# Patient Record
Sex: FEMALE | Race: WHITE | Employment: UNEMPLOYED | URBAN - METROPOLITAN AREA
[De-identification: names, ages, dates, MRNs, and addresses within clinical notes are randomized per-mention and may not be internally consistent; named-entity substitution may affect disease eponyms.]

---

## 2017-01-10 ENCOUNTER — OFFICE VISIT (OUTPATIENT)
Dept: FAMILY MEDICINE CLINIC | Age: 37
End: 2017-01-10

## 2017-01-10 VITALS
HEART RATE: 88 BPM | OXYGEN SATURATION: 98 % | BODY MASS INDEX: 34.75 KG/M2 | HEIGHT: 60 IN | DIASTOLIC BLOOD PRESSURE: 78 MMHG | TEMPERATURE: 98.4 F | WEIGHT: 177 LBS | SYSTOLIC BLOOD PRESSURE: 120 MMHG

## 2017-01-10 DIAGNOSIS — Z01.818 PRE-OP EXAM: Primary | ICD-10-CM

## 2017-01-10 DIAGNOSIS — N30.10 INTERSTITIAL CYSTITIS: ICD-10-CM

## 2017-01-10 PROCEDURE — 99242 OFF/OP CONSLTJ NEW/EST SF 20: CPT | Performed by: FAMILY MEDICINE

## 2017-01-10 PROCEDURE — 93000 ELECTROCARDIOGRAM COMPLETE: CPT | Performed by: FAMILY MEDICINE

## 2017-01-10 ASSESSMENT — ENCOUNTER SYMPTOMS
RESPIRATORY NEGATIVE: 1
GASTROINTESTINAL NEGATIVE: 1

## 2017-01-31 ENCOUNTER — OFFICE VISIT (OUTPATIENT)
Dept: FAMILY MEDICINE CLINIC | Age: 37
End: 2017-01-31

## 2017-01-31 VITALS
HEIGHT: 60 IN | BODY MASS INDEX: 35.14 KG/M2 | SYSTOLIC BLOOD PRESSURE: 102 MMHG | WEIGHT: 179 LBS | DIASTOLIC BLOOD PRESSURE: 72 MMHG

## 2017-01-31 DIAGNOSIS — J01.00 ACUTE NON-RECURRENT MAXILLARY SINUSITIS: ICD-10-CM

## 2017-01-31 DIAGNOSIS — E66.9 OBESITY (BMI 30.0-34.9): ICD-10-CM

## 2017-01-31 DIAGNOSIS — N30.10 INTERSTITIAL CYSTITIS: Primary | ICD-10-CM

## 2017-01-31 PROCEDURE — 99214 OFFICE O/P EST MOD 30 MIN: CPT | Performed by: FAMILY MEDICINE

## 2017-01-31 RX ORDER — AZITHROMYCIN 250 MG/1
TABLET, FILM COATED ORAL
Qty: 1 PACKET | Refills: 0 | Status: SHIPPED | OUTPATIENT
Start: 2017-01-31 | End: 2017-02-10

## 2017-01-31 ASSESSMENT — ENCOUNTER SYMPTOMS
BACK PAIN: 1
SORE THROAT: 1
NAUSEA: 1
RHINORRHEA: 1
COUGH: 1
SINUS PRESSURE: 1

## 2017-02-21 DIAGNOSIS — E66.9 OBESITY (BMI 30.0-34.9): ICD-10-CM

## 2017-02-22 ENCOUNTER — TELEPHONE (OUTPATIENT)
Dept: FAMILY MEDICINE CLINIC | Age: 37
End: 2017-02-22

## 2017-02-28 ENCOUNTER — OFFICE VISIT (OUTPATIENT)
Dept: FAMILY MEDICINE CLINIC | Age: 37
End: 2017-02-28

## 2017-02-28 VITALS
WEIGHT: 173 LBS | BODY MASS INDEX: 32.66 KG/M2 | SYSTOLIC BLOOD PRESSURE: 122 MMHG | HEIGHT: 61 IN | DIASTOLIC BLOOD PRESSURE: 82 MMHG

## 2017-02-28 DIAGNOSIS — K42.9 UMBILICAL HERNIA WITHOUT OBSTRUCTION AND WITHOUT GANGRENE: Primary | ICD-10-CM

## 2017-02-28 DIAGNOSIS — E66.9 OBESITY (BMI 30.0-34.9): ICD-10-CM

## 2017-02-28 PROCEDURE — 99214 OFFICE O/P EST MOD 30 MIN: CPT | Performed by: FAMILY MEDICINE

## 2017-02-28 ASSESSMENT — ENCOUNTER SYMPTOMS
ABDOMINAL PAIN: 1
RESPIRATORY NEGATIVE: 1

## 2017-03-09 ENCOUNTER — INITIAL CONSULT (OUTPATIENT)
Dept: SURGERY | Age: 37
End: 2017-03-09

## 2017-03-09 VITALS
DIASTOLIC BLOOD PRESSURE: 60 MMHG | SYSTOLIC BLOOD PRESSURE: 100 MMHG | WEIGHT: 173 LBS | HEIGHT: 61 IN | BODY MASS INDEX: 32.66 KG/M2

## 2017-03-09 DIAGNOSIS — K42.9 UMBILICAL HERNIA WITHOUT OBSTRUCTION AND WITHOUT GANGRENE: Primary | ICD-10-CM

## 2017-03-09 PROCEDURE — 99243 OFF/OP CNSLTJ NEW/EST LOW 30: CPT | Performed by: SURGERY

## 2017-03-09 ASSESSMENT — ENCOUNTER SYMPTOMS
ABDOMINAL PAIN: 1
ABDOMINAL DISTENTION: 1

## 2017-03-17 ENCOUNTER — OFFICE VISIT (OUTPATIENT)
Dept: FAMILY MEDICINE CLINIC | Age: 37
End: 2017-03-17

## 2017-03-17 VITALS
HEIGHT: 60 IN | OXYGEN SATURATION: 98 % | HEART RATE: 107 BPM | SYSTOLIC BLOOD PRESSURE: 116 MMHG | WEIGHT: 173 LBS | DIASTOLIC BLOOD PRESSURE: 80 MMHG | TEMPERATURE: 97.5 F | BODY MASS INDEX: 33.96 KG/M2

## 2017-03-17 DIAGNOSIS — Z01.818 PRE-OP EXAM: Primary | ICD-10-CM

## 2017-03-17 DIAGNOSIS — K42.9 UMBILICAL HERNIA WITHOUT OBSTRUCTION AND WITHOUT GANGRENE: ICD-10-CM

## 2017-03-17 DIAGNOSIS — E66.9 OBESITY (BMI 30.0-34.9): ICD-10-CM

## 2017-03-17 PROCEDURE — 99242 OFF/OP CONSLTJ NEW/EST SF 20: CPT | Performed by: FAMILY MEDICINE

## 2017-03-17 PROCEDURE — 93000 ELECTROCARDIOGRAM COMPLETE: CPT | Performed by: FAMILY MEDICINE

## 2017-03-17 ASSESSMENT — ENCOUNTER SYMPTOMS
RESPIRATORY NEGATIVE: 1
ABDOMINAL PAIN: 1

## 2017-03-22 ENCOUNTER — PAT TELEPHONE (OUTPATIENT)
Dept: PREADMISSION TESTING | Age: 37
End: 2017-03-22

## 2017-03-22 VITALS — WEIGHT: 173 LBS | BODY MASS INDEX: 32.66 KG/M2 | HEIGHT: 61 IN

## 2017-03-23 ENCOUNTER — SURG/PROC ORDERS (OUTPATIENT)
Dept: ANESTHESIOLOGY | Age: 37
End: 2017-03-23

## 2017-03-23 RX ORDER — SODIUM CHLORIDE 0.9 % (FLUSH) 0.9 %
10 SYRINGE (ML) INJECTION PRN
Status: CANCELLED | OUTPATIENT
Start: 2017-03-23

## 2017-03-23 RX ORDER — SODIUM CHLORIDE 9 MG/ML
INJECTION, SOLUTION INTRAVENOUS CONTINUOUS
Status: CANCELLED | OUTPATIENT
Start: 2017-03-23

## 2017-03-23 RX ORDER — SODIUM CHLORIDE 0.9 % (FLUSH) 0.9 %
10 SYRINGE (ML) INJECTION EVERY 12 HOURS SCHEDULED
Status: CANCELLED | OUTPATIENT
Start: 2017-03-23

## 2017-03-24 ENCOUNTER — HOSPITAL ENCOUNTER (OUTPATIENT)
Dept: SURGERY | Age: 37
Discharge: HOME | End: 2017-03-24
Attending: SURGERY | Admitting: SURGERY

## 2017-03-24 VITALS
SYSTOLIC BLOOD PRESSURE: 109 MMHG | TEMPERATURE: 97.8 F | DIASTOLIC BLOOD PRESSURE: 72 MMHG | OXYGEN SATURATION: 95 % | HEART RATE: 68 BPM | RESPIRATION RATE: 14 BRPM

## 2017-03-24 LAB
A/G RATIO: 2 (ref 1.1–2.2)
ALBUMIN SERPL-MCNC: 4.2 G/DL (ref 3.4–5)
ALP BLD-CCNC: 70 U/L (ref 40–129)
ALT SERPL-CCNC: 20 U/L (ref 10–40)
ANION GAP SERPL CALCULATED.3IONS-SCNC: 13 MMOL/L (ref 3–16)
AST SERPL-CCNC: 15 U/L (ref 15–37)
BILIRUB SERPL-MCNC: <0.2 MG/DL (ref 0–1)
BUN BLDV-MCNC: 16 MG/DL (ref 7–20)
CALCIUM SERPL-MCNC: 9.3 MG/DL (ref 8.3–10.6)
CHLORIDE BLD-SCNC: 104 MMOL/L (ref 99–110)
CO2: 21 MMOL/L (ref 21–32)
CREAT SERPL-MCNC: 0.7 MG/DL (ref 0.6–1.1)
GFR AFRICAN AMERICAN: >60
GFR NON-AFRICAN AMERICAN: >60
GLOBULIN: 2.1 G/DL
GLUCOSE BLD-MCNC: 98 MG/DL (ref 70–99)
HCT VFR BLD CALC: 39.9 % (ref 36–48)
HEMOGLOBIN: 13.2 G/DL (ref 12–16)
MCH RBC QN AUTO: 29.6 PG (ref 26–34)
MCHC RBC AUTO-ENTMCNC: 33.1 G/DL (ref 31–36)
MCV RBC AUTO: 89.6 FL (ref 80–100)
PDW BLD-RTO: 14 % (ref 12.4–15.4)
PLATELET # BLD: 233 K/UL (ref 135–450)
PMV BLD AUTO: 7.8 FL (ref 5–10.5)
POTASSIUM SERPL-SCNC: 3.6 MMOL/L (ref 3.5–5.1)
RBC # BLD: 4.45 M/UL (ref 4–5.2)
SODIUM BLD-SCNC: 138 MMOL/L (ref 136–145)
TOTAL PROTEIN: 6.3 G/DL (ref 6.4–8.2)
WBC # BLD: 12.2 K/UL (ref 4–11)

## 2017-03-24 PROCEDURE — 49585 REPAIR UMBILICAL HERN,5+Y/O,REDUC: CPT | Performed by: SURGERY

## 2017-03-24 RX ORDER — PROMETHAZINE HYDROCHLORIDE 25 MG/ML
6.25 INJECTION, SOLUTION INTRAMUSCULAR; INTRAVENOUS
Status: COMPLETED | OUTPATIENT
Start: 2017-03-24 | End: 2017-03-24

## 2017-03-24 RX ORDER — FENTANYL CITRATE 50 UG/ML
50 INJECTION, SOLUTION INTRAMUSCULAR; INTRAVENOUS EVERY 5 MIN PRN
Status: DISCONTINUED | OUTPATIENT
Start: 2017-03-24 | End: 2017-03-25 | Stop reason: HOSPADM

## 2017-03-24 RX ORDER — MORPHINE SULFATE 2 MG/ML
1 INJECTION, SOLUTION INTRAMUSCULAR; INTRAVENOUS EVERY 5 MIN PRN
Status: DISCONTINUED | OUTPATIENT
Start: 2017-03-24 | End: 2017-03-25 | Stop reason: HOSPADM

## 2017-03-24 RX ORDER — OXYCODONE HYDROCHLORIDE AND ACETAMINOPHEN 5; 325 MG/1; MG/1
2 TABLET ORAL PRN
Status: ACTIVE | OUTPATIENT
Start: 2017-03-24 | End: 2017-03-24

## 2017-03-24 RX ORDER — ALPRAZOLAM 0.5 MG/1
0.5 TABLET ORAL NIGHTLY PRN
COMMUNITY
End: 2017-10-12 | Stop reason: SDUPTHER

## 2017-03-24 RX ORDER — SODIUM CHLORIDE 0.9 % (FLUSH) 0.9 %
10 SYRINGE (ML) INJECTION EVERY 12 HOURS SCHEDULED
Status: DISCONTINUED | OUTPATIENT
Start: 2017-03-24 | End: 2017-03-25 | Stop reason: HOSPADM

## 2017-03-24 RX ORDER — MEPERIDINE HYDROCHLORIDE 25 MG/ML
12.5 INJECTION INTRAMUSCULAR; INTRAVENOUS; SUBCUTANEOUS EVERY 5 MIN PRN
Status: DISCONTINUED | OUTPATIENT
Start: 2017-03-24 | End: 2017-03-25 | Stop reason: HOSPADM

## 2017-03-24 RX ORDER — MORPHINE SULFATE 2 MG/ML
2 INJECTION, SOLUTION INTRAMUSCULAR; INTRAVENOUS EVERY 5 MIN PRN
Status: DISCONTINUED | OUTPATIENT
Start: 2017-03-24 | End: 2017-03-25 | Stop reason: HOSPADM

## 2017-03-24 RX ORDER — SODIUM CHLORIDE 9 MG/ML
INJECTION, SOLUTION INTRAVENOUS CONTINUOUS
Status: DISCONTINUED | OUTPATIENT
Start: 2017-03-24 | End: 2017-03-25 | Stop reason: HOSPADM

## 2017-03-24 RX ORDER — SODIUM CHLORIDE 0.9 % (FLUSH) 0.9 %
10 SYRINGE (ML) INJECTION PRN
Status: DISCONTINUED | OUTPATIENT
Start: 2017-03-24 | End: 2017-03-25 | Stop reason: HOSPADM

## 2017-03-24 RX ORDER — ONDANSETRON 2 MG/ML
4 INJECTION INTRAMUSCULAR; INTRAVENOUS
Status: COMPLETED | OUTPATIENT
Start: 2017-03-24 | End: 2017-03-24

## 2017-03-24 RX ORDER — FENTANYL CITRATE 50 UG/ML
25 INJECTION, SOLUTION INTRAMUSCULAR; INTRAVENOUS EVERY 5 MIN PRN
Status: DISCONTINUED | OUTPATIENT
Start: 2017-03-24 | End: 2017-03-25 | Stop reason: HOSPADM

## 2017-03-24 RX ORDER — OXYCODONE HYDROCHLORIDE AND ACETAMINOPHEN 5; 325 MG/1; MG/1
1 TABLET ORAL PRN
Status: ACTIVE | OUTPATIENT
Start: 2017-03-24 | End: 2017-03-24

## 2017-03-24 RX ORDER — OXYCODONE HYDROCHLORIDE 5 MG/1
10 TABLET ORAL
Status: ACTIVE | OUTPATIENT
Start: 2017-03-24 | End: 2017-03-24

## 2017-03-24 RX ORDER — APREPITANT 40 MG/1
40 CAPSULE ORAL ONCE
Status: COMPLETED | OUTPATIENT
Start: 2017-03-24 | End: 2017-03-24

## 2017-03-24 RX ADMIN — APREPITANT 40 MG: 40 CAPSULE ORAL at 07:09

## 2017-03-24 RX ADMIN — FENTANYL CITRATE 50 MCG: 50 INJECTION, SOLUTION INTRAMUSCULAR; INTRAVENOUS at 09:27

## 2017-03-24 RX ADMIN — SODIUM CHLORIDE: 9 INJECTION, SOLUTION INTRAVENOUS at 07:12

## 2017-03-24 RX ADMIN — FENTANYL CITRATE 25 MCG: 50 INJECTION, SOLUTION INTRAMUSCULAR; INTRAVENOUS at 10:24

## 2017-03-24 RX ADMIN — FENTANYL CITRATE 50 MCG: 50 INJECTION, SOLUTION INTRAMUSCULAR; INTRAVENOUS at 09:04

## 2017-03-24 RX ADMIN — ONDANSETRON 4 MG: 2 INJECTION INTRAMUSCULAR; INTRAVENOUS at 09:22

## 2017-03-24 RX ADMIN — PROMETHAZINE HYDROCHLORIDE 6.25 MG: 25 INJECTION, SOLUTION INTRAMUSCULAR; INTRAVENOUS at 09:35

## 2017-03-24 ASSESSMENT — PAIN SCALES - GENERAL: PAINLEVEL_OUTOF10: 7

## 2017-03-24 ASSESSMENT — ENCOUNTER SYMPTOMS: SHORTNESS OF BREATH: 0

## 2017-04-06 ENCOUNTER — OFFICE VISIT (OUTPATIENT)
Dept: SURGERY | Age: 37
End: 2017-04-06

## 2017-04-06 DIAGNOSIS — K42.9 UMBILICAL HERNIA WITHOUT OBSTRUCTION AND WITHOUT GANGRENE: Primary | ICD-10-CM

## 2017-04-06 PROCEDURE — 99024 POSTOP FOLLOW-UP VISIT: CPT | Performed by: SURGERY

## 2017-05-18 ENCOUNTER — OFFICE VISIT (OUTPATIENT)
Dept: SURGERY | Age: 37
End: 2017-05-18

## 2017-05-18 DIAGNOSIS — K42.9 UMBILICAL HERNIA WITHOUT OBSTRUCTION AND WITHOUT GANGRENE: Primary | ICD-10-CM

## 2017-05-18 PROCEDURE — 99024 POSTOP FOLLOW-UP VISIT: CPT | Performed by: SURGERY

## 2017-05-23 ENCOUNTER — OFFICE VISIT (OUTPATIENT)
Dept: FAMILY MEDICINE CLINIC | Age: 37
End: 2017-05-23

## 2017-05-23 VITALS
BODY MASS INDEX: 34.29 KG/M2 | SYSTOLIC BLOOD PRESSURE: 116 MMHG | WEIGHT: 181.6 LBS | HEIGHT: 61 IN | DIASTOLIC BLOOD PRESSURE: 76 MMHG
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
## 2017-05-23 DIAGNOSIS — B00.1 FEVER BLISTER: Primary | ICD-10-CM

## 2017-05-23 DIAGNOSIS — R53.83 OTHER FATIGUE: ICD-10-CM

## 2017-05-23 DIAGNOSIS — K21.9 GASTROESOPHAGEAL REFLUX DISEASE WITHOUT ESOPHAGITIS: ICD-10-CM

## 2017-05-23 DIAGNOSIS — R60.9 EDEMA, UNSPECIFIED TYPE: ICD-10-CM

## 2017-05-23 DIAGNOSIS — R00.2 HEART PALPITATIONS: ICD-10-CM

## 2017-05-23 DIAGNOSIS — L65.9 HAIR LOSS: ICD-10-CM

## 2017-05-23 LAB
A/G RATIO: 2.6 (ref 1.1–2.2)
ALBUMIN SERPL-MCNC: 4.4 G/DL (ref 3.4–5)
ALP BLD-CCNC: 91 U/L (ref 40–129)
ALT SERPL-CCNC: 70 U/L (ref 10–40)
ANION GAP SERPL CALCULATED.3IONS-SCNC: 16 MMOL/L (ref 3–16)
AST SERPL-CCNC: 37 U/L (ref 15–37)
BILIRUB SERPL-MCNC: 0.3 MG/DL (ref 0–1)
BUN BLDV-MCNC: 11 MG/DL (ref 7–20)
CALCIUM SERPL-MCNC: 9.4 MG/DL (ref 8.3–10.6)
CHLORIDE BLD-SCNC: 106 MMOL/L (ref 99–110)
CO2: 21 MMOL/L (ref 21–32)
CREAT SERPL-MCNC: 0.9 MG/DL (ref 0.6–1.1)
GFR AFRICAN AMERICAN: >60
GFR NON-AFRICAN AMERICAN: >60
GLOBULIN: 1.7 G/DL
GLUCOSE BLD-MCNC: 113 MG/DL (ref 70–99)
HCT VFR BLD CALC: 42.1 % (ref 36–48)
HEMOGLOBIN: 13.6 G/DL (ref 12–16)
MCH RBC QN AUTO: 29.5 PG (ref 26–34)
MCHC RBC AUTO-ENTMCNC: 32.3 G/DL (ref 31–36)
MCV RBC AUTO: 91.3 FL (ref 80–100)
PDW BLD-RTO: 14 % (ref 12.4–15.4)
PLATELET # BLD: 250 K/UL (ref 135–450)
PMV BLD AUTO: 8.5 FL (ref 5–10.5)
POTASSIUM SERPL-SCNC: 4.1 MMOL/L (ref 3.5–5.1)
RBC # BLD: 4.61 M/UL (ref 4–5.2)
SODIUM BLD-SCNC: 143 MMOL/L (ref 136–145)
TOTAL PROTEIN: 6.1 G/DL (ref 6.4–8.2)
TSH SERPL DL<=0.05 MIU/L-ACNC: 2.91 UIU/ML (ref 0.27–4.2)
WBC # BLD: 8.6 K/UL (ref 4–11)

## 2017-05-23 PROCEDURE — 99214 OFFICE O/P EST MOD 30 MIN: CPT | Performed by: FAMILY MEDICINE

## 2017-05-23 PROCEDURE — 36415 COLL VENOUS BLD VENIPUNCTURE: CPT | Performed by: FAMILY MEDICINE

## 2017-05-23 RX ORDER — FAMOTIDINE 40 MG/1
40 TABLET, FILM COATED ORAL 2 TIMES DAILY
Qty: 60 TABLET | Refills: 3 | Status: SHIPPED | OUTPATIENT
Start: 2017-05-23 | End: 2019-09-17 | Stop reason: SDUPTHER

## 2017-05-23 RX ORDER — FAMCICLOVIR 500 MG/1
500 TABLET, FILM COATED ORAL 3 TIMES DAILY
Qty: 30 TABLET | Refills: 2 | Status: SHIPPED | OUTPATIENT
Start: 2017-05-23 | End: 2018-05-14 | Stop reason: SDUPTHER

## 2017-05-23 RX ORDER — HYDROCHLOROTHIAZIDE 25 MG/1
25 TABLET ORAL DAILY
Qty: 30 TABLET | Refills: 3 | Status: SHIPPED | OUTPATIENT
Start: 2017-05-23 | End: 2021-12-20

## 2017-05-23 RX ORDER — PROPRANOLOL HYDROCHLORIDE 20 MG/1
20 TABLET ORAL 3 TIMES DAILY
Qty: 90 TABLET | Refills: 2 | Status: SHIPPED | OUTPATIENT
Start: 2017-05-23

## 2017-05-23 RX ORDER — PHENAZOPYRIDINE HYDROCHLORIDE 200 MG/1
200 TABLET, FILM COATED ORAL 3 TIMES DAILY PRN
Qty: 30 TABLET | Refills: 0 | Status: SHIPPED | OUTPATIENT
Start: 2017-05-23 | End: 2017-05-26

## 2017-05-23 ASSESSMENT — ENCOUNTER SYMPTOMS
GASTROINTESTINAL NEGATIVE: 1
RESPIRATORY NEGATIVE: 1
BACK PAIN: 1

## 2017-05-24 LAB
ANA INTERPRETATION: NORMAL
ANTI-NUCLEAR ANTIBODY (ANA): NEGATIVE

## 2017-05-25 LAB — LYME, EIA: 0.49 LIV (ref 0–1.2)

## 2017-05-30 ENCOUNTER — TELEPHONE (OUTPATIENT)
Dept: INTERNAL MEDICINE CLINIC | Age: 37
End: 2017-05-30

## 2017-05-30 ENCOUNTER — OFFICE VISIT (OUTPATIENT)
Dept: FAMILY MEDICINE CLINIC | Age: 37
End: 2017-05-30

## 2017-05-30 VITALS
SYSTOLIC BLOOD PRESSURE: 112 MMHG | WEIGHT: 179.4 LBS | DIASTOLIC BLOOD PRESSURE: 76 MMHG | BODY MASS INDEX: 33.87 KG/M2 | HEIGHT: 61 IN

## 2017-05-30 DIAGNOSIS — R53.83 FATIGUE, UNSPECIFIED TYPE: ICD-10-CM

## 2017-05-30 DIAGNOSIS — M25.50 POLYARTHRALGIA: Primary | ICD-10-CM

## 2017-05-30 DIAGNOSIS — R60.9 EDEMA, UNSPECIFIED TYPE: ICD-10-CM

## 2017-05-30 PROCEDURE — 99214 OFFICE O/P EST MOD 30 MIN: CPT | Performed by: FAMILY MEDICINE

## 2017-05-30 ASSESSMENT — ENCOUNTER SYMPTOMS: RESPIRATORY NEGATIVE: 1

## 2017-06-06 ENCOUNTER — OFFICE VISIT (OUTPATIENT)
Dept: RHEUMATOLOGY | Age: 37
End: 2017-06-06

## 2017-06-06 VITALS
SYSTOLIC BLOOD PRESSURE: 116 MMHG | HEART RATE: 100 BPM | DIASTOLIC BLOOD PRESSURE: 60 MMHG | BODY MASS INDEX: 34.01 KG/M2 | WEIGHT: 180 LBS | OXYGEN SATURATION: 98 %

## 2017-06-06 DIAGNOSIS — M79.7 FIBROMYALGIA: ICD-10-CM

## 2017-06-06 DIAGNOSIS — Z13.89 SCREENING FOR RHEUMATIC DISORDER: Primary | ICD-10-CM

## 2017-06-06 DIAGNOSIS — B18.2 CHRONIC HEPATITIS C WITHOUT HEPATIC COMA (HCC): ICD-10-CM

## 2017-06-06 PROCEDURE — 99244 OFF/OP CNSLTJ NEW/EST MOD 40: CPT | Performed by: INTERNAL MEDICINE

## 2017-06-06 RX ORDER — TIZANIDINE 4 MG/1
4 TABLET ORAL
COMMUNITY
End: 2017-10-05 | Stop reason: ALTCHOICE

## 2017-06-15 DIAGNOSIS — Z13.89 SCREENING FOR RHEUMATIC DISORDER: ICD-10-CM

## 2017-06-15 DIAGNOSIS — B18.2 CHRONIC HEPATITIS C WITHOUT HEPATIC COMA (HCC): ICD-10-CM

## 2017-06-15 DIAGNOSIS — M79.7 FIBROMYALGIA: ICD-10-CM

## 2017-06-15 LAB
C-REACTIVE PROTEIN: 1.8 MG/L (ref 0–5.1)
C3 COMPLEMENT: 149.3 MG/DL (ref 90–180)
C4 COMPLEMENT: 18.8 MG/DL (ref 10–40)
HEPATITIS B CORE IGM ANTIBODY: NORMAL
HEPATITIS B SURFACE ANTIGEN INTERPRETATION: NORMAL
RHEUMATOID FACTOR: <10 IU/ML
SEDIMENTATION RATE, ERYTHROCYTE: 9 MM/HR (ref 0–20)
TOTAL CK: 47 U/L (ref 26–192)
TSH REFLEX FT4: 3.37 UIU/ML (ref 0.27–4.2)
VITAMIN B-12: 259 PG/ML (ref 211–911)
VITAMIN D 25-HYDROXY: 27.2 NG/ML

## 2017-06-16 LAB
ENA TO RNP ANTIBODY: NEGATIVE EU
ENA TO SMITH (SM) ANTIBODY: NEGATIVE EU
ENA TO SSA (RO) ANTIBODY: NEGATIVE EU
ENA TO SSB (LA) ANTIBODY: NEGATIVE EU

## 2017-06-17 LAB
ANA BY IFA: NORMAL
ANTICARDIOLIPIN IGA ANTIBODY: 0 APL (ref 0–11)
ANTICARDIOLIPIN IGG ANTIBODY: 7 GPL (ref 0–14)
BETA-2 GLYCOPROTEIN 1 IGG ANTIBODY: 0 SGU (ref 0–20)
BETA-2 GLYCOPROTEIN 1 IGM ANTIBODY: 2 SMU (ref 0–20)
CARDIOLIPIN AB IGM: 5 MPL (ref 0–12)
CCP IGG ANTIBODIES: 2 UNITS (ref 0–19)
DOUBLE STRANDED DNA AB, IGG: NORMAL
DRVVT CONFIRMATION TEST: ABNORMAL RATIO
DRVVT SCREEN: 37 SEC (ref 33–44)
DRVVT,DIL: ABNORMAL SEC (ref 33–44)
HEXAGONAL PHOSPHOLIPID NEUTRALIZAT TEST: ABNORMAL
LUPUS ANTICOAG INTERP: ABNORMAL
PLT NEUTA: ABNORMAL
PT D: 11.8 SEC (ref 12–15.5)
PTT D: 39 SEC (ref 32–48)
PTT-D CORR REFLEX: ABNORMAL SEC (ref 32–48)
PTT-HEPARIN NEUTRALIZED: ABNORMAL SEC (ref 32–48)
REPTILASE TIME: ABNORMAL SEC
THROMBIN TIME: ABNORMAL SEC (ref 14.7–19.5)

## 2017-06-20 ENCOUNTER — OFFICE VISIT (OUTPATIENT)
Dept: FAMILY MEDICINE CLINIC | Age: 37
End: 2017-06-20

## 2017-06-20 VITALS
WEIGHT: 178 LBS | DIASTOLIC BLOOD PRESSURE: 84 MMHG | SYSTOLIC BLOOD PRESSURE: 122 MMHG | BODY MASS INDEX: 33.61 KG/M2 | HEIGHT: 61 IN | TEMPERATURE: 98.3 F | HEART RATE: 96 BPM | OXYGEN SATURATION: 97 %

## 2017-06-20 DIAGNOSIS — N30.10 IC (INTERSTITIAL CYSTITIS): Primary | ICD-10-CM

## 2017-06-20 DIAGNOSIS — Z01.818 PREOP EXAMINATION: ICD-10-CM

## 2017-06-20 LAB — CRYOGLOBULIN, QUALITATIVE: NORMAL

## 2017-06-20 PROCEDURE — 99242 OFF/OP CONSLTJ NEW/EST SF 20: CPT | Performed by: FAMILY MEDICINE

## 2017-06-20 RX ORDER — EPINEPHRINE 0.3 MG/.3ML
0.3 INJECTION SUBCUTANEOUS ONCE
Qty: 0.3 ML | Refills: 1 | Status: ON HOLD | OUTPATIENT
Start: 2017-06-20 | End: 2019-04-12 | Stop reason: HOSPADM

## 2017-06-20 RX ORDER — PHENTERMINE HYDROCHLORIDE 37.5 MG/1
37.5 TABLET ORAL
Qty: 30 TABLET | Refills: 0 | Status: SHIPPED | OUTPATIENT
Start: 2017-06-20 | End: 2017-07-20

## 2017-06-20 ASSESSMENT — ENCOUNTER SYMPTOMS
GASTROINTESTINAL NEGATIVE: 1
RESPIRATORY NEGATIVE: 1

## 2017-07-06 ENCOUNTER — OFFICE VISIT (OUTPATIENT)
Dept: RHEUMATOLOGY | Age: 37
End: 2017-07-06

## 2017-07-06 VITALS
HEART RATE: 100 BPM | DIASTOLIC BLOOD PRESSURE: 82 MMHG | HEIGHT: 61 IN | BODY MASS INDEX: 32.59 KG/M2 | SYSTOLIC BLOOD PRESSURE: 126 MMHG | WEIGHT: 172.6 LBS

## 2017-07-06 DIAGNOSIS — M25.50 POLYARTHRALGIA: ICD-10-CM

## 2017-07-06 DIAGNOSIS — M79.7 FIBROMYALGIA: Primary | ICD-10-CM

## 2017-07-06 DIAGNOSIS — R51.9 CHRONIC NONINTRACTABLE HEADACHE, UNSPECIFIED HEADACHE TYPE: ICD-10-CM

## 2017-07-06 DIAGNOSIS — G89.29 CHRONIC NONINTRACTABLE HEADACHE, UNSPECIFIED HEADACHE TYPE: ICD-10-CM

## 2017-07-06 DIAGNOSIS — B18.2 CHRONIC HEPATITIS C WITHOUT HEPATIC COMA (HCC): ICD-10-CM

## 2017-07-06 PROCEDURE — 99214 OFFICE O/P EST MOD 30 MIN: CPT | Performed by: INTERNAL MEDICINE

## 2017-07-19 ENCOUNTER — OFFICE VISIT (OUTPATIENT)
Dept: FAMILY MEDICINE CLINIC | Age: 37
End: 2017-07-19

## 2017-07-19 VITALS
HEIGHT: 61 IN | WEIGHT: 172.6 LBS | BODY MASS INDEX: 32.59 KG/M2 | DIASTOLIC BLOOD PRESSURE: 86 MMHG | SYSTOLIC BLOOD PRESSURE: 124 MMHG

## 2017-07-19 DIAGNOSIS — E66.09 NON MORBID OBESITY DUE TO EXCESS CALORIES: ICD-10-CM

## 2017-07-19 DIAGNOSIS — G45.9 TRANSIENT CEREBRAL ISCHEMIA, UNSPECIFIED TYPE: ICD-10-CM

## 2017-07-19 DIAGNOSIS — A09 TRAVELER'S DIARRHEA: ICD-10-CM

## 2017-07-19 DIAGNOSIS — N30.10 INTERSTITIAL CYSTITIS: Primary | ICD-10-CM

## 2017-07-19 PROCEDURE — 99214 OFFICE O/P EST MOD 30 MIN: CPT | Performed by: FAMILY MEDICINE

## 2017-07-19 RX ORDER — PHENTERMINE HYDROCHLORIDE 37.5 MG/1
37.5 TABLET ORAL
Qty: 30 TABLET | Refills: 0 | Status: SHIPPED | OUTPATIENT
Start: 2017-07-19 | End: 2017-08-18 | Stop reason: SDUPTHER

## 2017-07-19 RX ORDER — CIPROFLOXACIN 500 MG/1
500 TABLET, FILM COATED ORAL 2 TIMES DAILY
Qty: 20 TABLET | Refills: 0 | Status: SHIPPED | OUTPATIENT
Start: 2017-07-19 | End: 2017-07-29

## 2017-07-19 ASSESSMENT — ENCOUNTER SYMPTOMS
GASTROINTESTINAL NEGATIVE: 1
RESPIRATORY NEGATIVE: 1

## 2017-07-24 ENCOUNTER — HOSPITAL ENCOUNTER (OUTPATIENT)
Dept: CT IMAGING | Age: 37
Discharge: HOME | End: 2017-07-24
Attending: FAMILY MEDICINE | Admitting: FAMILY MEDICINE

## 2017-07-24 DIAGNOSIS — G45.9 TRANSIENT CEREBRAL ISCHEMIC ATTACK: ICD-10-CM

## 2017-07-24 DIAGNOSIS — G45.9 TRANSIENT CEREBRAL ISCHEMIA, UNSPECIFIED TYPE: ICD-10-CM

## 2017-08-18 ENCOUNTER — OFFICE VISIT (OUTPATIENT)
Dept: FAMILY MEDICINE CLINIC | Age: 37
End: 2017-08-18

## 2017-08-18 VITALS
BODY MASS INDEX: 31.53 KG/M2 | WEIGHT: 167 LBS | HEIGHT: 61 IN | SYSTOLIC BLOOD PRESSURE: 118 MMHG | DIASTOLIC BLOOD PRESSURE: 70 MMHG

## 2017-08-18 DIAGNOSIS — J01.00 ACUTE NON-RECURRENT MAXILLARY SINUSITIS: Primary | ICD-10-CM

## 2017-08-18 DIAGNOSIS — E66.09 NON MORBID OBESITY DUE TO EXCESS CALORIES: ICD-10-CM

## 2017-08-18 DIAGNOSIS — F32.9 REACTIVE DEPRESSION: ICD-10-CM

## 2017-08-18 PROCEDURE — 99214 OFFICE O/P EST MOD 30 MIN: CPT | Performed by: FAMILY MEDICINE

## 2017-08-18 RX ORDER — BUPROPION HYDROCHLORIDE 150 MG/1
150 TABLET, EXTENDED RELEASE ORAL 2 TIMES DAILY
Qty: 60 TABLET | Refills: 3 | Status: SHIPPED | OUTPATIENT
Start: 2017-08-18 | End: 2017-10-30 | Stop reason: SDUPTHER

## 2017-08-18 RX ORDER — PHENTERMINE HYDROCHLORIDE 37.5 MG/1
37.5 TABLET ORAL
Qty: 30 TABLET | Refills: 0 | Status: SHIPPED | OUTPATIENT
Start: 2017-08-18 | End: 2017-09-17

## 2017-08-18 ASSESSMENT — ENCOUNTER SYMPTOMS
HOARSE VOICE: 1
SINUS PRESSURE: 1
COUGH: 1
SORE THROAT: 0
SWOLLEN GLANDS: 0
SHORTNESS OF BREATH: 0

## 2017-09-14 ENCOUNTER — OFFICE VISIT (OUTPATIENT)
Dept: FAMILY MEDICINE CLINIC | Age: 37
End: 2017-09-14

## 2017-09-14 VITALS
HEIGHT: 61 IN | WEIGHT: 161 LBS | SYSTOLIC BLOOD PRESSURE: 108 MMHG | TEMPERATURE: 97.8 F | BODY MASS INDEX: 30.4 KG/M2 | DIASTOLIC BLOOD PRESSURE: 72 MMHG

## 2017-09-14 DIAGNOSIS — J01.90 ACUTE BACTERIAL SINUSITIS: Primary | ICD-10-CM

## 2017-09-14 DIAGNOSIS — E66.09 NON MORBID OBESITY DUE TO EXCESS CALORIES: ICD-10-CM

## 2017-09-14 DIAGNOSIS — B96.89 ACUTE BACTERIAL SINUSITIS: Primary | ICD-10-CM

## 2017-09-14 PROCEDURE — 99213 OFFICE O/P EST LOW 20 MIN: CPT | Performed by: FAMILY MEDICINE

## 2017-09-14 RX ORDER — AZITHROMYCIN 250 MG/1
TABLET, FILM COATED ORAL
Qty: 1 PACKET | Refills: 0 | Status: SHIPPED | OUTPATIENT
Start: 2017-09-14 | End: 2017-09-24

## 2017-09-14 RX ORDER — PHENTERMINE HYDROCHLORIDE 37.5 MG/1
37.5 TABLET ORAL
Qty: 30 TABLET | Refills: 0 | Status: CANCELLED | OUTPATIENT
Start: 2017-09-14 | End: 2017-10-14

## 2017-09-14 ASSESSMENT — ENCOUNTER SYMPTOMS
SWOLLEN GLANDS: 0
COUGH: 1
SINUS PRESSURE: 1
HOARSE VOICE: 1
SHORTNESS OF BREATH: 0
SORE THROAT: 0

## 2017-10-04 ENCOUNTER — TELEPHONE (OUTPATIENT)
Dept: FAMILY MEDICINE CLINIC | Age: 37
End: 2017-10-04

## 2017-10-04 DIAGNOSIS — E66.9 OBESITY (BMI 30.0-34.9): ICD-10-CM

## 2017-10-04 NOTE — TELEPHONE ENCOUNTER
Pt called to let Dr Robert Shaw know that the liraglutide weight management injection(saxenda 18mg/3ml cost a thousand dollars and she can not afford them. Pt would like to go back on the Qsymia 37.5mg-23m.  Please send rx to [facility] and [facility] [phone](R) [phone](C) Please give pt a call when complete [phone]

## 2017-10-12 ENCOUNTER — OFFICE VISIT (OUTPATIENT)
Dept: FAMILY MEDICINE CLINIC | Age: 37
End: 2017-10-12

## 2017-10-12 VITALS
HEIGHT: 61 IN | BODY MASS INDEX: 29.64 KG/M2 | SYSTOLIC BLOOD PRESSURE: 112 MMHG | DIASTOLIC BLOOD PRESSURE: 84 MMHG | WEIGHT: 157 LBS

## 2017-10-12 DIAGNOSIS — N30.10 INTERSTITIAL CYSTITIS: ICD-10-CM

## 2017-10-12 DIAGNOSIS — F41.1 GAD (GENERALIZED ANXIETY DISORDER): ICD-10-CM

## 2017-10-12 DIAGNOSIS — S90.01XD CONTUSION OF RIGHT ANKLE, SUBSEQUENT ENCOUNTER: Primary | ICD-10-CM

## 2017-10-12 PROCEDURE — 99214 OFFICE O/P EST MOD 30 MIN: CPT | Performed by: FAMILY MEDICINE

## 2017-10-12 RX ORDER — ALPRAZOLAM 0.5 MG/1
0.5 TABLET ORAL 2 TIMES DAILY PRN
Qty: 60 TABLET | Refills: 2 | Status: SHIPPED | OUTPATIENT
Start: 2017-10-12 | End: 2018-10-10 | Stop reason: SDUPTHER

## 2017-10-12 RX ORDER — PHENAZOPYRIDINE HYDROCHLORIDE 200 MG/1
200 TABLET, FILM COATED ORAL 3 TIMES DAILY PRN
Qty: 30 TABLET | Refills: 2 | Status: SHIPPED | OUTPATIENT
Start: 2017-10-12 | End: 2017-12-28 | Stop reason: SDUPTHER

## 2017-10-12 ASSESSMENT — PATIENT HEALTH QUESTIONNAIRE - PHQ9
2. FEELING DOWN, DEPRESSED OR HOPELESS: 3
SUM OF ALL RESPONSES TO PHQ9 QUESTIONS 1 & 2: 3
SUM OF ALL RESPONSES TO PHQ QUESTIONS 1-9: 3
1. LITTLE INTEREST OR PLEASURE IN DOING THINGS: 0

## 2017-10-12 NOTE — PROGRESS NOTES
Subjective:      Patient ID: Didier Yu is a 39 y.o. female. Ankle Pain    The incident occurred more than 1 week ago. The incident occurred at home. The injury mechanism was a fall. The pain is present in the right ankle. The pain is at a severity of 6/10. The pain is moderate. The pain has been worsening since onset. Associated symptoms include an inability to bear weight and a loss of motion. Pertinent negatives include no loss of sensation, muscle weakness, numbness or tingling. She reports no foreign bodies present. The symptoms are aggravated by movement and weight bearing. She has tried non-weight bearing and NSAIDs for the symptoms. The treatment provided no relief. She is not longer seeing pain specialist  She is not going to go back on her pain meds  She does want to continue her anxiety meds   She needs refill     She needs refill on her pyridium  She takes this for flare ups on her Interstitial cystitis   Review of Systems   Genitourinary: Positive for difficulty urinating, frequency and urgency. Musculoskeletal: Positive for arthralgias, gait problem, joint swelling and myalgias. Neurological: Negative for tingling and numbness. Psychiatric/Behavioral: Positive for agitation and sleep disturbance. The patient is nervous/anxious.       YOB: 1980    Date of Visit:  10/12/2017    Allergies   Allergen Reactions    Shellfish-Derived Products Shortness Of Breath    Tape Sandralee Hensen Tape] Rash    Tylenol [Acetaminophen]      History of hep c    Codeine Nausea And Vomiting and Palpitations    Morphine Itching and Nausea And Vomiting     \"makes me crazy\"     Prednisone Hives, Palpitations and Rash       Outpatient Prescriptions Marked as Taking for the 10/12/17 encounter (Office Visit) with Francisco Javier Martinez, DO   Medication Sig Dispense Refill    buPROPion (WELLBUTRIN SR) 150 MG extended release tablet Take 1 tablet by mouth 2 times daily 60 tablet 3    promethazine Future    JACKSON (generalized anxiety disorder)  -     ALPRAZolam (XANAX) 0.5 MG tablet; Take 1 tablet by mouth 2 times daily as needed for Sleep    Interstitial cystitis  -     phenazopyridine (PYRIDIUM) 200 MG tablet;  Take 1 tablet by mouth 3 times daily as needed for Pain

## 2017-10-30 ENCOUNTER — OFFICE VISIT (OUTPATIENT)
Dept: FAMILY MEDICINE CLINIC | Age: 37
End: 2017-10-30

## 2017-10-30 VITALS
BODY MASS INDEX: 29 KG/M2 | SYSTOLIC BLOOD PRESSURE: 112 MMHG | HEIGHT: 61 IN | DIASTOLIC BLOOD PRESSURE: 74 MMHG | WEIGHT: 153.6 LBS

## 2017-10-30 DIAGNOSIS — F32.9 REACTIVE DEPRESSION: Primary | ICD-10-CM

## 2017-10-30 DIAGNOSIS — E66.9 OBESITY (BMI 30.0-34.9): ICD-10-CM

## 2017-10-30 PROCEDURE — 99214 OFFICE O/P EST MOD 30 MIN: CPT | Performed by: FAMILY MEDICINE

## 2017-10-30 RX ORDER — BUPROPION HYDROCHLORIDE 150 MG/1
150 TABLET, EXTENDED RELEASE ORAL 2 TIMES DAILY
Qty: 60 TABLET | Refills: 3 | Status: SHIPPED | OUTPATIENT
Start: 2017-10-30 | End: 2019-03-01 | Stop reason: SDUPTHER

## 2017-10-30 ASSESSMENT — ENCOUNTER SYMPTOMS
RESPIRATORY NEGATIVE: 1
GASTROINTESTINAL NEGATIVE: 1

## 2017-10-30 NOTE — LETTER
[facility]  [address] [place] [address]  Phone: [phone]  Fax: [phone]    Amaury Guadalupe DO        October 30, 2017     Patient: Evy Davila   YOB: 1980   Date of Visit: 10/30/2017       To Whom it May Concern:    Sabine Stanton was seen in my clinic on 10/30/2017. She has a medical excuse from work today. Also her schedule should be limited to working every other day due to her chronic medical conditions. Her lifting at work should be restricted to no more than 10 pounds at any time. These restrictions are in place until further notice. If you have any questions or concerns, please don't hesitate to call.     Sincerely,         Amaury Guadalupe DO

## 2017-10-30 NOTE — LETTER
[facility]  [address] [place] [address]  Phone: [phone]  Fax: [phone]    Sumi Raman DO        October 30, 2017     Patient: Arminda Underwood   YOB: 1980   Date of Visit: 10/30/2017       To Whom it May Concern:    Warden Lawson was seen in my clinic on 10/30/2017. Please excuse her from work on 10/30 due to medical illness. She may return to work on 10/31/2017. If you have any questions or concerns, please don't hesitate to call.     Sincerely,         Sumi Raman DO

## 2017-10-30 NOTE — PROGRESS NOTES
tablet by mouth 3 times daily (Patient taking differently: Take 20 mg by mouth daily ) 90 tablet 2    hydrochlorothiazide (HYDRODIURIL) 25 MG tablet Take 1 tablet by mouth daily 30 tablet 3       Vitals:    10/30/17 1107   BP: 112/74   Weight: 153 lb 9.6 oz (69.7 kg)   Height: 5' 1\" (1.549 m)     Body mass index is 29.02 kg/m². Wt Readings from Last 3 Encounters:   10/30/17 153 lb 9.6 oz (69.7 kg)   10/12/17 157 lb (71.2 kg)   10/05/17 145 lb (65.8 kg)     BP Readings from Last 3 Encounters:   10/30/17 112/74   10/12/17 112/84   10/05/17 133/86       Objective:   Physical Exam   Constitutional: She is oriented to person, place, and time. She appears well-developed and well-nourished. HENT:   Head: Normocephalic. Neck: No thyromegaly present. Cardiovascular: Normal rate, regular rhythm and normal heart sounds. Pulmonary/Chest: Effort normal and breath sounds normal.   Lymphadenopathy:     She has no cervical adenopathy. Neurological: She is alert and oriented to person, place, and time. Psychiatric: She has a normal mood and affect. Her behavior is normal. Judgment and thought content normal.   Nursing note and vitals reviewed. Assessment:      Assessment/plan;  Sulma was seen today for weight management, medication refill, fall and numbness. Diagnoses and all orders for this visit:    Reactive depression  -     buPROPion (WELLBUTRIN SR) 150 MG extended release tablet; Take 1 tablet by mouth 2 times daily    Obesity (BMI 30.0-34.9)  -     Phentermine-Topiramate (QSYMIA) 3.75-23 MG CP24;  Take 1 capsule by mouth daily    note for work written with restrictions  Explained with bmi closing in on 28 she will not be able to take qysmia  Wants to check into contrave

## 2017-11-30 ENCOUNTER — TELEPHONE (OUTPATIENT)
Dept: FAMILY MEDICINE CLINIC | Age: 37
End: 2017-11-30

## 2017-11-30 ENCOUNTER — OFFICE VISIT (OUTPATIENT)
Dept: FAMILY MEDICINE CLINIC | Age: 37
End: 2017-11-30

## 2017-11-30 VITALS
HEIGHT: 60 IN | BODY MASS INDEX: 29.61 KG/M2 | TEMPERATURE: 97.7 F | SYSTOLIC BLOOD PRESSURE: 108 MMHG | DIASTOLIC BLOOD PRESSURE: 80 MMHG | WEIGHT: 150.8 LBS

## 2017-11-30 DIAGNOSIS — E66.9 OBESITY (BMI 30.0-34.9): ICD-10-CM

## 2017-11-30 DIAGNOSIS — J40 BRONCHITIS: Primary | ICD-10-CM

## 2017-11-30 PROCEDURE — 99214 OFFICE O/P EST MOD 30 MIN: CPT | Performed by: FAMILY MEDICINE

## 2017-11-30 RX ORDER — DEXTROMETHORPHAN HYDROBROMIDE AND PROMETHAZINE HYDROCHLORIDE 15; 6.25 MG/5ML; MG/5ML
SYRUP ORAL
Qty: 180 ML | Refills: 2 | Status: SHIPPED | OUTPATIENT
Start: 2017-11-30 | End: 2018-01-09 | Stop reason: ALTCHOICE

## 2017-11-30 RX ORDER — DEXTROMETHORPHAN HYDROBROMIDE AND PROMETHAZINE HYDROCHLORIDE 15; 6.25 MG/5ML; MG/5ML
SYRUP ORAL
Qty: 1 BOTTLE | Refills: 0 | Status: CANCELLED | OUTPATIENT
Start: 2017-11-30

## 2017-11-30 RX ORDER — AZITHROMYCIN 250 MG/1
TABLET, FILM COATED ORAL
Qty: 1 PACKET | Refills: 0 | Status: SHIPPED | OUTPATIENT
Start: 2017-11-30 | End: 2018-11-09 | Stop reason: SDUPTHER

## 2017-11-30 RX ORDER — AMOXICILLIN AND CLAVULANATE POTASSIUM 250; 62.5 MG/5ML; MG/5ML
500 POWDER, FOR SUSPENSION ORAL 2 TIMES DAILY
Qty: 200 ML | Refills: 0 | Status: CANCELLED | OUTPATIENT
Start: 2017-11-30 | End: 2017-12-10

## 2017-11-30 ASSESSMENT — ENCOUNTER SYMPTOMS
SINUS PAIN: 1
ABDOMINAL PAIN: 0
RHINORRHEA: 1
NAUSEA: 1
COUGH: 1
DIARRHEA: 0
SORE THROAT: 1
WHEEZING: 1
SWOLLEN GLANDS: 0

## 2017-11-30 NOTE — LETTER
[facility]  [address] [place] [address]  Phone: [phone]  Fax: [phone]    Adriano Montana DO        November 30, 2017     Patient: Chikis Townsend   YOB: 1980   Date of Visit: 11/30/2017       To Whom it May Concern:    Jesse House was seen in my clinic on 11/30/2017. 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 If you have any questions or concerns, please don't hesitate to call.     Sincerely,         Adriano Montana DO

## 2017-11-30 NOTE — PROGRESS NOTES
suppository Place 0.5 suppositories rectally every 6 hours as needed for Nausea 12 suppository 0    famciclovir (FAMVIR) 500 MG tablet Take 1 tablet by mouth 3 times daily 30 tablet 2    famotidine (PEPCID) 40 MG tablet Take 1 tablet by mouth 2 times daily 60 tablet 3    propranolol (INDERAL) 20 MG tablet Take 1 tablet by mouth 3 times daily (Patient taking differently: Take 20 mg by mouth daily ) 90 tablet 2    hydrochlorothiazide (HYDRODIURIL) 25 MG tablet Take 1 tablet by mouth daily 30 tablet 3       Vitals:    11/30/17 1121   BP: 108/80   Temp: 97.7 °F (36.5 °C)   Weight: 150 lb 12.8 oz (68.4 kg)   Height: 5' (1.524 m)     Body mass index is 29.45 kg/m². Wt Readings from Last 3 Encounters:   11/30/17 150 lb 12.8 oz (68.4 kg)   10/30/17 153 lb 9.6 oz (69.7 kg)   10/12/17 157 lb (71.2 kg)     BP Readings from Last 3 Encounters:   11/30/17 108/80   10/30/17 112/74   10/12/17 112/84       Objective:   Physical Exam   Constitutional: She is oriented to person, place, and time. She appears well-developed and well-nourished. No distress. HENT:   Head: Normocephalic. Right Ear: Tympanic membrane, external ear and ear canal normal.   Left Ear: Tympanic membrane, external ear and ear canal normal.   Nose: Mucosal edema present. Mouth/Throat: Posterior oropharyngeal erythema present. Eyes: Conjunctivae are normal. Left eye discharge: pnd    Neck: No thyromegaly present. Cardiovascular: Normal rate. Pulmonary/Chest: Effort normal. No respiratory distress. She has wheezes. She has no rales. Lymphadenopathy:     She has cervical adenopathy. Neurological: She is alert and oriented to person, place, and time. Skin: Skin is warm and dry. No rash noted. Psychiatric: She has a normal mood and affect. Her behavior is normal. Judgment and thought content normal.       Assessment:     Assessment/plan;  Sulma was seen today for weight management and cough.     Diagnoses and all orders for this visit:    Bronchitis  Antibiotic and cough meds called   Note for work  Call if increasing symptoms      Obesity (BMI 30.0-34. 9)  Doing well on the qsymia    rx sent in  Watch calories and increase activity    Other orders  -     Cancel: Phentermine-Topiramate (QSYMIA) 3.75-23 MG CP24; Take 1 capsule by mouth daily . -     Cancel: promethazine-dextromethorphan (PROMETHAZINE-DM) 6.25-15 MG/5ML syrup; One or two tsp q 6 hours prn cough  -     Cancel: amoxicillin-clavulanate (AUGMENTIN) 250-62.5 MG/5ML suspension; Take 10 mLs by mouth 2 times daily for 10 days      Return if symptoms worsen or fail to improve.

## 2017-12-06 ENCOUNTER — TELEPHONE (OUTPATIENT)
Dept: FAMILY MEDICINE CLINIC | Age: 37
End: 2017-12-06

## 2017-12-06 DIAGNOSIS — E66.9 OBESITY (BMI 30.0-34.9): ICD-10-CM

## 2017-12-28 ENCOUNTER — OFFICE VISIT (OUTPATIENT)
Dept: FAMILY MEDICINE CLINIC | Age: 37
End: 2017-12-28

## 2017-12-28 VITALS
TEMPERATURE: 98 F | WEIGHT: 148 LBS | DIASTOLIC BLOOD PRESSURE: 74 MMHG | SYSTOLIC BLOOD PRESSURE: 104 MMHG | HEIGHT: 60 IN | BODY MASS INDEX: 29.06 KG/M2

## 2017-12-28 DIAGNOSIS — K64.4 EXTERNAL HEMORRHOID: Primary | ICD-10-CM

## 2017-12-28 DIAGNOSIS — N30.10 INTERSTITIAL CYSTITIS: ICD-10-CM

## 2017-12-28 DIAGNOSIS — J40 BRONCHITIS: ICD-10-CM

## 2017-12-28 DIAGNOSIS — E66.9 OBESITY (BMI 30.0-34.9): ICD-10-CM

## 2017-12-28 PROCEDURE — 99214 OFFICE O/P EST MOD 30 MIN: CPT | Performed by: FAMILY MEDICINE

## 2017-12-28 RX ORDER — PHENAZOPYRIDINE HYDROCHLORIDE 200 MG/1
200 TABLET, FILM COATED ORAL 3 TIMES DAILY PRN
Qty: 30 TABLET | Refills: 2 | Status: SHIPPED | OUTPATIENT
Start: 2017-12-28 | End: 2018-11-16 | Stop reason: ALTCHOICE

## 2017-12-28 RX ORDER — METHYLPREDNISOLONE 4 MG/1
TABLET ORAL
Qty: 1 KIT | Refills: 0 | Status: SHIPPED | OUTPATIENT
Start: 2017-12-28 | End: 2018-01-03

## 2017-12-28 NOTE — PATIENT INSTRUCTIONS
Patient Education        Hemorrhoids: Care Instructions  Your Care Instructions    Hemorrhoids are enlarged veins that develop in the anal canal. Bleeding during bowel movements, itching, swelling, and rectal pain are the most common symptoms. They can be uncomfortable at times, but hemorrhoids rarely are a serious problem. You can treat most hemorrhoids with simple changes to your diet and bowel habits. These changes include eating more fiber and not straining to pass stools. Most hemorrhoids do not need surgery or other treatment unless they are very large and painful or bleed a lot. Follow-up care is a key part of your treatment and safety. Be sure to make and go to all appointments, and call your doctor if you are having problems. It's also a good idea to know your test results and keep a list of the medicines you take. How can you care for yourself at home? · Sit in a few inches of warm water (sitz bath) 3 times a day and after bowel movements. The warm water helps with pain and itching. · Put ice on your anal area several times a day for 10 minutes at a time. Put a thin cloth between the ice and your skin. Follow this by placing a warm, wet towel on the area for another 10 to 20 minutes. · Take pain medicines exactly as directed. ¨ If the doctor gave you a prescription medicine for pain, take it as prescribed. ¨ If you are not taking a prescription pain medicine, ask your doctor if you can take an over-the-counter medicine. · Keep the anal area clean, but be gentle. Use water and a fragrance-free soap, such as [place], or use baby wipes or medicated pads, such as Tucks. · Wear cotton underwear and loose clothing to decrease moisture in the anal area. · Eat more fiber. Include foods such as whole-grain breads and cereals, raw vegetables, raw and dried fruits, and beans. · Drink plenty of fluids, enough so that your urine is light yellow or clear like water.  If you have kidney, heart, or liver

## 2017-12-28 NOTE — PROGRESS NOTES
Subjective:      Patient ID: Ollie Goltz is a 40 y.o. female. Cough   This is a recurrent problem. The current episode started more than 1 month ago. The problem has been gradually improving. The problem occurs every few minutes. The cough is non-productive. Associated symptoms include chest pain, chills, myalgias, nasal congestion, postnasal drip, shortness of breath and wheezing. Pertinent negatives include no ear congestion, ear pain, fever, headaches, heartburn, hemoptysis, rhinorrhea, sore throat, sweats or weight loss. The symptoms are aggravated by exercise and lying down. She has tried rest and OTC cough suppressant (took antibiotic) for the symptoms. The treatment provided mild relief. Her past medical history is significant for asthma, bronchitis and environmental allergies. There is no history of bronchiectasis, COPD, emphysema or pneumonia. obesity  Doing well on the medication   She feels like it helps control her appetite and she is able to not eat as much    Bump need rectum  Tender  Hurts when she sits  Not bleeding  Never had this before  Tried to open it but not successful        Review of Systems   Constitutional: Positive for chills. Negative for fever and weight loss. HENT: Positive for postnasal drip. Negative for ear pain, rhinorrhea and sore throat. Respiratory: Positive for cough, shortness of breath and wheezing. Negative for hemoptysis. Cardiovascular: Positive for chest pain. Gastrointestinal: Negative for heartburn. Musculoskeletal: Positive for myalgias. Allergic/Immunologic: Positive for environmental allergies. Neurological: Negative for headaches.      YOB: 1980    Date of Visit:  12/28/2017    Allergies   Allergen Reactions    Shellfish-Derived Products Shortness Of Breath    Tape Jose L Clary Tape] Rash    Tylenol [Acetaminophen]      History of hep c    Codeine Nausea And Vomiting and Palpitations    Morphine Itching and Nausea And Vomiting

## 2017-12-28 NOTE — LETTER
[facility]  [address]  [address] [facility] [place] [address]  Phone: [phone]  Fax: [phone]    Chata Salas DO        December 28, 2017     Patient: Emiliano Barton   YOB: 1980   Date of Visit: 12/28/2017       To Whom it May Concern:    Jared Frazier was seen in my clinic on 12/28/2017. Please excuse her from work Tuesday 12/26/2017 and Thursday 12/27/2017 due to illness. If you have any questions or concerns, please don't hesitate to call.     Sincerely,         Chata Salas DO

## 2018-01-09 ENCOUNTER — OFFICE VISIT (OUTPATIENT)
Dept: FAMILY MEDICINE CLINIC | Age: 38
End: 2018-01-09

## 2018-01-09 VITALS
SYSTOLIC BLOOD PRESSURE: 110 MMHG | BODY MASS INDEX: 29.72 KG/M2 | TEMPERATURE: 98 F | OXYGEN SATURATION: 99 % | DIASTOLIC BLOOD PRESSURE: 68 MMHG | WEIGHT: 151.4 LBS | HEIGHT: 60 IN | HEART RATE: 87 BPM

## 2018-01-09 DIAGNOSIS — Z01.818 PREOP EXAMINATION: ICD-10-CM

## 2018-01-09 DIAGNOSIS — N30.10 INTERSTITIAL CYSTITIS: Primary | ICD-10-CM

## 2018-01-09 DIAGNOSIS — M99.08 RIB CAGE DYSFUNCTION: ICD-10-CM

## 2018-01-09 PROCEDURE — 99242 OFF/OP CONSLTJ NEW/EST SF 20: CPT | Performed by: FAMILY MEDICINE

## 2018-01-09 RX ORDER — TIZANIDINE 4 MG/1
4 TABLET ORAL EVERY 8 HOURS PRN
Qty: 30 TABLET | Refills: 1 | Status: SHIPPED | OUTPATIENT
Start: 2018-01-09 | End: 2018-10-10 | Stop reason: SDUPTHER

## 2018-01-09 ASSESSMENT — ENCOUNTER SYMPTOMS
RESPIRATORY NEGATIVE: 1
GASTROINTESTINAL NEGATIVE: 1

## 2018-01-09 NOTE — PROGRESS NOTES
 hydrochlorothiazide (HYDRODIURIL) 25 MG tablet Take 1 tablet by mouth daily 30 tablet 3       Vitals:    01/09/18 1132   BP: 110/68   Pulse: 87   Temp: 98 °F (36.7 °C)   SpO2: 99%   Weight: 151 lb 6.4 oz (68.7 kg)   Height: 5' (1.524 m)     Body mass index is 29.57 kg/m². Wt Readings from Last 3 Encounters:   01/09/18 151 lb 6.4 oz (68.7 kg)   12/28/17 148 lb (67.1 kg)   11/30/17 150 lb 12.8 oz (68.4 kg)     BP Readings from Last 3 Encounters:   01/09/18 110/68   12/28/17 104/74   11/30/17 108/80     Allergies   Allergen Reactions    Shellfish-Derived Products Shortness Of Breath    Tape Ty Sober Tape] Rash    Tylenol [Acetaminophen]      History of hep c    Codeine Nausea And Vomiting and Palpitations    Morphine Itching and Nausea And Vomiting     \"makes me crazy\"     Prednisone Hives, Palpitations and Rash     Current Outpatient Prescriptions   Medication Sig Dispense Refill    tiZANidine (ZANAFLEX) 4 MG tablet Take 1 tablet by mouth every 8 hours as needed (muscle spasm) 30 tablet 1    phenazopyridine (PYRIDIUM) 200 MG tablet Take 1 tablet by mouth 3 times daily as needed for Pain 30 tablet 2    hydrocortisone (ANUSOL-HC) 2.5 % rectal cream Place rectally 2 times daily. 1 Tube 0    Phentermine-Topiramate (QSYMIA) 3.75-23 MG CP24 Take 1 capsule by mouth daily .  30 capsule 0    buPROPion (WELLBUTRIN SR) 150 MG extended release tablet Take 1 tablet by mouth 2 times daily 60 tablet 3    ALPRAZolam (XANAX) 0.5 MG tablet Take 1 tablet by mouth 2 times daily as needed for Sleep 60 tablet 2    promethazine (PHENERGAN) 25 MG suppository Place 0.5 suppositories rectally every 6 hours as needed for Nausea 12 suppository 0    famciclovir (FAMVIR) 500 MG tablet Take 1 tablet by mouth 3 times daily 30 tablet 2    famotidine (PEPCID) 40 MG tablet Take 1 tablet by mouth 2 times daily 60 tablet 3    propranolol (INDERAL) 20 MG tablet Take 1 tablet by mouth 3 times daily (Patient taking differently: Take

## 2018-02-22 ENCOUNTER — OFFICE VISIT (OUTPATIENT)
Dept: FAMILY MEDICINE CLINIC | Age: 38
End: 2018-02-22

## 2018-02-22 VITALS
SYSTOLIC BLOOD PRESSURE: 100 MMHG | HEIGHT: 60 IN | OXYGEN SATURATION: 98 % | HEART RATE: 100 BPM | DIASTOLIC BLOOD PRESSURE: 78 MMHG | TEMPERATURE: 98 F | WEIGHT: 149 LBS | RESPIRATION RATE: 16 BRPM | BODY MASS INDEX: 29.25 KG/M2

## 2018-02-22 DIAGNOSIS — Z01.818 PREOP EXAMINATION: Primary | ICD-10-CM

## 2018-02-22 DIAGNOSIS — T85.49XS: ICD-10-CM

## 2018-02-22 DIAGNOSIS — E66.09 OBESITY DUE TO EXCESS CALORIES WITH SERIOUS COMORBIDITY, UNSPECIFIED CLASSIFICATION: ICD-10-CM

## 2018-02-22 LAB
A/G RATIO: 3.6 (ref 1.1–2.2)
ALBUMIN SERPL-MCNC: 5 G/DL (ref 3.4–5)
ALP BLD-CCNC: 71 U/L (ref 40–129)
ALT SERPL-CCNC: 12 U/L (ref 10–40)
ANION GAP SERPL CALCULATED.3IONS-SCNC: 16 MMOL/L (ref 3–16)
AST SERPL-CCNC: 13 U/L (ref 15–37)
BILIRUB SERPL-MCNC: 0.4 MG/DL (ref 0–1)
BUN BLDV-MCNC: 13 MG/DL (ref 7–20)
CALCIUM SERPL-MCNC: 9.7 MG/DL (ref 8.3–10.6)
CHLORIDE BLD-SCNC: 106 MMOL/L (ref 99–110)
CO2: 23 MMOL/L (ref 21–32)
CREAT SERPL-MCNC: 0.7 MG/DL (ref 0.6–1.1)
GFR AFRICAN AMERICAN: >60
GFR NON-AFRICAN AMERICAN: >60
GLOBULIN: 1.4 G/DL
GLUCOSE BLD-MCNC: 66 MG/DL (ref 70–99)
HCT VFR BLD CALC: 38.6 % (ref 36–48)
HEMOGLOBIN: 13.4 G/DL (ref 12–16)
INR BLD: 0.96 (ref 0.85–1.15)
MCH RBC QN AUTO: 31.2 PG (ref 26–34)
MCHC RBC AUTO-ENTMCNC: 34.7 G/DL (ref 31–36)
MCV RBC AUTO: 90.1 FL (ref 80–100)
PDW BLD-RTO: 13.6 % (ref 12.4–15.4)
PLATELET # BLD: 223 K/UL (ref 135–450)
PMV BLD AUTO: 8.5 FL (ref 5–10.5)
POTASSIUM SERPL-SCNC: 4.2 MMOL/L (ref 3.5–5.1)
PROTHROMBIN TIME: 10.9 SEC (ref 9.6–13)
RBC # BLD: 4.28 M/UL (ref 4–5.2)
SODIUM BLD-SCNC: 145 MMOL/L (ref 136–145)
TOTAL PROTEIN: 6.4 G/DL (ref 6.4–8.2)
WBC # BLD: 7.2 K/UL (ref 4–11)

## 2018-02-22 PROCEDURE — 36415 COLL VENOUS BLD VENIPUNCTURE: CPT | Performed by: FAMILY MEDICINE

## 2018-02-22 PROCEDURE — 99242 OFF/OP CONSLTJ NEW/EST SF 20: CPT | Performed by: FAMILY MEDICINE

## 2018-02-22 PROCEDURE — 93000 ELECTROCARDIOGRAM COMPLETE: CPT | Performed by: FAMILY MEDICINE

## 2018-02-22 RX ORDER — PHENTERMINE HYDROCHLORIDE 37.5 MG/1
37.5 TABLET ORAL
Qty: 30 TABLET | Refills: 0 | Status: SHIPPED | OUTPATIENT
Start: 2018-02-22 | End: 2018-05-02 | Stop reason: SDUPTHER

## 2018-02-22 ASSESSMENT — ENCOUNTER SYMPTOMS
RESPIRATORY NEGATIVE: 1
GASTROINTESTINAL NEGATIVE: 1

## 2018-03-01 ENCOUNTER — HOSPITAL ENCOUNTER (OUTPATIENT)
Dept: NON INVASIVE DIAGNOSTICS | Age: 38
Discharge: HOME | End: 2018-03-01
Attending: PHYSICAL MEDICINE & REHABILITATION | Admitting: PHYSICAL MEDICINE & REHABILITATION

## 2018-03-01 DIAGNOSIS — M51.36 OTHER INTERVERTEBRAL DISC DEGENERATION, LUMBAR REGION: ICD-10-CM

## 2018-03-08 RX ORDER — PROMETHAZINE HYDROCHLORIDE 25 MG/1
TABLET ORAL
Qty: 60 TABLET | Refills: 1 | Status: SHIPPED | OUTPATIENT
Start: 2018-03-08 | End: 2018-10-10 | Stop reason: SDUPTHER

## 2018-03-28 ENCOUNTER — TELEPHONE (OUTPATIENT)
Dept: FAMILY MEDICINE CLINIC | Age: 38
End: 2018-03-28

## 2018-03-28 RX ORDER — CLOTRIMAZOLE 10 MG/1
10 LOZENGE ORAL; TOPICAL
Qty: 50 TABLET | Refills: 0 | Status: SHIPPED | OUTPATIENT
Start: 2018-03-28 | End: 2018-04-07

## 2018-05-02 ENCOUNTER — OFFICE VISIT (OUTPATIENT)
Dept: FAMILY MEDICINE CLINIC | Age: 38
End: 2018-05-02

## 2018-05-02 VITALS
DIASTOLIC BLOOD PRESSURE: 76 MMHG | HEIGHT: 60 IN | WEIGHT: 150 LBS | BODY MASS INDEX: 29.45 KG/M2 | SYSTOLIC BLOOD PRESSURE: 104 MMHG

## 2018-05-02 DIAGNOSIS — E66.09 OBESITY DUE TO EXCESS CALORIES WITH SERIOUS COMORBIDITY, UNSPECIFIED CLASSIFICATION: Primary | ICD-10-CM

## 2018-05-02 DIAGNOSIS — B37.31 VAGINAL YEAST INFECTION: ICD-10-CM

## 2018-05-02 DIAGNOSIS — F41.9 ANXIETY: ICD-10-CM

## 2018-05-02 PROCEDURE — 99214 OFFICE O/P EST MOD 30 MIN: CPT | Performed by: FAMILY MEDICINE

## 2018-05-02 RX ORDER — FLUCONAZOLE 150 MG/1
150 TABLET ORAL ONCE
Qty: 2 TABLET | Refills: 0 | Status: SHIPPED | OUTPATIENT
Start: 2018-05-02 | End: 2018-05-02

## 2018-05-02 RX ORDER — SULFAMETHOXAZOLE AND TRIMETHOPRIM 800; 160 MG/1; MG/1
1 TABLET ORAL 2 TIMES DAILY
Qty: 20 TABLET | Refills: 0 | Status: SHIPPED | OUTPATIENT
Start: 2018-05-02 | End: 2018-05-12

## 2018-05-02 RX ORDER — PHENTERMINE HYDROCHLORIDE 37.5 MG/1
37.5 TABLET ORAL
Qty: 30 TABLET | Refills: 0 | Status: SHIPPED | OUTPATIENT
Start: 2018-05-02 | End: 2018-06-01

## 2018-05-02 ASSESSMENT — ENCOUNTER SYMPTOMS
RESPIRATORY NEGATIVE: 1
GASTROINTESTINAL NEGATIVE: 1

## 2018-05-14 DIAGNOSIS — B00.1 FEVER BLISTER: ICD-10-CM

## 2018-05-14 RX ORDER — FAMCICLOVIR 500 MG/1
TABLET, FILM COATED ORAL
Qty: 30 TABLET | Refills: 0 | Status: SHIPPED | OUTPATIENT
Start: 2018-05-14 | End: 2018-07-27 | Stop reason: SDUPTHER

## 2018-07-25 ENCOUNTER — OFFICE VISIT (OUTPATIENT)
Dept: ORTHOPEDIC SURGERY | Age: 38
End: 2018-07-25

## 2018-07-25 VITALS
BODY MASS INDEX: 28.66 KG/M2 | DIASTOLIC BLOOD PRESSURE: 76 MMHG | HEIGHT: 60 IN | WEIGHT: 146 LBS | SYSTOLIC BLOOD PRESSURE: 112 MMHG | HEART RATE: 88 BPM

## 2018-07-25 DIAGNOSIS — S63.502A WRIST SPRAIN, LEFT, INITIAL ENCOUNTER: Primary | ICD-10-CM

## 2018-07-25 DIAGNOSIS — G56.02 CARPAL TUNNEL SYNDROME OF LEFT WRIST: ICD-10-CM

## 2018-07-25 DIAGNOSIS — M25.532 LEFT WRIST PAIN: ICD-10-CM

## 2018-07-25 PROCEDURE — 99203 OFFICE O/P NEW LOW 30 MIN: CPT | Performed by: PHYSICIAN ASSISTANT

## 2018-07-27 ENCOUNTER — OFFICE VISIT (OUTPATIENT)
Dept: FAMILY MEDICINE CLINIC | Age: 38
End: 2018-07-27

## 2018-07-27 VITALS
BODY MASS INDEX: 28.66 KG/M2 | TEMPERATURE: 98.3 F | OXYGEN SATURATION: 99 % | SYSTOLIC BLOOD PRESSURE: 110 MMHG | HEIGHT: 60 IN | DIASTOLIC BLOOD PRESSURE: 78 MMHG | HEART RATE: 74 BPM | WEIGHT: 146 LBS

## 2018-07-27 DIAGNOSIS — Z01.818 PREOP EXAMINATION: ICD-10-CM

## 2018-07-27 DIAGNOSIS — N30.10 INTERSTITIAL CYSTITIS: Primary | ICD-10-CM

## 2018-07-27 DIAGNOSIS — B00.1 FEVER BLISTER: ICD-10-CM

## 2018-07-27 DIAGNOSIS — L25.9 CONTACT DERMATITIS, UNSPECIFIED CONTACT DERMATITIS TYPE, UNSPECIFIED TRIGGER: ICD-10-CM

## 2018-07-27 PROCEDURE — 99242 OFF/OP CONSLTJ NEW/EST SF 20: CPT | Performed by: FAMILY MEDICINE

## 2018-07-27 RX ORDER — FAMCICLOVIR 500 MG/1
TABLET, FILM COATED ORAL
Qty: 30 TABLET | Refills: 1 | Status: SHIPPED | OUTPATIENT
Start: 2018-07-27 | End: 2018-11-09 | Stop reason: SDUPTHER

## 2018-07-27 RX ORDER — TRIAMCINOLONE ACETONIDE 0.25 MG/G
CREAM TOPICAL
Qty: 80 G | Refills: 1 | Status: SHIPPED | OUTPATIENT
Start: 2018-07-27 | End: 2018-11-16 | Stop reason: ALTCHOICE

## 2018-07-27 ASSESSMENT — ENCOUNTER SYMPTOMS
GASTROINTESTINAL NEGATIVE: 1
RESPIRATORY NEGATIVE: 1

## 2018-07-27 NOTE — PROGRESS NOTES
trigger  -     triamcinolone (KENALOG) 0.025 % cream; Apply topically 2 times daily.     pt medically clear for surgery  Refer to derm if rash not clearing with cream

## 2018-08-21 ENCOUNTER — OFFICE VISIT (OUTPATIENT)
Dept: ORTHOPEDIC SURGERY | Age: 38
End: 2018-08-21

## 2018-08-21 DIAGNOSIS — S63.502A SPRAIN OF LEFT WRIST, INITIAL ENCOUNTER: Primary | ICD-10-CM

## 2018-08-21 DIAGNOSIS — M25.532 LEFT WRIST PAIN: ICD-10-CM

## 2018-08-21 DIAGNOSIS — M79.602 PAIN OF LEFT UPPER EXTREMITY: Primary | ICD-10-CM

## 2018-08-21 PROCEDURE — L3908 WHO COCK-UP NONMOLDE PRE OTS: HCPCS | Performed by: PHYSICIAN ASSISTANT

## 2018-08-21 PROCEDURE — 95886 MUSC TEST DONE W/N TEST COMP: CPT | Performed by: PHYSICAL MEDICINE & REHABILITATION

## 2018-08-21 PROCEDURE — 95908 NRV CNDJ TST 3-4 STUDIES: CPT | Performed by: PHYSICAL MEDICINE & REHABILITATION

## 2018-08-21 PROCEDURE — 99213 OFFICE O/P EST LOW 20 MIN: CPT | Performed by: PHYSICIAN ASSISTANT

## 2018-08-21 RX ORDER — MELOXICAM 15 MG/1
15 TABLET ORAL DAILY
Qty: 90 TABLET | Refills: 0 | Status: SHIPPED | OUTPATIENT
Start: 2018-08-21 | End: 2018-11-16 | Stop reason: ALTCHOICE

## 2018-08-21 NOTE — PROGRESS NOTES
Constitutional: Patient is adequately groomed with no evidence of malnutrition  DTRs: Deep tendon reflexes are intact  Mental Status: The patient is oriented to time, place and person. The patient's mood and affect are appropriate. Lymphatic: The lymphatic examination bilaterally reveals all areas to be without enlargement or induration. Vascular: Examination reveals no swelling or calf tenderness. Peripheral pulses are palpable and 2+. Neurological: The patient has good coordination. There is no weakness or sensory deficit. Left Wrist Examination:    Inspection:  No rashes, scars, or lesions. No swelling or discoloration    Palpation:  She does have some tenderness to palpation over the anatomical snuffbox and the scapholunate region. Range of Motion:  Range of motion of the left wrist is limited secondary to pain and discomfort. Both ulnar and radial deviation increases her level pain as does wrist extension. Strength:   strength is markedly diminished at greater than 50% compared to her contralateral side. Special Tests:  Positive Phalen's and reverse Phalen's exam and positive carpal tunnel compression exam.    Skin: There are no rashes, ulcerations or lesions. Gait: Normal gait pattern    Reflex normal deep tendon reflexes    Additional Comments:       Additional Examinations:         Right Upper Extremity:  Examination of the right upper extremity does not show any tenderness, deformity or injury. Range of motion is unremarkable. There is no gross instability. There are no rashes, ulcerations or lesions. Strength and tone are normal.        ELECTRODIAGNOSTIC EXAMINATION  [facility]      Patient: Brady Kenny Age: 40 Years 8 Months  Sex: Female Date: 8/21/2018  YOB: 1980 Ref. Phys.: Juju López  Notes:  r/o left CTS      Sensory NCS      Nerve / Sites Peak PeakAmp Dist Julius    ms µV cm m/s   L MEDIAN - D2 ULNAR D5   1.  Median Wrist

## 2018-08-21 NOTE — PROGRESS NOTES
ELECTRODIAGNOSTIC EXAMINATION  [address] [place] [address]      Patient: Landy Pizano Age: 40 Years 8 Months  Sex: Female Date: 8/21/2018  YOB: 1980 Ref. Phys.: Aida Hernandez  Notes:  r/o left CTS      Sensory NCS      Nerve / Sites Peak PeakAmp Dist Julius    ms µV cm m/s   L MEDIAN - D2 ULNAR D5   1. Median Wrist 2.90 28.3 14 58.3   2. Ulnar Wrist 2.95 61.7 14 58.3   L MEDIAN - RADIAL THUMB   1. Median Wrist       2. Radial Wrist 2.25 24.8 10 58.8       Motor NCS      Nerve / Sites Lat Amp Amp Dist Juluis    ms mV % cm m/s   L MEDIAN - APB   1. Wrist 2.95 7.9 100 8    2. Elbow 6.50 7.8 99.2 25 70.4   L ULNAR - ADM   1. Wrist 2.30 9.4 100 8    2. B. Elbow 4.95 10.1 107 18 67.9   3. A. Elbow 6.55 10.3 110 10 62.5       EMG Summary Table     Spontaneous MUAP Recruit. Ins. Act Fibs. PSW Fasics. H.F. Amp. Dur. Poly's. Pattern   L. FIRST D INTEROSS N None None None None N N N N   L. BICEPS N None None None None N N N N   L. TRICEPS N None None None None N N N N   L. EXT CARPI R BREV N None None None None N N N N   L. EXT DIG COMM N None None None None N N N N   L. PRON TERES N None None None None N N N N       Summary: Nerve conduction studies and monopolar exam of the left upper extremity are normal.      Impression: Normal examination. 1. No left median mononeuropathy around the wrist (carpal tunnel syndrome)  2.  No evidence of any other left upper extremity mononeuropathy, plexopathy, or radiculopathy            Dina Ramos MD

## 2018-10-10 ENCOUNTER — OFFICE VISIT (OUTPATIENT)
Dept: FAMILY MEDICINE CLINIC | Age: 38
End: 2018-10-10
Payer: COMMERCIAL

## 2018-10-10 VITALS
SYSTOLIC BLOOD PRESSURE: 110 MMHG | OXYGEN SATURATION: 97 % | HEART RATE: 76 BPM | WEIGHT: 147 LBS | DIASTOLIC BLOOD PRESSURE: 72 MMHG | BODY MASS INDEX: 28.86 KG/M2 | TEMPERATURE: 98.3 F | HEIGHT: 60 IN

## 2018-10-10 DIAGNOSIS — M79.7 FIBROMYALGIA: ICD-10-CM

## 2018-10-10 DIAGNOSIS — N65.0 BREAST RECONSTRUCTION DEFORMITY: ICD-10-CM

## 2018-10-10 DIAGNOSIS — S05.11XS: ICD-10-CM

## 2018-10-10 DIAGNOSIS — Z01.818 PREOP EXAMINATION: Primary | ICD-10-CM

## 2018-10-10 DIAGNOSIS — F41.1 GAD (GENERALIZED ANXIETY DISORDER): ICD-10-CM

## 2018-10-10 LAB
A/G RATIO: 2.6 (ref 1.1–2.2)
ALBUMIN SERPL-MCNC: 4.6 G/DL (ref 3.4–5)
ALP BLD-CCNC: 89 U/L (ref 40–129)
ALT SERPL-CCNC: 14 U/L (ref 10–40)
ANION GAP SERPL CALCULATED.3IONS-SCNC: 13 MMOL/L (ref 3–16)
AST SERPL-CCNC: 14 U/L (ref 15–37)
BILIRUB SERPL-MCNC: 0.4 MG/DL (ref 0–1)
BUN BLDV-MCNC: 11 MG/DL (ref 7–20)
CALCIUM SERPL-MCNC: 9.8 MG/DL (ref 8.3–10.6)
CHLORIDE BLD-SCNC: 106 MMOL/L (ref 99–110)
CO2: 23 MMOL/L (ref 21–32)
CREAT SERPL-MCNC: 0.7 MG/DL (ref 0.6–1.1)
GFR AFRICAN AMERICAN: >60
GFR NON-AFRICAN AMERICAN: >60
GLOBULIN: 1.8 G/DL
GLUCOSE BLD-MCNC: 111 MG/DL (ref 70–99)
HCT VFR BLD CALC: 41 % (ref 36–48)
HEMOGLOBIN: 13.6 G/DL (ref 12–16)
MCH RBC QN AUTO: 30.5 PG (ref 26–34)
MCHC RBC AUTO-ENTMCNC: 33.1 G/DL (ref 31–36)
MCV RBC AUTO: 92.2 FL (ref 80–100)
PDW BLD-RTO: 13.5 % (ref 12.4–15.4)
PLATELET # BLD: 256 K/UL (ref 135–450)
PMV BLD AUTO: 8.3 FL (ref 5–10.5)
POTASSIUM SERPL-SCNC: 4.1 MMOL/L (ref 3.5–5.1)
RBC # BLD: 4.45 M/UL (ref 4–5.2)
SODIUM BLD-SCNC: 142 MMOL/L (ref 136–145)
TOTAL PROTEIN: 6.4 G/DL (ref 6.4–8.2)
WBC # BLD: 7.9 K/UL (ref 4–11)

## 2018-10-10 PROCEDURE — 99242 OFF/OP CONSLTJ NEW/EST SF 20: CPT | Performed by: FAMILY MEDICINE

## 2018-10-10 PROCEDURE — 36415 COLL VENOUS BLD VENIPUNCTURE: CPT | Performed by: FAMILY MEDICINE

## 2018-10-10 RX ORDER — PROMETHAZINE HYDROCHLORIDE 25 MG/1
TABLET ORAL
Qty: 60 TABLET | Refills: 2 | Status: ON HOLD | OUTPATIENT
Start: 2018-10-10 | End: 2019-04-12 | Stop reason: HOSPADM

## 2018-10-10 RX ORDER — ALPRAZOLAM 0.5 MG/1
0.5 TABLET ORAL 2 TIMES DAILY PRN
Qty: 60 TABLET | Refills: 2 | Status: SHIPPED | OUTPATIENT
Start: 2018-10-10 | End: 2019-01-24 | Stop reason: SDUPTHER

## 2018-10-10 RX ORDER — TIZANIDINE 4 MG/1
4 TABLET ORAL EVERY 8 HOURS PRN
Qty: 30 TABLET | Refills: 2 | Status: SHIPPED | OUTPATIENT
Start: 2018-10-10 | End: 2019-04-08 | Stop reason: SDUPTHER

## 2018-10-10 ASSESSMENT — PATIENT HEALTH QUESTIONNAIRE - PHQ9
1. LITTLE INTEREST OR PLEASURE IN DOING THINGS: 1
SUM OF ALL RESPONSES TO PHQ QUESTIONS 1-9: 2
SUM OF ALL RESPONSES TO PHQ9 QUESTIONS 1 & 2: 2
SUM OF ALL RESPONSES TO PHQ QUESTIONS 1-9: 2
2. FEELING DOWN, DEPRESSED OR HOPELESS: 1

## 2018-10-10 ASSESSMENT — ENCOUNTER SYMPTOMS
GASTROINTESTINAL NEGATIVE: 1
RESPIRATORY NEGATIVE: 1

## 2018-10-10 NOTE — PROGRESS NOTES
needed for Pain. .      ondansetron (ZOFRAN ODT) 4 MG disintegrating tablet Take 1 tablet by mouth every 6 hours as needed for Nausea 12 tablet 0    meloxicam (MOBIC) 15 MG tablet Take 1 tablet by mouth daily 90 tablet 0    famciclovir (FAMVIR) 500 MG tablet TAKE ONE TABLET BY MOUTH THREE TIMES DAILY 30 tablet 1    triamcinolone (KENALOG) 0.025 % cream Apply topically 2 times daily. 80 g 1    phenazopyridine (PYRIDIUM) 200 MG tablet Take 1 tablet by mouth 3 times daily as needed for Pain 30 tablet 2    Phentermine-Topiramate (QSYMIA) 3.75-23 MG CP24 Take 1 capsule by mouth daily . 30 capsule 0    buPROPion (WELLBUTRIN SR) 150 MG extended release tablet Take 1 tablet by mouth 2 times daily 60 tablet 3    promethazine (PHENERGAN) 25 MG suppository Place 0.5 suppositories rectally every 6 hours as needed for Nausea 12 suppository 0    famotidine (PEPCID) 40 MG tablet Take 1 tablet by mouth 2 times daily 60 tablet 3    propranolol (INDERAL) 20 MG tablet Take 1 tablet by mouth 3 times daily (Patient taking differently: Take 20 mg by mouth daily ) 90 tablet 2    hydrochlorothiazide (HYDRODIURIL) 25 MG tablet Take 1 tablet by mouth daily 30 tablet 3    EPINEPHrine (EPIPEN 2-RUFINA) 0.3 MG/0.3ML SOAJ injection Inject 0.3 mLs into the skin once for 1 dose Use as directed for allergic reaction 0.3 mL 1     No current facility-administered medications for this visit.       Past Medical History:   Diagnosis Date    Arrythmia     Asthma     no problems recently    Back pain     Breast cancer ([facility] [address]     Breast lump     right    Cervical cancer (HCC)     Chronic hepatitis C (HCC)     Chronic pain     DDD (degenerative disc disease)     Diverticulitis     Fibromyalgia     GERD (gastroesophageal reflux disease)     Headache     migraines    Heart disease     Immune deficiency disorder (HCC)     Interstitial cystitis     Nausea & vomiting     Osteoarthritis     Other closed fractures of distal end of radius

## 2018-10-26 ENCOUNTER — TELEPHONE (OUTPATIENT)
Dept: FAMILY MEDICINE CLINIC | Age: 38
End: 2018-10-26

## 2018-11-01 ENCOUNTER — APPOINTMENT (OUTPATIENT)
Dept: CT IMAGING | Age: 38
End: 2018-11-01
Payer: COMMERCIAL

## 2018-11-01 ENCOUNTER — TELEPHONE (OUTPATIENT)
Dept: FAMILY MEDICINE CLINIC | Age: 38
End: 2018-11-01

## 2018-11-01 ENCOUNTER — HOSPITAL ENCOUNTER (EMERGENCY)
Age: 38
Discharge: HOME | End: 2018-11-02
Attending: EMERGENCY MEDICINE
Payer: COMMERCIAL

## 2018-11-01 DIAGNOSIS — R50.9 FEVER, UNSPECIFIED FEVER CAUSE: Primary | ICD-10-CM

## 2018-11-01 LAB
A/G RATIO: 2.1 (ref 1.1–2.2)
ALBUMIN SERPL-MCNC: 3.9 G/DL (ref 3.4–5)
ALP BLD-CCNC: 81 U/L (ref 40–129)
ALT SERPL-CCNC: 30 U/L (ref 10–40)
ANION GAP SERPL CALCULATED.3IONS-SCNC: 11 MMOL/L (ref 3–16)
AST SERPL-CCNC: 31 U/L (ref 15–37)
BASOPHILS ABSOLUTE: 0 K/UL (ref 0–0.2)
BASOPHILS RELATIVE PERCENT: 0.5 %
BILIRUB SERPL-MCNC: 0.6 MG/DL (ref 0–1)
BUN BLDV-MCNC: 5 MG/DL (ref 7–20)
CALCIUM SERPL-MCNC: 8.8 MG/DL (ref 8.3–10.6)
CHLORIDE BLD-SCNC: 107 MMOL/L (ref 99–110)
CO2: 26 MMOL/L (ref 21–32)
CREAT SERPL-MCNC: 0.7 MG/DL (ref 0.6–1.1)
EOSINOPHILS ABSOLUTE: 0.2 K/UL (ref 0–0.6)
EOSINOPHILS RELATIVE PERCENT: 2.9 %
GFR AFRICAN AMERICAN: >60
GFR NON-AFRICAN AMERICAN: >60
GLOBULIN: 1.9 G/DL
GLUCOSE BLD-MCNC: 88 MG/DL (ref 70–99)
HCT VFR BLD CALC: 34.8 % (ref 36–48)
HEMOGLOBIN: 11.6 G/DL (ref 12–16)
LYMPHOCYTES ABSOLUTE: 2.6 K/UL (ref 1–5.1)
LYMPHOCYTES RELATIVE PERCENT: 31 %
MCH RBC QN AUTO: 30.3 PG (ref 26–34)
MCHC RBC AUTO-ENTMCNC: 33.4 G/DL (ref 31–36)
MCV RBC AUTO: 90.7 FL (ref 80–100)
MONOCYTES ABSOLUTE: 0.6 K/UL (ref 0–1.3)
MONOCYTES RELATIVE PERCENT: 7.1 %
NEUTROPHILS ABSOLUTE: 4.8 K/UL (ref 1.7–7.7)
NEUTROPHILS RELATIVE PERCENT: 58.5 %
PDW BLD-RTO: 13.3 % (ref 12.4–15.4)
PLATELET # BLD: 214 K/UL (ref 135–450)
PMV BLD AUTO: 8.3 FL (ref 5–10.5)
POTASSIUM SERPL-SCNC: 4 MMOL/L (ref 3.5–5.1)
RAPID INFLUENZA  B AGN: NEGATIVE
RAPID INFLUENZA A AGN: NEGATIVE
RBC # BLD: 3.84 M/UL (ref 4–5.2)
SODIUM BLD-SCNC: 144 MMOL/L (ref 136–145)
TOTAL PROTEIN: 5.8 G/DL (ref 6.4–8.2)
WBC # BLD: 8.3 K/UL (ref 4–11)

## 2018-11-01 PROCEDURE — 87804 INFLUENZA ASSAY W/OPTIC: CPT

## 2018-11-01 PROCEDURE — 99284 EMERGENCY DEPT VISIT MOD MDM: CPT

## 2018-11-01 PROCEDURE — 6370000000 HC RX 637 (ALT 250 FOR IP): Performed by: EMERGENCY MEDICINE

## 2018-11-01 PROCEDURE — 36415 COLL VENOUS BLD VENIPUNCTURE: CPT

## 2018-11-01 PROCEDURE — 87040 BLOOD CULTURE FOR BACTERIA: CPT

## 2018-11-01 PROCEDURE — 87086 URINE CULTURE/COLONY COUNT: CPT

## 2018-11-01 PROCEDURE — 70450 CT HEAD/BRAIN W/O DYE: CPT

## 2018-11-01 PROCEDURE — 80053 COMPREHEN METABOLIC PANEL: CPT

## 2018-11-01 PROCEDURE — 85025 COMPLETE CBC W/AUTO DIFF WBC: CPT

## 2018-11-01 PROCEDURE — 4500000024 HC ED LEVEL 4 PROCEDURE

## 2018-11-01 RX ORDER — ALPRAZOLAM 0.5 MG/1
0.5 TABLET ORAL ONCE
Status: COMPLETED | OUTPATIENT
Start: 2018-11-01 | End: 2018-11-01

## 2018-11-01 RX ADMIN — ALPRAZOLAM 0.5 MG: 0.5 TABLET ORAL at 23:14

## 2018-11-01 ASSESSMENT — PAIN SCALES - GENERAL: PAINLEVEL_OUTOF10: 8

## 2018-11-01 ASSESSMENT — PAIN DESCRIPTION - LOCATION: LOCATION: HEAD

## 2018-11-01 ASSESSMENT — PAIN DESCRIPTION - PAIN TYPE: TYPE: ACUTE PAIN

## 2018-11-02 VITALS
OXYGEN SATURATION: 98 % | TEMPERATURE: 98.6 F | BODY MASS INDEX: 25.23 KG/M2 | HEIGHT: 64 IN | RESPIRATION RATE: 18 BRPM | DIASTOLIC BLOOD PRESSURE: 69 MMHG | HEART RATE: 84 BPM | SYSTOLIC BLOOD PRESSURE: 100 MMHG

## 2018-11-02 LAB
APPEARANCE CSF: CLEAR
BILIRUBIN URINE: NEGATIVE
BLOOD, URINE: NEGATIVE
CLARITY: CLEAR
CLOT EVALUATION CSF: ABNORMAL
COLOR CSF: COLORLESS
COLOR: YELLOW
EPITHELIAL CELLS, UA: 0 /HPF (ref 0–5)
GLUCOSE URINE: NEGATIVE MG/DL
GLUCOSE, CSF: 63 MG/DL (ref 40–80)
HYALINE CASTS: 1 /LPF (ref 0–8)
KETONES, URINE: NEGATIVE MG/DL
LEUKOCYTE ESTERASE, URINE: ABNORMAL
MENINGITIS ENCEPHALITIS PANEL: NORMAL
MICROSCOPIC EXAMINATION: YES
NITRITE, URINE: NEGATIVE
NO DIFFERENTIAL CSF: ABNORMAL
PH UA: 7
PROTEIN CSF: 35 MG/DL (ref 15–45)
PROTEIN UA: NEGATIVE MG/DL
RBC CSF: 9 /CUMM
RBC UA: 0 /HPF (ref 0–4)
REPORT: NORMAL
SPECIFIC GRAVITY UA: 1.01
TUBE NUMBER CSF: ABNORMAL
URINE REFLEX TO CULTURE: YES
URINE TYPE: ABNORMAL
UROBILINOGEN, URINE: 0.2 E.U./DL
VOLUME CSF: 3.8 ML
WBC CSF: 5 /CUMM (ref 0–5)
WBC UA: 3 /HPF (ref 0–5)

## 2018-11-02 PROCEDURE — 87205 SMEAR GRAM STAIN: CPT

## 2018-11-02 PROCEDURE — 2580000003 HC RX 258: Performed by: EMERGENCY MEDICINE

## 2018-11-02 PROCEDURE — 96375 TX/PRO/DX INJ NEW DRUG ADDON: CPT

## 2018-11-02 PROCEDURE — 89050 BODY FLUID CELL COUNT: CPT

## 2018-11-02 PROCEDURE — 82945 GLUCOSE OTHER FLUID: CPT

## 2018-11-02 PROCEDURE — 87070 CULTURE OTHR SPECIMN AEROBIC: CPT

## 2018-11-02 PROCEDURE — 96374 THER/PROPH/DIAG INJ IV PUSH: CPT

## 2018-11-02 PROCEDURE — 81001 URINALYSIS AUTO W/SCOPE: CPT

## 2018-11-02 PROCEDURE — 6360000002 HC RX W HCPCS: Performed by: EMERGENCY MEDICINE

## 2018-11-02 PROCEDURE — 84157 ASSAY OF PROTEIN OTHER: CPT

## 2018-11-02 PROCEDURE — 96361 HYDRATE IV INFUSION ADD-ON: CPT

## 2018-11-02 PROCEDURE — 87483 CNS DNA AMP PROBE TYPE 12-25: CPT

## 2018-11-02 RX ORDER — KETOROLAC TROMETHAMINE 30 MG/ML
30 INJECTION, SOLUTION INTRAMUSCULAR; INTRAVENOUS ONCE
Status: COMPLETED | OUTPATIENT
Start: 2018-11-02 | End: 2018-11-02

## 2018-11-02 RX ORDER — 0.9 % SODIUM CHLORIDE 0.9 %
1000 INTRAVENOUS SOLUTION INTRAVENOUS ONCE
Status: COMPLETED | OUTPATIENT
Start: 2018-11-02 | End: 2018-11-02

## 2018-11-02 RX ORDER — HYDROCODONE BITARTRATE AND ACETAMINOPHEN 5; 325 MG/1; MG/1
1 TABLET ORAL EVERY 6 HOURS PRN
Qty: 15 TABLET | Refills: 0 | Status: SHIPPED | OUTPATIENT
Start: 2018-11-02 | End: 2018-11-05

## 2018-11-02 RX ORDER — KETOROLAC TROMETHAMINE 10 MG/1
10 TABLET, FILM COATED ORAL 3 TIMES DAILY
Qty: 12 TABLET | Refills: 0 | Status: SHIPPED | OUTPATIENT
Start: 2018-11-02 | End: 2018-11-16 | Stop reason: ALTCHOICE

## 2018-11-02 RX ADMIN — KETOROLAC TROMETHAMINE 30 MG: 30 INJECTION, SOLUTION INTRAMUSCULAR at 00:58

## 2018-11-02 RX ADMIN — SODIUM CHLORIDE 1000 ML: 9 INJECTION, SOLUTION INTRAVENOUS at 02:10

## 2018-11-02 RX ADMIN — HYDROMORPHONE HYDROCHLORIDE 1 MG: 1 INJECTION, SOLUTION INTRAMUSCULAR; INTRAVENOUS; SUBCUTANEOUS at 00:58

## 2018-11-02 ASSESSMENT — PAIN SCALES - GENERAL
PAINLEVEL_OUTOF10: 10
PAINLEVEL_OUTOF10: 5

## 2018-11-02 ASSESSMENT — PAIN - FUNCTIONAL ASSESSMENT: PAIN_FUNCTIONAL_ASSESSMENT: 0-10

## 2018-11-02 ASSESSMENT — PAIN DESCRIPTION - PAIN TYPE: TYPE: ACUTE PAIN

## 2018-11-02 ASSESSMENT — PAIN DESCRIPTION - DESCRIPTORS: DESCRIPTORS: ACHING

## 2018-11-02 ASSESSMENT — PAIN DESCRIPTION - LOCATION: LOCATION: HEAD

## 2018-11-02 NOTE — ED PROVIDER NOTES
30 tablet 3     Allergies   Allergen Reactions    Shellfish-Derived Products Shortness Of Breath    Tape Meg Daly Tape] Rash    Tylenol [Acetaminophen]      History of hep c    Codeine Nausea And Vomiting and Palpitations    Morphine Itching and Nausea And Vomiting     \"makes me crazy\"     Prednisone Hives, Palpitations and Rash       Nursing Notes Reviewed    Physical Exam:  ED Triage Vitals [11/01/18 2143]   BP Temp Temp Source Pulse Resp SpO2 Height Weight   111/76 98.6 °F (37 °C) Oral 84 19 98 % 5' 4\" (1.626 m) --     GENERAL APPEARANCE: Awake and alert. Cooperative. No acute distress. HEAD:Normocephalic. Atraumatic. EYES: EOM's grossly intact. Sclera anicteric. ENT: Mucous membranes are moist. Tolerates saliva. No trismus. NECK: Supple. No meningismus. Trachea midline. HEART: RRR. LUNGS: Respirations unlabored. CTAB  ABDOMEN: Soft. Non-tender. No guarding or rebound. EXTREMITIES: No acute deformities. SKIN: Warm and dry. NEUROLOGICAL: No gross facial drooping. Moves all 4 extremitiesspontaneously.     Physical Exam    I have reviewed and interpreted all of the currently availablelab results from this visit (if applicable):  Results for orders placed or performed during the hospital encounter of 11/01/18   Rapid influenza A/B antigens   Result Value Ref Range    Rapid Influenza A Ag Negative Negative    Rapid Influenza B Ag Negative Negative   CSF Culture   Result Value Ref Range    Gram Stain Result       No organisms seen WBC's (Polymorphonuclear) WBC's (Mononuclear)   CBC Auto Differential   Result Value Ref Range    WBC 8.3 4.0 - 11.0 K/uL    RBC 3.84 (L) 4.00 - 5.20 M/uL    Hemoglobin 11.6 (L) 12.0 - 16.0 g/dL    Hematocrit 34.8 (L) 36.0 - 48.0 %    MCV 90.7 80.0 - 100.0 fL    MCH 30.3 26.0 - 34.0 pg    MCHC 33.4 31.0 - 36.0 g/dL    RDW 13.3 12.4 - 15.4 %    Platelets 979 490 - 501 K/uL    MPV 8.3 5.0 - 10.5 fL    Neutrophils % 58.5 %    Lymphocytes % 31.0 %    Monocytes % 7.1 % Eosinophils % 2.9 %    Basophils % 0.5 %    Neutrophils # 4.8 1.7 - 7.7 K/uL    Lymphocytes # 2.6 1.0 - 5.1 K/uL    Monocytes # 0.6 0.0 - 1.3 K/uL    Eosinophils # 0.2 0.0 - 0.6 K/uL    Basophils # 0.0 0.0 - 0.2 K/uL   Comprehensive Metabolic Panel   Result Value Ref Range    Sodium 144 136 - 145 mmol/L    Potassium 4.0 3.5 - 5.1 mmol/L    Chloride 107 99 - 110 mmol/L    CO2 26 21 - 32 mmol/L    Anion Gap 11 3 - 16    Glucose 88 70 - 99 mg/dL    BUN 5 (L) 7 - 20 mg/dL    CREATININE 0.7 0.6 - 1.1 mg/dL    GFR Non-African American >60 >60    GFR African American >60 >60    Calcium 8.8 8.3 - 10.6 mg/dL    Total Protein 5.8 (L) 6.4 - 8.2 g/dL    Alb 3.9 3.4 - 5.0 g/dL    Albumin/Globulin Ratio 2.1 1.1 - 2.2    Total Bilirubin 0.6 0.0 - 1.0 mg/dL    Alkaline Phosphatase 81 40 - 129 U/L    ALT 30 10 - 40 U/L    AST 31 15 - 37 U/L    Globulin 1.9 g/dL   Urinalysis Reflex to Culture   Result Value Ref Range    Color, UA YELLOW Straw/Yellow    Clarity, UA Clear Clear    Glucose, Ur Negative Negative mg/dL    Bilirubin Urine Negative Negative    Ketones, Urine Negative Negative mg/dL    Specific Gravity, UA 1.007 1.005 - 1.030    Blood, Urine Negative Negative    pH, UA 7.0 5.0 - 8.0    Protein, UA Negative Negative mg/dL    Urobilinogen, Urine 0.2 <2.0 E.U./dL    Nitrite, Urine Negative Negative    Leukocyte Esterase, Urine TRACE (A) Negative    Microscopic Examination YES     Urine Reflex to Culture Yes     Urine Type Not Specified    CSF Cell Count with Differential   Result Value Ref Range    Color, CSF Colorless Colorless    Appearance, CSF Clear Clear    Tube Number + CELL CT + DIFF-CSF Tube 3     Clot Evaluation CSF see below     WBC, CSF 5 0 - 5 /cumm    RBC, CSF 9 (A) 0 /cumm    Volume Csf 3.8 mL    No differential CSF see below    Protein, CSF   Result Value Ref Range    Protein, CSF 35 15 - 45 mg/dL   Glucose, CSF   Result Value Ref Range    Glucose, CSF 63 40 - 80 mg/dL   Microscopic Urinalysis   Result Value Ref

## 2018-11-03 LAB — URINE CULTURE, ROUTINE: NORMAL

## 2018-11-07 LAB
BLOOD CULTURE, ROUTINE: NORMAL
CULTURE, BLOOD 2: NORMAL

## 2018-11-09 ENCOUNTER — OFFICE VISIT (OUTPATIENT)
Dept: FAMILY MEDICINE CLINIC | Age: 38
End: 2018-11-09
Payer: COMMERCIAL

## 2018-11-09 VITALS
WEIGHT: 152 LBS | BODY MASS INDEX: 29.84 KG/M2 | SYSTOLIC BLOOD PRESSURE: 100 MMHG | DIASTOLIC BLOOD PRESSURE: 70 MMHG | HEIGHT: 60 IN | TEMPERATURE: 98.2 F

## 2018-11-09 DIAGNOSIS — J40 BRONCHITIS: Primary | ICD-10-CM

## 2018-11-09 DIAGNOSIS — B00.1 FEVER BLISTER: ICD-10-CM

## 2018-11-09 LAB
CSF CULTURE: NORMAL
GRAM STAIN RESULT: NORMAL

## 2018-11-09 PROCEDURE — 99213 OFFICE O/P EST LOW 20 MIN: CPT | Performed by: FAMILY MEDICINE

## 2018-11-09 RX ORDER — FAMCICLOVIR 500 MG/1
TABLET, FILM COATED ORAL
Qty: 30 TABLET | Refills: 1 | Status: SHIPPED | OUTPATIENT
Start: 2018-11-09 | End: 2019-04-08 | Stop reason: SDUPTHER

## 2018-11-09 RX ORDER — HYDROCODONE POLISTIREX AND CHLORPHENIRAMINE POLISTIREX 10; 8 MG/5ML; MG/5ML
5 SUSPENSION, EXTENDED RELEASE ORAL EVERY 12 HOURS PRN
Qty: 60 ML | Refills: 0 | Status: SHIPPED | OUTPATIENT
Start: 2018-11-09 | End: 2018-11-19

## 2018-11-09 RX ORDER — AZITHROMYCIN 250 MG/1
TABLET, FILM COATED ORAL
Qty: 1 PACKET | Refills: 0 | Status: SHIPPED | OUTPATIENT
Start: 2018-11-09 | End: 2018-11-16 | Stop reason: ALTCHOICE

## 2018-11-09 NOTE — PROGRESS NOTES
2 times daily. Kristi Mcdaniel oxyCODONE (ROXICODONE) 5 MG immediate release tablet Take 10 mg by mouth every 8 hours as needed for Pain. .      ondansetron (ZOFRAN ODT) 4 MG disintegrating tablet Take 1 tablet by mouth every 6 hours as needed for Nausea 12 tablet 0    meloxicam (MOBIC) 15 MG tablet Take 1 tablet by mouth daily 90 tablet 0    famciclovir (FAMVIR) 500 MG tablet TAKE ONE TABLET BY MOUTH THREE TIMES DAILY 30 tablet 1    triamcinolone (KENALOG) 0.025 % cream Apply topically 2 times daily. 80 g 1    phenazopyridine (PYRIDIUM) 200 MG tablet Take 1 tablet by mouth 3 times daily as needed for Pain 30 tablet 2    Phentermine-Topiramate (QSYMIA) 3.75-23 MG CP24 Take 1 capsule by mouth daily . 30 capsule 0    buPROPion (WELLBUTRIN SR) 150 MG extended release tablet Take 1 tablet by mouth 2 times daily 60 tablet 3    promethazine (PHENERGAN) 25 MG suppository Place 0.5 suppositories rectally every 6 hours as needed for Nausea 12 suppository 0    famotidine (PEPCID) 40 MG tablet Take 1 tablet by mouth 2 times daily 60 tablet 3    propranolol (INDERAL) 20 MG tablet Take 1 tablet by mouth 3 times daily (Patient taking differently: Take 20 mg by mouth daily ) 90 tablet 2    hydrochlorothiazide (HYDRODIURIL) 25 MG tablet Take 1 tablet by mouth daily 30 tablet 3       Vitals:    11/09/18 1053   BP: 100/70   Temp: 98.2 °F (36.8 °C)   Weight: 152 lb (68.9 kg)   Height: 5' (1.524 m)     Body mass index is 29.69 kg/m². Wt Readings from Last 3 Encounters:   11/09/18 152 lb (68.9 kg)   10/10/18 147 lb (66.7 kg)   09/02/18 155 lb 10.3 oz (70.6 kg)     BP Readings from Last 3 Encounters:   11/09/18 100/70   11/02/18 100/69   10/10/18 110/72       Objective:   Physical Exam   Constitutional: She is oriented to person, place, and time. She appears well-developed and well-nourished. No distress. HENT:   Head: Normocephalic.    Right Ear: Tympanic membrane, external ear and ear canal normal.   Left Ear: Tympanic membrane,

## 2018-11-11 ASSESSMENT — ENCOUNTER SYMPTOMS
SHORTNESS OF BREATH: 1
COUGH: 1
WHEEZING: 1

## 2018-11-16 ENCOUNTER — OFFICE VISIT (OUTPATIENT)
Dept: FAMILY MEDICINE CLINIC | Age: 38
End: 2018-11-16
Payer: COMMERCIAL

## 2018-11-16 VITALS
HEIGHT: 60 IN | BODY MASS INDEX: 29.06 KG/M2 | WEIGHT: 148 LBS | DIASTOLIC BLOOD PRESSURE: 70 MMHG | SYSTOLIC BLOOD PRESSURE: 108 MMHG

## 2018-11-16 DIAGNOSIS — J40 BRONCHITIS: ICD-10-CM

## 2018-11-16 DIAGNOSIS — Z83.438 FAMILY HISTORY OF HYPERLIPIDEMIA: ICD-10-CM

## 2018-11-16 DIAGNOSIS — L23.9 ALLERGIC CONTACT DERMATITIS, UNSPECIFIED TRIGGER: Primary | ICD-10-CM

## 2018-11-16 LAB
CHOLESTEROL, TOTAL: 215 MG/DL (ref 0–199)
HDLC SERPL-MCNC: 44 MG/DL (ref 40–60)
LDL CHOLESTEROL CALCULATED: 128 MG/DL
TRIGL SERPL-MCNC: 216 MG/DL (ref 0–150)
VLDLC SERPL CALC-MCNC: 43 MG/DL

## 2018-11-16 PROCEDURE — 99214 OFFICE O/P EST MOD 30 MIN: CPT | Performed by: FAMILY MEDICINE

## 2018-11-16 PROCEDURE — 36415 COLL VENOUS BLD VENIPUNCTURE: CPT | Performed by: FAMILY MEDICINE

## 2018-11-16 RX ORDER — CIPROFLOXACIN 500 MG/1
500 TABLET, FILM COATED ORAL 2 TIMES DAILY
Qty: 20 TABLET | Refills: 0 | Status: SHIPPED | OUTPATIENT
Start: 2018-11-16 | End: 2018-11-26

## 2018-11-16 RX ORDER — MOMETASONE FUROATE 1 MG/G
CREAM TOPICAL
Qty: 50 G | Refills: 2 | Status: SHIPPED | OUTPATIENT
Start: 2018-11-16 | End: 2019-04-11

## 2018-11-16 ASSESSMENT — ENCOUNTER SYMPTOMS
WHEEZING: 1
RHINORRHEA: 1
SHORTNESS OF BREATH: 1
COUGH: 1

## 2019-01-24 ENCOUNTER — OFFICE VISIT (OUTPATIENT)
Dept: FAMILY MEDICINE CLINIC | Age: 39
End: 2019-01-24
Payer: COMMERCIAL

## 2019-01-24 VITALS
HEIGHT: 60 IN | SYSTOLIC BLOOD PRESSURE: 108 MMHG | TEMPERATURE: 98.4 F | WEIGHT: 152 LBS | OXYGEN SATURATION: 99 % | DIASTOLIC BLOOD PRESSURE: 70 MMHG | HEART RATE: 82 BPM | BODY MASS INDEX: 29.84 KG/M2

## 2019-01-24 DIAGNOSIS — N30.10 INTERSTITIAL CYSTITIS: Primary | ICD-10-CM

## 2019-01-24 DIAGNOSIS — Z01.818 PRE-OP EXAM: ICD-10-CM

## 2019-01-24 DIAGNOSIS — F41.1 GAD (GENERALIZED ANXIETY DISORDER): ICD-10-CM

## 2019-01-24 PROCEDURE — 99242 OFF/OP CONSLTJ NEW/EST SF 20: CPT | Performed by: FAMILY MEDICINE

## 2019-01-24 RX ORDER — ALPRAZOLAM 0.5 MG/1
0.5 TABLET ORAL 2 TIMES DAILY PRN
Qty: 60 TABLET | Refills: 2 | Status: SHIPPED | OUTPATIENT
Start: 2019-01-28 | End: 2019-02-27

## 2019-01-24 ASSESSMENT — PATIENT HEALTH QUESTIONNAIRE - PHQ9
1. LITTLE INTEREST OR PLEASURE IN DOING THINGS: 0
SUM OF ALL RESPONSES TO PHQ QUESTIONS 1-9: 0
2. FEELING DOWN, DEPRESSED OR HOPELESS: 0
SUM OF ALL RESPONSES TO PHQ9 QUESTIONS 1 & 2: 0
SUM OF ALL RESPONSES TO PHQ QUESTIONS 1-9: 0

## 2019-02-20 ENCOUNTER — HOSPITAL ENCOUNTER (EMERGENCY)
Age: 39
Discharge: HOME | End: 2019-02-20
Attending: EMERGENCY MEDICINE
Payer: COMMERCIAL

## 2019-02-20 ENCOUNTER — APPOINTMENT (OUTPATIENT)
Dept: GENERAL RADIOLOGY | Age: 39
End: 2019-02-20
Payer: COMMERCIAL

## 2019-02-20 VITALS
OXYGEN SATURATION: 96 % | SYSTOLIC BLOOD PRESSURE: 108 MMHG | BODY MASS INDEX: 32.12 KG/M2 | DIASTOLIC BLOOD PRESSURE: 74 MMHG | RESPIRATION RATE: 16 BRPM | TEMPERATURE: 98.2 F | HEART RATE: 107 BPM | WEIGHT: 164.46 LBS

## 2019-02-20 DIAGNOSIS — S61.212A LACERATION OF RIGHT MIDDLE FINGER WITHOUT FOREIGN BODY WITHOUT DAMAGE TO NAIL, INITIAL ENCOUNTER: Primary | ICD-10-CM

## 2019-02-20 PROCEDURE — 4500000023 HC ED LEVEL 3 PROCEDURE

## 2019-02-20 PROCEDURE — 99283 EMERGENCY DEPT VISIT LOW MDM: CPT

## 2019-02-20 PROCEDURE — 73130 X-RAY EXAM OF HAND: CPT

## 2019-02-20 ASSESSMENT — PAIN DESCRIPTION - PAIN TYPE: TYPE: ACUTE PAIN

## 2019-02-20 ASSESSMENT — PAIN SCALES - GENERAL: PAINLEVEL_OUTOF10: 8

## 2019-02-20 ASSESSMENT — PAIN DESCRIPTION - LOCATION: LOCATION: FINGER (COMMENT WHICH ONE)

## 2019-02-20 ASSESSMENT — PAIN DESCRIPTION - ORIENTATION: ORIENTATION: RIGHT

## 2019-02-21 ASSESSMENT — ENCOUNTER SYMPTOMS
SHORTNESS OF BREATH: 0
ABDOMINAL PAIN: 0

## 2019-03-01 DIAGNOSIS — F32.9 REACTIVE DEPRESSION: ICD-10-CM

## 2019-03-01 RX ORDER — BUPROPION HYDROCHLORIDE 150 MG/1
150 TABLET, EXTENDED RELEASE ORAL 2 TIMES DAILY
Qty: 60 TABLET | Refills: 3 | Status: SHIPPED | OUTPATIENT
Start: 2019-03-01 | End: 2019-06-28 | Stop reason: SDUPTHER

## 2019-03-08 ENCOUNTER — OFFICE VISIT (OUTPATIENT)
Dept: FAMILY MEDICINE CLINIC | Age: 39
End: 2019-03-08
Payer: COMMERCIAL

## 2019-03-08 VITALS
SYSTOLIC BLOOD PRESSURE: 112 MMHG | HEIGHT: 60 IN | WEIGHT: 154.8 LBS | BODY MASS INDEX: 30.39 KG/M2 | DIASTOLIC BLOOD PRESSURE: 78 MMHG

## 2019-03-08 DIAGNOSIS — E66.09 CLASS 1 OBESITY DUE TO EXCESS CALORIES WITH SERIOUS COMORBIDITY AND BODY MASS INDEX (BMI) OF 30.0 TO 30.9 IN ADULT: Primary | ICD-10-CM

## 2019-03-08 DIAGNOSIS — S61.218S: ICD-10-CM

## 2019-03-08 PROCEDURE — 99213 OFFICE O/P EST LOW 20 MIN: CPT | Performed by: FAMILY MEDICINE

## 2019-03-08 RX ORDER — ALPRAZOLAM 0.5 MG/1
0.5 TABLET ORAL
COMMUNITY
End: 2019-04-11

## 2019-03-08 RX ORDER — PHENTERMINE HYDROCHLORIDE 37.5 MG/1
37.5 TABLET ORAL
Qty: 30 TABLET | Refills: 0 | Status: SHIPPED | OUTPATIENT
Start: 2019-03-08 | End: 2019-04-08 | Stop reason: SDUPTHER

## 2019-03-08 ASSESSMENT — ENCOUNTER SYMPTOMS
RESPIRATORY NEGATIVE: 1
GASTROINTESTINAL NEGATIVE: 1

## 2019-04-08 ENCOUNTER — OFFICE VISIT (OUTPATIENT)
Dept: FAMILY MEDICINE CLINIC | Age: 39
End: 2019-04-08
Payer: COMMERCIAL

## 2019-04-08 VITALS
SYSTOLIC BLOOD PRESSURE: 112 MMHG | WEIGHT: 153.4 LBS | BODY MASS INDEX: 30.12 KG/M2 | DIASTOLIC BLOOD PRESSURE: 78 MMHG | HEIGHT: 60 IN

## 2019-04-08 DIAGNOSIS — M79.7 FIBROMYALGIA: ICD-10-CM

## 2019-04-08 DIAGNOSIS — B96.89 ACUTE BACTERIAL SINUSITIS: Primary | ICD-10-CM

## 2019-04-08 DIAGNOSIS — F41.1 GAD (GENERALIZED ANXIETY DISORDER): ICD-10-CM

## 2019-04-08 DIAGNOSIS — J01.90 ACUTE BACTERIAL SINUSITIS: Primary | ICD-10-CM

## 2019-04-08 DIAGNOSIS — B00.1 FEVER BLISTER: ICD-10-CM

## 2019-04-08 DIAGNOSIS — E66.09 CLASS 1 OBESITY DUE TO EXCESS CALORIES WITH SERIOUS COMORBIDITY AND BODY MASS INDEX (BMI) OF 30.0 TO 30.9 IN ADULT: ICD-10-CM

## 2019-04-08 PROCEDURE — 99214 OFFICE O/P EST MOD 30 MIN: CPT | Performed by: FAMILY MEDICINE

## 2019-04-08 RX ORDER — AZITHROMYCIN 250 MG/1
TABLET, FILM COATED ORAL
Qty: 1 PACKET | Refills: 0 | Status: SHIPPED | OUTPATIENT
Start: 2019-04-08 | End: 2019-04-11

## 2019-04-08 RX ORDER — TIZANIDINE 4 MG/1
4 TABLET ORAL EVERY 8 HOURS PRN
Qty: 30 TABLET | Refills: 2 | Status: SHIPPED | OUTPATIENT
Start: 2019-04-08 | End: 2019-06-07 | Stop reason: SDUPTHER

## 2019-04-08 RX ORDER — PHENTERMINE HYDROCHLORIDE 37.5 MG/1
37.5 TABLET ORAL
Qty: 30 TABLET | Refills: 0 | Status: SHIPPED | OUTPATIENT
Start: 2019-04-08 | End: 2019-05-07 | Stop reason: SDUPTHER

## 2019-04-08 RX ORDER — FAMCICLOVIR 500 MG/1
TABLET, FILM COATED ORAL
Qty: 30 TABLET | Refills: 5 | Status: SHIPPED | OUTPATIENT
Start: 2019-04-08 | End: 2019-06-28 | Stop reason: SDUPTHER

## 2019-04-08 RX ORDER — ALPRAZOLAM 0.5 MG/1
0.5 TABLET ORAL 2 TIMES DAILY PRN
Qty: 60 TABLET | Refills: 2 | Status: SHIPPED | OUTPATIENT
Start: 2019-04-27 | End: 2019-08-28 | Stop reason: SDUPTHER

## 2019-04-08 ASSESSMENT — ENCOUNTER SYMPTOMS
SINUS PRESSURE: 1
HOARSE VOICE: 1
COUGH: 1

## 2019-04-08 NOTE — PROGRESS NOTES
Subjective:      Patient ID: Deann Wilson is a 45 y.o. female. sinusitis  bilat ears hurting  She went to urgent care and was treated with amoxil for sinusitis  This does not help  Still with pressure   pnd   Purulent drainage  Headaches in her face    Obesity   Finished first month of adipex     Feels like it help her hunger control  Tolerating fine  No side effects    Fibromyalgia   She is seeing her pain specialist and he did some testing by putting sensors onher fingers and toes  Told to followup with me immediately  She wants to follow up and see what these mean  Checking autonomic nervous system       Review of Systems   HENT: Positive for congestion, ear pain, hoarse voice and sinus pressure. Respiratory: Positive for cough. Musculoskeletal: Positive for arthralgias, gait problem and myalgias. Neurological: Positive for dizziness and headaches. Seizures:  Speech difficulty:    Psychiatric/Behavioral: Positive for agitation. The patient is nervous/anxious. YOB: 1980    Date of Visit:  4/8/2019    Allergies   Allergen Reactions    Shellfish-Derived Products Shortness Of Breath    Tape Janece Meredith Tape] Rash    Tylenol [Acetaminophen]      History of hep c    Codeine Nausea And Vomiting and Palpitations    Morphine Itching and Nausea And Vomiting     \"makes me crazy\"     Prednisone Hives, Palpitations and Rash       Outpatient Medications Marked as Taking for the 4/8/19 encounter (Office Visit) with Mk Peterson,    Medication Sig Dispense Refill    ALPRAZolam (XANAX) 0.5 MG tablet Take 0.5 mg by mouth.       buPROPion (WELLBUTRIN SR) 150 MG extended release tablet Take 1 tablet by mouth 2 times daily 60 tablet 3    famciclovir (FAMVIR) 500 MG tablet TAKE ONE TABLET BY MOUTH THREE TIMES DAILY 30 tablet 1    tiZANidine (ZANAFLEX) 4 MG tablet Take 1 tablet by mouth every 8 hours as needed (muscle spasm) 30 tablet 2    promethazine (PHENERGAN) 25 MG tablet TAKE ONE TABLET BY MOUTH TWICE DAILY AS NEEDED FOR NAUSEA 60 tablet 2    OxyCODONE ER (XTAMPZA ER) 9 MG C12A Take 9 mg by mouth 2 times daily. Malachy Arena promethazine (PHENERGAN) 25 MG suppository Place 0.5 suppositories rectally every 6 hours as needed for Nausea 12 suppository 0    famotidine (PEPCID) 40 MG tablet Take 1 tablet by mouth 2 times daily 60 tablet 3    propranolol (INDERAL) 20 MG tablet Take 1 tablet by mouth 3 times daily (Patient taking differently: Take 20 mg by mouth daily ) 90 tablet 2    hydrochlorothiazide (HYDRODIURIL) 25 MG tablet Take 1 tablet by mouth daily 30 tablet 3       Vitals:    04/08/19 1257   BP: 112/78   Weight: 153 lb 6.4 oz (69.6 kg)   Height: 5' (1.524 m)     Body mass index is 29.96 kg/m². Wt Readings from Last 3 Encounters:   04/08/19 153 lb 6.4 oz (69.6 kg)   03/08/19 154 lb 12.8 oz (70.2 kg)   02/20/19 164 lb 7.4 oz (74.6 kg)     BP Readings from Last 3 Encounters:   04/08/19 112/78   03/08/19 112/78   02/20/19 108/74       Objective:   Physical Exam   Constitutional: She is oriented to person, place, and time. She appears well-developed and well-nourished. No distress. HENT:   Head: Normocephalic. Right Ear: Tympanic membrane, external ear and ear canal normal.   Left Ear: Tympanic membrane, external ear and ear canal normal.   Nose: Mucosal edema present. Mouth/Throat: Posterior oropharyngeal erythema present. Eyes: Conjunctivae are normal. Left eye discharge: pnd    Neck: No thyromegaly present. Cardiovascular: Normal rate. Pulmonary/Chest: Effort normal and breath sounds normal. No respiratory distress. She has no wheezes. She has no rales. Lymphadenopathy:     She has cervical adenopathy. Neurological: She is alert and oriented to person, place, and time. Skin: Skin is warm and dry. No rash noted. Psychiatric: She has a normal mood and affect.  Her behavior is normal. Judgment and thought content normal.       Assessment:      Assessment/plan;  Sulma was seen today for weight management and otalgia. Diagnoses and all orders for this visit:    Acute bacterial sinusitis  -     azithromycin (ZITHROMAX) 250 MG tablet; Take 2 tabs (500 mg) on Day 1, and take 1 tab (250 mg) on days 2 through 5. JACKSON (generalized anxiety disorder)  -     ALPRAZolam (XANAX) 0.5 MG tablet; Take 1 tablet by mouth 2 times daily as needed for Sleep for up to 30 days. Fibromyalgia  -     tiZANidine (ZANAFLEX) 4 MG tablet; Take 1 tablet by mouth every 8 hours as needed (muscle spasm)    Fever blister  -     famciclovir (FAMVIR) 500 MG tablet; TAKE ONE TABLET BY MOUTH THREE TIMES DAILY    Class 1 obesity due to excess calories with serious comorbidity and body mass index (BMI) of 30.0 to 30.9 in adult  -     phentermine (ADIPEX-P) 37.5 MG tablet; Take 1 tablet by mouth every morning (before breakfast) for 30 days.         Dickson Whitney, DO

## 2019-04-11 ENCOUNTER — HOSPITAL ENCOUNTER (INPATIENT)
Age: 39
LOS: 1 days | Discharge: HOME | DRG: 871 | End: 2019-04-12
Attending: EMERGENCY MEDICINE | Admitting: INTERNAL MEDICINE
Payer: COMMERCIAL

## 2019-04-11 ENCOUNTER — APPOINTMENT (OUTPATIENT)
Dept: CT IMAGING | Age: 39
DRG: 871 | End: 2019-04-11
Payer: COMMERCIAL

## 2019-04-11 DIAGNOSIS — A41.9 SEPSIS, DUE TO UNSPECIFIED ORGANISM: Primary | ICD-10-CM

## 2019-04-11 DIAGNOSIS — J18.9 PNEUMONIA DUE TO ORGANISM: ICD-10-CM

## 2019-04-11 PROBLEM — R00.0 SINUS TACHYCARDIA: Status: ACTIVE | Noted: 2019-04-11

## 2019-04-11 PROBLEM — R06.02 SHORTNESS OF BREATH: Status: ACTIVE | Noted: 2019-04-11

## 2019-04-11 LAB
A/G RATIO: 1.5 (ref 1.1–2.2)
ALBUMIN SERPL-MCNC: 4.4 G/DL (ref 3.4–5)
ALP BLD-CCNC: 84 U/L (ref 40–129)
ALT SERPL-CCNC: 16 U/L (ref 10–40)
AMPHETAMINE SCREEN, URINE: NORMAL
ANION GAP SERPL CALCULATED.3IONS-SCNC: 12 MMOL/L (ref 3–16)
AST SERPL-CCNC: 19 U/L (ref 15–37)
BARBITURATE SCREEN URINE: NORMAL
BASOPHILS ABSOLUTE: 0 K/UL (ref 0–0.2)
BASOPHILS RELATIVE PERCENT: 0.2 %
BENZODIAZEPINE SCREEN, URINE: NORMAL
BILIRUB SERPL-MCNC: 0.4 MG/DL (ref 0–1)
BUN BLDV-MCNC: 7 MG/DL (ref 7–20)
CALCIUM SERPL-MCNC: 9.6 MG/DL (ref 8.3–10.6)
CANNABINOID SCREEN URINE: NORMAL
CHLORIDE BLD-SCNC: 105 MMOL/L (ref 99–110)
CO2: 23 MMOL/L (ref 21–32)
COCAINE METABOLITE SCREEN URINE: NORMAL
CREAT SERPL-MCNC: 0.9 MG/DL (ref 0.6–1.1)
EKG ATRIAL RATE: 115 BPM
EKG DIAGNOSIS: NORMAL
EKG P AXIS: 70 DEGREES
EKG P-R INTERVAL: 158 MS
EKG Q-T INTERVAL: 308 MS
EKG QRS DURATION: 74 MS
EKG QTC CALCULATION (BAZETT): 426 MS
EKG R AXIS: 63 DEGREES
EKG T AXIS: 57 DEGREES
EKG VENTRICULAR RATE: 115 BPM
EOSINOPHILS ABSOLUTE: 0.2 K/UL (ref 0–0.6)
EOSINOPHILS RELATIVE PERCENT: 2.2 %
GFR AFRICAN AMERICAN: >60
GFR NON-AFRICAN AMERICAN: >60
GLOBULIN: 2.9 G/DL
GLUCOSE BLD-MCNC: 80 MG/DL (ref 70–99)
HCG QUALITATIVE: NEGATIVE
HCT VFR BLD CALC: 41 % (ref 36–48)
HEMOGLOBIN: 14 G/DL (ref 12–16)
LACTIC ACID: 2 MMOL/L (ref 0.4–2)
LACTIC ACID: 4.3 MMOL/L (ref 0.4–2)
LACTIC ACID: 5.8 MMOL/L (ref 0.4–2)
LYMPHOCYTES ABSOLUTE: 1 K/UL (ref 1–5.1)
LYMPHOCYTES RELATIVE PERCENT: 10.7 %
Lab: NORMAL
MCH RBC QN AUTO: 30.6 PG (ref 26–34)
MCHC RBC AUTO-ENTMCNC: 34 G/DL (ref 31–36)
MCV RBC AUTO: 90 FL (ref 80–100)
METHADONE SCREEN, URINE: NORMAL
MONOCYTES ABSOLUTE: 0.9 K/UL (ref 0–1.3)
MONOCYTES RELATIVE PERCENT: 9.6 %
NEUTROPHILS ABSOLUTE: 7.2 K/UL (ref 1.7–7.7)
NEUTROPHILS RELATIVE PERCENT: 77.3 %
OPIATE SCREEN URINE: NORMAL
OXYCODONE URINE: NORMAL
PDW BLD-RTO: 13.4 % (ref 12.4–15.4)
PH UA: 5
PHENCYCLIDINE SCREEN URINE: NORMAL
PLATELET # BLD: 215 K/UL (ref 135–450)
PMV BLD AUTO: 7.7 FL (ref 5–10.5)
POTASSIUM SERPL-SCNC: 3.6 MMOL/L (ref 3.5–5.1)
PRO-BNP: 13 PG/ML (ref 0–124)
PROPOXYPHENE SCREEN: NORMAL
RBC # BLD: 4.56 M/UL (ref 4–5.2)
SODIUM BLD-SCNC: 140 MMOL/L (ref 136–145)
TOTAL PROTEIN: 7.3 G/DL (ref 6.4–8.2)
TROPONIN: <0.01 NG/ML
WBC # BLD: 9.3 K/UL (ref 4–11)

## 2019-04-11 PROCEDURE — 71260 CT THORAX DX C+: CPT

## 2019-04-11 PROCEDURE — 6370000000 HC RX 637 (ALT 250 FOR IP): Performed by: INTERNAL MEDICINE

## 2019-04-11 PROCEDURE — 83880 ASSAY OF NATRIURETIC PEPTIDE: CPT

## 2019-04-11 PROCEDURE — 96365 THER/PROPH/DIAG IV INF INIT: CPT

## 2019-04-11 PROCEDURE — G0378 HOSPITAL OBSERVATION PER HR: HCPCS

## 2019-04-11 PROCEDURE — 94664 DEMO&/EVAL PT USE INHALER: CPT

## 2019-04-11 PROCEDURE — 80053 COMPREHEN METABOLIC PANEL: CPT

## 2019-04-11 PROCEDURE — 93005 ELECTROCARDIOGRAM TRACING: CPT | Performed by: PHYSICIAN ASSISTANT

## 2019-04-11 PROCEDURE — 96375 TX/PRO/DX INJ NEW DRUG ADDON: CPT

## 2019-04-11 PROCEDURE — 6360000004 HC RX CONTRAST MEDICATION: Performed by: PHYSICIAN ASSISTANT

## 2019-04-11 PROCEDURE — 96361 HYDRATE IV INFUSION ADD-ON: CPT

## 2019-04-11 PROCEDURE — 2580000003 HC RX 258: Performed by: PHYSICIAN ASSISTANT

## 2019-04-11 PROCEDURE — 83605 ASSAY OF LACTIC ACID: CPT

## 2019-04-11 PROCEDURE — 80307 DRUG TEST PRSMV CHEM ANLYZR: CPT

## 2019-04-11 PROCEDURE — 94762 N-INVAS EAR/PLS OXIMTRY CONT: CPT

## 2019-04-11 PROCEDURE — 99291 CRITICAL CARE FIRST HOUR: CPT

## 2019-04-11 PROCEDURE — 6370000000 HC RX 637 (ALT 250 FOR IP): Performed by: PHYSICIAN ASSISTANT

## 2019-04-11 PROCEDURE — 84703 CHORIONIC GONADOTROPIN ASSAY: CPT

## 2019-04-11 PROCEDURE — 96367 TX/PROPH/DG ADDL SEQ IV INF: CPT

## 2019-04-11 PROCEDURE — 87040 BLOOD CULTURE FOR BACTERIA: CPT

## 2019-04-11 PROCEDURE — 6360000002 HC RX W HCPCS: Performed by: PHYSICIAN ASSISTANT

## 2019-04-11 PROCEDURE — 93010 ELECTROCARDIOGRAM REPORT: CPT | Performed by: INTERNAL MEDICINE

## 2019-04-11 PROCEDURE — 84484 ASSAY OF TROPONIN QUANT: CPT

## 2019-04-11 PROCEDURE — 85025 COMPLETE CBC W/AUTO DIFF WBC: CPT

## 2019-04-11 PROCEDURE — 94640 AIRWAY INHALATION TREATMENT: CPT

## 2019-04-11 RX ORDER — ACETAMINOPHEN 325 MG/1
650 TABLET ORAL EVERY 4 HOURS PRN
Status: DISCONTINUED | OUTPATIENT
Start: 2019-04-11 | End: 2019-04-12 | Stop reason: HOSPADM

## 2019-04-11 RX ORDER — PROPRANOLOL HYDROCHLORIDE 20 MG/1
20 TABLET ORAL 2 TIMES DAILY
Status: DISCONTINUED | OUTPATIENT
Start: 2019-04-11 | End: 2019-04-12 | Stop reason: HOSPADM

## 2019-04-11 RX ORDER — PROPRANOLOL HYDROCHLORIDE 10 MG/1
20 TABLET ORAL ONCE
Status: COMPLETED | OUTPATIENT
Start: 2019-04-11 | End: 2019-04-11

## 2019-04-11 RX ORDER — SODIUM CHLORIDE 9 MG/ML
INJECTION, SOLUTION INTRAVENOUS CONTINUOUS
Status: DISCONTINUED | OUTPATIENT
Start: 2019-04-11 | End: 2019-04-12 | Stop reason: HOSPADM

## 2019-04-11 RX ORDER — OXYCODONE HYDROCHLORIDE 10 MG/1
10 TABLET ORAL EVERY 8 HOURS SCHEDULED
Refills: 0 | COMMUNITY
Start: 2019-03-23 | End: 2019-04-23 | Stop reason: ALTCHOICE

## 2019-04-11 RX ORDER — TIZANIDINE 4 MG/1
4 TABLET ORAL EVERY 8 HOURS PRN
Status: DISCONTINUED | OUTPATIENT
Start: 2019-04-11 | End: 2019-04-12 | Stop reason: HOSPADM

## 2019-04-11 RX ORDER — ACYCLOVIR 800 MG/1
800 TABLET ORAL EVERY 8 HOURS PRN
Status: DISCONTINUED | OUTPATIENT
Start: 2019-04-11 | End: 2019-04-12 | Stop reason: HOSPADM

## 2019-04-11 RX ORDER — 0.9 % SODIUM CHLORIDE 0.9 %
1000 INTRAVENOUS SOLUTION INTRAVENOUS ONCE
Status: COMPLETED | OUTPATIENT
Start: 2019-04-11 | End: 2019-04-11

## 2019-04-11 RX ORDER — SODIUM CHLORIDE 0.9 % (FLUSH) 0.9 %
10 SYRINGE (ML) INJECTION PRN
Status: DISCONTINUED | OUTPATIENT
Start: 2019-04-11 | End: 2019-04-12 | Stop reason: HOSPADM

## 2019-04-11 RX ORDER — SODIUM CHLORIDE 0.9 % (FLUSH) 0.9 %
10 SYRINGE (ML) INJECTION EVERY 12 HOURS SCHEDULED
Status: DISCONTINUED | OUTPATIENT
Start: 2019-04-11 | End: 2019-04-12 | Stop reason: HOSPADM

## 2019-04-11 RX ORDER — 0.9 % SODIUM CHLORIDE 0.9 %
500 INTRAVENOUS SOLUTION INTRAVENOUS ONCE
Status: COMPLETED | OUTPATIENT
Start: 2019-04-11 | End: 2019-04-11

## 2019-04-11 RX ORDER — ONDANSETRON 2 MG/ML
4 INJECTION INTRAMUSCULAR; INTRAVENOUS EVERY 4 HOURS PRN
Status: DISCONTINUED | OUTPATIENT
Start: 2019-04-11 | End: 2019-04-12 | Stop reason: HOSPADM

## 2019-04-11 RX ORDER — FAMOTIDINE 20 MG/1
40 TABLET, FILM COATED ORAL 2 TIMES DAILY
Status: DISCONTINUED | OUTPATIENT
Start: 2019-04-11 | End: 2019-04-12 | Stop reason: HOSPADM

## 2019-04-11 RX ORDER — OXYCODONE HYDROCHLORIDE 10 MG/1
10 TABLET ORAL EVERY 8 HOURS SCHEDULED
Status: DISCONTINUED | OUTPATIENT
Start: 2019-04-11 | End: 2019-04-12 | Stop reason: HOSPADM

## 2019-04-11 RX ORDER — METHYLPREDNISOLONE SODIUM SUCCINATE 125 MG/2ML
125 INJECTION, POWDER, LYOPHILIZED, FOR SOLUTION INTRAMUSCULAR; INTRAVENOUS ONCE
Status: COMPLETED | OUTPATIENT
Start: 2019-04-11 | End: 2019-04-11

## 2019-04-11 RX ORDER — KETOROLAC TROMETHAMINE 30 MG/ML
15 INJECTION, SOLUTION INTRAMUSCULAR; INTRAVENOUS ONCE
Status: COMPLETED | OUTPATIENT
Start: 2019-04-11 | End: 2019-04-11

## 2019-04-11 RX ORDER — IPRATROPIUM BROMIDE AND ALBUTEROL SULFATE 2.5; .5 MG/3ML; MG/3ML
3 SOLUTION RESPIRATORY (INHALATION) ONCE
Status: COMPLETED | OUTPATIENT
Start: 2019-04-11 | End: 2019-04-11

## 2019-04-11 RX ORDER — BUPROPION HYDROCHLORIDE 150 MG/1
150 TABLET, EXTENDED RELEASE ORAL 2 TIMES DAILY
Status: DISCONTINUED | OUTPATIENT
Start: 2019-04-11 | End: 2019-04-12 | Stop reason: HOSPADM

## 2019-04-11 RX ORDER — DIPHENHYDRAMINE HYDROCHLORIDE 50 MG/ML
25 INJECTION INTRAMUSCULAR; INTRAVENOUS ONCE
Status: COMPLETED | OUTPATIENT
Start: 2019-04-11 | End: 2019-04-11

## 2019-04-11 RX ORDER — BENZONATATE 100 MG/1
200 CAPSULE ORAL ONCE
Status: COMPLETED | OUTPATIENT
Start: 2019-04-11 | End: 2019-04-11

## 2019-04-11 RX ADMIN — Medication 12.5 MG: at 17:07

## 2019-04-11 RX ADMIN — KETOROLAC TROMETHAMINE 15 MG: 30 INJECTION, SOLUTION INTRAMUSCULAR; INTRAVENOUS at 13:12

## 2019-04-11 RX ADMIN — SODIUM CHLORIDE 500 ML: 9 INJECTION, SOLUTION INTRAVENOUS at 15:22

## 2019-04-11 RX ADMIN — AZITHROMYCIN DIHYDRATE 500 MG: 500 INJECTION, POWDER, LYOPHILIZED, FOR SOLUTION INTRAVENOUS at 16:26

## 2019-04-11 RX ADMIN — SODIUM CHLORIDE 1000 ML: 9 INJECTION, SOLUTION INTRAVENOUS at 13:07

## 2019-04-11 RX ADMIN — METHYLPREDNISOLONE SODIUM SUCCINATE 125 MG: 125 INJECTION, POWDER, FOR SOLUTION INTRAMUSCULAR; INTRAVENOUS at 13:13

## 2019-04-11 RX ADMIN — DIPHENHYDRAMINE HYDROCHLORIDE 25 MG: 50 INJECTION, SOLUTION INTRAMUSCULAR; INTRAVENOUS at 13:11

## 2019-04-11 RX ADMIN — IPRATROPIUM BROMIDE AND ALBUTEROL SULFATE 3 AMPULE: .5; 3 SOLUTION RESPIRATORY (INHALATION) at 13:30

## 2019-04-11 RX ADMIN — PROPRANOLOL HYDROCHLORIDE 20 MG: 10 TABLET ORAL at 18:10

## 2019-04-11 RX ADMIN — IOPAMIDOL 75 ML: 755 INJECTION, SOLUTION INTRAVENOUS at 13:59

## 2019-04-11 RX ADMIN — CEFTRIAXONE 1 G: 1 INJECTION, POWDER, FOR SOLUTION INTRAMUSCULAR; INTRAVENOUS at 15:50

## 2019-04-11 RX ADMIN — SODIUM CHLORIDE 1000 ML: 9 INJECTION, SOLUTION INTRAVENOUS at 14:22

## 2019-04-11 RX ADMIN — BENZONATATE 200 MG: 100 CAPSULE ORAL at 16:33

## 2019-04-11 RX ADMIN — SODIUM CHLORIDE 500 ML: 9 INJECTION, SOLUTION INTRAVENOUS at 15:50

## 2019-04-11 ASSESSMENT — PAIN DESCRIPTION - PAIN TYPE
TYPE: ACUTE PAIN
TYPE: ACUTE PAIN

## 2019-04-11 ASSESSMENT — PAIN SCALES - GENERAL
PAINLEVEL_OUTOF10: 6
PAINLEVEL_OUTOF10: 3
PAINLEVEL_OUTOF10: 3
PAINLEVEL_OUTOF10: 6
PAINLEVEL_OUTOF10: 2
PAINLEVEL_OUTOF10: 6
PAINLEVEL_OUTOF10: 2
PAINLEVEL_OUTOF10: 5

## 2019-04-11 ASSESSMENT — ENCOUNTER SYMPTOMS
COUGH: 1
WHEEZING: 1
RHINORRHEA: 0
SHORTNESS OF BREATH: 1
VOMITING: 1

## 2019-04-11 ASSESSMENT — PAIN DESCRIPTION - FREQUENCY
FREQUENCY: CONTINUOUS
FREQUENCY: CONTINUOUS

## 2019-04-11 ASSESSMENT — PAIN DESCRIPTION - LOCATION
LOCATION: CHEST
LOCATION: CHEST

## 2019-04-11 ASSESSMENT — PAIN DESCRIPTION - ONSET
ONSET: ON-GOING
ONSET: ON-GOING

## 2019-04-11 ASSESSMENT — PAIN - FUNCTIONAL ASSESSMENT
PAIN_FUNCTIONAL_ASSESSMENT: ACTIVITIES ARE NOT PREVENTED
PAIN_FUNCTIONAL_ASSESSMENT: ACTIVITIES ARE NOT PREVENTED

## 2019-04-11 ASSESSMENT — PAIN DESCRIPTION - PROGRESSION: CLINICAL_PROGRESSION: NOT CHANGED

## 2019-04-11 ASSESSMENT — PAIN DESCRIPTION - DESCRIPTORS
DESCRIPTORS: PRESSURE
DESCRIPTORS: PRESSURE

## 2019-04-11 ASSESSMENT — PAIN DESCRIPTION - ORIENTATION: ORIENTATION: MID;LEFT

## 2019-04-11 NOTE — ED PROVIDER NOTES
[facility]  eMERGENCY dEPARTMENT eNCOUnter      Pt Name: Johnson Phillips  MRN: [id number]  Armssantagfanjelica 1980  Date of evaluation: 4/11/2019  Provider: Anselmo Francisco Elsmore Shira       Chief Complaint   Patient presents with    Shortness of Breath     finished amoxicillin, start on azithromycin, not getting any better    Cough     brownish green sputum         HISTORY OF PRESENT ILLNESS  (Location/Symptom, Timing/Onset, Context/Setting, Quality, Duration, Modifying Factors, Severity.)   Johnson Phillips is a 45 y.o. female who presents to the emergency department for shortness of breath and productive cough. Patient reports she's had a productive cough for the past 1 month. It is productive of a green and brown sputum. Also has had a little bit of blood associated with it. Denies aspirin or anticoagulant use. Has had some posttussive emesis. Has been wheezing for the past few days. Has asthma as a child. Denies fever or chills. Took amoxicillin 2 weeks ago prescribed by [address] [place] [address]. PCP prescribed azithromycin on Monday. Neither have helped Patient denies congestion rhinorrhea. He also reports chest pressure since last night. She she denies history of hypertension, diabetes, hyperlipidemia, smoking. Does have positive family history of MI. Denies any history of VTE, surgery the past 4 weeks, leg swelling. Nursing Notes were reviewed and I agree. REVIEW OF SYSTEMS    (2-9 systems for level 4, 10 or more for level 5)     Review of Systems   Constitutional: Negative for chills and fever. HENT: Negative for congestion and rhinorrhea. Respiratory: Positive for cough, shortness of breath and wheezing. +sputum, hemoptysis   Cardiovascular: Positive for chest pain. Gastrointestinal: Positive for vomiting (post tussive). Except as noted above the remainder of the review of systems was reviewed and negative.        PAST MEDICAL HISTORY         Diagnosis Date    Arrythmia     Asthma     no problems recently    Back pain     Breast cancer (HCC)     Breast lump     right    Cervical cancer (HCC)     Chronic hepatitis C (HCC)     Chronic pain     DDD (degenerative disc disease)     Diverticulitis     Fibromyalgia     GERD (gastroesophageal reflux disease)     Headache     migraines    Heart disease     Immune deficiency disorder (HCC)     Interstitial cystitis     Nausea & vomiting     Osteoarthritis     Other closed fractures of distal end of radius (alone) 12/19/2009    distal radius fx surgery 12/28/2009 Dr. Greg Forrester    Pneumonia     Rheumatoid arthritis(714.0)        SURGICAL HISTORY           Procedure Laterality Date    APPENDECTOMY      BLADDER SURGERY      BREAST SURGERY      bilat mastectomy    CARPAL TUNNEL RELEASE Right 7/2/14    removal of hardware    CHOLECYSTECTOMY      COLONOSCOPY      with polyectomy    CYSTOSCOPY  8-17-11    with bladder and urethral dilatation    HERNIA REPAIR  03/24/2017    repair of umbilical hernia with primary closure, exploration of LLQ subcutaneous space without definitive findings of lipoma    HYSTERECTOMY      LAPAROSCOPIC APPENDECTOMY  3/18/13    LAPAROSCOPY  3/18/13    TONSILLECTOMY AND ADENOIDECTOMY      UPPER GASTROINTESTINAL ENDOSCOPY      1/09    UPPER GASTROINTESTINAL ENDOSCOPY  5/28/2010    UPPER GASTROINTESTINAL ENDOSCOPY  2/13/13    WRIST FRACTURE SURGERY  12/28/09    Open treatment with open reduction, internal fixation rightdistal radius using       CURRENT MEDICATIONS       Previous Medications    ALPRAZOLAM (XANAX) 0.5 MG TABLET    Take 1 tablet by mouth 2 times daily as needed for Sleep for up to 30 days.     BUPROPION (WELLBUTRIN SR) 150 MG EXTENDED RELEASE TABLET    Take 1 tablet by mouth 2 times daily    EPINEPHRINE (EPIPEN 2-RUFINA) 0.3 MG/0.3ML SOAJ INJECTION    Inject 0.3 mLs into the skin once for 1 dose Use as directed for allergic reaction FAMCICLOVIR (FAMVIR) 500 MG TABLET    TAKE ONE TABLET BY MOUTH THREE TIMES DAILY    FAMOTIDINE (PEPCID) 40 MG TABLET    Take 1 tablet by mouth 2 times daily    HYDROCHLOROTHIAZIDE (HYDRODIURIL) 25 MG TABLET    Take 1 tablet by mouth daily    OXYCODONE ER (XTAMPZA ER) 9 MG C12A    Take 9 mg by mouth nightly. OXYCODONE HCL (OXY-IR) 10 MG IMMEDIATE RELEASE TABLET    Take 10 mg by mouth every 8 hours. PHENTERMINE (ADIPEX-P) 37.5 MG TABLET    Take 1 tablet by mouth every morning (before breakfast) for 30 days. PROMETHAZINE (PHENERGAN) 25 MG SUPPOSITORY    Place 0.5 suppositories rectally every 6 hours as needed for Nausea    PROMETHAZINE (PHENERGAN) 25 MG TABLET    TAKE ONE TABLET BY MOUTH TWICE DAILY AS NEEDED FOR NAUSEA    PROPRANOLOL (INDERAL) 20 MG TABLET    Take 1 tablet by mouth 3 times daily    TIZANIDINE (ZANAFLEX) 4 MG TABLET    Take 1 tablet by mouth every 8 hours as needed (muscle spasm)       ALLERGIES     Shellfish-derived products; Tape [adhesive tape]; Tylenol [acetaminophen]; Codeine; Morphine; and Prednisone    FAMILY HISTORY           Problem Relation Age of Onset    Cancer Mother         Breast    Depression Mother     Cancer Father         lung    Cancer Maternal Grandmother         breast    Asthma Other     Cancer Other     Heart Disease Other     Depression Paternal Grandmother     Emphysema Paternal Grandmother     Elevated Lipids Paternal Grandmother     Cancer Paternal Grandfather         lung    Cancer Maternal Grandfather         colon      Family Status   Relation Name Status    Mother  Alive    Father  Alive    MGM      Other  (Not Specified)    PGM  (Not Specified)    PGF  (Not Specified)    MGF  (Not Specified)        SOCIAL HISTORY      reports that she has never smoked. She has never used smokeless tobacco. She reports that she drinks alcohol. She reports that she does not use drugs.     PHYSICAL EXAM    (up to 7 for level 4, 8 or more for level 5) disease anterolateral left lower lobe. This   could represent focal early pneumonia.                LABS:  Results for orders placed or performed during the hospital encounter of 04/11/19   CBC Auto Differential   Result Value Ref Range    WBC 9.3 4.0 - 11.0 K/uL    RBC 4.56 4.00 - 5.20 M/uL    Hemoglobin 14.0 12.0 - 16.0 g/dL    Hematocrit 41.0 36.0 - 48.0 %    MCV 90.0 80.0 - 100.0 fL    MCH 30.6 26.0 - 34.0 pg    MCHC 34.0 31.0 - 36.0 g/dL    RDW 13.4 12.4 - 15.4 %    Platelets 395 973 - 272 K/uL    MPV 7.7 5.0 - 10.5 fL    Neutrophils % 77.3 %    Lymphocytes % 10.7 %    Monocytes % 9.6 %    Eosinophils % 2.2 %    Basophils % 0.2 %    Neutrophils # 7.2 1.7 - 7.7 K/uL    Lymphocytes # 1.0 1.0 - 5.1 K/uL    Monocytes # 0.9 0.0 - 1.3 K/uL    Eosinophils # 0.2 0.0 - 0.6 K/uL    Basophils # 0.0 0.0 - 0.2 K/uL   Comprehensive Metabolic Panel   Result Value Ref Range    Sodium 140 136 - 145 mmol/L    Potassium 3.6 3.5 - 5.1 mmol/L    Chloride 105 99 - 110 mmol/L    CO2 23 21 - 32 mmol/L    Anion Gap 12 3 - 16    Glucose 80 70 - 99 mg/dL    BUN 7 7 - 20 mg/dL    CREATININE 0.9 0.6 - 1.1 mg/dL    GFR Non-African American >60 >60    GFR African American >60 >60    Calcium 9.6 8.3 - 10.6 mg/dL    Total Protein 7.3 6.4 - 8.2 g/dL    Alb 4.4 3.4 - 5.0 g/dL    Albumin/Globulin Ratio 1.5 1.1 - 2.2    Total Bilirubin 0.4 0.0 - 1.0 mg/dL    Alkaline Phosphatase 84 40 - 129 U/L    ALT 16 10 - 40 U/L    AST 19 15 - 37 U/L    Globulin 2.9 g/dL   Troponin   Result Value Ref Range    Troponin <0.01 <0.01 ng/mL   Brain Natriuretic Peptide   Result Value Ref Range    Pro-BNP 13 0 - 124 pg/mL   Lactic Acid, Plasma   Result Value Ref Range    Lactic Acid 2.0 0.4 - 2.0 mmol/L   HCG, Serum, Qualitative   Result Value Ref Range    hCG Qual Negative Detects HCG level >10 MIU/mL   Urine Drug Screen   Result Value Ref Range    Amphetamine Screen, Urine Neg Negative <1000ng/mL    Barbiturate Screen, Ur Neg Negative <200 ng/mL    Benzodiazepine Hospital medicine was consulted as she does meet sepsis criteria and has pneumonia. Repeat lactic 4.3. Has remained tachycardic. Patient signed out to Dr. Gurpreet Moore. Please see his note for further ED course, treatment disposition    CRITICAL CARE TIME   Total Critical Care time was 40 minutes, excluding separately reportable procedures. There was a high probability of clinically significant/life threatening deterioration in the patient's condition which required my urgent intervention. Time was spent managing patient's tachycardia, sepsis, pneumonia with fluids, antibiotics , reevaluation nebulizers      This patient was seen by the attendingphysician and they agreed with the assessment and plan. CONSULTS:  None    PROCEDURES:  None    FINAL IMPRESSION      1. Sepsis, due to unspecified organism ([address]    2. Pneumonia due to organism          DISPOSITION/PLAN   DISPOSITION Decision To Admit 04/11/2019 03:39:53 PM      PATIENT REFERRED TO:  No follow-up provider specified.     DISCHARGE MEDICATIONS:  New Prescriptions    No medications on file       (Please note that portions of this note werecompleted with a voice recognition program.  Efforts were made to edit the dictations but occasionally words are mis-transcribed.)    Suzanna Zambrano, [address] [place] [address]  04/11/19 2684

## 2019-04-11 NOTE — ED NOTES
RT Kenzie Mcintyre called and made aware of ordered breathing tx.        Shanthi Galeana RN  04/11/19 [id number]

## 2019-04-11 NOTE — ED NOTES
HR in the 130's. Pt states she \"feels better than she did earlier\", chest pain diminishing. Will continue to monitor.       Ebenezer Obregon RN  04/11/19 [id number]

## 2019-04-11 NOTE — ED NOTES
Pt returned from CT with heart rate in the 150's-160's. Pt states she can feel her \"heart pounding\", also c/o chest pain at this time. WILBERT Esposito made aware. Repeat EKG being obtained.       Joana Jorgensen, RN  04/11/19 [address] Abhijeet Crawley RN  04/11/19 7155

## 2019-04-11 NOTE — ED NOTES
Pharmacy Medication Reconciliation Note     List of medications patient is currently taking is complete. Allergies:  Shellfish-derived products; Tape [adhesive tape]; Tylenol [acetaminophen]; Codeine; Morphine; and Prednisone    Source of information:   1. Dispensing record  2. Patient    Notes regarding home medications:   1. Patient reports not taking any medication today due to fear of throwing them up since she has been so nauseous   2. Only takes Xtampza at night and the oxycodone IR TID throughout the day. 3. Only takes propranolol BID, not TID as prescribed. 4. Has not used Hctz in a while - only takes as needed. 5. Famvir only for cold sore breakout.      Denies taking any other OTC or herbal medications    Russel Bhatia, PharmD, [facility]  4/11/2019  5:11 PM

## 2019-04-11 NOTE — H&P
Yes Historical Provider, MD   EPINEPHrine (EPIPEN 2-RUFINA) 0.3 MG/0.3ML SOAJ injection Inject 0.3 mLs into the skin once for 1 dose Use as directed for allergic reaction 6/20/17 4/11/19 Yes Romy Wood DO   famotidine (PEPCID) 40 MG tablet Take 1 tablet by mouth 2 times daily 5/23/17  Yes Romy Wood DO   propranolol (INDERAL) 20 MG tablet Take 1 tablet by mouth 3 times daily  Patient taking differently: Take 20 mg by mouth 2 times daily  5/23/17  Yes Romy Wood DO   famciclovir (FAMVIR) 500 MG tablet TAKE ONE TABLET BY MOUTH THREE TIMES DAILY  Patient taking differently: TAKE ONE TABLET BY MOUTH THREE TIMES DAILY as needed for fever blister 4/8/19   Romy Wood DO   promethazine (PHENERGAN) 25 MG tablet TAKE ONE TABLET BY MOUTH TWICE DAILY AS NEEDED FOR NAUSEA 10/10/18   Romy Wood DO   promethazine (PHENERGAN) 25 MG suppository Place 0.5 suppositories rectally every 6 hours as needed for Nausea 6/15/17   Jenna Rosario MD   hydrochlorothiazide (HYDRODIURIL) 25 MG tablet Take 1 tablet by mouth daily  Patient taking differently: Take 25 mg by mouth daily as needed (swelling)  5/23/17   Jone Bennett DO       Family History:       Problem Relation Age of Onset    Cancer Mother         Breast    Depression Mother     Cancer Father         lung    Cancer Maternal Grandmother         breast    Asthma Other     Cancer Other     Heart Disease Other     Depression Paternal Grandmother     Emphysema Paternal Grandmother     Elevated Lipids Paternal Grandmother     Cancer Paternal Grandfather         lung    Cancer Maternal Grandfather         colon      Social History:   TOBACCO:   reports that she has never smoked. She has never used smokeless tobacco.  ETOH:   reports that she drinks alcohol.   OCCUPATION:      REVIEW OF SYSTEMS:  A full review of systems was performed and is negative except for that which appears in the HPI    Physical Exam:    Vitals: /61   Pulse 140   Temp 99 UROBILINOGEN 0.2 11/02/2018    BILIRUBINUR Negative 11/02/2018    BILIRUBINUR NEGATIVE 01/03/2014    BILIRUBINUR NEGATIVE 12/10/2010    BLOODU Negative 11/02/2018    GLUCOSEU Negative 11/02/2018    GLUCOSEU NEGATIVE 12/10/2010    PROTEINU Negative 11/02/2018         RAD:   I have independently reviewed and interpreted the imaging studies below and based my recommendations to the patient on those findings. Ct Chest Pulmonary Embolism W Contrast    Result Date: 4/11/2019  EXAMINATION: CTA OF THE CHEST 4/11/2019 1:24 pm TECHNIQUE: CTA of the chest was performed after the administration of intravenous contrast.  Multiplanar reformatted images are provided for review. MIP images are provided for review. Dose modulation, iterative reconstruction, and/or weight based adjustment of the mA/kV was utilized to reduce the radiation dose to as low as reasonably achievable. COMPARISON: None. HISTORY: ORDERING SYSTEM PROVIDED HISTORY: SOB, tachy, wheezing TECHNOLOGIST PROVIDED HISTORY: Ordering Physician Provided Reason for Exam: sob Acuity: Acute Type of Exam: Initial Relevant Medical/Surgical History: hernia, appy, radha, hyst FINDINGS: Pulmonary Arteries: There is adequate pulmonary artery enhancement. No evidence of significant pulmonary embolus. There is some contrast dilution. Left superior pulmonary vein has some nonenhanced intermixed contrast incidental finding sample series 4, image 233. Mediastinum: No evidence of mediastinal lymphadenopathy. The heart and pericardium demonstrate no acute abnormality. Vasculature is otherwise unremarkable specifically showing no evidence of aortic dissection. Lungs/pleura: There is a small patchy focus of airspace disease left lower lobe anterior laterally extending towards the lateral sulcus. This extends along the peripheral airway distribution. No pleural effusion. The remainder the lungs are clear. No suspicious mass or nodule.  Upper Abdomen: Limited images of the upper abdomen are unremarkable. Soft Tissues/Bones: No acute bone or soft tissue abnormality. Incidental bilateral breast implants. No evidence of pulmonary embolism. Small patchy focus of airspace disease anterolateral left lower lobe. This could represent focal early pneumonia. EKG:   Read by ER in the absence of a Cardiologist shows  The Ekg interpreted by ER  sinus tachycardia, [id number]   Axis is   Normal  QTc is  normal  Intervals and Durations are unremarkable. ST Segments: nonspecific changes        The Ekg interpreted by ER  sinus tachycardia, [id number]   Axis is   Normal  QTc is  514 msec  Intervals and Durations are unremarkable. ST Segments: nonspecific changes  When compared to an EKG performed on Allison 15, 2017 the patient's heart rate is increased and the QTC is prolonged.         Assessment:   Principal Problem:    Pneumonia possible  Active Problems:    Asthma    Rheumatoid arthritis ([address]    Sinus tachycardia    Shortness of breath  Resolved Problems:    * No resolved hospital problems. *      Plan:       Pneumonia possible - Pt afebrile, without leukocytosis or O2 demands. I would recommend outpatient treatment with Levaquin 750 mg daily x 7 days with close follow up with her PCP. The ER physician feels strongly about her admission, so she will be admitted to observation status  - Blood cultures x 2 ordered  - Respiratory Panel ordered  - Legionella pneumophilia and Strep pneumoniae urine antigens ordered  - Rapid Influenza A&B negative  - Procalcitonin ordered    Sinus tachycardia - secondary to having not taken her Propranolol today and exacerbated by serial Duonebs x 3. Pt given a dose of Propranolol in the ER, and advised to take as prescribed    Shortness of breath - The etiology is unclear; a pulmonary CTA was negative for a PE, and the pneumonia, if present, isn't significant enough to cause symptoms. Pt does have a h/o anxiety which may be playing a role.      Asthma - Pt's lungs are clear on exam. Monitor and treat as needed    Rheumatoid arthritis ([address] - continue home treatment        Disposition: Admitted to observation status with an expected length of stay of less than 2 Micheal Mckeon MD

## 2019-04-11 NOTE — ED NOTES
Report received from off going Diana . Pain assessment completed as documented.       Faustino Shay, RN  04/11/19 5532

## 2019-04-11 NOTE — ED NOTES
Pt up to bedside commode with stand by assist, no complications noted or reported.       Franklyn Liu RN  04/11/19 [id number]

## 2019-04-11 NOTE — ED PROVIDER NOTES
temperature is elevated here she does not have a fever. Her lactic acid is 2. She is receiving 30 MLS per kilogram of IV fluid. She has no white count. No left shift. CMP unremarkable.         Bhupinder Basilio MD  04/11/19 981 Cali Quinones MD  04/11/19 0817

## 2019-04-11 NOTE — ED NOTES
Critical high lactic acid resulted of 5.79. Dr. Gurpreet Moore made aware.       Chicho Denney, RN  04/11/19 4197

## 2019-04-11 NOTE — ED NOTES
Critical sabine lactic acid resulted of 4.31.  WILBERT Hernandez made aware, also hospitalist Dr. Samantha Hoover at bedside and made aware.       Nikhil Nascimento RN  04/11/19 1916

## 2019-04-12 VITALS
RESPIRATION RATE: 18 BRPM | WEIGHT: 149.91 LBS | DIASTOLIC BLOOD PRESSURE: 71 MMHG | BODY MASS INDEX: 28.3 KG/M2 | HEART RATE: 88 BPM | TEMPERATURE: 98.1 F | HEIGHT: 61 IN | SYSTOLIC BLOOD PRESSURE: 112 MMHG | OXYGEN SATURATION: 95 %

## 2019-04-12 PROBLEM — A41.9 SEPSIS (HCC): Status: ACTIVE | Noted: 2019-04-12

## 2019-04-12 LAB
ANION GAP SERPL CALCULATED.3IONS-SCNC: 11 MMOL/L (ref 3–16)
BASOPHILS ABSOLUTE: 0 K/UL (ref 0–0.2)
BASOPHILS RELATIVE PERCENT: 0.1 %
BUN BLDV-MCNC: 8 MG/DL (ref 7–20)
CALCIUM SERPL-MCNC: 9.2 MG/DL (ref 8.3–10.6)
CHLORIDE BLD-SCNC: 111 MMOL/L (ref 99–110)
CO2: 20 MMOL/L (ref 21–32)
CREAT SERPL-MCNC: 0.8 MG/DL (ref 0.6–1.1)
EKG ATRIAL RATE: 157 BPM
EKG DIAGNOSIS: NORMAL
EKG P-R INTERVAL: 112 MS
EKG Q-T INTERVAL: 318 MS
EKG QRS DURATION: 80 MS
EKG QTC CALCULATION (BAZETT): 514 MS
EKG R AXIS: 40 DEGREES
EKG T AXIS: 70 DEGREES
EKG VENTRICULAR RATE: 157 BPM
EOSINOPHILS ABSOLUTE: 0 K/UL (ref 0–0.6)
EOSINOPHILS RELATIVE PERCENT: 0 %
GFR AFRICAN AMERICAN: >60
GFR NON-AFRICAN AMERICAN: >60
GLUCOSE BLD-MCNC: 120 MG/DL (ref 70–99)
HCT VFR BLD CALC: 33.5 % (ref 36–48)
HEMOGLOBIN: 11.3 G/DL (ref 12–16)
L. PNEUMOPHILA SEROGP 1 UR AG: NORMAL
LACTIC ACID: 1.4 MMOL/L (ref 0.4–2)
LACTIC ACID: 1.8 MMOL/L (ref 0.4–2)
LACTIC ACID: 2.8 MMOL/L (ref 0.4–2)
LYMPHOCYTES ABSOLUTE: 1.4 K/UL (ref 1–5.1)
LYMPHOCYTES RELATIVE PERCENT: 10.3 %
MCH RBC QN AUTO: 30.4 PG (ref 26–34)
MCHC RBC AUTO-ENTMCNC: 33.8 G/DL (ref 31–36)
MCV RBC AUTO: 90.1 FL (ref 80–100)
MONOCYTES ABSOLUTE: 1.2 K/UL (ref 0–1.3)
MONOCYTES RELATIVE PERCENT: 8.4 %
NEUTROPHILS ABSOLUTE: 11.2 K/UL (ref 1.7–7.7)
NEUTROPHILS RELATIVE PERCENT: 81.2 %
PDW BLD-RTO: 13.7 % (ref 12.4–15.4)
PLATELET # BLD: 210 K/UL (ref 135–450)
PMV BLD AUTO: 7.9 FL (ref 5–10.5)
POTASSIUM REFLEX MAGNESIUM: 3.9 MMOL/L (ref 3.5–5.1)
PROCALCITONIN: 0.22 NG/ML (ref 0–0.15)
RBC # BLD: 3.72 M/UL (ref 4–5.2)
SODIUM BLD-SCNC: 142 MMOL/L (ref 136–145)
STREP PNEUMONIAE ANTIGEN, URINE: NORMAL
WBC # BLD: 13.8 K/UL (ref 4–11)

## 2019-04-12 PROCEDURE — 1200000000 HC SEMI PRIVATE

## 2019-04-12 PROCEDURE — 80048 BASIC METABOLIC PNL TOTAL CA: CPT

## 2019-04-12 PROCEDURE — 2580000003 HC RX 258: Performed by: INTERNAL MEDICINE

## 2019-04-12 PROCEDURE — 6370000000 HC RX 637 (ALT 250 FOR IP): Performed by: PHYSICIAN ASSISTANT

## 2019-04-12 PROCEDURE — 87299 ANTIBODY DETECTION NOS IF: CPT

## 2019-04-12 PROCEDURE — 93010 ELECTROCARDIOGRAM REPORT: CPT | Performed by: INTERNAL MEDICINE

## 2019-04-12 PROCEDURE — 96375 TX/PRO/DX INJ NEW DRUG ADDON: CPT

## 2019-04-12 PROCEDURE — 87260 ADENOVIRUS AG IF: CPT

## 2019-04-12 PROCEDURE — 6370000000 HC RX 637 (ALT 250 FOR IP): Performed by: NURSE PRACTITIONER

## 2019-04-12 PROCEDURE — 87279 PARAINFLUENZA AG IF: CPT

## 2019-04-12 PROCEDURE — 6360000002 HC RX W HCPCS: Performed by: NURSE PRACTITIONER

## 2019-04-12 PROCEDURE — 87280 RESPIRATORY SYNCYTIAL AG IF: CPT

## 2019-04-12 PROCEDURE — 6360000002 HC RX W HCPCS: Performed by: PHYSICIAN ASSISTANT

## 2019-04-12 PROCEDURE — 96361 HYDRATE IV INFUSION ADD-ON: CPT

## 2019-04-12 PROCEDURE — 87070 CULTURE OTHR SPECIMN AEROBIC: CPT

## 2019-04-12 PROCEDURE — 94760 N-INVAS EAR/PLS OXIMETRY 1: CPT

## 2019-04-12 PROCEDURE — 87449 NOS EACH ORGANISM AG IA: CPT

## 2019-04-12 PROCEDURE — 36415 COLL VENOUS BLD VENIPUNCTURE: CPT

## 2019-04-12 PROCEDURE — 87276 INFLUENZA A AG IF: CPT

## 2019-04-12 PROCEDURE — 2580000003 HC RX 258: Performed by: PHYSICIAN ASSISTANT

## 2019-04-12 PROCEDURE — 87275 INFLUENZA B AG IF: CPT

## 2019-04-12 PROCEDURE — G0378 HOSPITAL OBSERVATION PER HR: HCPCS

## 2019-04-12 PROCEDURE — 6370000000 HC RX 637 (ALT 250 FOR IP): Performed by: INTERNAL MEDICINE

## 2019-04-12 PROCEDURE — 6360000002 HC RX W HCPCS: Performed by: INTERNAL MEDICINE

## 2019-04-12 PROCEDURE — 87205 SMEAR GRAM STAIN: CPT

## 2019-04-12 PROCEDURE — 84145 PROCALCITONIN (PCT): CPT

## 2019-04-12 PROCEDURE — 85025 COMPLETE CBC W/AUTO DIFF WBC: CPT

## 2019-04-12 PROCEDURE — 83605 ASSAY OF LACTIC ACID: CPT

## 2019-04-12 RX ORDER — ALBUTEROL SULFATE 90 UG/1
2 AEROSOL, METERED RESPIRATORY (INHALATION) 4 TIMES DAILY PRN
Qty: 3 INHALER | Refills: 0 | Status: SHIPPED | OUTPATIENT
Start: 2019-04-12 | End: 2020-05-12 | Stop reason: SDUPTHER

## 2019-04-12 RX ORDER — LEVOFLOXACIN 750 MG/1
750 TABLET ORAL DAILY
Qty: 7 TABLET | Refills: 0 | Status: SHIPPED | OUTPATIENT
Start: 2019-04-13 | End: 2019-04-20

## 2019-04-12 RX ORDER — GUAIFENESIN/DEXTROMETHORPHAN 100-10MG/5
5 SYRUP ORAL EVERY 4 HOURS PRN
Status: DISCONTINUED | OUTPATIENT
Start: 2019-04-12 | End: 2019-04-12 | Stop reason: HOSPADM

## 2019-04-12 RX ORDER — METHYLPREDNISOLONE 4 MG/1
TABLET ORAL
Qty: 1 KIT | Refills: 0 | Status: SHIPPED | OUTPATIENT
Start: 2019-04-12 | End: 2019-04-18

## 2019-04-12 RX ORDER — PROMETHAZINE HYDROCHLORIDE AND CODEINE PHOSPHATE 6.25; 1 MG/5ML; MG/5ML
5 SOLUTION ORAL EVERY 4 HOURS PRN
Qty: 118 ML | Refills: 0 | Status: SHIPPED | OUTPATIENT
Start: 2019-04-12 | End: 2019-05-23 | Stop reason: SDUPTHER

## 2019-04-12 RX ORDER — PROMETHAZINE HYDROCHLORIDE AND CODEINE PHOSPHATE 6.25; 1 MG/5ML; MG/5ML
5 SOLUTION ORAL EVERY 4 HOURS PRN
Status: DISCONTINUED | OUTPATIENT
Start: 2019-04-12 | End: 2019-04-12 | Stop reason: HOSPADM

## 2019-04-12 RX ORDER — METHYLPREDNISOLONE 4 MG/1
4 TABLET ORAL DAILY
Status: DISCONTINUED | OUTPATIENT
Start: 2019-04-12 | End: 2019-04-12 | Stop reason: HOSPADM

## 2019-04-12 RX ORDER — OXYCODONE HCL 10 MG/1
10 TABLET, FILM COATED, EXTENDED RELEASE ORAL NIGHTLY
Status: DISCONTINUED | OUTPATIENT
Start: 2019-04-12 | End: 2019-04-12 | Stop reason: HOSPADM

## 2019-04-12 RX ORDER — GREEN TEA/HOODIA GORDONII 315-12.5MG
1 CAPSULE ORAL 2 TIMES DAILY
Qty: 20 TABLET | Refills: 0 | Status: SHIPPED | OUTPATIENT
Start: 2019-04-12 | End: 2019-04-22

## 2019-04-12 RX ORDER — GUAIFENESIN 600 MG/1
600 TABLET, EXTENDED RELEASE ORAL 2 TIMES DAILY
Qty: 10 TABLET | Refills: 0 | Status: SHIPPED | OUTPATIENT
Start: 2019-04-12 | End: 2019-04-17

## 2019-04-12 RX ADMIN — ENOXAPARIN SODIUM 40 MG: 40 INJECTION SUBCUTANEOUS at 08:13

## 2019-04-12 RX ADMIN — OXYCODONE HYDROCHLORIDE 10 MG: 10 TABLET ORAL at 08:13

## 2019-04-12 RX ADMIN — TIZANIDINE 4 MG: 4 TABLET ORAL at 00:26

## 2019-04-12 RX ADMIN — Medication 10 ML: at 00:27

## 2019-04-12 RX ADMIN — BUPROPION HYDROCHLORIDE 150 MG: 150 TABLET, EXTENDED RELEASE ORAL at 08:13

## 2019-04-12 RX ADMIN — FAMOTIDINE 40 MG: 20 TABLET ORAL at 08:12

## 2019-04-12 RX ADMIN — PROMETHAZINE HYDROCHLORIDE 12.5 MG: 25 INJECTION INTRAMUSCULAR; INTRAVENOUS at 03:41

## 2019-04-12 RX ADMIN — PROPRANOLOL HYDROCHLORIDE 20 MG: 20 TABLET ORAL at 08:13

## 2019-04-12 RX ADMIN — LEVOFLOXACIN 750 MG: 500 TABLET, FILM COATED ORAL at 08:12

## 2019-04-12 RX ADMIN — SODIUM CHLORIDE: 9 INJECTION, SOLUTION INTRAVENOUS at 00:26

## 2019-04-12 RX ADMIN — GUAIFENESIN AND DEXTROMETHORPHAN 5 ML: 100; 10 SYRUP ORAL at 03:41

## 2019-04-12 RX ADMIN — METHYLPREDNISOLONE 4 MG: 4 TABLET ORAL at 14:37

## 2019-04-12 RX ADMIN — Medication 10 ML: at 08:15

## 2019-04-12 RX ADMIN — Medication 5 ML: at 14:37

## 2019-04-12 RX ADMIN — OXYCODONE HYDROCHLORIDE 10 MG: 10 TABLET, FILM COATED, EXTENDED RELEASE ORAL at 00:26

## 2019-04-12 RX ADMIN — OXYCODONE HYDROCHLORIDE 10 MG: 10 TABLET ORAL at 14:21

## 2019-04-12 ASSESSMENT — PAIN SCALES - GENERAL
PAINLEVEL_OUTOF10: 0
PAINLEVEL_OUTOF10: 7
PAINLEVEL_OUTOF10: 0
PAINLEVEL_OUTOF10: 6
PAINLEVEL_OUTOF10: 3

## 2019-04-12 ASSESSMENT — PAIN - FUNCTIONAL ASSESSMENT
PAIN_FUNCTIONAL_ASSESSMENT: PREVENTS OR INTERFERES SOME ACTIVE ACTIVITIES AND ADLS
PAIN_FUNCTIONAL_ASSESSMENT: ACTIVITIES ARE NOT PREVENTED

## 2019-04-12 ASSESSMENT — PAIN DESCRIPTION - PAIN TYPE: TYPE: CHRONIC PAIN

## 2019-04-12 ASSESSMENT — PAIN DESCRIPTION - DESCRIPTORS: DESCRIPTORS: ACHING

## 2019-04-12 ASSESSMENT — PAIN DESCRIPTION - ONSET: ONSET: ON-GOING

## 2019-04-12 ASSESSMENT — PAIN DESCRIPTION - LOCATION
LOCATION: GENERALIZED
LOCATION: GENERALIZED;THROAT

## 2019-04-12 ASSESSMENT — PAIN DESCRIPTION - FREQUENCY: FREQUENCY: INTERMITTENT

## 2019-04-12 ASSESSMENT — PAIN DESCRIPTION - PROGRESSION: CLINICAL_PROGRESSION: GRADUALLY IMPROVING

## 2019-04-12 NOTE — PROGRESS NOTES
4 Eyes Skin Assessment     The patient is being assess for  Admission    I agree that 2 RN's have performed a thorough Head to Toe Skin Assessment on the patient. ALL assessment sites listed below have been assessed. Areas assessed by both nurses: Yes  [x]   Head, Face, and Ears   [x]   Shoulders, Back, and Chest  [x]   Arms, Elbows, and Hands   [x]   Coccyx, Sacrum, and IschIum  [x]   Legs, Feet, and Heels        Does the Patient have Skin Breakdown?   No         Britton Prevention initiated:  NA   Wound Care Orders initiated:  NA      [facility] nurse consulted for Pressure Injury (Stage 3,4, Unstageable, DTI, NWPT, and Complex wounds), New and Established Ostomies:  NA      Nurse 1 eSignature: Electronically signed by Tammy Zelaya RN on 4/12/19 at 3:15 AM    **SHARE this note so that the co-signing nurse is able to place an eSignature**    Nurse 2 eSignature: Electronically signed by Gita Slater RN on 4/12/19 at 6:29 AM

## 2019-04-12 NOTE — ED NOTES
Report called to RN [facility], all questions answered. Pain assessment completed as documented.       Santa Whitten RN  04/11/19 6141

## 2019-04-12 NOTE — PROGRESS NOTES
Patient remains alert and oriented x4,able to verbalize needs. Appetite fair for meals. Patient is up ad erika,gait steady,denies having any sob, pain or discomfort. Call light remains in reach. Will continue to monitor.

## 2019-04-12 NOTE — PLAN OF CARE
Problem: Pain:  Goal: Pain level will decrease  Description  Pain level will decrease  4/12/2019 1205 by Nehemias Lopez RN  Outcome: Ongoing  4/11/2019 2249 by Delvin Rodriguez RN  Outcome: Met This Shift  Goal: Control of acute pain  Description  Control of acute pain  4/12/2019 1205 by Nehemias Lopez RN  Outcome: Ongoing  4/11/2019 2249 by Delvin Rodriguez RN  Outcome: Met This Shift  Goal: Control of chronic pain  Description  Control of chronic pain  4/12/2019 1205 by Nehemias Lopez RN  Outcome: Ongoing  4/11/2019 2249 by Delvin Rodriguez RN  Outcome: Met This Shift     Problem: Infection:  Goal: Will remain free from infection  Description  Will remain free from infection  4/12/2019 1205 by Nehemias Lopez RN  Outcome: Ongoing  4/11/2019 2249 by Delvin Rodriguez RN  Outcome: Ongoing     Problem: Safety:  Goal: Free from accidental physical injury  Description  Free from accidental physical injury  4/12/2019 1205 by Nehemias Lopez RN  Outcome: Ongoing  4/11/2019 2249 by Delvin Rodriguez RN  Outcome: Ongoing  Goal: Free from intentional harm  Description  Free from intentional harm  4/12/2019 1205 by Nehemias Lopez RN  Outcome: Ongoing  4/11/2019 2249 by Delvin Rodriguez RN  Outcome: Ongoing     Problem: Daily Care:  Goal: Daily care needs are met  Description  Daily care needs are met  4/12/2019 1205 by Nehemias Lopez RN  Outcome: Ongoing  4/11/2019 2249 by Delvin Rodriguez RN  Outcome: Ongoing     Problem: Skin Integrity:  Goal: Skin integrity will stabilize  Description  Skin integrity will stabilize  4/12/2019 1205 by Nehemias Lopez RN  Outcome: Ongoing  4/11/2019 2249 by Delvin Rodriguez RN  Outcome: Ongoing     Problem: Discharge Planning:  Goal: Patients continuum of care needs are met  Description  Patients continuum of care needs are met  4/12/2019 1205 by Nehemias Lopez RN  Outcome: Ongoing  4/11/2019 2249 by Delvin Rodriguez RN  Outcome: Ongoing

## 2019-04-12 NOTE — PROGRESS NOTES
Patient name: Juanito Hamilton  Patient : 1980    The primary encounter diagnosis was Sepsis, due to unspecified organism [facility]). A diagnosis of Pneumonia due to organism was also pertinent to this visit. Referral received for swallow screen per Shady Griffin MD referral.  This SLP performed a chart review and discussed with RN. Chart review:   Current diet: regular food and thin liquid consistency general diet  CT  Chest: 2019  Impression   No evidence of pulmonary embolism.       Small patchy focus of airspace disease anterolateral left lower lobe.  This   could represent focal early pneumonia.          has a past medical history of Arrythmia, Asthma, Back pain, Breast cancer ([address] Breast lump, Cervical cancer ([address] Chronic hepatitis C ([address] Chronic pain, DDD (degenerative disc disease), Diverticulitis, Fibromyalgia, GERD (gastroesophageal reflux disease), Headache, Heart disease, Immune deficiency disorder ([address] Interstitial cystitis, Nausea & vomiting, Osteoarthritis, Other closed fractures of distal end of radius (alone) (2009), Pneumonia, and Rheumatoid arthritis(714.0). Recommend  1. No requests for Clinical Evaluation of Swallowing at this time. If status should change, or if MD is concerned for Dysphagia contributing to current medical issues please request MD orders for Clinical Evaluation of Swallowing    Thank you    Emma Varma,MS,CCC,SLP [id number]  Speech and Language Pathologist

## 2019-04-12 NOTE — DISCHARGE SUMMARY
Hospital Medicine Discharge Summary      Patient ID: Roel Lee      Patient's PCP: Durga Krishnamurthy DO    Admit Date: 4/11/2019     Discharge Date: 4/12/19    Admitting Physician: Suhail Keys MD     Discharge Provider: DONALD Grimes NP     Discharge Diagnoses: Active Hospital Problems    Diagnosis Date Noted    Pneumonia possible [J18.9] 04/11/2019    Sinus tachycardia [R00.0] 04/11/2019    Shortness of breath [R06.02] 04/11/2019    Rheumatoid arthritis ([address] [M06.9]     Asthma [J45.909] 05/12/2010     Disposition:  [] Home  [] Home with home health [] Rehab [] Psych [] SNF  [] LTAC  [] Long term nursing home or group home [] Transfer to ICU  [] Transfer to PCU [] Other:    Discharge Instructions/Follow-up:      Please call and schedule an appointment with your Primary Care Provider Dr. Durga Krishnamurthy DO at [phone] for a follow up visit within 1 week.      F/u with pain management as instructed.     Take medications as prescribed.      Return to the emergency room for fever, cough, chest pain or worsening symptoms. PCP/SNF to follow up: As above    Discharge Condition: Stable      Code Status:  Full Code     Activity: activity as tolerated    Diet: DIET GENERAL;     Discharge Medications:     Current Discharge Medication List           Details   methylPREDNISolone (MEDROL DOSEPACK) 4 MG tablet Take by mouth. Qty: 1 kit, Refills: 0      promethazine-codeine (PHENERGAN WITH CODEINE) 6.25-10 MG/5ML SOLN solution Take 5 mLs by mouth every 4 hours as needed for Cough for up to 4 days.   Qty: 118 mL, Refills: 0    Comments: Reduce doses taken as pain becomes manageable  Associated Diagnoses: Pneumonia due to organism      levofloxacin (LEVAQUIN) 750 MG tablet Take 1 tablet by mouth daily for 7 days  Qty: 7 tablet, Refills: 0      Lactobacillus (PROBIOTIC ACIDOPHILUS) TABS Take 1 tablet by mouth 2 times daily for 10 days  Qty: 20 tablet, Refills: 0      guaiFENesin (MUCINEX) 600 MG extended release tablet Take 1 tablet by mouth 2 times daily for 5 days  Qty: 10 tablet, Refills: 0      albuterol sulfate  (90 Base) MCG/ACT inhaler Inhale 2 puffs into the lungs 4 times daily as needed for Wheezing  Qty: 3 Inhaler, Refills: 0              Details   oxyCODONE HCl (OXY-IR) 10 MG immediate release tablet Take 10 mg by mouth every 8 hours. Refills: 0      ALPRAZolam (XANAX) 0.5 MG tablet Take 1 tablet by mouth 2 times daily as needed for Sleep for up to 30 days. Qty: 60 tablet, Refills: 2    Associated Diagnoses: JACKSON (generalized anxiety disorder)      tiZANidine (ZANAFLEX) 4 MG tablet Take 1 tablet by mouth every 8 hours as needed (muscle spasm)  Qty: 30 tablet, Refills: 2    Associated Diagnoses: Fibromyalgia      phentermine (ADIPEX-P) 37.5 MG tablet Take 1 tablet by mouth every morning (before breakfast) for 30 days. Qty: 30 tablet, Refills: 0    Associated Diagnoses: Class 1 obesity due to excess calories with serious comorbidity and body mass index (BMI) of 30.0 to 30.9 in adult      buPROPion (WELLBUTRIN SR) 150 MG extended release tablet Take 1 tablet by mouth 2 times daily  Qty: 60 tablet, Refills: 3    Associated Diagnoses: Reactive depression      OxyCODONE ER (XTAMPZA ER) 9 MG C12A Take 9 mg by mouth nightly.        famotidine (PEPCID) 40 MG tablet Take 1 tablet by mouth 2 times daily  Qty: 60 tablet, Refills: 3    Associated Diagnoses: Gastroesophageal reflux disease without esophagitis      propranolol (INDERAL) 20 MG tablet Take 1 tablet by mouth 3 times daily  Qty: 90 tablet, Refills: 2    Associated Diagnoses: Heart palpitations      famciclovir (FAMVIR) 500 MG tablet TAKE ONE TABLET BY MOUTH THREE TIMES DAILY  Qty: 30 tablet, Refills: 5    Associated Diagnoses: Fever blister      hydrochlorothiazide (HYDRODIURIL) 25 MG tablet Take 1 tablet by mouth daily  Qty: 30 tablet, Refills: 3    Associated Diagnoses: Edema, unspecified type             The patient was seen and examined

## 2019-04-12 NOTE — DISCHARGE INSTR - DIET

## 2019-04-12 NOTE — PROGRESS NOTES
Pt alert and oriented x4. Admit from the ED to room 4119. Able to ambulate with a steady gait from the stretcher to bed. Slight dyspnea with exertion noted. Pt denies nausea, vomiting. Numbness, tingling, shortness of breath or chest pain at this time. Vital signs stable. Oriented to room and call light. Will continue to monitor.

## 2019-04-14 LAB
CULTURE, RESPIRATORY: NORMAL
GRAM STAIN RESULT: NORMAL

## 2019-04-15 LAB
ADENOVIRUS, DFA: NEGATIVE
INFLUENZA A,DFA: NEGATIVE
INFLUENZA B,DFA: NEGATIVE
METAPNEUMOVIRUS, DFA: NEGATIVE
PARAINFLUENZA 1 DFA STAIN: NEGATIVE
PARAINFLUENZA 2 DFA STAIN: NEGATIVE
PARAINFLUENZA 3: POSITIVE
RESPIRATORY SYNCYTIAL VIRUS  (RSV) DFA: NEGATIVE
RSPFA SOURCE: ABNORMAL

## 2019-04-16 ENCOUNTER — APPOINTMENT (OUTPATIENT)
Dept: GENERAL RADIOLOGY | Age: 39
End: 2019-04-16
Payer: COMMERCIAL

## 2019-04-16 ENCOUNTER — OFFICE VISIT (OUTPATIENT)
Dept: FAMILY MEDICINE CLINIC | Age: 39
End: 2019-04-16
Payer: COMMERCIAL

## 2019-04-16 ENCOUNTER — TELEPHONE (OUTPATIENT)
Dept: OTHER | Facility: CLINIC | Age: 39
End: 2019-04-16

## 2019-04-16 ENCOUNTER — HOSPITAL ENCOUNTER (EMERGENCY)
Age: 39
Discharge: HOME | End: 2019-04-16
Attending: EMERGENCY MEDICINE
Payer: COMMERCIAL

## 2019-04-16 VITALS
OXYGEN SATURATION: 100 % | SYSTOLIC BLOOD PRESSURE: 121 MMHG | DIASTOLIC BLOOD PRESSURE: 74 MMHG | HEART RATE: 77 BPM | RESPIRATION RATE: 20 BRPM | TEMPERATURE: 97.4 F

## 2019-04-16 VITALS
WEIGHT: 153.4 LBS | BODY MASS INDEX: 28.96 KG/M2 | HEIGHT: 61 IN | SYSTOLIC BLOOD PRESSURE: 108 MMHG | TEMPERATURE: 96.3 F | DIASTOLIC BLOOD PRESSURE: 86 MMHG

## 2019-04-16 DIAGNOSIS — J44.1 COPD EXACERBATION (HCC): Primary | ICD-10-CM

## 2019-04-16 DIAGNOSIS — R06.09 DYSPNEA ON EXERTION: ICD-10-CM

## 2019-04-16 DIAGNOSIS — J18.9 PNEUMONIA OF LEFT LOWER LOBE DUE TO INFECTIOUS ORGANISM: Primary | ICD-10-CM

## 2019-04-16 DIAGNOSIS — R06.02 SHORTNESS OF BREATH: ICD-10-CM

## 2019-04-16 LAB
A/G RATIO: 1.3 (ref 1.1–2.2)
ALBUMIN SERPL-MCNC: 3.9 G/DL (ref 3.4–5)
ALP BLD-CCNC: 76 U/L (ref 40–129)
ALT SERPL-CCNC: 21 U/L (ref 10–40)
ANION GAP SERPL CALCULATED.3IONS-SCNC: 12 MMOL/L (ref 3–16)
AST SERPL-CCNC: 15 U/L (ref 15–37)
BILIRUB SERPL-MCNC: <0.2 MG/DL (ref 0–1)
BLOOD CULTURE, ROUTINE: NORMAL
BUN BLDV-MCNC: 13 MG/DL (ref 7–20)
CALCIUM SERPL-MCNC: 8.9 MG/DL (ref 8.3–10.6)
CHLORIDE BLD-SCNC: 103 MMOL/L (ref 99–110)
CO2: 25 MMOL/L (ref 21–32)
CREAT SERPL-MCNC: 0.9 MG/DL (ref 0.6–1.1)
CULTURE, BLOOD 2: NORMAL
GFR AFRICAN AMERICAN: >60
GFR NON-AFRICAN AMERICAN: >60
GLOBULIN: 3.1 G/DL
GLUCOSE BLD-MCNC: 90 MG/DL (ref 70–99)
LACTIC ACID: 1.6 MMOL/L (ref 0.4–2)
POTASSIUM SERPL-SCNC: 3.5 MMOL/L (ref 3.5–5.1)
SODIUM BLD-SCNC: 140 MMOL/L (ref 136–145)
TOTAL PROTEIN: 7 G/DL (ref 6.4–8.2)
TROPONIN: <0.01 NG/ML

## 2019-04-16 PROCEDURE — 83605 ASSAY OF LACTIC ACID: CPT

## 2019-04-16 PROCEDURE — 80053 COMPREHEN METABOLIC PANEL: CPT

## 2019-04-16 PROCEDURE — 99285 EMERGENCY DEPT VISIT HI MDM: CPT

## 2019-04-16 PROCEDURE — 87040 BLOOD CULTURE FOR BACTERIA: CPT

## 2019-04-16 PROCEDURE — 84484 ASSAY OF TROPONIN QUANT: CPT

## 2019-04-16 PROCEDURE — 2580000003 HC RX 258: Performed by: EMERGENCY MEDICINE

## 2019-04-16 PROCEDURE — 94664 DEMO&/EVAL PT USE INHALER: CPT

## 2019-04-16 PROCEDURE — 94640 AIRWAY INHALATION TREATMENT: CPT

## 2019-04-16 PROCEDURE — 71046 X-RAY EXAM CHEST 2 VIEWS: CPT

## 2019-04-16 PROCEDURE — 85025 COMPLETE CBC W/AUTO DIFF WBC: CPT

## 2019-04-16 PROCEDURE — 6370000000 HC RX 637 (ALT 250 FOR IP): Performed by: EMERGENCY MEDICINE

## 2019-04-16 PROCEDURE — 93005 ELECTROCARDIOGRAM TRACING: CPT | Performed by: EMERGENCY MEDICINE

## 2019-04-16 PROCEDURE — 99214 OFFICE O/P EST MOD 30 MIN: CPT | Performed by: FAMILY MEDICINE

## 2019-04-16 PROCEDURE — 96360 HYDRATION IV INFUSION INIT: CPT

## 2019-04-16 PROCEDURE — 94762 N-INVAS EAR/PLS OXIMTRY CONT: CPT

## 2019-04-16 RX ORDER — DEXAMETHASONE 4 MG/1
4 TABLET ORAL 2 TIMES DAILY WITH MEALS
Qty: 12 TABLET | Refills: 0 | Status: SHIPPED | OUTPATIENT
Start: 2019-04-16 | End: 2019-04-22

## 2019-04-16 RX ORDER — IPRATROPIUM BROMIDE AND ALBUTEROL SULFATE 2.5; .5 MG/3ML; MG/3ML
1 SOLUTION RESPIRATORY (INHALATION) ONCE
Status: COMPLETED | OUTPATIENT
Start: 2019-04-16 | End: 2019-04-16

## 2019-04-16 RX ORDER — 0.9 % SODIUM CHLORIDE 0.9 %
500 INTRAVENOUS SOLUTION INTRAVENOUS ONCE
Status: COMPLETED | OUTPATIENT
Start: 2019-04-16 | End: 2019-04-16

## 2019-04-16 RX ORDER — ALBUTEROL SULFATE 90 UG/1
AEROSOL, METERED RESPIRATORY (INHALATION)
Qty: 1 INHALER | Refills: 0 | Status: SHIPPED | OUTPATIENT
Start: 2019-04-16 | End: 2019-04-23 | Stop reason: SDUPTHER

## 2019-04-16 RX ADMIN — SODIUM CHLORIDE 500 ML: 9 INJECTION, SOLUTION INTRAVENOUS at 16:35

## 2019-04-16 RX ADMIN — IPRATROPIUM BROMIDE AND ALBUTEROL SULFATE 1 AMPULE: .5; 3 SOLUTION RESPIRATORY (INHALATION) at 16:13

## 2019-04-16 ASSESSMENT — PAIN DESCRIPTION - LOCATION: LOCATION: GENERALIZED

## 2019-04-16 ASSESSMENT — ENCOUNTER SYMPTOMS
SHORTNESS OF BREATH: 1
VOMITING: 1
WHEEZING: 1
RHINORRHEA: 1
SPUTUM PRODUCTION: 1

## 2019-04-16 ASSESSMENT — PAIN - FUNCTIONAL ASSESSMENT: PAIN_FUNCTIONAL_ASSESSMENT: ACTIVITIES ARE NOT PREVENTED

## 2019-04-16 ASSESSMENT — PAIN DESCRIPTION - DESCRIPTORS: DESCRIPTORS: CONSTANT

## 2019-04-16 ASSESSMENT — PAIN DESCRIPTION - PAIN TYPE: TYPE: ACUTE PAIN

## 2019-04-16 ASSESSMENT — PAIN DESCRIPTION - FREQUENCY: FREQUENCY: CONTINUOUS

## 2019-04-16 ASSESSMENT — PAIN DESCRIPTION - ONSET: ONSET: ON-GOING

## 2019-04-16 ASSESSMENT — PAIN SCALES - GENERAL: PAINLEVEL_OUTOF10: 6

## 2019-04-16 NOTE — TELEPHONE ENCOUNTER
Writer contacted DB Jessica, ED provider to inform of 30 day readmission risk. ED provider informed writer admit or discharge has not been determined. Continue to follow-up.

## 2019-04-16 NOTE — ED TRIAGE NOTES
Patient arrived to ED via private vehicle after being sent by her PCP for further eval for pneumonia. Patient states she was admitted to [facility] last Thursday for Pneumonia, DC'd Friday and is not getting any better. VS noted and stable. Patient A&Ox4. Respirations easy and unlabored. Skin warm and dry and appropriate for ethnicity. No acute distress noted at this time.

## 2019-04-16 NOTE — ED NOTES
Patient requesting to speak to ED MD about discharge. ED MD Made aware.      Josh Ricardo, LISA  04/16/19 401 [facility] Pavan Bills RN  04/16/19 9362

## 2019-04-16 NOTE — PROGRESS NOTES
Subjective:      Patient ID: Stephania Greer is a 45 y.o. female. Shortness of Breath   The current episode started 1 to 4 weeks ago. The problem occurs constantly. Associated symptoms include headaches, rhinorrhea, sputum production, vomiting and wheezing. The symptoms are aggravated by any activity, emotional upset and weather changes. The patient has no known risk factors for DVT/PE.   she was in the hospital and diagnosed with pneumonia  She is on one of her last days of meds for pneumonia and she does not feel better  Wheezing and sob with any activity   Using nebulizer and inhaler and not helping  Very fatigued   Cannot walk across the room without sob   Feels terrible   Body aches     Review of Systems   HENT: Positive for rhinorrhea. Respiratory: Positive for sputum production, shortness of breath and wheezing. Gastrointestinal: Positive for vomiting. Neurological: Positive for headaches. YOB: 1980    Date of Visit:  4/16/2019    Allergies   Allergen Reactions    Shellfish-Derived Products Shortness Of Breath    Tape Ceclia Ishihara Tape] Rash    Tylenol [Acetaminophen]      History of hep c    Codeine Nausea And Vomiting and Palpitations    Morphine Itching and Nausea And Vomiting     \"makes me crazy\"     Prednisone Hives, Palpitations and Rash       Outpatient Medications Marked as Taking for the 4/16/19 encounter (Office Visit) with Rubio Negus, DO   Medication Sig Dispense Refill    methylPREDNISolone (MEDROL DOSEPACK) 4 MG tablet Take by mouth. 1 kit 0    promethazine-codeine (PHENERGAN WITH CODEINE) 6.25-10 MG/5ML SOLN solution Take 5 mLs by mouth every 4 hours as needed for Cough for up to 4 days.  118 mL 0    levofloxacin (LEVAQUIN) 750 MG tablet Take 1 tablet by mouth daily for 7 days 7 tablet 0    Lactobacillus (PROBIOTIC ACIDOPHILUS) TABS Take 1 tablet by mouth 2 times daily for 10 days 20 tablet 0    guaiFENesin (MUCINEX) 600 MG extended release tablet Take 1 tablet by mouth 2 times daily for 5 days 10 tablet 0    albuterol sulfate  (90 Base) MCG/ACT inhaler Inhale 2 puffs into the lungs 4 times daily as needed for Wheezing 3 Inhaler 0    oxyCODONE HCl (OXY-IR) 10 MG immediate release tablet Take 10 mg by mouth every 8 hours. 0    [START ON 4/27/2019] ALPRAZolam (XANAX) 0.5 MG tablet Take 1 tablet by mouth 2 times daily as needed for Sleep for up to 30 days. 60 tablet 2    tiZANidine (ZANAFLEX) 4 MG tablet Take 1 tablet by mouth every 8 hours as needed (muscle spasm) 30 tablet 2    famciclovir (FAMVIR) 500 MG tablet TAKE ONE TABLET BY MOUTH THREE TIMES DAILY (Patient taking differently: TAKE ONE TABLET BY MOUTH THREE TIMES DAILY as needed for fever blister) 30 tablet 5    buPROPion (WELLBUTRIN SR) 150 MG extended release tablet Take 1 tablet by mouth 2 times daily 60 tablet 3    OxyCODONE ER (XTAMPZA ER) 9 MG C12A Take 9 mg by mouth nightly.  famotidine (PEPCID) 40 MG tablet Take 1 tablet by mouth 2 times daily 60 tablet 3    propranolol (INDERAL) 20 MG tablet Take 1 tablet by mouth 3 times daily (Patient taking differently: Take 20 mg by mouth 2 times daily ) 90 tablet 2    hydrochlorothiazide (HYDRODIURIL) 25 MG tablet Take 1 tablet by mouth daily (Patient taking differently: Take 25 mg by mouth daily as needed (swelling) ) 30 tablet 3       Vitals:    04/16/19 1343   BP: 108/86   Temp: 96.3 °F (35.7 °C)   Weight: 153 lb 6.4 oz (69.6 kg)   Height: 5' 1\" (1.549 m)     Body mass index is 28.98 kg/m². Wt Readings from Last 3 Encounters:   04/16/19 153 lb 6.4 oz (69.6 kg)   04/12/19 149 lb 14.6 oz (68 kg)   04/08/19 153 lb 6.4 oz (69.6 kg)     BP Readings from Last 3 Encounters:   04/16/19 108/86   04/12/19 112/71   04/08/19 112/78       Objective:   Physical Exam   Constitutional: She is oriented to person, place, and time. She appears well-developed and well-nourished. No distress. HENT:   Head: Normocephalic.    Right Ear: Tympanic membrane, external ear and ear canal normal.   Left Ear: Tympanic membrane, external ear and ear canal normal.   Nose: Mucosal edema present. Mouth/Throat: Posterior oropharyngeal erythema present. Eyes: Conjunctivae are normal. Left eye discharge: pnd    Neck: No thyromegaly present. Cardiovascular: Normal rate. Pulmonary/Chest: Effort normal. No respiratory distress. She has wheezes (bilat end exp wheezing ). She has no rales. Lymphadenopathy:     She has cervical adenopathy. Neurological: She is alert and oriented to person, place, and time. Skin: Skin is warm and dry. No rash noted. Psychiatric: She has a normal mood and affect. Her behavior is normal. Judgment and thought content normal.       Assessment:      Assessment/plan;  Sulma was seen today for follow-up.     Diagnoses and all orders for this visit:    Pneumonia of left lower lobe due to infectious organism [facility])    Dyspnea on exertion      Pt will go to the ER for further evaluation    [address] [place] [address], DO

## 2019-04-16 NOTE — ED NOTES
Patient refusing ED prescriptions at this time and states she already has them. ED MD aware and ordered to shred prescriptions. D/C: Order noted for d/c. Pt confirmed d/c paperwork has correct name. Discharge and education instructions reviewed with patient. Pt verbalized understanding and signed d/c papers. Pt denied questions at this time. No acute distress noted. Patient instructed to follow-up as noted - return to emergency department if symptoms worsen. Patient verbalized understanding. Discharged per EDMD with discharge instructions. Pt discharged to private vehicle. Patient stable upon departure. Thanked patient for choosing [facility]) for care.         Jevon Bernardo RN  04/16/19 2485

## 2019-04-17 LAB
ANISOCYTOSIS: ABNORMAL
ATYPICAL LYMPHOCYTE RELATIVE PERCENT: 6 % (ref 0–6)
BANDED NEUTROPHILS RELATIVE PERCENT: 3 % (ref 0–7)
BASOPHILS ABSOLUTE: 0 K/UL (ref 0–0.2)
BASOPHILS RELATIVE PERCENT: 0 %
EKG ATRIAL RATE: 69 BPM
EKG DIAGNOSIS: NORMAL
EKG P AXIS: 53 DEGREES
EKG P-R INTERVAL: 148 MS
EKG Q-T INTERVAL: 366 MS
EKG QRS DURATION: 68 MS
EKG QTC CALCULATION (BAZETT): 392 MS
EKG R AXIS: 17 DEGREES
EKG T AXIS: 39 DEGREES
EKG VENTRICULAR RATE: 69 BPM
EOSINOPHILS ABSOLUTE: 0.3 K/UL (ref 0–0.6)
EOSINOPHILS RELATIVE PERCENT: 2 %
HCT VFR BLD CALC: 39.8 % (ref 36–48)
HEMATOLOGY PATH CONSULT: NORMAL
HEMATOLOGY PATH CONSULT: YES
HEMOGLOBIN: 13.5 G/DL (ref 12–16)
LYMPHOCYTES ABSOLUTE: 5.1 K/UL (ref 1–5.1)
LYMPHOCYTES RELATIVE PERCENT: 33 %
MCH RBC QN AUTO: 30.4 PG (ref 26–34)
MCHC RBC AUTO-ENTMCNC: 33.8 G/DL (ref 31–36)
MCV RBC AUTO: 89.7 FL (ref 80–100)
MONOCYTES ABSOLUTE: 0.7 K/UL (ref 0–1.3)
MONOCYTES RELATIVE PERCENT: 5 %
MYELOCYTE PERCENT: 1 %
NEUTROPHILS ABSOLUTE: 7 K/UL (ref 1.7–7.7)
NEUTROPHILS RELATIVE PERCENT: 50 %
PDW BLD-RTO: 13.2 % (ref 12.4–15.4)
PLATELET # BLD: 354 K/UL (ref 135–450)
PMV BLD AUTO: 7.2 FL (ref 5–10.5)
POLYCHROMASIA: ABNORMAL
RBC # BLD: 4.43 M/UL (ref 4–5.2)
WBC # BLD: 13 K/UL (ref 4–11)

## 2019-04-17 PROCEDURE — 93010 ELECTROCARDIOGRAM REPORT: CPT | Performed by: INTERNAL MEDICINE

## 2019-04-18 ENCOUNTER — TELEPHONE (OUTPATIENT)
Dept: FAMILY MEDICINE CLINIC | Age: 39
End: 2019-04-18

## 2019-04-18 RX ORDER — CLOTRIMAZOLE 10 MG/1
10 LOZENGE ORAL; TOPICAL
Qty: 50 TABLET | Refills: 0 | Status: SHIPPED | OUTPATIENT
Start: 2019-04-18 | End: 2019-04-28

## 2019-04-18 RX ORDER — BENZONATATE 200 MG/1
200 CAPSULE ORAL 3 TIMES DAILY PRN
Qty: 30 CAPSULE | Refills: 0 | Status: SHIPPED | OUTPATIENT
Start: 2019-04-18 | End: 2019-04-28

## 2019-04-18 RX ORDER — FLUCONAZOLE 150 MG/1
150 TABLET ORAL ONCE
Qty: 1 TABLET | Refills: 0 | Status: SHIPPED | OUTPATIENT
Start: 2019-04-18 | End: 2019-04-18

## 2019-04-18 NOTE — TELEPHONE ENCOUNTER
Patient states she has been on antibiotics and now has horrible thrush in her mouth and a vaginal yeast infection. Patient requesting if MD would call in medication for both of these issues. Also, patient still having a cough and requesting if MD would refill her Tessalon pearls. Please return call.

## 2019-04-20 ASSESSMENT — ENCOUNTER SYMPTOMS
VOMITING: 0
COUGH: 1
COLOR CHANGE: 0
WHEEZING: 1
PHOTOPHOBIA: 0
NAUSEA: 0
TROUBLE SWALLOWING: 0
ABDOMINAL PAIN: 0
SHORTNESS OF BREATH: 1

## 2019-04-20 NOTE — ED PROVIDER NOTES
[facility]  [facility]      Pt Name: Stephania Greer  MRN: [id number]  [place] 1980  Date ofevaluation: 4/16/2019  Provider: Duyen Chau MD    CHIEF COMPLAINT       Chief Complaint   Patient presents with    Shortness of Breath     sent by primary MD for pneumonia/ recently admitted for pneumonia         HISTORY OF PRESENT ILLNESS   (Location/Symptom, Timing/Onset,Context/Setting, Quality, Duration, Modifying Factors, Severity)  Note limiting factors. Stephania Greer is a 45 y.o. female who presents to the emergency department for evaluation of cough and shortness of breath. Patient states that she was recently admitted to the hospital due to pneumonia. Patient was given nebulized breathing treatment and started on albuterol. Patient states that she requested to be discharged the day after she was feeling better and was sent home on Levaquin which is currently taking. Patient states she'll follow up with her primary care physician today and was sent to the ED for evaluation. On presentation the patient is a normal respiratory effort and oxygen saturation is R 100% on room air. Patient states that she does have a productive cough. Patient denies chest pain, doubtful pain nausea or vomiting. Patient states that she has been compliant with her home medications. HPI    NursingNotes were reviewed. REVIEW OF SYSTEMS    (2-9 systems for level 4, 10 or more for level 5)     Review of Systems   Constitutional: Negative for activity change, chills, fatigue and fever. HENT: Negative for congestion, mouth sores and trouble swallowing. Eyes: Negative for photophobia and visual disturbance. Respiratory: Positive for cough, shortness of breath and wheezing. Cardiovascular: Negative for chest pain, palpitations and leg swelling. Gastrointestinal: Negative for abdominal pain, nausea and vomiting.    Genitourinary: Negative for difficulty urinating and frequency. Musculoskeletal: Negative for gait problem and neck pain. Skin: Negative for color change and rash. Neurological: Negative for dizziness, light-headedness and headaches. Psychiatric/Behavioral: Negative for confusion. The patient is not nervous/anxious. Except as noted above the remainder of the review of systems was reviewed and negative.        PAST MEDICAL HISTORY     Past Medical History:   Diagnosis Date    Arrythmia     Asthma     no problems recently    Back pain     Breast cancer ([facility] [address]     Breast lump     right    Cervical cancer (HCC)     Chronic hepatitis C (HCC)     Chronic pain     DDD (degenerative disc disease)     Diverticulitis     Fibromyalgia     GERD (gastroesophageal reflux disease)     Headache     migraines    Heart disease     Immune deficiency disorder (HCC)     Interstitial cystitis     Nausea & vomiting     Osteoarthritis     Other closed fractures of distal end of radius (alone) 12/19/2009    distal radius fx surgery 12/28/2009 Dr. Idalia Farias    Pneumonia     Rheumatoid arthritis(714.0)          SURGICALHISTORY       Past Surgical History:   Procedure Laterality Date    APPENDECTOMY      BLADDER SURGERY      BREAST SURGERY      bilat mastectomy    CARPAL TUNNEL RELEASE Right 7/2/14    removal of hardware    CHOLECYSTECTOMY      COLONOSCOPY      with polyectomy    CYSTOSCOPY  8-17-11    with bladder and urethral dilatation    HERNIA REPAIR  03/24/2017    repair of umbilical hernia with primary closure, exploration of LLQ subcutaneous space without definitive findings of lipoma    HYSTERECTOMY      LAPAROSCOPIC APPENDECTOMY  3/18/13    LAPAROSCOPY  3/18/13    TONSILLECTOMY AND ADENOIDECTOMY      UPPER GASTROINTESTINAL ENDOSCOPY      1/09    UPPER GASTROINTESTINAL ENDOSCOPY  5/28/2010    UPPER GASTROINTESTINAL ENDOSCOPY  2/13/13    WRIST FRACTURE SURGERY  12/28/09    Open treatment with open reduction, internal fixation rightdistal radius using         CURRENT MEDICATIONS       Discharge Medication List as of 4/16/2019  6:10 PM      CONTINUE these medications which have NOT CHANGED    Details   methylPREDNISolone (MEDROL DOSEPACK) 4 MG tablet Take by mouth., Disp-1 kit, R-0Normal      promethazine-codeine (PHENERGAN WITH CODEINE) 6.25-10 MG/5ML SOLN solution Take 5 mLs by mouth every 4 hours as needed for Cough for up to 4 days. , Disp-118 mL, R-0Normal      levofloxacin (LEVAQUIN) 750 MG tablet Take 1 tablet by mouth daily for 7 days, Disp-7 tablet, R-0Normal      Lactobacillus (PROBIOTIC ACIDOPHILUS) TABS Take 1 tablet by mouth 2 times daily for 10 days, Disp-20 tablet, R-0Normal      guaiFENesin (MUCINEX) 600 MG extended release tablet Take 1 tablet by mouth 2 times daily for 5 days, Disp-10 tablet, R-0Normal      !! albuterol sulfate  (90 Base) MCG/ACT inhaler Inhale 2 puffs into the lungs 4 times daily as needed for Wheezing, Disp-3 Inhaler, R-0Normal      oxyCODONE HCl (OXY-IR) 10 MG immediate release tablet Take 10 mg by mouth every 8 hours. , R-0Historical Med      ALPRAZolam (XANAX) 0.5 MG tablet Take 1 tablet by mouth 2 times daily as needed for Sleep for up to 30 days. , Disp-60 tablet, R-2Print      tiZANidine (ZANAFLEX) 4 MG tablet Take 1 tablet by mouth every 8 hours as needed (muscle spasm), Disp-30 tablet, R-2Normal      famciclovir (FAMVIR) 500 MG tablet TAKE ONE TABLET BY MOUTH THREE TIMES DAILY, Disp-30 tablet, R-5Normal      phentermine (ADIPEX-P) 37.5 MG tablet Take 1 tablet by mouth every morning (before breakfast) for 30 days. , Disp-30 tablet, R-0Normal      buPROPion (WELLBUTRIN SR) 150 MG extended release tablet Take 1 tablet by mouth 2 times daily, Disp-60 tablet, R-3Normal      OxyCODONE ER (XTAMPZA ER) 9 MG C12A Take 9 mg by mouth nightly.  Historical Med      famotidine (PEPCID) 40 MG tablet Take 1 tablet by mouth 2 times daily, Disp-60 tablet, R-3Normal      propranolol (INDERAL) 20 MG tablet Take 1 tablet by mouth 3 times daily, Disp-90 tablet, R-2Normal      hydrochlorothiazide (HYDRODIURIL) 25 MG tablet Take 1 tablet by mouth daily, Disp-30 tablet, R-3Normal       !! - Potential duplicate medications found. Please discuss with provider. ALLERGIES     Shellfish-derived products; Tape [adhesive tape]; Tylenol [acetaminophen]; Codeine; Morphine; and Prednisone    FAMILY HISTORY       Family History   Problem Relation Age of Onset   [facility] Cancer Mother         Breast    Depression Mother     Cancer Father         lung    Cancer Maternal Grandmother         breast    Asthma Other     Cancer Other     Heart Disease Other     Depression Paternal Grandmother     Emphysema Paternal Grandmother     Elevated Lipids Paternal Grandmother     Cancer Paternal Grandfather         lung    Cancer Maternal Grandfather         colon           SOCIAL HISTORY       Social History     Socioeconomic History    Marital status:      Spouse name: None    Number of children: 3    Years of education: None    Highest education level: None   Occupational History    None   Social Needs    Financial resource strain: None    Food insecurity:     Worry: None     Inability: None    Transportation needs:     Medical: None     Non-medical: None   Tobacco Use    Smoking status: Never Smoker    Smokeless tobacco: Never Used    Tobacco comment: encouraged to never smoke    Substance and Sexual Activity    Alcohol use:  Yes     Alcohol/week: 0.0 oz     Comment: socially; special occasions 1 every other week    Drug use: No    Sexual activity: Yes     Partners: Male   Lifestyle    Physical activity:     Days per week: None     Minutes per session: None    Stress: None   Relationships    Social connections:     Talks on phone: None     Gets together: None     Attends Sikhism service: None     Active member of club or organization: None     Attends meetings of clubs or organizations: None     Relationship Myelocytes Relative 1 (*)     Anisocytosis Occasional (*)     Polychromasia Occasional (*)     All other components within normal limits    Narrative:     Performed at:  [facility]  [address] [facility] [address] [address]   Phone [phone]   CULTURE BLOOD #1    Narrative:     ORDER#: [id number]                          ORDERED BY: Cathie Monteiro  SOURCE: Blood Antecubital-Left             COLLECTED:  04/16/19 15:13  ANTIBIOTICS AT BEATRICE.:                      RECEIVED :  04/16/19 15:18  If child <=2 yrs old please draw pediatric bottle. ~Blood Culture #1  Performed at:  [facility]  [address] [facility] [place] [address]   Phone [phone]   CULTURE BLOOD #2    Narrative:     ORDER#: [id number]                          ORDERED BY: Cathie Monteiro  SOURCE: Blood Antecubital-Left             COLLECTED:  04/16/19 15:13  ANTIBIOTICS AT BEATRICE.:                      RECEIVED :  04/16/19 15:18  If child <=2 yrs old please draw pediatric bottle. ~Blood Culture #2  Performed at:  [facility]  [address] [facility] [place] [address]   Phone [phone]   COMPREHENSIVE METABOLIC PANEL    Narrative:     Performed at:  [facility]  [address] [facility] [place] [address]   Phone [phone]   LACTIC ACID, PLASMA    Narrative:     Performed at:  [facility]  [address] [facility] [place] [address]   Phone [phone]   TROPONIN    Narrative:     Performed at:  [facility]  [address], [facility] [place] [address]   Phone [phone]   PERIPHERAL BLOOD SMEAR, PATH REVIEW    Narrative:     Performed at:  [facility]  [address], [facility] [place] [address]   Phone [phone]   URINE RT REFLEX TO CULTURE       All other labs were within normal range or not returned as of this dictation. EMERGENCY DEPARTMENT COURSE and DIFFERENTIAL DIAGNOSIS/MDM:   Vitals:    Vitals:    04/16/19 1424 04/16/19 1613 04/16/19 1840   BP: 126/87  121/74   Pulse: 79  77   Resp: 16  20   Temp: 97.4 °F (36.3 °C)     TempSrc: Oral     SpO2: 100% 100% 100%           MDM  Number of Diagnoses or Management Options  COPD exacerbation ([address]):   Shortness of breath:   Diagnosis management comments: 27-year-old female presents the ED for evaluation of cough and shortness of breath. Patient was recently treated in the hospital for pneumonia. Review the patient's previous admission she appeared to be septic given his presentation. On exam the patient does have coarse breath sounds and expiratory wheezing. Vital signs were within normal limits. Will obtain chest x-ray basic lab workup. Differential diagnosis includes recurrent pneumonia, reactive airway disease, bronchitis, pneumothorax, and COPD. REASSESSMENT      On reevaluation the patient is resting comfortably and has a normal respiratory effort. Patient states she does feel slightly better after symptomatic treatment with DuoNeb nebs. Patient's chest x-ray does not demonstrate cardiopulmonary disease and her laboratory workup did not demonstrate any emergent laboratory abnormalities. I did call the on-call physician for the patient's PCP and we discussed the results and patient's current vital signs. Physician not think the patient needed admission at this time. Patient does have steroids and antibiotics at home. Patient will be discharged with instructions to follow-up with primary care physician. Results were discussed with the patient and why it was of the opinion that the patient was suitable for discharge. Patient is amenable to discharge home. Return indications discussed with the patient. Patient demonstrates understanding of when to return for reevaluation for persistent or worsening symptoms.      CRITICAL CARE TIME   Total Critical Care time was 0 minutes, excluding separatelyreportable procedures. There was a high probability ofclinically significant/life threatening deterioration in the patient's condition which required my urgent intervention. CONSULTS:  IP CONSULT TO PRIMARY CARE PROVIDER    PROCEDURES:  Unless otherwise noted below, none     Procedures    FINAL IMPRESSION      1. COPD exacerbation ([address]    2. Shortness of breath          DISPOSITION/PLAN   DISPOSITION Decision To Discharge 04/16/2019 05:48:02 PM      PATIENT REFERREDTO:  No follow-up provider specified. DISCHARGEMEDICATIONS:  Discharge Medication List as of 4/16/2019  6:10 PM      START taking these medications    Details   !! albuterol sulfate  (90 Base) MCG/ACT inhaler Use 2 puffs 4 times daily for 7 days then as needed for wheezing. Dispense with Spacer and instruct in use. At patient's preference may use 60 dose MDI. May Sub Pro-Air or Proventil as needed per insurance., Disp-1 Inhaler, R-0, DAWPrint      dexamethasone (DECADRON) 4 MG tablet Take 1 tablet by mouth 2 times daily (with meals) for 6 days, Disp-12 tablet, R-0Print       !! - Potential duplicate medications found. Please discuss with provider.              (Please note that portions of this note were completed with a voice recognition program.  Efforts were made to edit the dictations but occasionally words are mis-transcribed.)    Raman Restrepo MD (electronically signed)  Attending Emergency Physician          Raman Restrepo MD  04/20/19 4340

## 2019-04-21 LAB
BLOOD CULTURE, ROUTINE: NORMAL
CULTURE, BLOOD 2: NORMAL

## 2019-04-23 ENCOUNTER — OFFICE VISIT (OUTPATIENT)
Dept: FAMILY MEDICINE CLINIC | Age: 39
End: 2019-04-23
Payer: COMMERCIAL

## 2019-04-23 VITALS
DIASTOLIC BLOOD PRESSURE: 70 MMHG | SYSTOLIC BLOOD PRESSURE: 110 MMHG | HEIGHT: 61 IN | BODY MASS INDEX: 28.25 KG/M2 | TEMPERATURE: 98.3 F | WEIGHT: 149.6 LBS

## 2019-04-23 DIAGNOSIS — J18.9 PNEUMONIA OF RIGHT LOWER LOBE DUE TO INFECTIOUS ORGANISM: Primary | ICD-10-CM

## 2019-04-23 PROCEDURE — 99213 OFFICE O/P EST LOW 20 MIN: CPT | Performed by: FAMILY MEDICINE

## 2019-04-23 RX ORDER — METHYLPREDNISOLONE 4 MG/1
TABLET ORAL
Qty: 1 KIT | Refills: 0 | Status: SHIPPED | OUTPATIENT
Start: 2019-04-23 | End: 2019-04-29

## 2019-04-23 ASSESSMENT — ENCOUNTER SYMPTOMS
COUGH: 1
WHEEZING: 1

## 2019-04-23 NOTE — PROGRESS NOTES
famciclovir (FAMVIR) 500 MG tablet TAKE ONE TABLET BY MOUTH THREE TIMES DAILY (Patient taking differently: TAKE ONE TABLET BY MOUTH THREE TIMES DAILY as needed for fever blister) 30 tablet 5    buPROPion (WELLBUTRIN SR) 150 MG extended release tablet Take 1 tablet by mouth 2 times daily 60 tablet 3    OxyCODONE ER (XTAMPZA ER) 9 MG C12A Take 9 mg by mouth nightly.  famotidine (PEPCID) 40 MG tablet Take 1 tablet by mouth 2 times daily 60 tablet 3    propranolol (INDERAL) 20 MG tablet Take 1 tablet by mouth 3 times daily (Patient taking differently: Take 20 mg by mouth 2 times daily ) 90 tablet 2    hydrochlorothiazide (HYDRODIURIL) 25 MG tablet Take 1 tablet by mouth daily (Patient taking differently: Take 25 mg by mouth daily as needed (swelling) ) 30 tablet 3       Vitals:    04/23/19 0848   BP: 110/70   Temp: 98.3 °F (36.8 °C)   Weight: 149 lb 9.6 oz (67.9 kg)   Height: 5' 1\" (1.549 m)     Body mass index is 28.27 kg/m². Wt Readings from Last 3 Encounters:   04/23/19 149 lb 9.6 oz (67.9 kg)   04/16/19 153 lb 6.4 oz (69.6 kg)   04/12/19 149 lb 14.6 oz (68 kg)     BP Readings from Last 3 Encounters:   04/23/19 110/70   04/16/19 121/74   04/16/19 108/86       Objective:   Physical Exam   Constitutional: She is oriented to person, place, and time. She appears well-developed and well-nourished. No distress. HENT:   Head: Normocephalic. Right Ear: Tympanic membrane, external ear and ear canal normal.   Left Ear: Tympanic membrane, external ear and ear canal normal.   Nose: Mucosal edema present. Mouth/Throat: Posterior oropharyngeal erythema present. Eyes: Conjunctivae are normal. Left eye discharge: pnd    Neck: No thyromegaly present. Cardiovascular: Normal rate. Pulmonary/Chest: Effort normal. No respiratory distress. She has wheezes. She has no rales. Lymphadenopathy:     She has cervical adenopathy. Neurological: She is alert and oriented to person, place, and time.    Skin: Skin is warm and dry. No rash noted. Psychiatric: She has a normal mood and affect. Her behavior is normal. Judgment and thought content normal.       Assessment:      Assessment/plan;  Sulma was seen today for follow-up. Diagnoses and all orders for this visit:    Pneumonia of right lower lobe due to infectious organism ([facility] [address]    Other orders  -     methylPREDNISolone (MEDROL DOSEPACK) 4 MG tablet; Take by mouth.       Call if symptoms continue   [address], DO

## 2019-05-07 ENCOUNTER — OFFICE VISIT (OUTPATIENT)
Dept: FAMILY MEDICINE CLINIC | Age: 39
End: 2019-05-07
Payer: COMMERCIAL

## 2019-05-07 VITALS
WEIGHT: 149 LBS | HEIGHT: 61 IN | DIASTOLIC BLOOD PRESSURE: 72 MMHG | SYSTOLIC BLOOD PRESSURE: 122 MMHG | BODY MASS INDEX: 28.13 KG/M2 | HEART RATE: 100 BPM | OXYGEN SATURATION: 98 %

## 2019-05-07 DIAGNOSIS — E66.09 CLASS 1 OBESITY DUE TO EXCESS CALORIES WITH SERIOUS COMORBIDITY AND BODY MASS INDEX (BMI) OF 30.0 TO 30.9 IN ADULT: ICD-10-CM

## 2019-05-07 PROCEDURE — 99213 OFFICE O/P EST LOW 20 MIN: CPT | Performed by: FAMILY MEDICINE

## 2019-05-07 RX ORDER — PHENTERMINE HYDROCHLORIDE 37.5 MG/1
37.5 TABLET ORAL
Qty: 30 TABLET | Refills: 0 | Status: SHIPPED | OUTPATIENT
Start: 2019-05-07 | End: 2019-06-07 | Stop reason: SDUPTHER

## 2019-05-07 ASSESSMENT — ENCOUNTER SYMPTOMS
RESPIRATORY NEGATIVE: 1
GASTROINTESTINAL NEGATIVE: 1

## 2019-05-07 NOTE — PROGRESS NOTES
mouth 2 times daily ) 90 tablet 2    hydrochlorothiazide (HYDRODIURIL) 25 MG tablet Take 1 tablet by mouth daily (Patient taking differently: Take 25 mg by mouth daily as needed (swelling) ) 30 tablet 3       Vitals:    05/07/19 1325   BP: 122/72   Pulse: 100   SpO2: 98%   Weight: 149 lb (67.6 kg)   Height: 5' 1\" (1.549 m)     Body mass index is 28.15 kg/m². Wt Readings from Last 3 Encounters:   05/07/19 149 lb (67.6 kg)   04/23/19 149 lb 9.6 oz (67.9 kg)   04/16/19 153 lb 6.4 oz (69.6 kg)     BP Readings from Last 3 Encounters:   05/07/19 122/72   04/23/19 110/70   04/16/19 121/74       Objective:   Physical Exam   Constitutional: She is oriented to person, place, and time. She appears well-developed and well-nourished. HENT:   Head: Normocephalic. Neck: No thyromegaly present. Cardiovascular: Normal rate, regular rhythm and normal heart sounds. Pulmonary/Chest: Effort normal and breath sounds normal.   Lymphadenopathy:     She has no cervical adenopathy. Neurological: She is alert and oriented to person, place, and time. Psychiatric: She has a normal mood and affect. Her behavior is normal. Judgment and thought content normal.   Nursing note and vitals reviewed. Assessment:      Assessment/plan;  Sulma was seen today for weight management. Diagnoses and all orders for this visit:    Class 1 obesity due to excess calories with serious comorbidity and body mass index (BMI) of 30.0 to 30.9 in adult  -     phentermine (ADIPEX-P) 37.5 MG tablet; Take 1 tablet by mouth every morning (before breakfast) for 30 days. No follow-ups on file.     Erik June DO

## 2019-05-23 DIAGNOSIS — J18.9 PNEUMONIA DUE TO ORGANISM: ICD-10-CM

## 2019-05-24 RX ORDER — PROMETHAZINE HYDROCHLORIDE AND CODEINE PHOSPHATE 6.25; 1 MG/5ML; MG/5ML
SYRUP ORAL
Qty: 118 ML | Refills: 0 | Status: SHIPPED | OUTPATIENT
Start: 2019-05-24 | End: 2019-05-31

## 2019-05-27 ENCOUNTER — HOSPITAL ENCOUNTER (EMERGENCY)
Age: 39
Discharge: HOME | End: 2019-05-27
Attending: EMERGENCY MEDICINE
Payer: COMMERCIAL

## 2019-05-27 VITALS
DIASTOLIC BLOOD PRESSURE: 88 MMHG | BODY MASS INDEX: 28.97 KG/M2 | WEIGHT: 153.31 LBS | HEART RATE: 105 BPM | RESPIRATION RATE: 16 BRPM | OXYGEN SATURATION: 100 % | SYSTOLIC BLOOD PRESSURE: 134 MMHG | TEMPERATURE: 97.9 F

## 2019-05-27 DIAGNOSIS — K12.1 VIRAL STOMATITIS: Primary | ICD-10-CM

## 2019-05-27 DIAGNOSIS — B97.89 VIRAL STOMATITIS: Primary | ICD-10-CM

## 2019-05-27 PROCEDURE — 99282 EMERGENCY DEPT VISIT SF MDM: CPT

## 2019-05-27 ASSESSMENT — PAIN DESCRIPTION - PAIN TYPE: TYPE: ACUTE PAIN

## 2019-05-27 ASSESSMENT — PAIN DESCRIPTION - LOCATION: LOCATION: MOUTH

## 2019-05-27 ASSESSMENT — PAIN DESCRIPTION - FREQUENCY: FREQUENCY: CONTINUOUS

## 2019-05-27 ASSESSMENT — PAIN SCALES - GENERAL
PAINLEVEL_OUTOF10: 4
PAINLEVEL_OUTOF10: 3

## 2019-05-27 ASSESSMENT — PAIN DESCRIPTION - DESCRIPTORS: DESCRIPTORS: PRESSURE;BURNING

## 2019-05-27 NOTE — ED PROVIDER NOTES
[facility]  eMERGENCY dEPARTMENT eNCOUnter      Pt Name: Karolyn Mccloud  MRN: [id number]  [place] 1980  Date of evaluation: 5/27/2019  Provider: Kevin Cary MD    CHIEF COMPLAINT     No chief complaint on file. HISTORY OF PRESENT ILLNESS  (Location/Symptom, Timing/Onset, Context/Setting, Quality, Duration, Modifying Factors, Severity.)   Karolyn Mccloud is a 45 y.o. female who presents to the emergency department playing of ulcers in her mouth that started several days ago. She is now developing blisters on her lips. She states she's had fever blisters before, but since she noticed some in her mouth first she didn't think that there fever blisters. She has Famvir at home but she did not take it. She does feel somewhat fatigued and tired. No fever. No nasal congestion, earache, or cough. No other complaints. Nursing Notes were reviewed and I agree. REVIEW OF SYSTEMS    (2-9 systems for level 4, 10 or more for level 5)     Gen.: Fatigue, no fever. Eyes: No discharge or redness. ENT: Blisters in her mouth on her lip. Cardiovascular: No chest pain. Pulmonary: No shortness of breath or cough. GI: No abdominal pain nausea vomiting diarrhea. Except as noted above the remainder of the review of systems was reviewed and negative.        PAST MEDICAL HISTORY         Diagnosis Date    Arrythmia     Asthma     no problems recently    Back pain     Breast cancer ([address]     Breast lump     right    Cervical cancer (HCC)     Chronic hepatitis C (HCC)     Chronic pain     DDD (degenerative disc disease)     Diverticulitis     Fibromyalgia     GERD (gastroesophageal reflux disease)     Headache     migraines    Heart disease     Immune deficiency disorder (HCC)     Interstitial cystitis     Nausea & vomiting     Osteoarthritis     Other closed fractures of distal end of radius (alone) 12/19/2009    distal radius fx surgery 12/28/2009 Dr. Osvaldo Zamorano Pneumonia     Rheumatoid arthritis(714.0)        SURGICAL HISTORY           Procedure Laterality Date    APPENDECTOMY      BLADDER SURGERY      BREAST SURGERY      bilat mastectomy    CARPAL TUNNEL RELEASE Right 7/2/14    removal of hardware    CHOLECYSTECTOMY      COLONOSCOPY      with polyectomy    CYSTOSCOPY  8-17-11    with bladder and urethral dilatation    HERNIA REPAIR  03/24/2017    repair of umbilical hernia with primary closure, exploration of LLQ subcutaneous space without definitive findings of lipoma    HYSTERECTOMY      LAPAROSCOPIC APPENDECTOMY  3/18/13    LAPAROSCOPY  3/18/13    TONSILLECTOMY AND ADENOIDECTOMY      UPPER GASTROINTESTINAL ENDOSCOPY      1/09    UPPER GASTROINTESTINAL ENDOSCOPY  5/28/2010    UPPER GASTROINTESTINAL ENDOSCOPY  2/13/13    WRIST FRACTURE SURGERY  12/28/09    Open treatment with open reduction, internal fixation rightdistal radius using       CURRENT MEDICATIONS       Previous Medications    ALBUTEROL SULFATE  (90 BASE) MCG/ACT INHALER    Inhale 2 puffs into the lungs 4 times daily as needed for Wheezing    ALPRAZOLAM (XANAX) 0.5 MG TABLET    Take 1 tablet by mouth 2 times daily as needed for Sleep for up to 30 days. BUPROPION (WELLBUTRIN SR) 150 MG EXTENDED RELEASE TABLET    Take 1 tablet by mouth 2 times daily    FAMCICLOVIR (FAMVIR) 500 MG TABLET    TAKE ONE TABLET BY MOUTH THREE TIMES DAILY    FAMOTIDINE (PEPCID) 40 MG TABLET    Take 1 tablet by mouth 2 times daily    HYDROCHLOROTHIAZIDE (HYDRODIURIL) 25 MG TABLET    Take 1 tablet by mouth daily    OXYCODONE ER (XTAMPZA ER) 9 MG C12A    Take 9 mg by mouth nightly. PHENTERMINE (ADIPEX-P) 37.5 MG TABLET    Take 1 tablet by mouth every morning (before breakfast) for 30 days. PROMETHAZINE-CODEINE (PHENERGAN WITH CODEINE) 6.25-10 MG/5ML SYRUP    Take 5 mLs by mouth every 4 hours as needed for Cough for up to 4 days.     PROPRANOLOL (INDERAL) 20 MG TABLET    Take 1 tablet by mouth 3 times Nora Montano MD with the below findings:      Interpretation per the Radiologist below, if available at the time of this note:    No orders to display       LABS:  Labs Reviewed - No data to display    All other labs were within normal range or not returned as of this dictation. EMERGENCY DEPARTMENT COURSE and DIFFERENTIAL DIAGNOSIS/MDM:   Vitals:    Vitals:    05/27/19 1333   BP: 134/88   Pulse: 105   Resp: 16   Temp: 97.9 °F (36.6 °C)   TempSrc: Oral   SpO2: 100%   Weight: 153 lb 5 oz (69.5 kg)       I think this is viral, likely a viral stomatitis. She's had a history of herpes labialis in the past. She is developing some vesicles on the outside, and it could be herpetic. She has Famvir at home. I'm recommending that she take the Famvir as prescribed. Tylenol as needed for discomfort. I encouraged her to take in lots of fluids. Follow-up in 3 days by her primary care physician. PROCEDURES:  None    FINAL IMPRESSION      1. Viral stomatitis          DISPOSITION/PLAN   DISPOSITION Decision To Discharge 05/27/2019 01:43:59 PM      PATIENT REFERRED TO:  Alvaro Olson   [address] [facility] [address]  [phone]    In 3 days        DISCHARGE MEDICATIONS:  New Prescriptions    MAGIC MOUTHWASH (MIRACLE MOUTHWASH)    Swish and spit 10 mLs 4 times daily as needed for Irritation Dispense as Magic Mouthwash. Please add Equal Parts: 60 mL Maalox, 60 mL Viscous Lidocaine, 60 mL Benadryl to 60mL of Carafate. 10 ml swish and spit or swallow as directed above.        (Please note that portions of this note were completed with a voice recognition program.  Efforts were made to edit the dictations but occasionally words are mis-transcribed.)    Nora Montano MD  Attending Emergency Physician       Beatriz Das MD  05/27/19 [address], [place]  05/27/19 0971

## 2019-06-05 ENCOUNTER — HOSPITAL ENCOUNTER (OUTPATIENT)
Dept: GENERAL RADIOLOGY | Age: 39
Discharge: HOME | End: 2019-06-05
Payer: COMMERCIAL

## 2019-06-05 ENCOUNTER — HOSPITAL ENCOUNTER (OUTPATIENT)
Age: 39
Discharge: HOME | End: 2019-06-05
Payer: COMMERCIAL

## 2019-06-05 DIAGNOSIS — M47.816 SPONDYLOSIS OF LUMBAR SPINE: ICD-10-CM

## 2019-06-05 PROCEDURE — 72110 X-RAY EXAM L-2 SPINE 4/>VWS: CPT

## 2019-06-07 ENCOUNTER — OFFICE VISIT (OUTPATIENT)
Dept: FAMILY MEDICINE CLINIC | Age: 39
End: 2019-06-07
Payer: COMMERCIAL

## 2019-06-07 VITALS
BODY MASS INDEX: 27.9 KG/M2 | HEIGHT: 61 IN | WEIGHT: 147.8 LBS | DIASTOLIC BLOOD PRESSURE: 78 MMHG | SYSTOLIC BLOOD PRESSURE: 118 MMHG

## 2019-06-07 DIAGNOSIS — E66.09 CLASS 1 OBESITY DUE TO EXCESS CALORIES WITH SERIOUS COMORBIDITY AND BODY MASS INDEX (BMI) OF 30.0 TO 30.9 IN ADULT: ICD-10-CM

## 2019-06-07 DIAGNOSIS — B37.31 VAGINAL YEAST INFECTION: Primary | ICD-10-CM

## 2019-06-07 DIAGNOSIS — K12.1 VIRAL STOMATITIS: ICD-10-CM

## 2019-06-07 DIAGNOSIS — M79.7 FIBROMYALGIA: ICD-10-CM

## 2019-06-07 DIAGNOSIS — B97.89 VIRAL STOMATITIS: ICD-10-CM

## 2019-06-07 PROCEDURE — 99214 OFFICE O/P EST MOD 30 MIN: CPT | Performed by: FAMILY MEDICINE

## 2019-06-07 RX ORDER — FLUCONAZOLE 150 MG/1
150 TABLET ORAL ONCE
Qty: 1 TABLET | Refills: 0 | Status: SHIPPED | OUTPATIENT
Start: 2019-06-07 | End: 2019-06-07

## 2019-06-07 RX ORDER — PHENTERMINE HYDROCHLORIDE 37.5 MG/1
37.5 TABLET ORAL
Qty: 30 TABLET | Refills: 0 | Status: SHIPPED | OUTPATIENT
Start: 2019-06-07 | End: 2019-10-01 | Stop reason: SDUPTHER

## 2019-06-07 RX ORDER — TIZANIDINE 4 MG/1
4 TABLET ORAL EVERY 8 HOURS PRN
Qty: 30 TABLET | Refills: 2 | Status: SHIPPED | OUTPATIENT
Start: 2019-06-07 | End: 2019-09-11 | Stop reason: SDUPTHER

## 2019-06-07 ASSESSMENT — ENCOUNTER SYMPTOMS
RESPIRATORY NEGATIVE: 1
EYES NEGATIVE: 1

## 2019-06-07 NOTE — LETTER
[facility]  [address]  [address] [facility] [address]  Phone: [phone]  Fax: [phone]    Saima Anaya DO        June 7, 2019     Patient: Karolyn Mccloud   YOB: 1980   Date of Visit: 6/7/2019       To Whom it May Concern:    Shon Dominguez was seen in my clinic on 6/7/2019. She needs to be on light duty until she starts vacation 6/12/19. If you have any questions or concerns, please don't hesitate to call.     Sincerely,         Saima Anaya DO

## 2019-06-07 NOTE — PROGRESS NOTES
Subjective:      Patient ID: Jaskaran Killian is a 45 y.o. female. HPI   Mouth ulcers  She broke out in several sores in her mouth  Very painful  Went to ER   They gave her a prescription for magic mouthwash but she had to have prior auth for this so could not fill  Wants new rx for this    Vaginal yeast infection  She needs a prescription for diflucan  Itching and discharge for last several days    Obesity   She is doing fine on the adipex   Wants to have her last month of medicine   No side effects  Also watching calories and staying active    Fibromyalgia  She needs her muscle relaxant refilled      Review of Systems   Constitutional: Positive for activity change and fatigue. HENT: Positive for mouth sores. Eyes: Negative. Respiratory: Negative. Genitourinary: Positive for vaginal discharge. Musculoskeletal: Positive for arthralgias and myalgias. Neurological: Negative. YOB: 1980    Date of Visit:  6/7/2019    Allergies   Allergen Reactions    Shellfish-Derived Products Shortness Of Breath    Tape Crissy Minors Tape] Rash    Tylenol [Acetaminophen]      History of hep c    Codeine Nausea And Vomiting and Palpitations    Morphine Itching and Nausea And Vomiting     \"makes me crazy\"     Prednisone Hives, Palpitations and Rash       Outpatient Medications Marked as Taking for the 6/7/19 encounter (Office Visit) with Eleanor Mcginnis, DO   Medication Sig Dispense Refill    Magic Mouthwash (MIRACLE MOUTHWASH) Swish and spit 10 mLs 4 times daily as needed for Irritation Dispense as Magic Mouthwash. Please add Equal Parts: 60 mL Maalox, 60 mL Viscous Lidocaine, 60 mL Benadryl to 60mL of Carafate. 10 ml swish and spit or swallow as directed above.  240 mL 3    albuterol sulfate  (90 Base) MCG/ACT inhaler Inhale 2 puffs into the lungs 4 times daily as needed for Wheezing 3 Inhaler 0    tiZANidine (ZANAFLEX) 4 MG tablet Take 1 tablet by mouth every 8 hours as needed (muscle spasm) 30 tablet 2    famciclovir (FAMVIR) 500 MG tablet TAKE ONE TABLET BY MOUTH THREE TIMES DAILY (Patient taking differently: TAKE ONE TABLET BY MOUTH THREE TIMES DAILY as needed for fever blister) 30 tablet 5    buPROPion (WELLBUTRIN SR) 150 MG extended release tablet Take 1 tablet by mouth 2 times daily 60 tablet 3    OxyCODONE ER (XTAMPZA ER) 9 MG C12A Take 9 mg by mouth nightly.  famotidine (PEPCID) 40 MG tablet Take 1 tablet by mouth 2 times daily 60 tablet 3    propranolol (INDERAL) 20 MG tablet Take 1 tablet by mouth 3 times daily (Patient taking differently: Take 20 mg by mouth 2 times daily ) 90 tablet 2    hydrochlorothiazide (HYDRODIURIL) 25 MG tablet Take 1 tablet by mouth daily (Patient taking differently: Take 25 mg by mouth daily as needed (swelling) ) 30 tablet 3       Vitals:    06/07/19 1058   BP: 118/78   Weight: 147 lb 12.8 oz (67 kg)   Height: 5' 1\" (1.549 m)     Body mass index is 27.93 kg/m². Wt Readings from Last 3 Encounters:   06/07/19 147 lb 12.8 oz (67 kg)   05/27/19 153 lb 5 oz (69.5 kg)   05/07/19 149 lb (67.6 kg)     BP Readings from Last 3 Encounters:   06/07/19 118/78   05/27/19 134/88   05/07/19 122/72       Objective:   Physical Exam   Constitutional: She is oriented to person, place, and time. She appears well-developed and well-nourished. HENT:   Head: Normocephalic. Mouth with few aphthous ulcers    Neck: No thyromegaly present. Cardiovascular: Normal rate, regular rhythm and normal heart sounds. Pulmonary/Chest: Effort normal and breath sounds normal.   Lymphadenopathy:     She has no cervical adenopathy. Neurological: She is alert and oriented to person, place, and time. Psychiatric: She has a normal mood and affect. Her behavior is normal. Judgment and thought content normal.   Nursing note and vitals reviewed. Assessment:      Assessment/plan;  Sulma was seen today for 1 month follow-up.     Diagnoses and all orders for this visit:    Vaginal yeast infection  -     fluconazole (DIFLUCAN) 150 MG tablet; Take 1 tablet by mouth once for 1 dose    Class 1 obesity due to excess calories with serious comorbidity and body mass index (BMI) of 30.0 to 30.9 in adult  -     phentermine (ADIPEX-P) 37.5 MG tablet; Take 1 tablet by mouth every morning (before breakfast) for 30 days. Fibromyalgia  -     tiZANidine (ZANAFLEX) 4 MG tablet; Take 1 tablet by mouth every 8 hours as needed (muscle spasm)    Viral stomatitis  -     Magic Mouthwash (MIRACLE MOUTHWASH); Swish and spit 10 mLs 4 times daily as needed for Irritation Dispense as Magic Mouthwash. Please add Equal Parts: 60 mL Maalox, 60 mL Viscous Lidocaine, 60 mL Benadryl to 60mL of Carafate. 10 ml swish and spit or swallow as directed above.       Francine Solis, DO

## 2019-06-14 ENCOUNTER — TELEPHONE (OUTPATIENT)
Dept: PAIN MANAGEMENT | Age: 39
End: 2019-06-14

## 2019-06-14 NOTE — TELEPHONE ENCOUNTER
Submitted PA for Compound Drug: MAGIC MOUTHWASH, ([facility] - [id number]  Via CM STATUS: PENDING

## 2019-06-18 NOTE — TELEPHONE ENCOUNTER
Received APPROVAL for Compound Drug: MAGIC MOUTHWASH until 06/16/2020. Please advise patient. Thank you.

## 2019-06-20 ENCOUNTER — TELEPHONE (OUTPATIENT)
Dept: FAMILY MEDICINE CLINIC | Age: 39
End: 2019-06-20

## 2019-06-20 NOTE — TELEPHONE ENCOUNTER
Patient wanted to let Dr. Janine Membreno know that she has a swollen Lymph node on right side of her neck that keeps getting larger and is now affecting her swallowing.  Patient is going to [facility]. Oswald

## 2019-06-28 ENCOUNTER — OFFICE VISIT (OUTPATIENT)
Dept: FAMILY MEDICINE CLINIC | Age: 39
End: 2019-06-28
Payer: COMMERCIAL

## 2019-06-28 VITALS
HEIGHT: 61 IN | TEMPERATURE: 97.7 F | WEIGHT: 149 LBS | HEART RATE: 110 BPM | SYSTOLIC BLOOD PRESSURE: 106 MMHG | BODY MASS INDEX: 28.13 KG/M2 | OXYGEN SATURATION: 97 % | DIASTOLIC BLOOD PRESSURE: 74 MMHG

## 2019-06-28 DIAGNOSIS — F32.9 REACTIVE DEPRESSION: ICD-10-CM

## 2019-06-28 DIAGNOSIS — J01.90 ACUTE BACTERIAL SINUSITIS: ICD-10-CM

## 2019-06-28 DIAGNOSIS — B00.1 FEVER BLISTER: ICD-10-CM

## 2019-06-28 DIAGNOSIS — N30.10 INTERSTITIAL CYSTITIS: ICD-10-CM

## 2019-06-28 DIAGNOSIS — Z01.818 PREOP EXAMINATION: Primary | ICD-10-CM

## 2019-06-28 DIAGNOSIS — N63.10 MASS OF BREAST, RIGHT: ICD-10-CM

## 2019-06-28 DIAGNOSIS — B96.89 ACUTE BACTERIAL SINUSITIS: ICD-10-CM

## 2019-06-28 PROCEDURE — 99242 OFF/OP CONSLTJ NEW/EST SF 20: CPT | Performed by: FAMILY MEDICINE

## 2019-06-28 RX ORDER — AZITHROMYCIN 250 MG/1
TABLET, FILM COATED ORAL
Qty: 1 PACKET | Refills: 0 | Status: SHIPPED | OUTPATIENT
Start: 2019-06-28 | End: 2019-07-05 | Stop reason: ALTCHOICE

## 2019-06-28 RX ORDER — BUPROPION HYDROCHLORIDE 150 MG/1
150 TABLET, EXTENDED RELEASE ORAL 2 TIMES DAILY
Qty: 60 TABLET | Refills: 3 | Status: SHIPPED | OUTPATIENT
Start: 2019-06-28 | End: 2019-09-11 | Stop reason: SDUPTHER

## 2019-06-28 RX ORDER — FAMCICLOVIR 500 MG/1
TABLET, FILM COATED ORAL
Qty: 30 TABLET | Refills: 5 | Status: SHIPPED | OUTPATIENT
Start: 2019-06-28 | End: 2019-09-17 | Stop reason: SDUPTHER

## 2019-06-28 ASSESSMENT — ENCOUNTER SYMPTOMS
COUGH: 1
SINUS PAIN: 1
RHINORRHEA: 1
SINUS PRESSURE: 1
GASTROINTESTINAL NEGATIVE: 1

## 2019-06-28 NOTE — PROGRESS NOTES
mg by mouth 2 times daily ) 90 tablet 2    hydrochlorothiazide (HYDRODIURIL) 25 MG tablet Take 1 tablet by mouth daily (Patient taking differently: Take 25 mg by mouth daily as needed (swelling) ) 30 tablet 3       Vitals:    06/28/19 1354   BP: 106/74   Pulse: 110   Temp: 97.7 °F (36.5 °C)   SpO2: 97%   Weight: 149 lb (67.6 kg)   Height: 5' 1\" (1.549 m)     Body mass index is 28.15 kg/m². Wt Readings from Last 3 Encounters:   06/28/19 149 lb (67.6 kg)   06/07/19 147 lb 12.8 oz (67 kg)   05/27/19 153 lb 5 oz (69.5 kg)     BP Readings from Last 3 Encounters:   06/28/19 106/74   06/07/19 118/78   05/27/19 134/88       Allergies   Allergen Reactions    Shellfish-Derived Products Shortness Of Breath    Tape Lynn Goring Tape] Rash    Iodides Other (See Comments)     pt states tachycardia    Tylenol [Acetaminophen]      History of hep c    Codeine Nausea And Vomiting and Palpitations    Morphine Itching and Nausea And Vomiting     \"makes me crazy\"     Prednisone Hives, Palpitations and Rash     Current Outpatient Medications   Medication Sig Dispense Refill    buPROPion (WELLBUTRIN SR) 150 MG extended release tablet Take 1 tablet by mouth 2 times daily 60 tablet 3    famciclovir (FAMVIR) 500 MG tablet TAKE ONE TABLET BY MOUTH THREE TIMES DAILY 30 tablet 5    azithromycin (ZITHROMAX) 250 MG tablet Take 2 tabs (500 mg) on Day 1, and take 1 tab (250 mg) on days 2 through 5. 1 packet 0    tiZANidine (ZANAFLEX) 4 MG tablet Take 1 tablet by mouth every 8 hours as needed (muscle spasm) 30 tablet 2    albuterol sulfate  (90 Base) MCG/ACT inhaler Inhale 2 puffs into the lungs 4 times daily as needed for Wheezing 3 Inhaler 0    OxyCODONE ER (XTAMPZA ER) 9 MG C12A Take 9 mg by mouth nightly.        famotidine (PEPCID) 40 MG tablet Take 1 tablet by mouth 2 times daily 60 tablet 3    propranolol (INDERAL) 20 MG tablet Take 1 tablet by mouth 3 times daily (Patient taking differently: Take 20 mg by mouth 2 times daily ) 90 tablet 2    hydrochlorothiazide (HYDRODIURIL) 25 MG tablet Take 1 tablet by mouth daily (Patient taking differently: Take 25 mg by mouth daily as needed (swelling) ) 30 tablet 3    Magic Mouthwash (MIRACLE MOUTHWASH) Swish and spit 10 mLs 4 times daily as needed for Irritation Dispense as Magic Mouthwash. Please add Equal Parts: 60 mL Maalox, 60 mL Viscous Lidocaine, 60 mL Benadryl to 60mL of Carafate. 10 ml swish and spit or swallow as directed above. 240 mL 3    phentermine (ADIPEX-P) 37.5 MG tablet Take 1 tablet by mouth every morning (before breakfast) for 30 days. 30 tablet 0     No current facility-administered medications for this visit.       Past Medical History:   Diagnosis Date    Arrythmia     Asthma     no problems recently    Back pain     Breast cancer ([facility] [address]     Breast lump     right    Cervical cancer (HCC)     Chronic hepatitis C (HCC)     Chronic pain     DDD (degenerative disc disease)     Diverticulitis     Fibromyalgia     GERD (gastroesophageal reflux disease)     Headache     migraines    Heart disease     Immune deficiency disorder (HCC)     Interstitial cystitis     Nausea & vomiting     Osteoarthritis     Other closed fractures of distal end of radius (alone) 12/19/2009    distal radius fx surgery 12/28/2009 Dr. Pérez Mass    Pneumonia     Rheumatoid arthritis(714.0)      Past Surgical History:   Procedure Laterality Date    APPENDECTOMY      BLADDER SURGERY      BREAST SURGERY      bilat mastectomy    CARPAL TUNNEL RELEASE Right 7/2/14    removal of hardware    CHOLECYSTECTOMY      COLONOSCOPY      with polyectomy    CYSTOSCOPY  8-17-11    with bladder and urethral dilatation    HERNIA REPAIR  03/24/2017    repair of umbilical hernia with primary closure, exploration of LLQ subcutaneous space without definitive findings of lipoma    HYSTERECTOMY      LAPAROSCOPIC APPENDECTOMY  3/18/13    LAPAROSCOPY  3/18/13    TONSILLECTOMY AND ADENOIDECTOMY  UPPER GASTROINTESTINAL ENDOSCOPY      1/09    UPPER GASTROINTESTINAL ENDOSCOPY  5/28/2010    UPPER GASTROINTESTINAL ENDOSCOPY  2/13/13    WRIST FRACTURE SURGERY  12/28/09    Open treatment with open reduction, internal fixation rightdistal radius using     Social History     Tobacco Use    Smoking status: Never Smoker    Smokeless tobacco: Never Used    Tobacco comment: encouraged to never smoke    Substance Use Topics    Alcohol use: Yes     Alcohol/week: 0.0 oz     Comment: socially; special occasions 1 every other week    Drug use: No     Family History   Problem Relation Age of Onset    Cancer Mother         Breast    Depression Mother     Cancer Father         lung    Cancer Maternal Grandmother         breast    Asthma Other     Cancer Other     Heart Disease Other     Depression Paternal Grandmother     Emphysema Paternal Grandmother     Elevated Lipids Paternal Grandmother     Cancer Paternal Grandfather         lung    Cancer Maternal Grandfather         colon        Objective:   Physical Exam   Constitutional: She is oriented to person, place, and time. She appears well-developed and well-nourished. No distress. HENT:   Head: Normocephalic. Right Ear: Tympanic membrane, external ear and ear canal normal.   Left Ear: Tympanic membrane, external ear and ear canal normal.   Nose: Mucosal edema present. Mouth/Throat: Posterior oropharyngeal erythema present. Eyes: Conjunctivae are normal. Left eye discharge: pnd    Neck: No thyromegaly present. Cardiovascular: Normal rate, regular rhythm and normal heart sounds. Pulmonary/Chest: Effort normal and breath sounds normal. No respiratory distress. She has no wheezes. She has no rales. Genitourinary:   Genitourinary Comments: Right breast with two cystic lestion at 3 and 4 o'clock    Lymphadenopathy:     She has no cervical adenopathy. Neurological: She is alert and oriented to person, place, and time.    Skin: Skin is warm and dry. No rash noted. Psychiatric: She has a normal mood and affect. Her behavior is normal. Judgment and thought content normal.   Nursing note and vitals reviewed. Assessment:      Assessment/plan;  Sulma was seen today for pre-op exam.    Diagnoses and all orders for this visit:    Preop examination    Interstitial cystitis    Reactive depression  -     buPROPion (WELLBUTRIN SR) 150 MG extended release tablet; Take 1 tablet by mouth 2 times daily    Fever blister  -     famciclovir (FAMVIR) 500 MG tablet; TAKE ONE TABLET BY MOUTH THREE TIMES DAILY    Acute bacterial sinusitis  -     azithromycin (ZITHROMAX) 250 MG tablet; Take 2 tabs (500 mg) on Day 1, and take 1 tab (250 mg) on days 2 through 5. Mass of breast, right  -     US BREAST COMPLETE RIGHT;  Future      Pt medically clear for surgery  Rakesh Holbrook DO

## 2019-07-03 ENCOUNTER — HOSPITAL ENCOUNTER (OUTPATIENT)
Dept: ULTRASOUND IMAGING | Age: 39
Discharge: HOME | End: 2019-07-03
Payer: COMMERCIAL

## 2019-07-03 DIAGNOSIS — N63.10 MASS OF BREAST, RIGHT: ICD-10-CM

## 2019-07-03 PROCEDURE — 76642 ULTRASOUND BREAST LIMITED: CPT

## 2019-07-05 ENCOUNTER — OFFICE VISIT (OUTPATIENT)
Dept: FAMILY MEDICINE CLINIC | Age: 39
End: 2019-07-05
Payer: COMMERCIAL

## 2019-07-05 VITALS
SYSTOLIC BLOOD PRESSURE: 102 MMHG | DIASTOLIC BLOOD PRESSURE: 68 MMHG | WEIGHT: 151.6 LBS | BODY MASS INDEX: 28.62 KG/M2 | HEIGHT: 61 IN

## 2019-07-05 DIAGNOSIS — N63.0 BREAST MASS: Primary | ICD-10-CM

## 2019-07-05 PROCEDURE — 99213 OFFICE O/P EST LOW 20 MIN: CPT | Performed by: FAMILY MEDICINE

## 2019-07-05 RX ORDER — SULFAMETHOXAZOLE AND TRIMETHOPRIM 800; 160 MG/1; MG/1
1 TABLET ORAL 2 TIMES DAILY
Qty: 20 TABLET | Refills: 0 | Status: SHIPPED | OUTPATIENT
Start: 2019-07-05 | End: 2019-07-15

## 2019-07-07 ASSESSMENT — ENCOUNTER SYMPTOMS
RESPIRATORY NEGATIVE: 1
GASTROINTESTINAL NEGATIVE: 1

## 2019-07-08 ENCOUNTER — TELEPHONE (OUTPATIENT)
Dept: FAMILY MEDICINE CLINIC | Age: 39
End: 2019-07-08

## 2019-07-08 NOTE — TELEPHONE ENCOUNTER
Stop and take benedryl prn  We need to wait a few days before we start any other antibiotic  Please add to allergy list  To ER if tongue swelling or can't breath

## 2019-07-11 ENCOUNTER — TELEPHONE (OUTPATIENT)
Dept: FAMILY MEDICINE CLINIC | Age: 39
End: 2019-07-11

## 2019-07-11 ENCOUNTER — NURSE ONLY (OUTPATIENT)
Dept: FAMILY MEDICINE CLINIC | Age: 39
End: 2019-07-11
Payer: COMMERCIAL

## 2019-07-11 DIAGNOSIS — F32.9 REACTIVE DEPRESSION: ICD-10-CM

## 2019-07-11 DIAGNOSIS — M79.7 FIBROMYALGIA: ICD-10-CM

## 2019-07-11 DIAGNOSIS — N63.0 BREAST MASS: ICD-10-CM

## 2019-07-11 DIAGNOSIS — Z01.818 PREOP EXAMINATION: ICD-10-CM

## 2019-07-11 DIAGNOSIS — N30.10 INTERSTITIAL CYSTITIS: ICD-10-CM

## 2019-07-11 DIAGNOSIS — N63.0 BREAST MASS: Primary | ICD-10-CM

## 2019-07-11 LAB
A/G RATIO: 2.6 (ref 1.1–2.2)
ALBUMIN SERPL-MCNC: 5 G/DL (ref 3.4–5)
ALP BLD-CCNC: 81 U/L (ref 40–129)
ALT SERPL-CCNC: 13 U/L (ref 10–40)
ANION GAP SERPL CALCULATED.3IONS-SCNC: 15 MMOL/L (ref 3–16)
AST SERPL-CCNC: 12 U/L (ref 15–37)
BILIRUB SERPL-MCNC: 0.5 MG/DL (ref 0–1)
BUN BLDV-MCNC: 12 MG/DL (ref 7–20)
CALCIUM SERPL-MCNC: 10.5 MG/DL (ref 8.3–10.6)
CHLORIDE BLD-SCNC: 104 MMOL/L (ref 99–110)
CO2: 24 MMOL/L (ref 21–32)
CREAT SERPL-MCNC: 0.7 MG/DL (ref 0.6–1.1)
GFR AFRICAN AMERICAN: >60
GFR NON-AFRICAN AMERICAN: >60
GLOBULIN: 1.9 G/DL
GLUCOSE BLD-MCNC: 85 MG/DL (ref 70–99)
HCT VFR BLD CALC: 44.5 % (ref 36–48)
HEMOGLOBIN: 14.8 G/DL (ref 12–16)
IRON: 113 UG/DL (ref 37–145)
MCH RBC QN AUTO: 30.2 PG (ref 26–34)
MCHC RBC AUTO-ENTMCNC: 33.3 G/DL (ref 31–36)
MCV RBC AUTO: 90.8 FL (ref 80–100)
PDW BLD-RTO: 13.6 % (ref 12.4–15.4)
PLATELET # BLD: 302 K/UL (ref 135–450)
PMV BLD AUTO: 8.6 FL (ref 5–10.5)
POTASSIUM SERPL-SCNC: 4.7 MMOL/L (ref 3.5–5.1)
RBC # BLD: 4.9 M/UL (ref 4–5.2)
SODIUM BLD-SCNC: 143 MMOL/L (ref 136–145)
TOTAL PROTEIN: 6.9 G/DL (ref 6.4–8.2)
TSH SERPL DL<=0.05 MIU/L-ACNC: 1.81 UIU/ML (ref 0.27–4.2)
VITAMIN D 25-HYDROXY: 42.2 NG/ML
WBC # BLD: 8.2 K/UL (ref 4–11)

## 2019-07-11 PROCEDURE — 36415 COLL VENOUS BLD VENIPUNCTURE: CPT | Performed by: FAMILY MEDICINE

## 2019-07-17 ENCOUNTER — TELEPHONE (OUTPATIENT)
Dept: BREAST CENTER | Age: 39
End: 2019-07-17

## 2019-07-17 ENCOUNTER — INITIAL CONSULT (OUTPATIENT)
Dept: BREAST CENTER | Age: 39
End: 2019-07-17
Payer: COMMERCIAL

## 2019-07-17 VITALS
SYSTOLIC BLOOD PRESSURE: 117 MMHG | RESPIRATION RATE: 16 BRPM | OXYGEN SATURATION: 97 % | BODY MASS INDEX: 27.94 KG/M2 | DIASTOLIC BLOOD PRESSURE: 78 MMHG | HEIGHT: 61 IN | HEART RATE: 87 BPM | WEIGHT: 148 LBS

## 2019-07-17 DIAGNOSIS — Z98.890 S/P BREAST RECONSTRUCTION, BILATERAL: ICD-10-CM

## 2019-07-17 DIAGNOSIS — Z90.13 S/P BILATERAL MASTECTOMY: ICD-10-CM

## 2019-07-17 DIAGNOSIS — N63.10 BREAST MASS, RIGHT: Primary | ICD-10-CM

## 2019-07-17 DIAGNOSIS — Z91.89 AT HIGH RISK FOR BREAST CANCER: ICD-10-CM

## 2019-07-17 PROCEDURE — 99243 OFF/OP CNSLTJ NEW/EST LOW 30: CPT | Performed by: SURGERY

## 2019-07-17 NOTE — PROGRESS NOTES
mouth 2 times daily 60 tablet 3    propranolol (INDERAL) 20 MG tablet Take 1 tablet by mouth 3 times daily (Patient taking differently: Take 20 mg by mouth 2 times daily ) 90 tablet 2    hydrochlorothiazide (HYDRODIURIL) 25 MG tablet Take 1 tablet by mouth daily (Patient taking differently: Take 25 mg by mouth daily as needed (swelling) ) 30 tablet 3     No current facility-administered medications for this visit. Social History     Socioeconomic History    Marital status:      Spouse name: Not on file    Number of children: 3    Years of education: Not on file    Highest education level: Not on file   Occupational History    Not on file   Social Needs    Financial resource strain: Not on file    Food insecurity:     Worry: Not on file     Inability: Not on file    Transportation needs:     Medical: Not on file     Non-medical: Not on file   Tobacco Use    Smoking status: Never Smoker    Smokeless tobacco: Never Used    Tobacco comment: encouraged to never smoke    Substance and Sexual Activity    Alcohol use:  Yes     Alcohol/week: 0.0 standard drinks     Comment: socially; special occasions 1 every other week    Drug use: No    Sexual activity: Yes     Partners: Male   Lifestyle    Physical activity:     Days per week: Not on file     Minutes per session: Not on file    Stress: Not on file   Relationships    Social connections:     Talks on phone: Not on file     Gets together: Not on file     Attends Jain service: Not on file     Active member of club or organization: Not on file     Attends meetings of clubs or organizations: Not on file     Relationship status: Not on file    Intimate partner violence:     Fear of current or ex partner: Not on file     Emotionally abused: Not on file     Physically abused: Not on file     Forced sexual activity: Not on file   Other Topics Concern    Not on file   Social History Narrative    Not on file       ROS  Constitutional: no weight loss, fever, night sweats   Skin: negative  Cardiovascular: no chest pain or palpitations   Pulmonary: No cough, sputum, or hemoptysis   GI:No abdominal pain  Breast: see above  All other systems were reviewed and are negative    Objective:   Physical Exam   Constitutional: She is oriented to person, place, and time. She appears well-developed and well-nourished. No distress. HENT:   Head: Normocephalic and atraumatic. Right Ear: External ear normal.   Left Ear: External ear normal.   Nose: Nose normal.   Mouth/Throat: No oropharyngeal exudate. Eyes: Conjunctivae and EOM are normal.   Neck: Normal range of motion. Neck supple. No tracheal deviation present. No thyromegaly present. Cardiovascular: Normal rate, regular rhythm and normal heart sounds. No murmur heard. Pulmonary/Chest: Effort normal and breath sounds normal. No respiratory distress. She has no wheezes. She has no rales. She exhibits no tenderness. Abdominal: Soft. She exhibits no distension. There is no hepatosplenomegaly. There is no tenderness. There is no rebound and no guarding. Musculoskeletal: Normal range of motion. She exhibits no edema or tenderness. Lymphadenopathy:     She has no cervical adenopathy. She has no axillary adenopathy. Neurological: She is alert and oriented to person, place, and time. Coordination normal.   Skin: Skin is warm and dry. No rash noted. She is not diaphoretic. No erythema. Psychiatric: She has a normal mood and affect. Her behavior is normal. Judgment and thought content normal.   Vitals reviewed. right breast - reconstruction scars and implant present, central and lower inner quadrant 1 cm masses, mobile. No skin changes. Left breast - reconstruction scars and implant present, no masses, no skin changes. Assessment:       Diagnosis Orders   1. Breast mass, right     2. At high risk for breast cancer     3. S/P bilateral mastectomy     4.  S/P breast reconstruction, bilateral

## 2019-07-24 ENCOUNTER — HOSPITAL ENCOUNTER (OUTPATIENT)
Dept: MRI IMAGING | Age: 39
Discharge: HOME | End: 2019-07-24
Payer: COMMERCIAL

## 2019-07-24 DIAGNOSIS — N63.10 BREAST MASS, RIGHT: ICD-10-CM

## 2019-07-24 DIAGNOSIS — Z91.89 AT HIGH RISK FOR BREAST CANCER: ICD-10-CM

## 2019-07-24 PROCEDURE — 77049 MRI BREAST C-+ W/CAD BI: CPT

## 2019-07-24 PROCEDURE — 6360000004 HC RX CONTRAST MEDICATION: Performed by: SURGERY

## 2019-07-24 PROCEDURE — A9577 INJ MULTIHANCE: HCPCS | Performed by: SURGERY

## 2019-07-24 RX ADMIN — GADOBENATE DIMEGLUMINE 14 ML: 529 INJECTION, SOLUTION INTRAVENOUS at 12:53

## 2019-07-29 ENCOUNTER — TELEPHONE (OUTPATIENT)
Dept: BREAST CENTER | Age: 39
End: 2019-07-29

## 2019-08-08 ENCOUNTER — TELEPHONE (OUTPATIENT)
Dept: SURGERY | Age: 39
End: 2019-08-08

## 2019-08-14 ENCOUNTER — OFFICE VISIT (OUTPATIENT)
Dept: SURGERY | Age: 39
End: 2019-08-14
Payer: COMMERCIAL

## 2019-08-14 VITALS
HEART RATE: 87 BPM | DIASTOLIC BLOOD PRESSURE: 80 MMHG | BODY MASS INDEX: 28.32 KG/M2 | WEIGHT: 150 LBS | HEIGHT: 61 IN | TEMPERATURE: 97.1 F | OXYGEN SATURATION: 99 % | SYSTOLIC BLOOD PRESSURE: 112 MMHG

## 2019-08-14 DIAGNOSIS — Z90.13 S/P MASTECTOMY, BILATERAL: ICD-10-CM

## 2019-08-14 DIAGNOSIS — Z91.89 AT HIGH RISK FOR BREAST CANCER: Primary | ICD-10-CM

## 2019-08-14 PROCEDURE — 99215 OFFICE O/P EST HI 40 MIN: CPT | Performed by: SURGERY

## 2019-08-14 RX ORDER — DIAZEPAM 5 MG/1
5 TABLET ORAL EVERY 8 HOURS PRN
Qty: 5 TABLET | Refills: 0 | Status: SHIPPED | OUTPATIENT
Start: 2019-08-14 | End: 2019-08-17

## 2019-08-14 NOTE — PROGRESS NOTES
(Site: Right Upper Arm, Position: Sitting, Cuff Size: Medium Adult)   Pulse 87   Temp 97.1 °F (36.2 °C) (Oral)   Ht 5' 1\" (1.549 m)   Wt 150 lb (68 kg)   LMP 05/25/2002   SpO2 99%   BMI 28.34 kg/m²     GEN: NAD, pleasant, appears stated age  CVS: RRR  PULM: No respiratory distress  HEENT: PERRLA/EOMI; dentition satisfactory, hearing appears within normal limits  NECK: Supple with trachea in midline, no masses  EXT: No lymphedema noted  ABD: soft/NT/ND   Lower abdominal incision healed  NEURO: No focal deficits, no obvious CN deficits  BACK: Bilateral latiss muscle intact  BREAST:   Physical Exam    Bra size: Unknown (wears sports bras)  Desired bra size: Desires to be smaller  Ptosis grade (reconstructed nipples with decreased projection):   R: 1   L: 1  The left breast size is larger than the right breast.  There were multiple palpable masses on the right with no palpable lymphadenopathy in either axilla. Nipple retraction: Yes bilaterally  Jaimes pattern incisions well healed  Significant animation deformity    Left breast sternal notch to nipple: 22.8 cm  Right breast sternal notch to nipple: 23 cm    Left breast nipple to inframammary fold: 10 cm  Right breast nipple to inframammary fold: 9.5 cm    Left breast width 13.8 cm  Right breast width 13.8 cm    IMAGING: Reviewed    IMP: 45 y.o. female s/p implant based reconstruction with pain who presents for discussion regarding reconstruction options  PLAN: Would benefit from explantation of implants, excision of fat necrosis, complete capsulectomies, transition to the prepectoral space and direct to implant reconstruction with Alloderm and high volume fat grafting. Will prescribe Valium as a test to see if her pain settles from animation. Will schedule. A discussion regarding surgical options including immediate vs delayed approaches, implant based vs autologous, as well as additional planned revisional surgeries was performed with the patient.  Multiple

## 2019-08-15 ENCOUNTER — TELEPHONE (OUTPATIENT)
Dept: SURGERY | Age: 39
End: 2019-08-15

## 2019-08-19 ENCOUNTER — TELEPHONE (OUTPATIENT)
Dept: SURGERY | Age: 39
End: 2019-08-19

## 2019-08-28 ENCOUNTER — TELEPHONE (OUTPATIENT)
Dept: SURGERY | Age: 39
End: 2019-08-28

## 2019-08-28 DIAGNOSIS — F41.1 GAD (GENERALIZED ANXIETY DISORDER): ICD-10-CM

## 2019-08-28 RX ORDER — ALPRAZOLAM 0.5 MG/1
0.5 TABLET ORAL 2 TIMES DAILY PRN
Qty: 60 TABLET | Refills: 0 | Status: SHIPPED | OUTPATIENT
Start: 2019-08-28 | End: 2019-09-11 | Stop reason: SDUPTHER

## 2019-09-11 ENCOUNTER — OFFICE VISIT (OUTPATIENT)
Dept: FAMILY MEDICINE CLINIC | Age: 39
End: 2019-09-11
Payer: COMMERCIAL

## 2019-09-11 VITALS
WEIGHT: 154 LBS | OXYGEN SATURATION: 98 % | DIASTOLIC BLOOD PRESSURE: 74 MMHG | SYSTOLIC BLOOD PRESSURE: 100 MMHG | RESPIRATION RATE: 16 BRPM | HEIGHT: 61 IN | HEART RATE: 102 BPM | BODY MASS INDEX: 29.07 KG/M2

## 2019-09-11 DIAGNOSIS — F41.1 GAD (GENERALIZED ANXIETY DISORDER): ICD-10-CM

## 2019-09-11 DIAGNOSIS — N64.1 FAT NECROSIS OF BREAST: ICD-10-CM

## 2019-09-11 DIAGNOSIS — M79.7 FIBROMYALGIA: ICD-10-CM

## 2019-09-11 DIAGNOSIS — F32.9 REACTIVE DEPRESSION: ICD-10-CM

## 2019-09-11 DIAGNOSIS — Z01.818 PRE-OP EVALUATION: Primary | ICD-10-CM

## 2019-09-11 LAB
A/G RATIO: 2.5 (ref 1.1–2.2)
ALBUMIN SERPL-MCNC: 4.5 G/DL (ref 3.4–5)
ALP BLD-CCNC: 133 U/L (ref 40–129)
ALT SERPL-CCNC: 68 U/L (ref 10–40)
ANION GAP SERPL CALCULATED.3IONS-SCNC: 14 MMOL/L (ref 3–16)
APTT: 32.6 SEC (ref 26–36)
AST SERPL-CCNC: 86 U/L (ref 15–37)
BILIRUB SERPL-MCNC: 0.4 MG/DL (ref 0–1)
BUN BLDV-MCNC: 8 MG/DL (ref 7–20)
CALCIUM SERPL-MCNC: 9.9 MG/DL (ref 8.3–10.6)
CHLORIDE BLD-SCNC: 105 MMOL/L (ref 99–110)
CO2: 24 MMOL/L (ref 21–32)
CREAT SERPL-MCNC: 0.7 MG/DL (ref 0.6–1.1)
GFR AFRICAN AMERICAN: >60
GFR NON-AFRICAN AMERICAN: >60
GLOBULIN: 1.8 G/DL
GLUCOSE BLD-MCNC: 83 MG/DL (ref 70–99)
HCT VFR BLD CALC: 40.5 % (ref 36–48)
HEMOGLOBIN: 13.7 G/DL (ref 12–16)
INR BLD: 0.92 (ref 0.86–1.14)
MCH RBC QN AUTO: 30.5 PG (ref 26–34)
MCHC RBC AUTO-ENTMCNC: 33.7 G/DL (ref 31–36)
MCV RBC AUTO: 90.4 FL (ref 80–100)
PDW BLD-RTO: 13.5 % (ref 12.4–15.4)
PLATELET # BLD: 249 K/UL (ref 135–450)
PMV BLD AUTO: 8.4 FL (ref 5–10.5)
POTASSIUM SERPL-SCNC: 4.2 MMOL/L (ref 3.5–5.1)
PROTHROMBIN TIME: 10.5 SEC (ref 9.8–13)
RBC # BLD: 4.48 M/UL (ref 4–5.2)
SODIUM BLD-SCNC: 143 MMOL/L (ref 136–145)
TOTAL PROTEIN: 6.3 G/DL (ref 6.4–8.2)
WBC # BLD: 7 K/UL (ref 4–11)

## 2019-09-11 PROCEDURE — 99242 OFF/OP CONSLTJ NEW/EST SF 20: CPT | Performed by: FAMILY MEDICINE

## 2019-09-11 PROCEDURE — 93000 ELECTROCARDIOGRAM COMPLETE: CPT | Performed by: FAMILY MEDICINE

## 2019-09-11 PROCEDURE — 36415 COLL VENOUS BLD VENIPUNCTURE: CPT | Performed by: FAMILY MEDICINE

## 2019-09-11 RX ORDER — ALPRAZOLAM 0.5 MG/1
0.5 TABLET ORAL 2 TIMES DAILY PRN
Qty: 60 TABLET | Refills: 0 | Status: SHIPPED | OUTPATIENT
Start: 2019-09-27 | End: 2019-11-04 | Stop reason: SDUPTHER

## 2019-09-11 RX ORDER — AMOXICILLIN 500 MG/1
500 CAPSULE ORAL 3 TIMES DAILY
Qty: 30 CAPSULE | Refills: 0 | Status: SHIPPED | OUTPATIENT
Start: 2019-09-11 | End: 2019-09-21

## 2019-09-11 RX ORDER — TIZANIDINE 4 MG/1
4 TABLET ORAL EVERY 8 HOURS PRN
Qty: 30 TABLET | Refills: 2 | Status: SHIPPED | OUTPATIENT
Start: 2019-09-11 | End: 2020-07-01

## 2019-09-11 RX ORDER — TRIAMCINOLONE ACETONIDE 1 MG/G
CREAM TOPICAL
Qty: 45 G | Refills: 1 | Status: SHIPPED | OUTPATIENT
Start: 2019-09-11 | End: 2020-09-18

## 2019-09-11 RX ORDER — BUPROPION HYDROCHLORIDE 150 MG/1
150 TABLET, EXTENDED RELEASE ORAL 2 TIMES DAILY
Qty: 60 TABLET | Refills: 3 | Status: SHIPPED | OUTPATIENT
Start: 2019-09-11 | End: 2021-04-08 | Stop reason: ALTCHOICE

## 2019-09-11 ASSESSMENT — ENCOUNTER SYMPTOMS
GASTROINTESTINAL NEGATIVE: 1
COUGH: 1

## 2019-09-11 NOTE — PROGRESS NOTES
Nursing note and vitals reviewed. Assessment:      Assessment/plan;  Sulma was seen today for pre-op exam.    Diagnoses and all orders for this visit:    Pre-op evaluation  -     EKG 12 Lead  -     CBC  -     Protime/INR & PTT  -     Comprehensive Metabolic Panel    Fat necrosis of breast    Fibromyalgia  -     tiZANidine (ZANAFLEX) 4 MG tablet; Take 1 tablet by mouth every 8 hours as needed (muscle spasm)    Reactive depression  -     buPROPion (WELLBUTRIN SR) 150 MG extended release tablet; Take 1 tablet by mouth 2 times daily    JACKSON (generalized anxiety disorder)  -     ALPRAZolam (XANAX) 0.5 MG tablet; Take 1 tablet by mouth 2 times daily as needed for Sleep for up to 30 days.     pt medically clear for surgery    Andrea Faster, DO

## 2019-09-12 ENCOUNTER — TELEPHONE (OUTPATIENT)
Dept: PAIN MANAGEMENT | Age: 39
End: 2019-09-12

## 2019-09-12 NOTE — TELEPHONE ENCOUNTER
Submitted PA for tiZANidine HCl 4MG tablets, Key: [id number] - Rx #: [id number]  Via CMM. PER CMM: Available without authorization. Please advise patient. Thank you.

## 2019-09-17 DIAGNOSIS — B00.1 FEVER BLISTER: ICD-10-CM

## 2019-09-17 DIAGNOSIS — K21.9 GASTROESOPHAGEAL REFLUX DISEASE WITHOUT ESOPHAGITIS: ICD-10-CM

## 2019-09-17 NOTE — PROGRESS NOTES
history of breast CA: Yes (extensive family history)     Meds:   Current Facility-Administered Medications          Current Outpatient Medications   Medication Sig Dispense Refill    buPROPion (WELLBUTRIN SR) 150 MG extended release tablet Take 1 tablet by mouth 2 times daily 60 tablet 3    famciclovir (FAMVIR) 500 MG tablet TAKE ONE TABLET BY MOUTH THREE TIMES DAILY 30 tablet 5    Magic Mouthwash (MIRACLE MOUTHWASH) Swish and spit 10 mLs 4 times daily as needed for Irritation Dispense as Magic Mouthwash. Please add Equal Parts: 60 mL Maalox, 60 mL Viscous Lidocaine, 60 mL Benadryl to 60mL of Carafate. 10 ml swish and spit or swallow as directed above. 240 mL 3    tiZANidine (ZANAFLEX) 4 MG tablet Take 1 tablet by mouth every 8 hours as needed (muscle spasm) 30 tablet 2    albuterol sulfate  (90 Base) MCG/ACT inhaler Inhale 2 puffs into the lungs 4 times daily as needed for Wheezing 3 Inhaler 0    OxyCODONE ER (XTAMPZA ER) 9 MG C12A Take 9 mg by mouth nightly.         famotidine (PEPCID) 40 MG tablet Take 1 tablet by mouth 2 times daily 60 tablet 3    propranolol (INDERAL) 20 MG tablet Take 1 tablet by mouth 3 times daily (Patient taking differently: Take 20 mg by mouth 2 times daily ) 90 tablet 2    hydrochlorothiazide (HYDRODIURIL) 25 MG tablet Take 1 tablet by mouth daily (Patient taking differently: Take 25 mg by mouth daily as needed (swelling) ) 30 tablet 3      No current facility-administered medications for this visit.          SocHx: Smoking: No               ETOH: occasional               Drug use: No     ROS   Constitutional: Negative for chills and fever. HENT: Negative for congestion, facial swelling, and voice change. Eyes: Negative for photophobia and visual disturbance. Respiratory: Negative for apnea, cough, chest tightness and shortness of breath. Cardiovascular: Negative for chest pain and palpitations.    Gastrointestinal: Negative for dysphagia and early

## 2019-09-18 ENCOUNTER — OFFICE VISIT (OUTPATIENT)
Dept: SURGERY | Age: 39
End: 2019-09-18
Payer: COMMERCIAL

## 2019-09-18 VITALS
HEIGHT: 61 IN | TEMPERATURE: 97.9 F | RESPIRATION RATE: 16 BRPM | BODY MASS INDEX: 28.32 KG/M2 | SYSTOLIC BLOOD PRESSURE: 111 MMHG | DIASTOLIC BLOOD PRESSURE: 81 MMHG | WEIGHT: 150 LBS | HEART RATE: 103 BPM

## 2019-09-18 DIAGNOSIS — Z91.89 AT HIGH RISK FOR BREAST CANCER: Primary | ICD-10-CM

## 2019-09-18 DIAGNOSIS — Z90.13 S/P MASTECTOMY, BILATERAL: ICD-10-CM

## 2019-09-18 PROCEDURE — 99213 OFFICE O/P EST LOW 20 MIN: CPT | Performed by: SURGERY

## 2019-09-18 RX ORDER — FAMCICLOVIR 500 MG/1
TABLET, FILM COATED ORAL
Qty: 30 TABLET | Refills: 5 | Status: SHIPPED | OUTPATIENT
Start: 2019-09-18 | End: 2020-03-02 | Stop reason: SDUPTHER

## 2019-09-18 RX ORDER — FAMOTIDINE 40 MG/1
40 TABLET, FILM COATED ORAL 2 TIMES DAILY
Qty: 60 TABLET | Refills: 3 | Status: SHIPPED | OUTPATIENT
Start: 2019-09-18 | End: 2020-12-23

## 2019-09-20 ENCOUNTER — TELEPHONE (OUTPATIENT)
Dept: SURGERY | Age: 39
End: 2019-09-20

## 2019-09-20 NOTE — TELEPHONE ENCOUNTER
CURLY Kamara to confirm [address] from pre cert done 7/65/96 ref # [id number]. Spoke with Al with care management who confirmed there is NPR. Call ref # [id number].

## 2019-09-24 ENCOUNTER — TELEPHONE (OUTPATIENT)
Dept: SURGERY | Age: 39
End: 2019-09-24

## 2019-09-24 RX ORDER — PROMETHAZINE HYDROCHLORIDE 25 MG/1
25 TABLET ORAL 2 TIMES DAILY PRN
COMMUNITY
End: 2020-07-01 | Stop reason: SDUPTHER

## 2019-09-24 NOTE — PROGRESS NOTES
registered at your bedside once brought back to your room. 5. DO NOT EAT ANYTHING eight hours prior to surgery. May have 8 ounces of water 4 hours prior to surgery. 6. MEDICATIONS    Take the following medications with a SMALL sip of water: PROPRANOLOL AND PEPCID. MAY HAVE OXYCODONE AND XANAX IF NEEDED. BRING ALBUTEROL    Use your usual dose of inhalers the morning of surgery. BRING your rescue inhaler with you to hospital.    Anesthesia does NOT want you to take insulin the morning of surgery. They will control your blood sugar while you are at the hospital. Please contact your ordering physician for instructions regarding your insulin the night before your procedure. If you have an insulin pump, please keep it set on basal rate. 7. Do not swallow water when brushing teeth. No gum, candy, mints or ice chips. Refrain from smoking or at least decrease the amount. 8. Dress in loose, comfortable clothing appropriate for redressing after your procedure. Do not wear jewelry (including body piercings), make-up (especially NO eye make-up), fingernail polish (NO toenail polish if foot/leg surgery), lotion, powders or metal hairclips. 9. Dentures, glasses, or contacts will need to be removed before your procedure. Bring cases for your glasses, contacts, dentures, or hearing aids to protect them while you are in surgery. 10. If you use a CPAP, please bring it with you on the day of your procedure. 11. We recommend that valuable personal  belongings such as cash, cell phones, e-tablets or jewelry, be left at home during your stay. The hospital will not be responsible for valuables that are not secured in the hospital safe. However, if your insurance requires a co-pay, you may want to bring a method of payment, i.e. Check or credit card, if you wish to pay your co-pay the day of surgery. 12. If you are to stay overnight, you may bring a bag with personal items.  Please have any large items you may also occur after anesthesia. Your nurse will help you manage these potential side effects. 2. For comfort and safety, arrange to have someone at home with you for the first 24 hours after discharge. 3. You and your family will be given written instructions about your diet, activity, dressing care, medications, and return visits. 4. Once at home, should issues with nausea, pain, or bleeding occur, or should you notice any signs of infection, you should call your surgeon. 5. Always clean your hands before and after caring for your wound. Do not let your family touch your surgery site without cleaning their hands. 6. Narcotic pain medications can cause significant constipation. You may want to add a stool softener to your postoperative medication schedule or speak to your surgeon on how best to manage this SIDE EFFECT. SPECIAL INSTRUCTIONS     Thank you for allowing us to care for you. We strive to exceed your expectations in the delivery of care and service provided to you and your family. If you need to contact us for any reason, please call us at [phone]    Instructions reviewed with patient during preadmission testing phone interview. Ryan Eckert. 9/24/2019 .8:46 AM      ADDITIONAL EDUCATIONAL INFORMATION REVIEWED PER PHONE WITH YOU AND/OR YOUR   Yes Antibacterial Soap

## 2019-09-25 ENCOUNTER — ANESTHESIA EVENT (OUTPATIENT)
Dept: OPERATING ROOM | Age: 39
End: 2019-09-25
Payer: COMMERCIAL

## 2019-09-25 NOTE — TELEPHONE ENCOUNTER
[facility]    Appreciate the update. May not need to reposition the muscle given the findings with the Valium. Thanks!   NK

## 2019-09-26 ENCOUNTER — ANESTHESIA (OUTPATIENT)
Dept: OPERATING ROOM | Age: 39
End: 2019-09-26
Payer: COMMERCIAL

## 2019-09-26 ENCOUNTER — HOSPITAL ENCOUNTER (OUTPATIENT)
Age: 39
Setting detail: OBSERVATION
Discharge: HOME | End: 2019-09-27
Attending: SURGERY | Admitting: SURGERY
Payer: COMMERCIAL

## 2019-09-26 VITALS — SYSTOLIC BLOOD PRESSURE: 85 MMHG | TEMPERATURE: 94.3 F | DIASTOLIC BLOOD PRESSURE: 52 MMHG | OXYGEN SATURATION: 96 %

## 2019-09-26 PROBLEM — C50.919 BREAST CANCER IN FEMALE (HCC): Status: ACTIVE | Noted: 2019-09-26

## 2019-09-26 PROCEDURE — 3700000001 HC ADD 15 MINUTES (ANESTHESIA): Performed by: SURGERY

## 2019-09-26 PROCEDURE — 2500000003 HC RX 250 WO HCPCS: Performed by: ANESTHESIOLOGY

## 2019-09-26 PROCEDURE — 2709999900 HC NON-CHARGEABLE SUPPLY: Performed by: SURGERY

## 2019-09-26 PROCEDURE — G0378 HOSPITAL OBSERVATION PER HR: HCPCS

## 2019-09-26 PROCEDURE — 6360000002 HC RX W HCPCS: Performed by: STUDENT IN AN ORGANIZED HEALTH CARE EDUCATION/TRAINING PROGRAM

## 2019-09-26 PROCEDURE — 6360000002 HC RX W HCPCS: Performed by: NURSE ANESTHETIST, CERTIFIED REGISTERED

## 2019-09-26 PROCEDURE — 2500000003 HC RX 250 WO HCPCS: Performed by: NURSE ANESTHETIST, CERTIFIED REGISTERED

## 2019-09-26 PROCEDURE — 2500000003 HC RX 250 WO HCPCS: Performed by: SURGERY

## 2019-09-26 PROCEDURE — 7100000001 HC PACU RECOVERY - ADDTL 15 MIN: Performed by: SURGERY

## 2019-09-26 PROCEDURE — 88304 TISSUE EXAM BY PATHOLOGIST: CPT

## 2019-09-26 PROCEDURE — 6360000002 HC RX W HCPCS: Performed by: SURGERY

## 2019-09-26 PROCEDURE — 6360000002 HC RX W HCPCS

## 2019-09-26 PROCEDURE — 2580000003 HC RX 258: Performed by: NURSE ANESTHETIST, CERTIFIED REGISTERED

## 2019-09-26 PROCEDURE — 88300 SURGICAL PATH GROSS: CPT

## 2019-09-26 PROCEDURE — 3600000014 HC SURGERY LEVEL 4 ADDTL 15MIN: Performed by: SURGERY

## 2019-09-26 PROCEDURE — 6370000000 HC RX 637 (ALT 250 FOR IP): Performed by: SURGERY

## 2019-09-26 PROCEDURE — 2580000003 HC RX 258: Performed by: ANESTHESIOLOGY

## 2019-09-26 PROCEDURE — 3700000000 HC ANESTHESIA ATTENDED CARE: Performed by: SURGERY

## 2019-09-26 PROCEDURE — 7100000000 HC PACU RECOVERY - FIRST 15 MIN: Performed by: SURGERY

## 2019-09-26 PROCEDURE — 2580000003 HC RX 258: Performed by: SURGERY

## 2019-09-26 PROCEDURE — 19342 INSJ/RPLCMT BRST IMPLT SEP D: CPT | Performed by: SURGERY

## 2019-09-26 PROCEDURE — 6360000002 HC RX W HCPCS: Performed by: ANESTHESIOLOGY

## 2019-09-26 PROCEDURE — 19371 PERI-IMPLT CAPSLC BRST COMPL: CPT | Performed by: SURGERY

## 2019-09-26 PROCEDURE — 6370000000 HC RX 637 (ALT 250 FOR IP): Performed by: ANESTHESIOLOGY

## 2019-09-26 PROCEDURE — C1789 PROSTHESIS, BREAST, IMP: HCPCS | Performed by: SURGERY

## 2019-09-26 PROCEDURE — 2720000010 HC SURG SUPPLY STERILE: Performed by: SURGERY

## 2019-09-26 PROCEDURE — 3600000004 HC SURGERY LEVEL 4 BASE: Performed by: SURGERY

## 2019-09-26 DEVICE — SMOOTH HIGH PROFILE XTRA 560CC  SMOOTH ROUND SILICONE
Type: IMPLANTABLE DEVICE | Site: BREAST | Status: FUNCTIONAL
Brand: MENTOR MEMORYGEL XTRA BREAST IMPLANT

## 2019-09-26 RX ORDER — HYDRALAZINE HYDROCHLORIDE 20 MG/ML
5 INJECTION INTRAMUSCULAR; INTRAVENOUS EVERY 5 MIN PRN
Status: DISCONTINUED | OUTPATIENT
Start: 2019-09-26 | End: 2019-09-26 | Stop reason: HOSPADM

## 2019-09-26 RX ORDER — ALPRAZOLAM 0.5 MG/1
0.5 TABLET ORAL 2 TIMES DAILY PRN
Status: DISCONTINUED | OUTPATIENT
Start: 2019-09-26 | End: 2019-09-27 | Stop reason: HOSPADM

## 2019-09-26 RX ORDER — LIDOCAINE HYDROCHLORIDE 20 MG/ML
INJECTION, SOLUTION INTRAVENOUS PRN
Status: DISCONTINUED | OUTPATIENT
Start: 2019-09-26 | End: 2019-09-26 | Stop reason: SDUPTHER

## 2019-09-26 RX ORDER — SODIUM CHLORIDE 0.9 % (FLUSH) 0.9 %
10 SYRINGE (ML) INJECTION PRN
Status: DISCONTINUED | OUTPATIENT
Start: 2019-09-26 | End: 2019-09-27 | Stop reason: HOSPADM

## 2019-09-26 RX ORDER — ROCURONIUM BROMIDE 10 MG/ML
INJECTION, SOLUTION INTRAVENOUS PRN
Status: DISCONTINUED | OUTPATIENT
Start: 2019-09-26 | End: 2019-09-26 | Stop reason: SDUPTHER

## 2019-09-26 RX ORDER — PROMETHAZINE HYDROCHLORIDE 25 MG/1
25 TABLET ORAL 2 TIMES DAILY PRN
Status: DISCONTINUED | OUTPATIENT
Start: 2019-09-26 | End: 2019-09-27 | Stop reason: HOSPADM

## 2019-09-26 RX ORDER — ONDANSETRON 2 MG/ML
4 INJECTION INTRAMUSCULAR; INTRAVENOUS
Status: DISCONTINUED | OUTPATIENT
Start: 2019-09-26 | End: 2019-09-26 | Stop reason: HOSPADM

## 2019-09-26 RX ORDER — SODIUM CHLORIDE 0.9 % (FLUSH) 0.9 %
10 SYRINGE (ML) INJECTION EVERY 12 HOURS SCHEDULED
Status: DISCONTINUED | OUTPATIENT
Start: 2019-09-26 | End: 2019-09-27 | Stop reason: HOSPADM

## 2019-09-26 RX ORDER — SODIUM CHLORIDE 0.9 % (FLUSH) 0.9 %
10 SYRINGE (ML) INJECTION EVERY 12 HOURS SCHEDULED
Status: DISCONTINUED | OUTPATIENT
Start: 2019-09-26 | End: 2019-09-26 | Stop reason: HOSPADM

## 2019-09-26 RX ORDER — GLYCOPYRROLATE 0.2 MG/ML
0.2 INJECTION INTRAMUSCULAR; INTRAVENOUS ONCE
Status: COMPLETED | OUTPATIENT
Start: 2019-09-26 | End: 2019-09-26

## 2019-09-26 RX ORDER — FENTANYL CITRATE 50 UG/ML
25 INJECTION, SOLUTION INTRAMUSCULAR; INTRAVENOUS EVERY 5 MIN PRN
Status: COMPLETED | OUTPATIENT
Start: 2019-09-26 | End: 2019-09-26

## 2019-09-26 RX ORDER — FENTANYL CITRATE 50 UG/ML
INJECTION, SOLUTION INTRAMUSCULAR; INTRAVENOUS PRN
Status: DISCONTINUED | OUTPATIENT
Start: 2019-09-26 | End: 2019-09-26 | Stop reason: SDUPTHER

## 2019-09-26 RX ORDER — LIDOCAINE HYDROCHLORIDE 10 MG/ML
1 INJECTION, SOLUTION EPIDURAL; INFILTRATION; INTRACAUDAL; PERINEURAL
Status: DISCONTINUED | OUTPATIENT
Start: 2019-09-26 | End: 2019-09-26 | Stop reason: HOSPADM

## 2019-09-26 RX ORDER — OXYCODONE HYDROCHLORIDE 5 MG/1
10 TABLET ORAL EVERY 4 HOURS PRN
Status: DISCONTINUED | OUTPATIENT
Start: 2019-09-26 | End: 2019-09-27 | Stop reason: HOSPADM

## 2019-09-26 RX ORDER — SODIUM CHLORIDE, SODIUM LACTATE, POTASSIUM CHLORIDE, CALCIUM CHLORIDE 600; 310; 30; 20 MG/100ML; MG/100ML; MG/100ML; MG/100ML
INJECTION, SOLUTION INTRAVENOUS CONTINUOUS
Status: DISCONTINUED | OUTPATIENT
Start: 2019-09-26 | End: 2019-09-26

## 2019-09-26 RX ORDER — PROMETHAZINE HYDROCHLORIDE 25 MG/ML
6.25 INJECTION, SOLUTION INTRAMUSCULAR; INTRAVENOUS EVERY 6 HOURS PRN
Status: DISCONTINUED | OUTPATIENT
Start: 2019-09-26 | End: 2019-09-27 | Stop reason: HOSPADM

## 2019-09-26 RX ORDER — TIZANIDINE 4 MG/1
4 TABLET ORAL EVERY 8 HOURS PRN
Status: DISCONTINUED | OUTPATIENT
Start: 2019-09-26 | End: 2019-09-27 | Stop reason: HOSPADM

## 2019-09-26 RX ORDER — ALBUTEROL SULFATE 2.5 MG/3ML
2.5 SOLUTION RESPIRATORY (INHALATION) 4 TIMES DAILY PRN
Status: DISCONTINUED | OUTPATIENT
Start: 2019-09-26 | End: 2019-09-27 | Stop reason: HOSPADM

## 2019-09-26 RX ORDER — ONDANSETRON 2 MG/ML
INJECTION INTRAMUSCULAR; INTRAVENOUS PRN
Status: DISCONTINUED | OUTPATIENT
Start: 2019-09-26 | End: 2019-09-26 | Stop reason: SDUPTHER

## 2019-09-26 RX ORDER — MAGNESIUM HYDROXIDE 1200 MG/15ML
LIQUID ORAL CONTINUOUS PRN
Status: COMPLETED | OUTPATIENT
Start: 2019-09-26 | End: 2019-09-26

## 2019-09-26 RX ORDER — SODIUM CHLORIDE 0.9 % (FLUSH) 0.9 %
10 SYRINGE (ML) INJECTION PRN
Status: DISCONTINUED | OUTPATIENT
Start: 2019-09-26 | End: 2019-09-26 | Stop reason: HOSPADM

## 2019-09-26 RX ORDER — PROPRANOLOL HYDROCHLORIDE 20 MG/1
20 TABLET ORAL NIGHTLY
Status: DISCONTINUED | OUTPATIENT
Start: 2019-09-26 | End: 2019-09-27 | Stop reason: HOSPADM

## 2019-09-26 RX ORDER — MEPERIDINE HYDROCHLORIDE 25 MG/ML
INJECTION INTRAMUSCULAR; INTRAVENOUS; SUBCUTANEOUS
Status: COMPLETED
Start: 2019-09-26 | End: 2019-09-26

## 2019-09-26 RX ORDER — HYDROCHLOROTHIAZIDE 25 MG/1
25 TABLET ORAL DAILY PRN
Status: DISCONTINUED | OUTPATIENT
Start: 2019-09-26 | End: 2019-09-27 | Stop reason: HOSPADM

## 2019-09-26 RX ORDER — ACYCLOVIR 800 MG/1
800 TABLET ORAL EVERY 8 HOURS SCHEDULED
Status: DISCONTINUED | OUTPATIENT
Start: 2019-09-26 | End: 2019-09-27 | Stop reason: HOSPADM

## 2019-09-26 RX ORDER — OXYCODONE HYDROCHLORIDE 5 MG/1
5 TABLET ORAL EVERY 4 HOURS PRN
Status: DISCONTINUED | OUTPATIENT
Start: 2019-09-26 | End: 2019-09-27 | Stop reason: HOSPADM

## 2019-09-26 RX ORDER — ENALAPRILAT 2.5 MG/2ML
1.25 INJECTION INTRAVENOUS
Status: DISCONTINUED | OUTPATIENT
Start: 2019-09-26 | End: 2019-09-26 | Stop reason: HOSPADM

## 2019-09-26 RX ORDER — SCOLOPAMINE TRANSDERMAL SYSTEM 1 MG/1
1 PATCH, EXTENDED RELEASE TRANSDERMAL
Status: DISCONTINUED | OUTPATIENT
Start: 2019-09-26 | End: 2019-09-26

## 2019-09-26 RX ORDER — SUCCINYLCHOLINE/SOD CL,ISO/PF 200MG/10ML
SYRINGE (ML) INTRAVENOUS PRN
Status: DISCONTINUED | OUTPATIENT
Start: 2019-09-26 | End: 2019-09-26 | Stop reason: SDUPTHER

## 2019-09-26 RX ORDER — DIPHENHYDRAMINE HYDROCHLORIDE 50 MG/ML
12.5 INJECTION INTRAMUSCULAR; INTRAVENOUS EVERY 6 HOURS PRN
Status: DISCONTINUED | OUTPATIENT
Start: 2019-09-26 | End: 2019-09-27 | Stop reason: HOSPADM

## 2019-09-26 RX ORDER — NEOSTIGMINE METHYLSULFATE 5 MG/5 ML
SYRINGE (ML) INTRAVENOUS PRN
Status: DISCONTINUED | OUTPATIENT
Start: 2019-09-26 | End: 2019-09-26 | Stop reason: SDUPTHER

## 2019-09-26 RX ORDER — FENTANYL CITRATE 50 UG/ML
25 INJECTION, SOLUTION INTRAMUSCULAR; INTRAVENOUS EVERY 5 MIN PRN
Status: DISCONTINUED | OUTPATIENT
Start: 2019-09-26 | End: 2019-09-26 | Stop reason: HOSPADM

## 2019-09-26 RX ORDER — ONDANSETRON 2 MG/ML
4 INJECTION INTRAMUSCULAR; INTRAVENOUS EVERY 6 HOURS PRN
Status: DISCONTINUED | OUTPATIENT
Start: 2019-09-26 | End: 2019-09-27 | Stop reason: HOSPADM

## 2019-09-26 RX ORDER — PROPOFOL 10 MG/ML
INJECTION, EMULSION INTRAVENOUS PRN
Status: DISCONTINUED | OUTPATIENT
Start: 2019-09-26 | End: 2019-09-26 | Stop reason: SDUPTHER

## 2019-09-26 RX ORDER — SODIUM CHLORIDE, SODIUM LACTATE, POTASSIUM CHLORIDE, CALCIUM CHLORIDE 600; 310; 30; 20 MG/100ML; MG/100ML; MG/100ML; MG/100ML
INJECTION, SOLUTION INTRAVENOUS CONTINUOUS PRN
Status: DISCONTINUED | OUTPATIENT
Start: 2019-09-26 | End: 2019-09-26 | Stop reason: SDUPTHER

## 2019-09-26 RX ORDER — MIDAZOLAM HYDROCHLORIDE 1 MG/ML
INJECTION INTRAMUSCULAR; INTRAVENOUS PRN
Status: DISCONTINUED | OUTPATIENT
Start: 2019-09-26 | End: 2019-09-26 | Stop reason: SDUPTHER

## 2019-09-26 RX ORDER — BUPROPION HYDROCHLORIDE 150 MG/1
150 TABLET, EXTENDED RELEASE ORAL 2 TIMES DAILY
Status: DISCONTINUED | OUTPATIENT
Start: 2019-09-26 | End: 2019-09-27 | Stop reason: HOSPADM

## 2019-09-26 RX ORDER — ONDANSETRON 2 MG/ML
4 INJECTION INTRAMUSCULAR; INTRAVENOUS ONCE
Status: COMPLETED | OUTPATIENT
Start: 2019-09-26 | End: 2019-09-26

## 2019-09-26 RX ORDER — HYDROMORPHONE HCL 110MG/55ML
PATIENT CONTROLLED ANALGESIA SYRINGE INTRAVENOUS PRN
Status: DISCONTINUED | OUTPATIENT
Start: 2019-09-26 | End: 2019-09-26 | Stop reason: SDUPTHER

## 2019-09-26 RX ORDER — LABETALOL 20 MG/4 ML (5 MG/ML) INTRAVENOUS SYRINGE
5 EVERY 10 MIN PRN
Status: DISCONTINUED | OUTPATIENT
Start: 2019-09-26 | End: 2019-09-26 | Stop reason: HOSPADM

## 2019-09-26 RX ORDER — FAMOTIDINE 20 MG/1
40 TABLET, FILM COATED ORAL 2 TIMES DAILY
Status: DISCONTINUED | OUTPATIENT
Start: 2019-09-26 | End: 2019-09-27 | Stop reason: HOSPADM

## 2019-09-26 RX ORDER — MEPERIDINE HYDROCHLORIDE 25 MG/ML
12.5 INJECTION INTRAMUSCULAR; INTRAVENOUS; SUBCUTANEOUS ONCE
Status: COMPLETED | OUTPATIENT
Start: 2019-09-26 | End: 2019-09-26

## 2019-09-26 RX ORDER — GLYCOPYRROLATE 0.2 MG/ML
INJECTION INTRAMUSCULAR; INTRAVENOUS PRN
Status: DISCONTINUED | OUTPATIENT
Start: 2019-09-26 | End: 2019-09-26 | Stop reason: SDUPTHER

## 2019-09-26 RX ADMIN — Medication 2 G: at 14:30

## 2019-09-26 RX ADMIN — HYDROMORPHONE HYDROCHLORIDE 0.5 MG: 1 INJECTION, SOLUTION INTRAMUSCULAR; INTRAVENOUS; SUBCUTANEOUS at 18:16

## 2019-09-26 RX ADMIN — SODIUM CHLORIDE, SODIUM LACTATE, POTASSIUM CHLORIDE, AND CALCIUM CHLORIDE: 600; 310; 30; 20 INJECTION, SOLUTION INTRAVENOUS at 12:36

## 2019-09-26 RX ADMIN — LIDOCAINE HYDROCHLORIDE 50 MG: 20 INJECTION, SOLUTION INTRAVENOUS at 14:14

## 2019-09-26 RX ADMIN — PROPRANOLOL HYDROCHLORIDE 20 MG: 20 TABLET ORAL at 23:49

## 2019-09-26 RX ADMIN — CEFAZOLIN 2 G: 10 INJECTION, POWDER, FOR SOLUTION INTRAVENOUS; PARENTERAL at 23:00

## 2019-09-26 RX ADMIN — ONDANSETRON 4 MG: 2 INJECTION INTRAMUSCULAR; INTRAVENOUS at 16:13

## 2019-09-26 RX ADMIN — SODIUM CHLORIDE, SODIUM LACTATE, POTASSIUM CHLORIDE, AND CALCIUM CHLORIDE: 600; 310; 30; 20 INJECTION, SOLUTION INTRAVENOUS at 16:13

## 2019-09-26 RX ADMIN — ONDANSETRON 4 MG: 2 INJECTION INTRAMUSCULAR; INTRAVENOUS at 13:54

## 2019-09-26 RX ADMIN — ROCURONIUM BROMIDE 20 MG: 10 INJECTION, SOLUTION INTRAVENOUS at 14:41

## 2019-09-26 RX ADMIN — Medication 120 MG: at 14:14

## 2019-09-26 RX ADMIN — FENTANYL CITRATE 25 MCG: 50 INJECTION INTRAMUSCULAR; INTRAVENOUS at 17:26

## 2019-09-26 RX ADMIN — PROPOFOL 250 MG: 10 INJECTION, EMULSION INTRAVENOUS at 14:14

## 2019-09-26 RX ADMIN — ROCURONIUM BROMIDE 5 MG: 10 INJECTION, SOLUTION INTRAVENOUS at 14:14

## 2019-09-26 RX ADMIN — MEPERIDINE HYDROCHLORIDE 12.5 MG: 25 INJECTION INTRAMUSCULAR; INTRAVENOUS; SUBCUTANEOUS at 17:10

## 2019-09-26 RX ADMIN — FENTANYL CITRATE 100 MCG: 50 INJECTION INTRAMUSCULAR; INTRAVENOUS at 14:14

## 2019-09-26 RX ADMIN — TIZANIDINE 4 MG: 4 TABLET ORAL at 21:32

## 2019-09-26 RX ADMIN — PROMETHAZINE HYDROCHLORIDE 6.25 MG: 25 INJECTION INTRAMUSCULAR; INTRAVENOUS at 22:52

## 2019-09-26 RX ADMIN — PROPOFOL 25 MG: 10 INJECTION, EMULSION INTRAVENOUS at 16:13

## 2019-09-26 RX ADMIN — ALPRAZOLAM 0.5 MG: 0.5 TABLET ORAL at 19:22

## 2019-09-26 RX ADMIN — ACYCLOVIR 800 MG: 800 TABLET ORAL at 20:30

## 2019-09-26 RX ADMIN — HYDROMORPHONE HYDROCHLORIDE 1 MG: 2 INJECTION, SOLUTION INTRAMUSCULAR; INTRAVENOUS; SUBCUTANEOUS at 14:49

## 2019-09-26 RX ADMIN — GLYCOPYRROLATE 0.6 MG: 0.2 INJECTION, SOLUTION INTRAMUSCULAR; INTRAVENOUS at 16:13

## 2019-09-26 RX ADMIN — HYDROMORPHONE HYDROCHLORIDE 0.5 MG: 1 INJECTION, SOLUTION INTRAMUSCULAR; INTRAVENOUS; SUBCUTANEOUS at 16:55

## 2019-09-26 RX ADMIN — BUPROPION HYDROCHLORIDE 150 MG: 150 TABLET, EXTENDED RELEASE ORAL at 21:33

## 2019-09-26 RX ADMIN — HYDROMORPHONE HYDROCHLORIDE 0.5 MG: 1 INJECTION, SOLUTION INTRAMUSCULAR; INTRAVENOUS; SUBCUTANEOUS at 18:05

## 2019-09-26 RX ADMIN — SODIUM CHLORIDE, POTASSIUM CHLORIDE, SODIUM LACTATE AND CALCIUM CHLORIDE: 600; 310; 30; 20 INJECTION, SOLUTION INTRAVENOUS at 13:46

## 2019-09-26 RX ADMIN — FENTANYL CITRATE 25 MCG: 50 INJECTION INTRAMUSCULAR; INTRAVENOUS at 17:21

## 2019-09-26 RX ADMIN — DIPHENHYDRAMINE HYDROCHLORIDE 12.5 MG: 50 INJECTION, SOLUTION INTRAMUSCULAR; INTRAVENOUS at 22:52

## 2019-09-26 RX ADMIN — FENTANYL CITRATE 25 MCG: 50 INJECTION INTRAMUSCULAR; INTRAVENOUS at 17:45

## 2019-09-26 RX ADMIN — FENTANYL CITRATE 25 MCG: 50 INJECTION INTRAMUSCULAR; INTRAVENOUS at 17:39

## 2019-09-26 RX ADMIN — HYDROMORPHONE HYDROCHLORIDE 0.5 MG: 1 INJECTION, SOLUTION INTRAMUSCULAR; INTRAVENOUS; SUBCUTANEOUS at 17:08

## 2019-09-26 RX ADMIN — HYDROMORPHONE HYDROCHLORIDE 1 MG: 2 INJECTION, SOLUTION INTRAMUSCULAR; INTRAVENOUS; SUBCUTANEOUS at 15:25

## 2019-09-26 RX ADMIN — MIDAZOLAM HYDROCHLORIDE 2 MG: 2 INJECTION, SOLUTION INTRAMUSCULAR; INTRAVENOUS at 14:10

## 2019-09-26 RX ADMIN — GLYCOPYRROLATE 0.2 MG: 0.2 INJECTION, SOLUTION INTRAMUSCULAR; INTRAVENOUS at 13:54

## 2019-09-26 RX ADMIN — Medication 3 MG: at 16:13

## 2019-09-26 RX ADMIN — FAMOTIDINE 40 MG: 20 TABLET ORAL at 22:55

## 2019-09-26 ASSESSMENT — PULMONARY FUNCTION TESTS
PIF_VALUE: 1
PIF_VALUE: 2
PIF_VALUE: 16
PIF_VALUE: 17
PIF_VALUE: 19
PIF_VALUE: 18
PIF_VALUE: 17
PIF_VALUE: 0
PIF_VALUE: 16
PIF_VALUE: 17
PIF_VALUE: 1
PIF_VALUE: 20
PIF_VALUE: 21
PIF_VALUE: 17
PIF_VALUE: 16
PIF_VALUE: 17
PIF_VALUE: 19
PIF_VALUE: 17
PIF_VALUE: 19
PIF_VALUE: 17
PIF_VALUE: 17
PIF_VALUE: 16
PIF_VALUE: 16
PIF_VALUE: 17
PIF_VALUE: 16
PIF_VALUE: 17
PIF_VALUE: 18
PIF_VALUE: 3
PIF_VALUE: 1
PIF_VALUE: 17
PIF_VALUE: 2
PIF_VALUE: 17
PIF_VALUE: 17
PIF_VALUE: 8
PIF_VALUE: 17
PIF_VALUE: 16
PIF_VALUE: 1
PIF_VALUE: 18
PIF_VALUE: 18
PIF_VALUE: 0
PIF_VALUE: 17
PIF_VALUE: 16
PIF_VALUE: 16
PIF_VALUE: 17
PIF_VALUE: 3
PIF_VALUE: 20
PIF_VALUE: 8
PIF_VALUE: 19
PIF_VALUE: 17
PIF_VALUE: 16
PIF_VALUE: 17
PIF_VALUE: 3
PIF_VALUE: 17
PIF_VALUE: 16
PIF_VALUE: 18
PIF_VALUE: 17
PIF_VALUE: 19
PIF_VALUE: 17
PIF_VALUE: 2
PIF_VALUE: 19
PIF_VALUE: 17
PIF_VALUE: 0
PIF_VALUE: 18
PIF_VALUE: 16
PIF_VALUE: 16
PIF_VALUE: 20
PIF_VALUE: 15
PIF_VALUE: 16
PIF_VALUE: 8
PIF_VALUE: 16
PIF_VALUE: 15
PIF_VALUE: 16
PIF_VALUE: 16
PIF_VALUE: 17
PIF_VALUE: 18
PIF_VALUE: 19
PIF_VALUE: 17
PIF_VALUE: 17
PIF_VALUE: 16
PIF_VALUE: 17
PIF_VALUE: 17
PIF_VALUE: 16
PIF_VALUE: 17
PIF_VALUE: 19
PIF_VALUE: 16
PIF_VALUE: 0
PIF_VALUE: 18
PIF_VALUE: 2
PIF_VALUE: 16
PIF_VALUE: 6
PIF_VALUE: 17
PIF_VALUE: 19
PIF_VALUE: 16
PIF_VALUE: 2
PIF_VALUE: 16
PIF_VALUE: 17
PIF_VALUE: 16
PIF_VALUE: 1
PIF_VALUE: 20
PIF_VALUE: 18
PIF_VALUE: 17
PIF_VALUE: 19
PIF_VALUE: 17
PIF_VALUE: 17
PIF_VALUE: 16
PIF_VALUE: 17
PIF_VALUE: 16
PIF_VALUE: 17
PIF_VALUE: 20
PIF_VALUE: 19
PIF_VALUE: 1
PIF_VALUE: 16
PIF_VALUE: 3
PIF_VALUE: 16
PIF_VALUE: 18
PIF_VALUE: 17
PIF_VALUE: 15
PIF_VALUE: 17
PIF_VALUE: 16
PIF_VALUE: 20
PIF_VALUE: 17
PIF_VALUE: 17
PIF_VALUE: 1
PIF_VALUE: 19
PIF_VALUE: 17
PIF_VALUE: 16
PIF_VALUE: 16

## 2019-09-26 ASSESSMENT — PAIN SCALES - GENERAL
PAINLEVEL_OUTOF10: 6
PAINLEVEL_OUTOF10: 7
PAINLEVEL_OUTOF10: 7
PAINLEVEL_OUTOF10: 6
PAINLEVEL_OUTOF10: 7
PAINLEVEL_OUTOF10: 6
PAINLEVEL_OUTOF10: 7
PAINLEVEL_OUTOF10: 7
PAINLEVEL_OUTOF10: 6

## 2019-09-26 ASSESSMENT — PAIN DESCRIPTION - PAIN TYPE: TYPE: ACUTE PAIN

## 2019-09-26 ASSESSMENT — PAIN - FUNCTIONAL ASSESSMENT: PAIN_FUNCTIONAL_ASSESSMENT: 0-10

## 2019-09-26 NOTE — PROGRESS NOTES
Patient admitted to pacu post Exchange Of Bilateral Breast Implants ; And Bilateral Capsulectomy, Revision Bilateral Breast Reconstruction with Dr. Glenn Castillo. Patient connected to bedside monitors; VSS. Patient has a history of tachycardia; HR in sinus tach currently. Patient has oral airway that was removed by crna on arrival. Patient resting comfortably with no complaints of pain.

## 2019-09-26 NOTE — PROGRESS NOTES
Pt arrived to [phone] from PACU. Pt able to ambulate from stretcher to bed. Pt currently resting in bed.  Electronically signed by Madeleine Conner RN on 9/26/2019 at 6:39 PM

## 2019-09-26 NOTE — ANESTHESIA PRE PROCEDURE
Immune deficiency disorder ([facility] [address]     Interstitial cystitis     Nausea & vomiting     Osteoarthritis     Other closed fractures of distal end of radius (alone) 12/19/2009    distal radius fx surgery 12/28/2009 Dr. Husain Coins Pneumonia     PONV (postoperative nausea and vomiting)     scop patch and Phenergan work best     Rheumatoid arthritis(714.0)        Past Surgical History:        Procedure Laterality Date    APPENDECTOMY      BLADDER SURGERY      BREAST SURGERY      bilat mastectomy    CARDIOVERSION      CARPAL TUNNEL RELEASE Right 7/2/14    removal of hardware    CHOLECYSTECTOMY      COLONOSCOPY      with polyectomy    CYSTOSCOPY  8-17-11    with bladder and urethral dilatation    HERNIA REPAIR  03/24/2017    repair of umbilical hernia with primary closure, exploration of LLQ subcutaneous space without definitive findings of lipoma    HYSTERECTOMY      LAPAROSCOPIC APPENDECTOMY  3/18/13    LAPAROSCOPY  3/18/13    TONSILLECTOMY AND ADENOIDECTOMY      UPPER GASTROINTESTINAL ENDOSCOPY      1/09    UPPER GASTROINTESTINAL ENDOSCOPY  5/28/2010    UPPER GASTROINTESTINAL ENDOSCOPY  2/13/13    WRIST FRACTURE SURGERY  12/28/09    Open treatment with open reduction, internal fixation rightdistal radius using       Social History:    Social History     Tobacco Use    Smoking status: Never Smoker    Smokeless tobacco: Never Used    Tobacco comment: encouraged to never smoke    Substance Use Topics    Alcohol use:  Yes     Alcohol/week: 0.0 standard drinks     Comment: socially; special occasions 1 every other week                                Counseling given: Not Answered  Comment: encouraged to never smoke       Vital Signs (Current):   Vitals:    09/24/19 0835 09/26/19 1210   BP:  112/78   Pulse:  94   Temp:  98 °F (36.7 °C)   TempSrc:  Oral   SpO2:  97%   Weight: 150 lb (68 kg) 150 lb (68 kg)   Height: 5' 1\" (1.549 m) 5' 1\" (1.549 m)                                              BP Readings from Last 3 Encounters:   09/26/19 112/78   09/18/19 111/81   09/11/19 100/74       NPO Status:                                                                                 BMI:   Wt Readings from Last 3 Encounters:   09/26/19 150 lb (68 kg)   09/18/19 150 lb (68 kg)   09/11/19 154 lb (69.9 kg)     Body mass index is 28.34 kg/m². CBC:   Lab Results   Component Value Date    WBC 7.0 09/11/2019    RBC 4.48 09/11/2019    HGB 13.7 09/11/2019    HCT 40.5 09/11/2019    MCV 90.4 09/11/2019    RDW 13.5 09/11/2019     09/11/2019       CMP:   Lab Results   Component Value Date     09/11/2019    K 4.2 09/11/2019    K 3.9 04/12/2019     09/11/2019    CO2 24 09/11/2019    BUN 8 09/11/2019    CREATININE 0.7 09/11/2019    GFRAA >60 09/11/2019    GFRAA >60 04/17/2013    AGRATIO 2.5 09/11/2019    LABGLOM >60 09/11/2019    LABGLOM 98.3 07/05/2011    GLUCOSE 83 09/11/2019    GLUCOSE 90 07/05/2011    PROT 6.3 09/11/2019    PROT 5.8 03/18/2013    PROT 5.8 03/18/2013    CALCIUM 9.9 09/11/2019    BILITOT 0.4 09/11/2019    ALKPHOS 133 09/11/2019    AST 86 09/11/2019    ALT 68 09/11/2019       POC Tests: No results for input(s): POCGLU, POCNA, POCK, POCCL, POCBUN, POCHEMO, POCHCT in the last 72 hours. Coags:   Lab Results   Component Value Date    PROTIME 10.5 09/11/2019    INR 0.92 09/11/2019    APTT 32.6 09/11/2019       HCG (If Applicable):   Lab Results   Component Value Date    PREGTESTUR Negative 05/15/2017        ABGs: No results found for: PHART, PO2ART, UFF8OKU, JPE7BYO, BEART, L6HLQTNT     Type & Screen (If Applicable):  No results found for: [facility]    Anesthesia Evaluation  Patient summary reviewed   history of anesthetic complications: PONV.   Airway: Mallampati: I  TM distance: >3 FB   Neck ROM: full  Mouth opening: > = 3 FB Dental: normal exam         Pulmonary:   (+) asthma:                            Cardiovascular:    (+) dysrhythmias (hx of sinus tachycardia with pain ):, Neuro/Psych:   (+) psychiatric history:depression/anxiety              ROS comment: Fibromyalgia and chronic pain in back and joints, DDD, CTS GI/Hepatic/Renal:   (+) GERD:, hepatitis: B, renal disease (interstitial cystitiis):,           Endo/Other:    (+) : arthritis: rheumatoid and OA., .                  ROS comment: Cervical Ca in 2002, bilateral mastectomy  Abdominal:           Vascular:                                        Anesthesia Plan      general     ASA 3       Induction: intravenous. Anesthetic plan and risks discussed with patient.                       Clark Swan MD   9/26/2019

## 2019-09-26 NOTE — H&P
definitive findings of lipoma    HYSTERECTOMY      LAPAROSCOPIC APPENDECTOMY  3/18/13    LAPAROSCOPY  3/18/13    TONSILLECTOMY AND ADENOIDECTOMY      UPPER GASTROINTESTINAL ENDOSCOPY      1/09    UPPER GASTROINTESTINAL ENDOSCOPY  5/28/2010    UPPER GASTROINTESTINAL ENDOSCOPY  2/13/13    WRIST FRACTURE SURGERY  12/28/09    Open treatment with open reduction, internal fixation rightdistal radius using     Past Social History:  Social History     Socioeconomic History    Marital status:      Spouse name: None    Number of children: 3    Years of education: None    Highest education level: None   Occupational History    None   Social Needs    Financial resource strain: None    Food insecurity:     Worry: None     Inability: None    Transportation needs:     Medical: None     Non-medical: None   Tobacco Use    Smoking status: Never Smoker    Smokeless tobacco: Never Used    Tobacco comment: encouraged to never smoke    Substance and Sexual Activity    Alcohol use: Yes     Alcohol/week: 0.0 standard drinks     Comment: socially; special occasions 1 every other week    Drug use: No    Sexual activity: Yes     Partners: Male   Lifestyle    Physical activity:     Days per week: None     Minutes per session: None    Stress: None   Relationships    Social connections:     Talks on phone: None     Gets together: None     Attends Rastafarian service: None     Active member of club or organization: None     Attends meetings of clubs or organizations: None     Relationship status: None    Intimate partner violence:     Fear of current or ex partner: None     Emotionally abused: None     Physically abused: None     Forced sexual activity: None   Other Topics Concern    None   Social History Narrative    None         Medications Prior to Admission:      Prior to Admission medications    Medication Sig Start Date End Date Taking?  Authorizing Provider   NONFORMULARY EPI PEN PRN   Yes Historical

## 2019-09-27 VITALS
HEART RATE: 98 BPM | TEMPERATURE: 98.8 F | DIASTOLIC BLOOD PRESSURE: 70 MMHG | BODY MASS INDEX: 28.32 KG/M2 | WEIGHT: 150 LBS | RESPIRATION RATE: 16 BRPM | OXYGEN SATURATION: 96 % | HEIGHT: 61 IN | SYSTOLIC BLOOD PRESSURE: 106 MMHG

## 2019-09-27 LAB
CREAT SERPL-MCNC: 0.9 MG/DL (ref 0.6–1.1)
GFR AFRICAN AMERICAN: >60
GFR NON-AFRICAN AMERICAN: >60

## 2019-09-27 PROCEDURE — 2580000003 HC RX 258: Performed by: SURGERY

## 2019-09-27 PROCEDURE — G0378 HOSPITAL OBSERVATION PER HR: HCPCS

## 2019-09-27 PROCEDURE — 36415 COLL VENOUS BLD VENIPUNCTURE: CPT

## 2019-09-27 PROCEDURE — 6370000000 HC RX 637 (ALT 250 FOR IP): Performed by: SURGERY

## 2019-09-27 PROCEDURE — 82565 ASSAY OF CREATININE: CPT

## 2019-09-27 PROCEDURE — 6360000002 HC RX W HCPCS: Performed by: SURGERY

## 2019-09-27 PROCEDURE — 99024 POSTOP FOLLOW-UP VISIT: CPT | Performed by: SURGERY

## 2019-09-27 RX ORDER — CEPHALEXIN 500 MG/1
500 CAPSULE ORAL 4 TIMES DAILY
Qty: 40 CAPSULE | Refills: 0 | Status: SHIPPED | OUTPATIENT
Start: 2019-09-27 | End: 2019-10-07

## 2019-09-27 RX ADMIN — BUPROPION HYDROCHLORIDE 150 MG: 150 TABLET, EXTENDED RELEASE ORAL at 09:06

## 2019-09-27 RX ADMIN — FAMOTIDINE 40 MG: 20 TABLET ORAL at 09:06

## 2019-09-27 RX ADMIN — ACYCLOVIR 800 MG: 800 TABLET ORAL at 06:13

## 2019-09-27 RX ADMIN — Medication 10 ML: at 09:06

## 2019-09-27 RX ADMIN — OXYCODONE HYDROCHLORIDE 10 MG: 5 TABLET ORAL at 06:26

## 2019-09-27 RX ADMIN — OXYCODONE HYDROCHLORIDE 10 MG: 5 TABLET ORAL at 11:27

## 2019-09-27 RX ADMIN — CEFAZOLIN 2 G: 10 INJECTION, POWDER, FOR SOLUTION INTRAVENOUS; PARENTERAL at 06:13

## 2019-09-27 ASSESSMENT — PAIN DESCRIPTION - LOCATION
LOCATION: BREAST
LOCATION: BREAST

## 2019-09-27 ASSESSMENT — PAIN DESCRIPTION - DESCRIPTORS: DESCRIPTORS: DISCOMFORT;SORE

## 2019-09-27 ASSESSMENT — PAIN SCALES - GENERAL
PAINLEVEL_OUTOF10: 9
PAINLEVEL_OUTOF10: 9

## 2019-09-27 ASSESSMENT — PAIN DESCRIPTION - FREQUENCY
FREQUENCY: CONTINUOUS
FREQUENCY: CONTINUOUS

## 2019-09-27 ASSESSMENT — PAIN DESCRIPTION - PAIN TYPE
TYPE: ACUTE PAIN
TYPE: SURGICAL PAIN

## 2019-09-27 NOTE — PROGRESS NOTES
RESPIRATORY THERAPY ASSESSMENT    Name:  Mimi [address] Record Number:  [id number]  Age: 45 y.o. Gender: female  : 1980  Today's Date:  2019  Room:  [id number]    Assessment     Is the patient being admitted for a COPD or Asthma exacerbation? No   (If yes the patient will be seen every 4 hours for the first 24 hours and then reassessed)    Patient Admission Diagnosis      Allergies  Allergies   Allergen Reactions    Latex Hives and Itching     After surgery x2     Shellfish-Derived Products Shortness Of Breath    Tape Jacquelyn Medicus Tape] Rash     Paper tape OK     Iodides Other (See Comments)     pt states tachycardia    Tylenol [Acetaminophen]      History of hep c    Bactrim [Sulfamethoxazole-Trimethoprim] Nausea And Vomiting and Other (See Comments)    Codeine Nausea And Vomiting and Palpitations    Morphine Itching and Nausea And Vomiting     \"makes me crazy\"     Prednisone Hives, Palpitations and Rash       Pulmonary History:Asthma  Home Oxygen Therapy:  room air  Home Respiratory Therapy:Albuterol   Current Respiratory Therapy:  Albuterol           Respiratory Severity Index(RSI)   Patients with orders for inhalation medications, oxygen, or any therapeutic treatment modality will be placed on Respiratory Protocol. They will be assessed with the first treatment and at least every 72 hours thereafter. The following severity scale will be used to determine frequency of treatment intervention. Smoking History: No Smoking History = 0    Social History  Social History     Tobacco Use    Smoking status: Never Smoker    Smokeless tobacco: Never Used    Tobacco comment: encouraged to never smoke    Substance Use Topics    Alcohol use:  Yes     Alcohol/week: 0.0 standard drinks     Comment: socially; special occasions 1 every other week    Drug use: No       Recent Surgical History: None = 0  Past Surgical History  Past Surgical History:   Procedure Laterality Date    APPENDECTOMY      BLADDER SURGERY      BREAST SURGERY      bilat mastectomy    CARDIOVERSION      CARPAL TUNNEL RELEASE Right 7/2/14    removal of hardware    CHOLECYSTECTOMY      COLONOSCOPY      with polyectomy    CYSTOSCOPY  8-17-11    with bladder and urethral dilatation    HERNIA REPAIR  03/24/2017    repair of umbilical hernia with primary closure, exploration of LLQ subcutaneous space without definitive findings of lipoma    HYSTERECTOMY      LAPAROSCOPIC APPENDECTOMY  3/18/13    LAPAROSCOPY  3/18/13    RECONSTRUCTION BREAST IMMEDIATE / DELAYED W/ TISSUE EXPANDER Bilateral 9/26/2019    EXCHANGE OF BILATERAL BREAST IMPLANTS ; AND BILATERAL CAPSULECTOMY, REVISION BILATERAL BREAST RECONSTRUCTION performed by Susan Mccracken MD at [facility] ENDOSCOPY      1/09    UPPER GASTROINTESTINAL ENDOSCOPY  5/28/2010    UPPER GASTROINTESTINAL ENDOSCOPY  2/13/13    WRIST FRACTURE SURGERY  12/28/09    Open treatment with open reduction, internal fixation rightdistal radius using       Level of Consciousness: Alert, Oriented, and Cooperative = 0    Level of Activity: Walking unassisted = 0    Respiratory Pattern: Regular Pattern; RR 8-20 = 0    Breath Sounds: Clear = 0    Sputum   ,  ,    Cough: Strong, spontaneous, non-productive = 0    Vital Signs   /67   Pulse 96   Temp 98.4 °F (36.9 °C) (Axillary)   Resp 17   Ht 5' 1\" (1.549 m)   Wt 150 lb (68 kg)   LMP 05/25/2002   SpO2 100%   BMI 28.34 kg/m²   SPO2 (COPD values may differ): Greater than or equal to 92% on room air = 0    Peak Flow (asthma only): not applicable = 0    RSI: 0-4 = See once and convert to home regimen or discontinue        Plan       Goals: Medication delivery    Patient/caregiver was educated on the proper method of use for Respiratory Care Devices:  NA      Level of patient/caregiver understanding able to:   ? Verbalize understanding   ?  Demonstrate understanding ? Teach back        ? Needs reinforcement       ? No available caregiver               ? Other:     Response to education:  NA     Is patient being placed on Home Treatment Regimen? Yes     Does the patient have everything they need prior to discharge? NA     Plan of Care: Continue with home regimen as ordered    Electronically signed by Lauren Raines RCP on 9/27/2019 at 4:42 AM    Respiratory Protocol Guidelines     1. Assessment and treatment by Respiratory Therapy will be initiated for medication and therapeutic interventions upon initiation of aerosolized medication. 2. Physician will be contacted for respiratory rate (RR) greater than 35 breaths per minute. Therapy will be held for heart rate (HR) greater than 140 beats per minute, pending direction from physician. 3. Bronchodilators will be administered via Metered Dose Inhaler (MDI) with spacer when the following criteria are met:  a. Alert and cooperative     b. HR < 140 bpm  c. RR < 30 bpm                d. Can demonstrate a 2-3 second inspiratory hold  4. Bronchodilators will be administered via Hand Held Nebulizer [facility]) to patients when ANY of the following criteria are met  a. Incognizant or uncooperative          b. Patients treated with HHN at Home        c. Unable to demonstrate proper use of MDI with spacer     d. RR > 30 bpm   5. Bronchodilators will be delivered via Metered Dose Inhaler (MDI), HHN, Aerogen to intubated patients on mechanical ventilation. 6. Inhalation medication orders will be delivered and/or substituted as outlined below. Aerosolized Medications Ordering and Administration Guidelines:    1. All Medications will be ordered by a physician, and their frequency and/or modality will be adjusted as defined by the patients Respiratory Severity Index (RSI) score.   2. If the patient does not have documented COPD, consider discontinuing anticholinergics when RSI is less than 9.  3. If the bronchospasm worsens (increased RSI), then the bronchodilator frequency can be increased to a maximum of every 4 hours. If greater than every 4 hours is required, the physician will be contacted. 4. If the bronchospasm improves, the frequency of the bronchodilator can be decreased, based on the patient's RSI, but not less than home treatment regimen frequency. 5. Bronchodilator(s) will be discontinued if patient has a RSI less than 9 and has received no scheduled or as needed treatment for 72  Hrs. Patients Ordered on a Mucolytic Agent:    1. Must always be administered with a bronchodilator. 2. Discontinue if patient experiences worsened bronchospasm, or secretions have lessened to the point that the patient is able to clear them with a cough. Anti-inflammatory and Combination Medications:    1. If the patient lacks prior history of lung disease, is not using inhaled anti-inflammatory medication at home, and lacks wheezing by examination or by history for at least 24 hours, contact physician for possible discontinuation.

## 2019-09-27 NOTE — ANESTHESIA POSTPROCEDURE EVALUATION
Department of Anesthesiology  Postprocedure Note    Patient: Taya Velasquez  MRN: [id number]  YOB: 1980  Date of evaluation: 9/26/2019  Time:  11:59 PM     Procedure Summary     Date:  09/26/19 Room / Location:  [facility]] OR 02 / [facility]3] OR    Anesthesia Start:  2114 Anesthesia Stop:  5371    Procedure:  EXCHANGE OF BILATERAL BREAST IMPLANTS ; AND BILATERAL CAPSULECTOMY, REVISION BILATERAL BREAST RECONSTRUCTION (Bilateral Breast) Diagnosis:  (BREAST CANCER, S/P PREVIOUS IMPLANT RECONSTRUCTION)    Surgeon:  Josue Cat MD Responsible Provider:  Leydi Mayorga MD    Anesthesia Type:  general ASA Status:  3          Anesthesia Type: general    Ehsan Phase I: Ehsan Score: 10    Ehsan Phase II:      Last vitals: Reviewed and per EMR flowsheets.        Anesthesia Post Evaluation    Patient location during evaluation: PACU  Patient participation: complete - patient participated  Level of consciousness: awake and alert  Pain score: 7  Airway patency: patent  Nausea & Vomiting: no nausea and no vomiting  Complications: no  Cardiovascular status: hemodynamically stable  Respiratory status: acceptable  Hydration status: euvolemic

## 2019-09-27 NOTE — PROGRESS NOTES
[facility]    Patient seen and examined this AM.  No issues overnight. HR improved this AM - states that this is a chronic condition per patient. Pain controlled. Plan for DC home today (no pain medications as she will receive from her pain management physician).     Marva Sinclair MD  [address] [place] [address] Reconstructive Surgery  [phone]  09/27/19

## 2019-10-01 ENCOUNTER — TELEPHONE (OUTPATIENT)
Dept: FAMILY MEDICINE CLINIC | Age: 39
End: 2019-10-01

## 2019-10-01 DIAGNOSIS — E66.09 CLASS 1 OBESITY DUE TO EXCESS CALORIES WITH SERIOUS COMORBIDITY AND BODY MASS INDEX (BMI) OF 30.0 TO 30.9 IN ADULT: ICD-10-CM

## 2019-10-01 RX ORDER — PHENTERMINE HYDROCHLORIDE 37.5 MG/1
37.5 TABLET ORAL
Qty: 30 TABLET | Refills: 0 | Status: SHIPPED | OUTPATIENT
Start: 2019-10-01 | End: 2019-11-26 | Stop reason: SDUPTHER

## 2019-10-02 ENCOUNTER — OFFICE VISIT (OUTPATIENT)
Dept: SURGERY | Age: 39
End: 2019-10-02

## 2019-10-02 VITALS
DIASTOLIC BLOOD PRESSURE: 83 MMHG | OXYGEN SATURATION: 99 % | BODY MASS INDEX: 28.72 KG/M2 | HEART RATE: 90 BPM | TEMPERATURE: 97.3 F | WEIGHT: 152 LBS | SYSTOLIC BLOOD PRESSURE: 122 MMHG

## 2019-10-02 DIAGNOSIS — Z09 POSTOP CHECK: Primary | ICD-10-CM

## 2019-10-02 PROCEDURE — 99024 POSTOP FOLLOW-UP VISIT: CPT | Performed by: SURGERY

## 2019-11-04 DIAGNOSIS — F41.1 GAD (GENERALIZED ANXIETY DISORDER): ICD-10-CM

## 2019-11-04 RX ORDER — ALPRAZOLAM 0.5 MG/1
0.5 TABLET ORAL 2 TIMES DAILY PRN
Qty: 60 TABLET | Refills: 0 | Status: SHIPPED | OUTPATIENT
Start: 2019-11-04 | End: 2019-11-26 | Stop reason: SDUPTHER

## 2019-11-06 ENCOUNTER — OFFICE VISIT (OUTPATIENT)
Dept: SURGERY | Age: 39
End: 2019-11-06

## 2019-11-06 VITALS
SYSTOLIC BLOOD PRESSURE: 113 MMHG | WEIGHT: 150 LBS | TEMPERATURE: 98 F | HEIGHT: 61 IN | DIASTOLIC BLOOD PRESSURE: 76 MMHG | OXYGEN SATURATION: 97 % | BODY MASS INDEX: 28.32 KG/M2 | HEART RATE: 103 BPM | RESPIRATION RATE: 16 BRPM

## 2019-11-06 DIAGNOSIS — Z09 POSTOP CHECK: Primary | ICD-10-CM

## 2019-11-06 PROCEDURE — 99024 POSTOP FOLLOW-UP VISIT: CPT | Performed by: SURGERY

## 2019-11-14 DIAGNOSIS — M79.7 FIBROMYALGIA: ICD-10-CM

## 2019-11-14 RX ORDER — PROMETHAZINE HYDROCHLORIDE 25 MG/1
TABLET ORAL
Qty: 60 TABLET | Refills: 2 | Status: SHIPPED | OUTPATIENT
Start: 2019-11-14 | End: 2020-01-22 | Stop reason: SDUPTHER

## 2019-11-19 ENCOUNTER — TELEPHONE (OUTPATIENT)
Dept: FAMILY MEDICINE CLINIC | Age: 39
End: 2019-11-19

## 2019-11-20 ENCOUNTER — APPOINTMENT (OUTPATIENT)
Dept: CT IMAGING | Age: 39
End: 2019-11-20
Payer: COMMERCIAL

## 2019-11-20 ENCOUNTER — HOSPITAL ENCOUNTER (EMERGENCY)
Age: 39
Discharge: HOME | End: 2019-11-20
Attending: EMERGENCY MEDICINE
Payer: COMMERCIAL

## 2019-11-20 VITALS
HEIGHT: 61 IN | RESPIRATION RATE: 18 BRPM | HEART RATE: 95 BPM | WEIGHT: 153.22 LBS | SYSTOLIC BLOOD PRESSURE: 109 MMHG | OXYGEN SATURATION: 100 % | DIASTOLIC BLOOD PRESSURE: 76 MMHG | BODY MASS INDEX: 28.93 KG/M2 | TEMPERATURE: 97.8 F

## 2019-11-20 DIAGNOSIS — K59.00 CONSTIPATION, UNSPECIFIED CONSTIPATION TYPE: ICD-10-CM

## 2019-11-20 DIAGNOSIS — R10.9 RIGHT FLANK PAIN: Primary | ICD-10-CM

## 2019-11-20 DIAGNOSIS — R31.9 HEMATURIA, UNSPECIFIED TYPE: ICD-10-CM

## 2019-11-20 DIAGNOSIS — R11.0 NAUSEA: ICD-10-CM

## 2019-11-20 LAB
A/G RATIO: 2 (ref 1.1–2.2)
ALBUMIN SERPL-MCNC: 4.5 G/DL (ref 3.4–5)
ALP BLD-CCNC: 132 U/L (ref 40–129)
ALT SERPL-CCNC: 103 U/L (ref 10–40)
ANION GAP SERPL CALCULATED.3IONS-SCNC: 12 MMOL/L (ref 3–16)
AST SERPL-CCNC: 129 U/L (ref 15–37)
BASOPHILS ABSOLUTE: 0.1 K/UL (ref 0–0.2)
BASOPHILS RELATIVE PERCENT: 0.7 %
BILIRUB SERPL-MCNC: 0.3 MG/DL (ref 0–1)
BILIRUBIN URINE: NEGATIVE
BLOOD, URINE: ABNORMAL
BUN BLDV-MCNC: 6 MG/DL (ref 7–20)
CALCIUM SERPL-MCNC: 9.4 MG/DL (ref 8.3–10.6)
CHLORIDE BLD-SCNC: 105 MMOL/L (ref 99–110)
CLARITY: CLEAR
CO2: 22 MMOL/L (ref 21–32)
COLOR: YELLOW
CREAT SERPL-MCNC: 0.7 MG/DL (ref 0.6–1.1)
EOSINOPHILS ABSOLUTE: 0.2 K/UL (ref 0–0.6)
EOSINOPHILS RELATIVE PERCENT: 3.1 %
EPITHELIAL CELLS, UA: 2 /HPF (ref 0–5)
GFR AFRICAN AMERICAN: >60
GFR NON-AFRICAN AMERICAN: >60
GLOBULIN: 2.3 G/DL
GLUCOSE BLD-MCNC: 90 MG/DL (ref 70–99)
GLUCOSE URINE: NEGATIVE MG/DL
HCG QUALITATIVE: NEGATIVE
HCT VFR BLD CALC: 41.4 % (ref 36–48)
HEMOGLOBIN: 13.9 G/DL (ref 12–16)
HYALINE CASTS: 1 /LPF (ref 0–8)
KETONES, URINE: NEGATIVE MG/DL
LEUKOCYTE ESTERASE, URINE: ABNORMAL
LIPASE: 55 U/L (ref 13–60)
LYMPHOCYTES ABSOLUTE: 2.1 K/UL (ref 1–5.1)
LYMPHOCYTES RELATIVE PERCENT: 26.4 %
MCH RBC QN AUTO: 30.8 PG (ref 26–34)
MCHC RBC AUTO-ENTMCNC: 33.7 G/DL (ref 31–36)
MCV RBC AUTO: 91.5 FL (ref 80–100)
MICROSCOPIC EXAMINATION: YES
MONOCYTES ABSOLUTE: 0.5 K/UL (ref 0–1.3)
MONOCYTES RELATIVE PERCENT: 6.6 %
NEUTROPHILS ABSOLUTE: 5.1 K/UL (ref 1.7–7.7)
NEUTROPHILS RELATIVE PERCENT: 63.2 %
NITRITE, URINE: NEGATIVE
PDW BLD-RTO: 13.8 % (ref 12.4–15.4)
PH UA: 6.5 (ref 5–8)
PLATELET # BLD: 275 K/UL (ref 135–450)
PMV BLD AUTO: 7.7 FL (ref 5–10.5)
POTASSIUM REFLEX MAGNESIUM: 3.9 MMOL/L (ref 3.5–5.1)
PROTEIN UA: ABNORMAL MG/DL
RAPID INFLUENZA  B AGN: NEGATIVE
RAPID INFLUENZA A AGN: NEGATIVE
RBC # BLD: 4.53 M/UL (ref 4–5.2)
RBC UA: 191 /HPF (ref 0–4)
SODIUM BLD-SCNC: 139 MMOL/L (ref 136–145)
SPECIFIC GRAVITY UA: 1.01 (ref 1–1.03)
TOTAL PROTEIN: 6.8 G/DL (ref 6.4–8.2)
URINE REFLEX TO CULTURE: YES
URINE TYPE: ABNORMAL
UROBILINOGEN, URINE: 0.2 E.U./DL
WBC # BLD: 8.1 K/UL (ref 4–11)
WBC UA: 15 /HPF (ref 0–5)

## 2019-11-20 PROCEDURE — 81001 URINALYSIS AUTO W/SCOPE: CPT

## 2019-11-20 PROCEDURE — 74176 CT ABD & PELVIS W/O CONTRAST: CPT

## 2019-11-20 PROCEDURE — 87086 URINE CULTURE/COLONY COUNT: CPT

## 2019-11-20 PROCEDURE — 83690 ASSAY OF LIPASE: CPT

## 2019-11-20 PROCEDURE — 87804 INFLUENZA ASSAY W/OPTIC: CPT

## 2019-11-20 PROCEDURE — 96374 THER/PROPH/DIAG INJ IV PUSH: CPT

## 2019-11-20 PROCEDURE — 96361 HYDRATE IV INFUSION ADD-ON: CPT

## 2019-11-20 PROCEDURE — 6360000002 HC RX W HCPCS: Performed by: NURSE PRACTITIONER

## 2019-11-20 PROCEDURE — 99284 EMERGENCY DEPT VISIT MOD MDM: CPT

## 2019-11-20 PROCEDURE — 84703 CHORIONIC GONADOTROPIN ASSAY: CPT

## 2019-11-20 PROCEDURE — 85025 COMPLETE CBC W/AUTO DIFF WBC: CPT

## 2019-11-20 PROCEDURE — 96375 TX/PRO/DX INJ NEW DRUG ADDON: CPT

## 2019-11-20 PROCEDURE — 2580000003 HC RX 258: Performed by: NURSE PRACTITIONER

## 2019-11-20 PROCEDURE — 80053 COMPREHEN METABOLIC PANEL: CPT

## 2019-11-20 RX ORDER — ONDANSETRON 2 MG/ML
4 INJECTION INTRAMUSCULAR; INTRAVENOUS ONCE
Status: COMPLETED | OUTPATIENT
Start: 2019-11-20 | End: 2019-11-20

## 2019-11-20 RX ORDER — GREEN TEA/HOODIA GORDONII 315-12.5MG
1 CAPSULE ORAL 2 TIMES DAILY
Qty: 60 TABLET | Refills: 0 | Status: SHIPPED | OUTPATIENT
Start: 2019-11-20 | End: 2019-12-20

## 2019-11-20 RX ORDER — ONDANSETRON 4 MG/1
4 TABLET, FILM COATED ORAL EVERY 8 HOURS PRN
Qty: 20 TABLET | Refills: 0 | Status: SHIPPED | OUTPATIENT
Start: 2019-11-20 | End: 2020-09-18

## 2019-11-20 RX ORDER — KETOROLAC TROMETHAMINE 30 MG/ML
30 INJECTION, SOLUTION INTRAMUSCULAR; INTRAVENOUS ONCE
Status: COMPLETED | OUTPATIENT
Start: 2019-11-20 | End: 2019-11-20

## 2019-11-20 RX ORDER — 0.9 % SODIUM CHLORIDE 0.9 %
1000 INTRAVENOUS SOLUTION INTRAVENOUS ONCE
Status: COMPLETED | OUTPATIENT
Start: 2019-11-20 | End: 2019-11-20

## 2019-11-20 RX ORDER — HYDROXYZINE PAMOATE 25 MG/1
25 CAPSULE ORAL 3 TIMES DAILY PRN
Qty: 30 CAPSULE | Refills: 0 | Status: SHIPPED | OUTPATIENT
Start: 2019-11-20 | End: 2019-12-04

## 2019-11-20 RX ADMIN — SODIUM CHLORIDE 1000 ML: 9 INJECTION, SOLUTION INTRAVENOUS at 20:27

## 2019-11-20 RX ADMIN — KETOROLAC TROMETHAMINE 30 MG: 30 INJECTION, SOLUTION INTRAMUSCULAR at 20:25

## 2019-11-20 RX ADMIN — ONDANSETRON 4 MG: 2 INJECTION INTRAMUSCULAR; INTRAVENOUS at 20:25

## 2019-11-20 ASSESSMENT — PAIN DESCRIPTION - PROGRESSION
CLINICAL_PROGRESSION: NOT CHANGED
CLINICAL_PROGRESSION: RESOLVED

## 2019-11-20 ASSESSMENT — PAIN SCALES - GENERAL
PAINLEVEL_OUTOF10: 9
PAINLEVEL_OUTOF10: 9

## 2019-11-21 ENCOUNTER — TELEPHONE (OUTPATIENT)
Dept: FAMILY MEDICINE CLINIC | Age: 39
End: 2019-11-21

## 2019-11-22 LAB — URINE CULTURE, ROUTINE: NORMAL

## 2019-11-26 ENCOUNTER — OFFICE VISIT (OUTPATIENT)
Dept: FAMILY MEDICINE CLINIC | Age: 39
End: 2019-11-26
Payer: COMMERCIAL

## 2019-11-26 VITALS
TEMPERATURE: 97.9 F | SYSTOLIC BLOOD PRESSURE: 110 MMHG | DIASTOLIC BLOOD PRESSURE: 70 MMHG | HEART RATE: 64 BPM | BODY MASS INDEX: 28.32 KG/M2 | HEIGHT: 61 IN | WEIGHT: 150 LBS | OXYGEN SATURATION: 96 %

## 2019-11-26 DIAGNOSIS — N30.10 INTERSTITIAL CYSTITIS: ICD-10-CM

## 2019-11-26 DIAGNOSIS — F41.1 GAD (GENERALIZED ANXIETY DISORDER): ICD-10-CM

## 2019-11-26 DIAGNOSIS — R19.7 DIARRHEA OF PRESUMED INFECTIOUS ORIGIN: ICD-10-CM

## 2019-11-26 DIAGNOSIS — Z01.818 PREOP EXAMINATION: Primary | ICD-10-CM

## 2019-11-26 DIAGNOSIS — E66.09 CLASS 1 OBESITY DUE TO EXCESS CALORIES WITH SERIOUS COMORBIDITY AND BODY MASS INDEX (BMI) OF 30.0 TO 30.9 IN ADULT: ICD-10-CM

## 2019-11-26 PROCEDURE — 99242 OFF/OP CONSLTJ NEW/EST SF 20: CPT | Performed by: FAMILY MEDICINE

## 2019-11-26 RX ORDER — PHENTERMINE HYDROCHLORIDE 37.5 MG/1
37.5 TABLET ORAL
Qty: 30 TABLET | Refills: 0 | Status: SHIPPED | OUTPATIENT
Start: 2019-11-26 | End: 2019-12-23 | Stop reason: SDUPTHER

## 2019-11-26 RX ORDER — ALPRAZOLAM 0.5 MG/1
0.5 TABLET ORAL 2 TIMES DAILY PRN
Qty: 60 TABLET | Refills: 2 | Status: SHIPPED | OUTPATIENT
Start: 2019-12-04 | End: 2020-01-03

## 2019-11-26 ASSESSMENT — ENCOUNTER SYMPTOMS
RESPIRATORY NEGATIVE: 1
GASTROINTESTINAL NEGATIVE: 1

## 2019-11-27 ENCOUNTER — TELEPHONE (OUTPATIENT)
Dept: FAMILY MEDICINE CLINIC | Age: 39
End: 2019-11-27

## 2019-11-27 DIAGNOSIS — R19.7 DIARRHEA OF PRESUMED INFECTIOUS ORIGIN: ICD-10-CM

## 2019-12-01 LAB
OVA AND PARASITE TRICHROME: NEGATIVE
OVA AND PARASITE WET MOUNT: NEGATIVE

## 2019-12-03 ENCOUNTER — TELEPHONE (OUTPATIENT)
Dept: SURGERY | Age: 39
End: 2019-12-03

## 2019-12-16 ENCOUNTER — TELEPHONE (OUTPATIENT)
Dept: SURGERY | Age: 39
End: 2019-12-16

## 2019-12-23 ENCOUNTER — OFFICE VISIT (OUTPATIENT)
Dept: FAMILY MEDICINE CLINIC | Age: 39
End: 2019-12-23
Payer: COMMERCIAL

## 2019-12-23 VITALS
WEIGHT: 151 LBS | DIASTOLIC BLOOD PRESSURE: 80 MMHG | SYSTOLIC BLOOD PRESSURE: 110 MMHG | HEIGHT: 61 IN | TEMPERATURE: 98.2 F | BODY MASS INDEX: 28.51 KG/M2

## 2019-12-23 DIAGNOSIS — R14.0 ABDOMINAL BLOATING: ICD-10-CM

## 2019-12-23 DIAGNOSIS — R10.84 GENERALIZED ABDOMINAL PAIN: ICD-10-CM

## 2019-12-23 DIAGNOSIS — K62.5 RECTAL BLEEDING: Primary | ICD-10-CM

## 2019-12-23 DIAGNOSIS — E66.09 CLASS 1 OBESITY DUE TO EXCESS CALORIES WITH SERIOUS COMORBIDITY AND BODY MASS INDEX (BMI) OF 30.0 TO 30.9 IN ADULT: ICD-10-CM

## 2019-12-23 PROCEDURE — 99214 OFFICE O/P EST MOD 30 MIN: CPT | Performed by: FAMILY MEDICINE

## 2019-12-23 RX ORDER — PHENTERMINE HYDROCHLORIDE 37.5 MG/1
37.5 TABLET ORAL
Qty: 30 TABLET | Refills: 0 | Status: SHIPPED | OUTPATIENT
Start: 2019-12-23 | End: 2020-01-22 | Stop reason: SDUPTHER

## 2019-12-23 ASSESSMENT — ENCOUNTER SYMPTOMS
DIARRHEA: 1
FLATUS: 1
RESPIRATORY NEGATIVE: 1
NAUSEA: 1
ABDOMINAL PAIN: 1

## 2019-12-23 ASSESSMENT — PATIENT HEALTH QUESTIONNAIRE - PHQ9
SUM OF ALL RESPONSES TO PHQ QUESTIONS 1-9: 0
SUM OF ALL RESPONSES TO PHQ9 QUESTIONS 1 & 2: 0
SUM OF ALL RESPONSES TO PHQ QUESTIONS 1-9: 0
2. FEELING DOWN, DEPRESSED OR HOPELESS: 0
1. LITTLE INTEREST OR PLEASURE IN DOING THINGS: 0

## 2019-12-30 ENCOUNTER — HOSPITAL ENCOUNTER (OUTPATIENT)
Dept: CT IMAGING | Age: 39
Discharge: HOME | End: 2019-12-30
Payer: COMMERCIAL

## 2019-12-30 DIAGNOSIS — R10.84 GENERALIZED ABDOMINAL PAIN: ICD-10-CM

## 2019-12-30 DIAGNOSIS — K62.5 RECTAL BLEEDING: ICD-10-CM

## 2019-12-30 DIAGNOSIS — R14.0 ABDOMINAL BLOATING: ICD-10-CM

## 2019-12-30 PROCEDURE — 74176 CT ABD & PELVIS W/O CONTRAST: CPT

## 2019-12-30 PROCEDURE — 6360000004 HC RX CONTRAST MEDICATION: Performed by: FAMILY MEDICINE

## 2019-12-30 RX ORDER — CIPROFLOXACIN 500 MG/1
500 TABLET, FILM COATED ORAL 2 TIMES DAILY
Qty: 20 TABLET | Refills: 0 | Status: SHIPPED | OUTPATIENT
Start: 2019-12-30 | End: 2020-03-02 | Stop reason: SDUPTHER

## 2019-12-30 RX ADMIN — IOHEXOL 50 ML: 240 INJECTION, SOLUTION INTRATHECAL; INTRAVASCULAR; INTRAVENOUS; ORAL at 12:34

## 2020-01-07 ENCOUNTER — APPOINTMENT (OUTPATIENT)
Dept: CT IMAGING | Age: 40
End: 2020-01-07
Payer: COMMERCIAL

## 2020-01-07 ENCOUNTER — HOSPITAL ENCOUNTER (EMERGENCY)
Age: 40
Discharge: HOME | End: 2020-01-07
Attending: EMERGENCY MEDICINE
Payer: COMMERCIAL

## 2020-01-07 ENCOUNTER — TELEPHONE (OUTPATIENT)
Dept: FAMILY MEDICINE CLINIC | Age: 40
End: 2020-01-07

## 2020-01-07 VITALS
OXYGEN SATURATION: 100 % | SYSTOLIC BLOOD PRESSURE: 100 MMHG | RESPIRATION RATE: 16 BRPM | DIASTOLIC BLOOD PRESSURE: 64 MMHG | WEIGHT: 154.54 LBS | TEMPERATURE: 98.6 F | BODY MASS INDEX: 29.18 KG/M2 | HEIGHT: 61 IN | HEART RATE: 83 BPM

## 2020-01-07 LAB
A/G RATIO: 2.4 (ref 1.1–2.2)
ALBUMIN SERPL-MCNC: 4.8 G/DL (ref 3.4–5)
ALP BLD-CCNC: 81 U/L (ref 40–129)
ALT SERPL-CCNC: 16 U/L (ref 10–40)
ANION GAP SERPL CALCULATED.3IONS-SCNC: 15 MMOL/L (ref 3–16)
AST SERPL-CCNC: 18 U/L (ref 15–37)
BASOPHILS ABSOLUTE: 0 K/UL (ref 0–0.2)
BASOPHILS RELATIVE PERCENT: 0.5 %
BILIRUB SERPL-MCNC: 0.3 MG/DL (ref 0–1)
BILIRUBIN URINE: NEGATIVE
BLOOD, URINE: NEGATIVE
BUN BLDV-MCNC: 10 MG/DL (ref 7–20)
CALCIUM SERPL-MCNC: 9.6 MG/DL (ref 8.3–10.6)
CHLORIDE BLD-SCNC: 106 MMOL/L (ref 99–110)
CLARITY: CLEAR
CO2: 23 MMOL/L (ref 21–32)
COLOR: YELLOW
CREAT SERPL-MCNC: 0.8 MG/DL (ref 0.6–1.1)
EOSINOPHILS ABSOLUTE: 0.2 K/UL (ref 0–0.6)
EOSINOPHILS RELATIVE PERCENT: 3.3 %
GFR AFRICAN AMERICAN: >60
GFR NON-AFRICAN AMERICAN: >60
GLOBULIN: 2 G/DL
GLUCOSE BLD-MCNC: 90 MG/DL (ref 70–99)
GLUCOSE URINE: NEGATIVE MG/DL
HCG QUALITATIVE: NEGATIVE
HCT VFR BLD CALC: 42.6 % (ref 36–48)
HEMOGLOBIN: 14.3 G/DL (ref 12–16)
KETONES, URINE: NEGATIVE MG/DL
LEUKOCYTE ESTERASE, URINE: NEGATIVE
LIPASE: 23 U/L (ref 13–60)
LYMPHOCYTES ABSOLUTE: 2.7 K/UL (ref 1–5.1)
LYMPHOCYTES RELATIVE PERCENT: 36.6 %
MCH RBC QN AUTO: 30.7 PG (ref 26–34)
MCHC RBC AUTO-ENTMCNC: 33.6 G/DL (ref 31–36)
MCV RBC AUTO: 91.3 FL (ref 80–100)
MICROSCOPIC EXAMINATION: NORMAL
MONOCYTES ABSOLUTE: 0.5 K/UL (ref 0–1.3)
MONOCYTES RELATIVE PERCENT: 6.5 %
NEUTROPHILS ABSOLUTE: 3.9 K/UL (ref 1.7–7.7)
NEUTROPHILS RELATIVE PERCENT: 53.1 %
NITRITE, URINE: NEGATIVE
PDW BLD-RTO: 13.5 % (ref 12.4–15.4)
PH UA: 5 (ref 5–8)
PLATELET # BLD: 242 K/UL (ref 135–450)
PMV BLD AUTO: 8.1 FL (ref 5–10.5)
POTASSIUM REFLEX MAGNESIUM: 3.9 MMOL/L (ref 3.5–5.1)
PROTEIN UA: NEGATIVE MG/DL
RBC # BLD: 4.66 M/UL (ref 4–5.2)
SODIUM BLD-SCNC: 144 MMOL/L (ref 136–145)
SPECIFIC GRAVITY UA: 1.02 (ref 1–1.03)
TOTAL PROTEIN: 6.8 G/DL (ref 6.4–8.2)
URINE REFLEX TO CULTURE: NORMAL
URINE TYPE: NORMAL
UROBILINOGEN, URINE: 0.2 E.U./DL
WBC # BLD: 7.4 K/UL (ref 4–11)

## 2020-01-07 PROCEDURE — 83690 ASSAY OF LIPASE: CPT

## 2020-01-07 PROCEDURE — 84703 CHORIONIC GONADOTROPIN ASSAY: CPT

## 2020-01-07 PROCEDURE — 99284 EMERGENCY DEPT VISIT MOD MDM: CPT

## 2020-01-07 PROCEDURE — 96375 TX/PRO/DX INJ NEW DRUG ADDON: CPT

## 2020-01-07 PROCEDURE — 6360000004 HC RX CONTRAST MEDICATION: Performed by: EMERGENCY MEDICINE

## 2020-01-07 PROCEDURE — 6360000002 HC RX W HCPCS: Performed by: EMERGENCY MEDICINE

## 2020-01-07 PROCEDURE — 96374 THER/PROPH/DIAG INJ IV PUSH: CPT

## 2020-01-07 PROCEDURE — 80053 COMPREHEN METABOLIC PANEL: CPT

## 2020-01-07 PROCEDURE — 85025 COMPLETE CBC W/AUTO DIFF WBC: CPT

## 2020-01-07 PROCEDURE — 74177 CT ABD & PELVIS W/CONTRAST: CPT

## 2020-01-07 PROCEDURE — 81003 URINALYSIS AUTO W/O SCOPE: CPT

## 2020-01-07 RX ORDER — DIPHENHYDRAMINE HYDROCHLORIDE 50 MG/ML
25 INJECTION INTRAMUSCULAR; INTRAVENOUS ONCE
Status: COMPLETED | OUTPATIENT
Start: 2020-01-07 | End: 2020-01-07

## 2020-01-07 RX ORDER — ONDANSETRON 2 MG/ML
4 INJECTION INTRAMUSCULAR; INTRAVENOUS ONCE
Status: COMPLETED | OUTPATIENT
Start: 2020-01-07 | End: 2020-01-07

## 2020-01-07 RX ADMIN — DIPHENHYDRAMINE HYDROCHLORIDE 25 MG: 50 INJECTION, SOLUTION INTRAMUSCULAR; INTRAVENOUS at 17:07

## 2020-01-07 RX ADMIN — ONDANSETRON 4 MG: 2 INJECTION INTRAMUSCULAR; INTRAVENOUS at 16:52

## 2020-01-07 RX ADMIN — IOPAMIDOL 75 ML: 755 INJECTION, SOLUTION INTRAVENOUS at 17:21

## 2020-01-07 ASSESSMENT — PAIN DESCRIPTION - ONSET
ONSET: ON-GOING

## 2020-01-07 ASSESSMENT — PAIN DESCRIPTION - LOCATION
LOCATION: ABDOMEN

## 2020-01-07 ASSESSMENT — PAIN DESCRIPTION - FREQUENCY
FREQUENCY: CONTINUOUS

## 2020-01-07 ASSESSMENT — PAIN SCALES - GENERAL
PAINLEVEL_OUTOF10: 4
PAINLEVEL_OUTOF10: 7
PAINLEVEL_OUTOF10: 4

## 2020-01-07 ASSESSMENT — PAIN DESCRIPTION - PAIN TYPE
TYPE: ACUTE PAIN

## 2020-01-07 ASSESSMENT — PAIN DESCRIPTION - PROGRESSION
CLINICAL_PROGRESSION: GRADUALLY IMPROVING
CLINICAL_PROGRESSION: GRADUALLY IMPROVING
CLINICAL_PROGRESSION: NOT CHANGED

## 2020-01-07 ASSESSMENT — PAIN DESCRIPTION - DESCRIPTORS
DESCRIPTORS: ACHING

## 2020-01-07 NOTE — ED PROVIDER NOTES
scop patch and Phenergan work best     Rheumatoid arthritis(714.0)      Past Surgical History:   Procedure Laterality Date    APPENDECTOMY      BLADDER SURGERY      BREAST SURGERY      bilat mastectomy    CARDIOVERSION      CARPAL TUNNEL RELEASE Right 7/2/14    removal of hardware    CHOLECYSTECTOMY      COLONOSCOPY      with polyectomy    CYSTOSCOPY  8-17-11    with bladder and urethral dilatation    HERNIA REPAIR  03/24/2017    repair of umbilical hernia with primary closure, exploration of LLQ subcutaneous space without definitive findings of lipoma    HYSTERECTOMY      LAPAROSCOPIC APPENDECTOMY  3/18/13    LAPAROSCOPY  3/18/13    RECONSTRUCTION BREAST IMMEDIATE / DELAYED W/ TISSUE EXPANDER Bilateral 9/26/2019    EXCHANGE OF BILATERAL BREAST IMPLANTS ; AND BILATERAL CAPSULECTOMY, REVISION BILATERAL BREAST RECONSTRUCTION performed by Kieran Lima MD at [address] [facility] ENDOSCOPY      1/09    UPPER GASTROINTESTINAL ENDOSCOPY  5/28/2010    UPPER GASTROINTESTINAL ENDOSCOPY  2/13/13    WRIST FRACTURE SURGERY  12/28/09    Open treatment with open reduction, internal fixation rightdistal radius using     Family History   Problem Relation Age of Onset    Cancer Mother         Breast    Depression Mother     Cancer Father         lung    Cancer Maternal Grandmother         breast    Asthma Other     Cancer Other     Heart Disease Other     Depression Paternal Grandmother     Emphysema Paternal Grandmother     Elevated Lipids Paternal Grandmother     Cancer Paternal Grandfather         lung    Cancer Maternal Grandfather         colon     Cancer Paternal Aunt         Breast     Social History     Socioeconomic History    Marital status:      Spouse name: Not on file    Number of children: 3    Years of education: Not on file    Highest education level: Not on file   Occupational History    Not on file   Social Needs    Vomiting     \"makes me crazy\"     Prednisone Hives, Palpitations and Rash         REVIEW OF SYSTEMS  10 systems reviewed, pertinent positives per HPI otherwise noted to be negative    PHYSICAL EXAM  /76   Pulse 87   Temp 98.2 °F (36.8 °C) (Oral)   Resp 16   Ht 5' 1\" (1.549 m)   Wt 154 lb 8.7 oz (70.1 kg)   LMP 05/25/2002   SpO2 100%   BMI 29.20 kg/m²      CONSTITUTIONAL: AOx4, cooperative with exam, afebrile   HEAD: normocephalic, atraumatic   EYES: PERRL, EOMI, anicteric sclera   ENT: Moist mucous membranes, uvula midline   LUNGS: Bilateral breath sounds, CTAB, no rales/ronchi/wheezes   CARDIOVASCULAR: RRR, normal S1/S2, no m/r/g, 2+ pulses throughout   ABDOMEN: Soft, periumbilical tenderness, no rebound tenderness or guarding, negative McBurney's point, negative [place] sign, non-distended, +BS   NEUROLOGIC:  MAEx4, GCS 15   MUSCULOSKELETAL: No clubbing, cyanosis or edema   SKIN: No rash, pallor or wounds on exposed surfaces         RADIOLOGY  X-RAYS:  I have reviewed radiologic plain film image(s). ALL OTHER NON-PLAIN FILM IMAGES SUCH AS CT, ULTRASOUND AND MRI HAVE BEEN READ BY THE RADIOLOGIST. CT ABDOMEN PELVIS W IV CONTRAST Additional Contrast? None   Final Result   No CT evidence for acute intra-abdominal or pelvic pathology. Status post cholecystectomy. EKG INTERPRETATION  None    PROCEDURES    ED COURSE/MDM  Colitis, diverticulitis, SBO, UTI, gastroenteritis, gastritis, viral syndrome    Patient seen and evaluated. Nontoxic, afebrile. History and physical as above. Belly pain is been present for the past 2 months. No acute change. Did already completed course of antibiotics outpatient with her PCP. Plan is for routine abdominal labs, urinalysis, pregnancy status. Antiemetics. IV fluids. Patient's lab work unremarkable. Negative pregnancy. Urinalysis negative for infection. Kidney function and liver function unremarkable.   CT abdomen pelvis ordered secondary to patient's continued abdominal pain and abnormal finding on previous CT scan. Patient had to be pretreated with Benadryl secondary to developing rash on her chest from previous IV infusion. Patient agreeable with care plan.    6:06 PM  Patient CT abdomen pelvis unremarkable. Patient updated on results. Discussed discharge with outpatient follow-up to GI and with her PCP. Patient agreeable. All questions answered prior to discharge. Return instructions provided.     Results for orders placed or performed during the hospital encounter of 01/07/20   HCG Qualitative, Serum   Result Value Ref Range    hCG Qual Negative Detects HCG level >10 MIU/mL   CBC Auto Differential   Result Value Ref Range    WBC 7.4 4.0 - 11.0 K/uL    RBC 4.66 4.00 - 5.20 M/uL    Hemoglobin 14.3 12.0 - 16.0 g/dL    Hematocrit 42.6 36.0 - 48.0 %    MCV 91.3 80.0 - 100.0 fL    MCH 30.7 26.0 - 34.0 pg    MCHC 33.6 31.0 - 36.0 g/dL    RDW 13.5 12.4 - 15.4 %    Platelets 943 776 - 549 K/uL    MPV 8.1 5.0 - 10.5 fL    Neutrophils % 53.1 %    Lymphocytes % 36.6 %    Monocytes % 6.5 %    Eosinophils % 3.3 %    Basophils % 0.5 %    Neutrophils Absolute 3.9 1.7 - 7.7 K/uL    Lymphocytes Absolute 2.7 1.0 - 5.1 K/uL    Monocytes Absolute 0.5 0.0 - 1.3 K/uL    Eosinophils Absolute 0.2 0.0 - 0.6 K/uL    Basophils Absolute 0.0 0.0 - 0.2 K/uL   Comprehensive Metabolic Panel w/ Reflex to MG   Result Value Ref Range    Sodium 144 136 - 145 mmol/L    Potassium reflex Magnesium 3.9 3.5 - 5.1 mmol/L    Chloride 106 99 - 110 mmol/L    CO2 23 21 - 32 mmol/L    Anion Gap 15 3 - 16    Glucose 90 70 - 99 mg/dL    BUN 10 7 - 20 mg/dL    CREATININE 0.8 0.6 - 1.1 mg/dL    GFR Non-African American >60 >60    GFR African American >60 >60    Calcium 9.6 8.3 - 10.6 mg/dL    Total Protein 6.8 6.4 - 8.2 g/dL    Alb 4.8 3.4 - 5.0 g/dL    Albumin/Globulin Ratio 2.4 (H) 1.1 - 2.2    Total Bilirubin 0.3 0.0 - 1.0 mg/dL    Alkaline Phosphatase 81 40 - 129 U/L    ALT 16 10 - 40 U/L    AST 18 15 - 37 U/L    Globulin 2.0 g/dL   Lipase   Result Value Ref Range    Lipase 23.0 13.0 - 60.0 U/L   Urinalysis Reflex to Culture   Result Value Ref Range    Color, UA YELLOW Straw/Yellow    Clarity, UA Clear Clear    Glucose, Ur Negative Negative mg/dL    Bilirubin Urine Negative Negative    Ketones, Urine Negative Negative mg/dL    Specific Gravity, UA 1.017 1.005 - 1.030    Blood, Urine Negative Negative    pH, UA 5.0 5.0 - 8.0    Protein, UA Negative Negative mg/dL    Urobilinogen, Urine 0.2 <2.0 E.U./dL    Nitrite, Urine Negative Negative    Leukocyte Esterase, Urine Negative Negative    Microscopic Examination Not Indicated     Urine Type NotGiven     Urine Reflex to Culture Not Indicated        I estimate there is LOW risk for ACUTE APPENDICITIS, BOWEL OBSTRUCTION, CHOLECYSTITIS, DIVERTICULITIS, INCARCERATED HERNIA, PANCREATITIS, PELVIC INFLAMMATORY DISEASE, PERFORATED BOWEL or ULCER, PREGNANCY, or TUBO-OVARIAN ABSCESS, thus I consider the discharge disposition reasonable. Also, there is no evidence or peritonitis, sepsis, or toxicity. Sulma Jain and I have discussed the diagnosis and risks, and we agree with discharging home to follow-up with their primary doctor. We also discussed returning to the Emergency Department immediately if new or worsening symptoms occur. We have discussed the symptoms which are most concerning (e.g., bloody stool, fever, changing or worsening pain, vomiting) that necessitate immediate return. Patient was given scripts for the following medications. I counseled patient how to take these medications. Current Discharge Medication List              CLINICAL IMPRESSION  1. Chronic generalized abdominal pain        Blood pressure 110/76, pulse 87, temperature 98.2 °F (36.8 °C), temperature source Oral, resp.  rate 16, height 5' 1\" (1.549 m), weight 154 lb 8.7 oz (70.1 kg), last menstrual period 05/25/2002, SpO2 100 %, not currently

## 2020-01-10 NOTE — PROGRESS NOTES
[facility]     PROCEDURE: 1.  Exchange of bilateral breast permanent implants. 2.  Bilateral breast complete capsulectomy. 3.  Revision of bilateral breast reconstruction. DATE: 9/26/19    Nancy Cadena has been recovering satisfactorily since her procedure. Pain has been well controlled without pain medications. She has had rippling and presents back for potential discussion regarding potential revision.     PMHx:   Past Medical History:   Diagnosis Date    Arrythmia     Asthma     no problems recently    Back pain     Breast cancer ([address]     Breast lump     right    Cervical cancer (HCC)     Chronic hepatitis C (HCC)     Chronic pain     DDD (degenerative disc disease)     Diverticulitis     Fibromyalgia     GERD (gastroesophageal reflux disease)     Headache     migraines    Heart disease     tachycardia     Immune deficiency disorder (HCC)     Interstitial cystitis     Nausea & vomiting     Osteoarthritis     Other closed fractures of distal end of radius (alone) 12/19/2009    distal radius fx surgery 12/28/2009 Dr. Annmarie Hand    Pneumonia     PONV (postoperative nausea and vomiting)     scop patch and Phenergan work best     Rheumatoid arthritis(714.0)      PSHx:   Past Surgical History:   Procedure Laterality Date    APPENDECTOMY      BLADDER SURGERY      BREAST SURGERY      bilat mastectomy    CARDIOVERSION      CARPAL TUNNEL RELEASE Right 7/2/14    removal of hardware    CHOLECYSTECTOMY      COLONOSCOPY      with polyectomy    CYSTOSCOPY  8-17-11    with bladder and urethral dilatation    HERNIA REPAIR  03/24/2017    repair of umbilical hernia with primary closure, exploration of LLQ subcutaneous space without definitive findings of lipoma    HYSTERECTOMY      LAPAROSCOPIC APPENDECTOMY  3/18/13    LAPAROSCOPY  3/18/13    RECONSTRUCTION BREAST IMMEDIATE / DELAYED W/ TISSUE EXPANDER Bilateral 9/26/2019    EXCHANGE OF BILATERAL BREAST IMPLANTS ; AND Lidocaine, 60 mL Benadryl to 60mL of Carafate. 10 ml swish and spit or swallow as directed above. 240 mL 3    albuterol sulfate  (90 Base) MCG/ACT inhaler Inhale 2 puffs into the lungs 4 times daily as needed for Wheezing 3 Inhaler 0    OxyCODONE ER (XTAMPZA ER) 9 MG C12A Take 9 mg by mouth 2 times daily as needed.  propranolol (INDERAL) 20 MG tablet Take 1 tablet by mouth 3 times daily (Patient taking differently: Take 20 mg by mouth 2 times daily ) 90 tablet 2    hydrochlorothiazide (HYDRODIURIL) 25 MG tablet Take 1 tablet by mouth daily (Patient taking differently: Take 25 mg by mouth daily as needed (swelling) ) 30 tablet 3     No current facility-administered medications for this visit. ROS   Constitutional: Negative for chills and fever. HENT: Negative for congestion, facial swelling, and voice change. Eyes: Negative for photophobia and visual disturbance. Respiratory: Negative for apnea, cough, chest tightness and shortness of breath. Cardiovascular: Negative for chest pain and palpitations. Gastrointestinal: Negative for dysphagia and early satiety. Genitourinary: Negative for difficulty urinating, dysuria, flank pain, frequency and hematuria. Musculoskeletal: Negative for new gait problem, joint swelling and myalgias. Skin: Negative for color change, pallor and rash. Endocrine: negative for tremors, temperature intolerance or polydipsia. Allergic/Immunologic: Negative for new environmental or food allergies. Neurological: Negative for dizziness, seizures, speech difficulty, numbness. Hematological: Negative for adenopathy. Psychiatric/Behavioral: Negative for agitation and confusion.     EXAM    /87   Pulse 122   Temp 97.1 °F (36.2 °C)   Resp 16   Ht 5' 1\" (1.549 m)   Wt 148 lb (67.1 kg)   LMP 05/25/2002   SpO2 97%   BMI 27.96 kg/m²     GEN: NAD, pleasant, obese  CVS: RRR  PULM: No respiratory distress  HEENT: PERRLA/EOMI; dentition appropriate,

## 2020-01-13 ENCOUNTER — OFFICE VISIT (OUTPATIENT)
Dept: SURGERY | Age: 40
End: 2020-01-13
Payer: COMMERCIAL

## 2020-01-13 VITALS
WEIGHT: 148 LBS | HEIGHT: 61 IN | DIASTOLIC BLOOD PRESSURE: 87 MMHG | HEART RATE: 122 BPM | RESPIRATION RATE: 16 BRPM | OXYGEN SATURATION: 97 % | SYSTOLIC BLOOD PRESSURE: 126 MMHG | TEMPERATURE: 97.1 F | BODY MASS INDEX: 27.94 KG/M2

## 2020-01-13 PROCEDURE — 99214 OFFICE O/P EST MOD 30 MIN: CPT | Performed by: SURGERY

## 2020-01-15 ENCOUNTER — TELEPHONE (OUTPATIENT)
Dept: SURGERY | Age: 40
End: 2020-01-15

## 2020-01-15 NOTE — TELEPHONE ENCOUNTER
Please contact [facility] asap at # [phone] regarding outpatient request. Please note reference # [id number].

## 2020-01-22 ENCOUNTER — OFFICE VISIT (OUTPATIENT)
Dept: FAMILY MEDICINE CLINIC | Age: 40
End: 2020-01-22
Payer: COMMERCIAL

## 2020-01-22 ENCOUNTER — TELEPHONE (OUTPATIENT)
Dept: FAMILY MEDICINE CLINIC | Age: 40
End: 2020-01-22

## 2020-01-22 VITALS
SYSTOLIC BLOOD PRESSURE: 110 MMHG | OXYGEN SATURATION: 98 % | DIASTOLIC BLOOD PRESSURE: 62 MMHG | BODY MASS INDEX: 28.13 KG/M2 | WEIGHT: 149 LBS | HEIGHT: 61 IN | HEART RATE: 111 BPM

## 2020-01-22 LAB
A/G RATIO: 2.4 (ref 1.1–2.2)
ALBUMIN SERPL-MCNC: 4.8 G/DL (ref 3.4–5)
ALP BLD-CCNC: 95 U/L (ref 40–129)
ALT SERPL-CCNC: 39 U/L (ref 10–40)
ANION GAP SERPL CALCULATED.3IONS-SCNC: 18 MMOL/L (ref 3–16)
APTT: 35.7 SEC (ref 24.2–36.2)
AST SERPL-CCNC: 17 U/L (ref 15–37)
BILIRUB SERPL-MCNC: <0.2 MG/DL (ref 0–1)
BUN BLDV-MCNC: 7 MG/DL (ref 7–20)
CALCIUM SERPL-MCNC: 10.2 MG/DL (ref 8.3–10.6)
CHLORIDE BLD-SCNC: 106 MMOL/L (ref 99–110)
CO2: 21 MMOL/L (ref 21–32)
CREAT SERPL-MCNC: 0.8 MG/DL (ref 0.6–1.1)
GFR AFRICAN AMERICAN: >60
GFR NON-AFRICAN AMERICAN: >60
GLOBULIN: 2 G/DL
GLUCOSE BLD-MCNC: 105 MG/DL (ref 70–99)
HCT VFR BLD CALC: 43 % (ref 36–48)
HEMOGLOBIN: 14.5 G/DL (ref 12–16)
INR BLD: 0.92 (ref 0.86–1.14)
MCH RBC QN AUTO: 30.7 PG (ref 26–34)
MCHC RBC AUTO-ENTMCNC: 33.7 G/DL (ref 31–36)
MCV RBC AUTO: 91.1 FL (ref 80–100)
PDW BLD-RTO: 13.3 % (ref 12.4–15.4)
PLATELET # BLD: 271 K/UL (ref 135–450)
PMV BLD AUTO: 8.6 FL (ref 5–10.5)
POTASSIUM SERPL-SCNC: 4.4 MMOL/L (ref 3.5–5.1)
PROTHROMBIN TIME: 10.7 SEC (ref 10–13.2)
RBC # BLD: 4.72 M/UL (ref 4–5.2)
SODIUM BLD-SCNC: 145 MMOL/L (ref 136–145)
TOTAL PROTEIN: 6.8 G/DL (ref 6.4–8.2)
VITAMIN B-12: 473 PG/ML (ref 211–911)
WBC # BLD: 8.4 K/UL (ref 4–11)

## 2020-01-22 PROCEDURE — 36415 COLL VENOUS BLD VENIPUNCTURE: CPT | Performed by: FAMILY MEDICINE

## 2020-01-22 PROCEDURE — 99242 OFF/OP CONSLTJ NEW/EST SF 20: CPT | Performed by: FAMILY MEDICINE

## 2020-01-22 RX ORDER — PHENTERMINE HYDROCHLORIDE 37.5 MG/1
37.5 TABLET ORAL
Qty: 30 TABLET | Refills: 0 | Status: SHIPPED | OUTPATIENT
Start: 2020-01-22 | End: 2020-02-21

## 2020-01-22 ASSESSMENT — ENCOUNTER SYMPTOMS
RESPIRATORY NEGATIVE: 1
GASTROINTESTINAL NEGATIVE: 1

## 2020-01-22 NOTE — PROGRESS NOTES
Subjective:      Patient ID: Nancy Cadena is a 44 y.o. female. HPI  preop   2/6/19    [facility]  Breast reconstruction   Dr Renny Emerson   Review of Systems   Constitutional: Negative. Respiratory: Negative. Cardiovascular: Negative. Gastrointestinal: Negative. Musculoskeletal: Negative. Skin: Negative. Neurological: Negative. YOB: 1980    Date of Visit:  1/22/2020    Allergies   Allergen Reactions    Latex Hives and Itching     After surgery x2     Shellfish-Derived Products Shortness Of Breath    Tape Sherlie Sportsman Tape] Rash     Paper tape OK     Iodides Other (See Comments)     pt states tachycardia    Tylenol [Acetaminophen]      History of hep c    Bactrim [Sulfamethoxazole-Trimethoprim] Nausea And Vomiting and Other (See Comments)    Codeine Nausea And Vomiting and Palpitations    Morphine Itching and Nausea And Vomiting     \"makes me crazy\"     Prednisone Hives, Palpitations and Rash       Outpatient Medications Marked as Taking for the 1/22/20 encounter (Office Visit) with Jennifer Long, DO   Medication Sig Dispense Refill    phentermine (ADIPEX-P) 37.5 MG tablet Take 1 tablet by mouth every morning (before breakfast) for 30 days.  30 tablet 0    ondansetron (ZOFRAN) 4 MG tablet Take 1 tablet by mouth every 8 hours as needed for Nausea 20 tablet 0    NONFORMULARY EPI PEN PRN      promethazine (PHENERGAN) 25 MG tablet Take 25 mg by mouth 2 times daily as needed for Nausea      famotidine (PEPCID) 40 MG tablet Take 1 tablet by mouth 2 times daily 60 tablet 3    famciclovir (FAMVIR) 500 MG tablet TAKE ONE TABLET BY MOUTH THREE TIMES DAILY (Patient taking differently: 3 times daily as needed TAKE ONE TABLET BY MOUTH THREE TIMES DAILY) 30 tablet 5    tiZANidine (ZANAFLEX) 4 MG tablet Take 1 tablet by mouth every 8 hours as needed (muscle spasm) 30 tablet 2    buPROPion (WELLBUTRIN SR) 150 MG extended release tablet Take 1 tablet by mouth 2 times daily 60 Protime/INR & PTT  -     Comprehensive Metabolic Panel    Class 1 obesity due to excess calories with serious comorbidity and body mass index (BMI) of 30.0 to 30.9 in adult  -     phentermine (ADIPEX-P) 37.5 MG tablet; Take 1 tablet by mouth every morning (before breakfast) for 30 days.     Fatigue, unspecified type  -     Vitamin B12    Breast reconstruction deformity      Pt medically clear for surgery

## 2020-01-30 NOTE — PROGRESS NOTES
[facility] / [facility] [place] ([facility]) [address] [facility], [address]    Acknowledgment of Informed Consent for Surgical or Medical Procedure and Sedation  I agree to allow doctor(s) Alyssa Reed and his/her associates or assistants, including residents and/or other qualified medical practitioner to perform the following medical treatment or procedure and to administer or direct the administration of sedation as necessary:  Procedure(s): REVISION BILATERAL BREAST RECONSTRUCTION, WITH HIGH VOLUME FAT GRAFTING  My doctor has explained the following regarding the proposed procedure:   the explanation of the procedure   the benefits of the procedure   the potential problems that might occur during recuperation   the risks and side effects of the procedure which could include but are not limited to severe blood loss, infection, stroke or death   the benefits, risks and side effect of alternative procedures including the consequences of declining this procedure or any alternative procedures   the likelihood of achieving satisfactory results. I acknowledge no guarantee or assurance has been made to me regarding the results. I understand that during the course of this treatment/procedure, unforeseen conditions can occur which require an additional or different procedure. I agree to allow my physician or assistants to perform such extension of the original procedure as they may find necessary. I understand that sedation will often result in temporary impairment of memory and fine motor skills and that sedation can occasionally progress to a state of deep sedation or general anesthesia. I understand the risks of anesthesia for surgery include, but are not limited to, sore throat, hoarseness, injury to face, mouth, or teeth; nausea; headache; injury to blood vessels or nerves; death, brain damage, or paralysis.     I understand that if I have a Limitation of Treatment order in effect during my hospitalization, the order may or may not be in effect during this procedure. I give my doctor permission to give me blood or blood products. I understand that there are risks with receiving blood such as hepatitis, AIDS, fever, or allergic reaction. I acknowledge that the risks, benefits, and alternatives of this treatment have been explained to me and that no express or implied warranty has been given by the hospital, any blood bank, or any person or entity as to the blood or blood components transfused. At the discretion of my doctor, I agree to allow observers, equipment/product representatives and allow photographing, and/or televising of the procedure, provided my name or identity is maintained confidentially. I agree the hospital may dispose of or use for scientific or educational purposes any tissue, fluid, or body parts which may be removed.     ________________________________Date________Time______ am/pm  ([place] [facility])  Patient or Signature of Closest Relative or Legal Guardian    ________________________________Date________Time______am/pm      Page 1 of  1  Witness

## 2020-01-31 ENCOUNTER — TELEPHONE (OUTPATIENT)
Dept: FAMILY MEDICINE CLINIC | Age: 40
End: 2020-01-31

## 2020-01-31 ENCOUNTER — TELEPHONE (OUTPATIENT)
Dept: SURGERY | Age: 40
End: 2020-01-31

## 2020-01-31 NOTE — PROGRESS NOTES
procedure. 13. Bring cases for your glasses, contacts, dentures, or hearing aids to protect them while you are in surgery. 16. If you use a CPAP, please bring it with you on the day of your procedure. 17. Do not shave or wax for 72 hours prior to procedure near your operative site  18. FOR WOMAN OF CHILDBEARING AGE ONLY- please bring a urine sample with you on day of surgery or make sure we can collect on arrival.    If you have further questions, you may contact your surgeon's office or us at [phone]    Left instructions on patient's voicemail. Rosenda Headings. 1/31/2020 .2:08 PM

## 2020-01-31 NOTE — TELEPHONE ENCOUNTER
Patient is requesting a return call after 3 pm regarding surgery next week. Please advise. Thank you!

## 2020-02-03 ENCOUNTER — TELEPHONE (OUTPATIENT)
Dept: FAMILY MEDICINE CLINIC | Age: 40
End: 2020-02-03

## 2020-02-03 ENCOUNTER — TELEPHONE (OUTPATIENT)
Dept: SURGERY | Age: 40
End: 2020-02-03

## 2020-02-03 RX ORDER — OSELTAMIVIR PHOSPHATE 75 MG/1
75 CAPSULE ORAL 2 TIMES DAILY
Qty: 10 CAPSULE | Refills: 0 | Status: SHIPPED | OUTPATIENT
Start: 2020-02-03 | End: 2020-02-08

## 2020-02-03 NOTE — TELEPHONE ENCOUNTER
The pharmacist called and said she had her three refills she can have for the next six months     I will send in the tamiflu

## 2020-02-03 NOTE — TELEPHONE ENCOUNTER
Patient is calling to find out why her adipex was refused. Her daughter tested positive for type b flu. Can she get tamiflu called in for this she has surgery on Thursday. Please return call.

## 2020-02-04 NOTE — TELEPHONE ENCOUNTER
Gilson Del Castillo to determine if any additional clinical information was needed. [employer] Ref Number: [id number]    Camilledejoycelyn Eve is not required due to Breast Cancer diagnosis. So, approval was voided. No additional auth needed. Case is ready to move forward.  DONE

## 2020-02-05 ENCOUNTER — ANESTHESIA EVENT (OUTPATIENT)
Dept: OPERATING ROOM | Age: 40
End: 2020-02-05
Payer: COMMERCIAL

## 2020-02-05 ENCOUNTER — TELEPHONE (OUTPATIENT)
Dept: FAMILY MEDICINE CLINIC | Age: 40
End: 2020-02-05

## 2020-02-06 ENCOUNTER — ANESTHESIA (OUTPATIENT)
Dept: OPERATING ROOM | Age: 40
End: 2020-02-06
Payer: COMMERCIAL

## 2020-02-06 ENCOUNTER — HOSPITAL ENCOUNTER (OUTPATIENT)
Age: 40
Setting detail: OUTPATIENT SURGERY
Discharge: HOME | End: 2020-02-06
Attending: SURGERY | Admitting: SURGERY
Payer: COMMERCIAL

## 2020-02-06 VITALS
DIASTOLIC BLOOD PRESSURE: 63 MMHG | RESPIRATION RATE: 13 BRPM | OXYGEN SATURATION: 98 % | SYSTOLIC BLOOD PRESSURE: 101 MMHG | TEMPERATURE: 94.5 F

## 2020-02-06 VITALS
DIASTOLIC BLOOD PRESSURE: 79 MMHG | OXYGEN SATURATION: 100 % | SYSTOLIC BLOOD PRESSURE: 107 MMHG | HEIGHT: 61 IN | TEMPERATURE: 97.1 F | WEIGHT: 149 LBS | HEART RATE: 72 BPM | RESPIRATION RATE: 16 BRPM | BODY MASS INDEX: 28.13 KG/M2

## 2020-02-06 LAB
EKG ATRIAL RATE: 65 BPM
EKG DIAGNOSIS: NORMAL
EKG P AXIS: 63 DEGREES
EKG P-R INTERVAL: 182 MS
EKG Q-T INTERVAL: 378 MS
EKG QRS DURATION: 78 MS
EKG QTC CALCULATION (BAZETT): 393 MS
EKG R AXIS: 49 DEGREES
EKG T AXIS: 60 DEGREES
EKG VENTRICULAR RATE: 65 BPM

## 2020-02-06 PROCEDURE — 13100 CMPLX RPR TRUNK 1.1-2.5 CM: CPT | Performed by: SURGERY

## 2020-02-06 PROCEDURE — 6360000002 HC RX W HCPCS: Performed by: SURGERY

## 2020-02-06 PROCEDURE — 2580000003 HC RX 258: Performed by: SURGERY

## 2020-02-06 PROCEDURE — 6370000000 HC RX 637 (ALT 250 FOR IP): Performed by: ANESTHESIOLOGY

## 2020-02-06 PROCEDURE — 7100000000 HC PACU RECOVERY - FIRST 15 MIN: Performed by: SURGERY

## 2020-02-06 PROCEDURE — 2720000010 HC SURG SUPPLY STERILE: Performed by: SURGERY

## 2020-02-06 PROCEDURE — 93005 ELECTROCARDIOGRAM TRACING: CPT

## 2020-02-06 PROCEDURE — 6360000002 HC RX W HCPCS: Performed by: NURSE ANESTHETIST, CERTIFIED REGISTERED

## 2020-02-06 PROCEDURE — 3700000000 HC ANESTHESIA ATTENDED CARE: Performed by: SURGERY

## 2020-02-06 PROCEDURE — 3600000004 HC SURGERY LEVEL 4 BASE: Performed by: SURGERY

## 2020-02-06 PROCEDURE — 2709999900 HC NON-CHARGEABLE SUPPLY: Performed by: SURGERY

## 2020-02-06 PROCEDURE — 93010 ELECTROCARDIOGRAM REPORT: CPT | Performed by: INTERNAL MEDICINE

## 2020-02-06 PROCEDURE — 2500000003 HC RX 250 WO HCPCS: Performed by: SURGERY

## 2020-02-06 PROCEDURE — 3700000001 HC ADD 15 MINUTES (ANESTHESIA): Performed by: SURGERY

## 2020-02-06 PROCEDURE — 7100000011 HC PHASE II RECOVERY - ADDTL 15 MIN: Performed by: SURGERY

## 2020-02-06 PROCEDURE — 7100000010 HC PHASE II RECOVERY - FIRST 15 MIN: Performed by: SURGERY

## 2020-02-06 PROCEDURE — 2500000003 HC RX 250 WO HCPCS: Performed by: NURSE ANESTHETIST, CERTIFIED REGISTERED

## 2020-02-06 PROCEDURE — 19366 PR BREAST RECONSTRUC W OTHR TECHNIQ: CPT | Performed by: SURGERY

## 2020-02-06 PROCEDURE — 93005 ELECTROCARDIOGRAM TRACING: CPT | Performed by: SURGERY

## 2020-02-06 PROCEDURE — 6360000002 HC RX W HCPCS: Performed by: ANESTHESIOLOGY

## 2020-02-06 PROCEDURE — 3600000014 HC SURGERY LEVEL 4 ADDTL 15MIN: Performed by: SURGERY

## 2020-02-06 PROCEDURE — 2580000003 HC RX 258: Performed by: ANESTHESIOLOGY

## 2020-02-06 PROCEDURE — 7100000001 HC PACU RECOVERY - ADDTL 15 MIN: Performed by: SURGERY

## 2020-02-06 RX ORDER — HYDRALAZINE HYDROCHLORIDE 20 MG/ML
5 INJECTION INTRAMUSCULAR; INTRAVENOUS EVERY 10 MIN PRN
Status: DISCONTINUED | OUTPATIENT
Start: 2020-02-06 | End: 2020-02-06 | Stop reason: HOSPADM

## 2020-02-06 RX ORDER — GLYCOPYRROLATE 1 MG/5 ML
SYRINGE (ML) INTRAVENOUS PRN
Status: DISCONTINUED | OUTPATIENT
Start: 2020-02-06 | End: 2020-02-06 | Stop reason: SDUPTHER

## 2020-02-06 RX ORDER — FENTANYL CITRATE 50 UG/ML
INJECTION, SOLUTION INTRAMUSCULAR; INTRAVENOUS PRN
Status: DISCONTINUED | OUTPATIENT
Start: 2020-02-06 | End: 2020-02-06 | Stop reason: SDUPTHER

## 2020-02-06 RX ORDER — PROPRANOLOL HYDROCHLORIDE 20 MG/1
20 TABLET ORAL ONCE
Status: COMPLETED | OUTPATIENT
Start: 2020-02-06 | End: 2020-02-06

## 2020-02-06 RX ORDER — METOCLOPRAMIDE HYDROCHLORIDE 5 MG/ML
10 INJECTION INTRAMUSCULAR; INTRAVENOUS ONCE
Status: COMPLETED | OUTPATIENT
Start: 2020-02-06 | End: 2020-02-06

## 2020-02-06 RX ORDER — EPINEPHRINE 1 MG/ML
INJECTION, SOLUTION, CONCENTRATE INTRAVENOUS PRN
Status: DISCONTINUED | OUTPATIENT
Start: 2020-02-06 | End: 2020-02-06 | Stop reason: ALTCHOICE

## 2020-02-06 RX ORDER — MEPERIDINE HYDROCHLORIDE 25 MG/ML
12.5 INJECTION INTRAMUSCULAR; INTRAVENOUS; SUBCUTANEOUS EVERY 5 MIN PRN
Status: DISCONTINUED | OUTPATIENT
Start: 2020-02-06 | End: 2020-02-06 | Stop reason: HOSPADM

## 2020-02-06 RX ORDER — SCOLOPAMINE TRANSDERMAL SYSTEM 1 MG/1
1 PATCH, EXTENDED RELEASE TRANSDERMAL
Status: DISCONTINUED | OUTPATIENT
Start: 2020-02-06 | End: 2020-02-06 | Stop reason: HOSPADM

## 2020-02-06 RX ORDER — PROMETHAZINE HYDROCHLORIDE 25 MG/ML
6.25 INJECTION, SOLUTION INTRAMUSCULAR; INTRAVENOUS
Status: DISCONTINUED | OUTPATIENT
Start: 2020-02-06 | End: 2020-02-06 | Stop reason: HOSPADM

## 2020-02-06 RX ORDER — MAGNESIUM HYDROXIDE 1200 MG/15ML
LIQUID ORAL CONTINUOUS PRN
Status: COMPLETED | OUTPATIENT
Start: 2020-02-06 | End: 2020-02-06

## 2020-02-06 RX ORDER — OXYCODONE HYDROCHLORIDE 5 MG/1
10 TABLET ORAL ONCE
Status: COMPLETED | OUTPATIENT
Start: 2020-02-06 | End: 2020-02-06

## 2020-02-06 RX ORDER — FENTANYL CITRATE 50 UG/ML
50 INJECTION, SOLUTION INTRAMUSCULAR; INTRAVENOUS EVERY 5 MIN PRN
Status: DISCONTINUED | OUTPATIENT
Start: 2020-02-06 | End: 2020-02-06 | Stop reason: HOSPADM

## 2020-02-06 RX ORDER — ONDANSETRON 2 MG/ML
INJECTION INTRAMUSCULAR; INTRAVENOUS PRN
Status: DISCONTINUED | OUTPATIENT
Start: 2020-02-06 | End: 2020-02-06 | Stop reason: SDUPTHER

## 2020-02-06 RX ORDER — NEOSTIGMINE METHYLSULFATE 5 MG/5 ML
SYRINGE (ML) INTRAVENOUS PRN
Status: DISCONTINUED | OUTPATIENT
Start: 2020-02-06 | End: 2020-02-06 | Stop reason: SDUPTHER

## 2020-02-06 RX ORDER — LIDOCAINE HYDROCHLORIDE 10 MG/ML
INJECTION, SOLUTION INFILTRATION; PERINEURAL PRN
Status: DISCONTINUED | OUTPATIENT
Start: 2020-02-06 | End: 2020-02-06 | Stop reason: ALTCHOICE

## 2020-02-06 RX ORDER — CEPHALEXIN 500 MG/1
500 CAPSULE ORAL 4 TIMES DAILY
Qty: 28 CAPSULE | Refills: 0 | Status: SHIPPED | OUTPATIENT
Start: 2020-02-06 | End: 2020-02-13

## 2020-02-06 RX ORDER — ROCURONIUM BROMIDE 10 MG/ML
INJECTION, SOLUTION INTRAVENOUS PRN
Status: DISCONTINUED | OUTPATIENT
Start: 2020-02-06 | End: 2020-02-06 | Stop reason: SDUPTHER

## 2020-02-06 RX ORDER — PROMETHAZINE HYDROCHLORIDE 25 MG/1
25 TABLET ORAL EVERY 6 HOURS PRN
Status: DISCONTINUED | OUTPATIENT
Start: 2020-02-06 | End: 2020-02-06 | Stop reason: HOSPADM

## 2020-02-06 RX ORDER — DIPHENHYDRAMINE HYDROCHLORIDE 50 MG/ML
12.5 INJECTION INTRAMUSCULAR; INTRAVENOUS
Status: DISCONTINUED | OUTPATIENT
Start: 2020-02-06 | End: 2020-02-06 | Stop reason: HOSPADM

## 2020-02-06 RX ORDER — SODIUM CHLORIDE, SODIUM LACTATE, POTASSIUM CHLORIDE, CALCIUM CHLORIDE 600; 310; 30; 20 MG/100ML; MG/100ML; MG/100ML; MG/100ML
INJECTION, SOLUTION INTRAVENOUS CONTINUOUS
Status: DISCONTINUED | OUTPATIENT
Start: 2020-02-06 | End: 2020-02-06 | Stop reason: HOSPADM

## 2020-02-06 RX ORDER — HYDROMORPHONE HCL 110MG/55ML
PATIENT CONTROLLED ANALGESIA SYRINGE INTRAVENOUS PRN
Status: DISCONTINUED | OUTPATIENT
Start: 2020-02-06 | End: 2020-02-06 | Stop reason: SDUPTHER

## 2020-02-06 RX ORDER — MIDAZOLAM HYDROCHLORIDE 1 MG/ML
INJECTION INTRAMUSCULAR; INTRAVENOUS PRN
Status: DISCONTINUED | OUTPATIENT
Start: 2020-02-06 | End: 2020-02-06 | Stop reason: SDUPTHER

## 2020-02-06 RX ORDER — FENTANYL CITRATE 50 UG/ML
25 INJECTION, SOLUTION INTRAMUSCULAR; INTRAVENOUS EVERY 5 MIN PRN
Status: DISCONTINUED | OUTPATIENT
Start: 2020-02-06 | End: 2020-02-06 | Stop reason: HOSPADM

## 2020-02-06 RX ORDER — 0.9 % SODIUM CHLORIDE 0.9 %
500 INTRAVENOUS SOLUTION INTRAVENOUS
Status: DISCONTINUED | OUTPATIENT
Start: 2020-02-06 | End: 2020-02-06 | Stop reason: HOSPADM

## 2020-02-06 RX ORDER — LIDOCAINE HYDROCHLORIDE 20 MG/ML
INJECTION, SOLUTION INTRAVENOUS PRN
Status: DISCONTINUED | OUTPATIENT
Start: 2020-02-06 | End: 2020-02-06 | Stop reason: SDUPTHER

## 2020-02-06 RX ORDER — ONDANSETRON 2 MG/ML
4 INJECTION INTRAMUSCULAR; INTRAVENOUS
Status: COMPLETED | OUTPATIENT
Start: 2020-02-06 | End: 2020-02-06

## 2020-02-06 RX ORDER — PROPOFOL 10 MG/ML
INJECTION, EMULSION INTRAVENOUS PRN
Status: DISCONTINUED | OUTPATIENT
Start: 2020-02-06 | End: 2020-02-06 | Stop reason: SDUPTHER

## 2020-02-06 RX ADMIN — PHENYLEPHRINE HYDROCHLORIDE 80 MCG: 10 INJECTION, SOLUTION INTRAMUSCULAR; INTRAVENOUS; SUBCUTANEOUS at 14:35

## 2020-02-06 RX ADMIN — PHENYLEPHRINE HYDROCHLORIDE 160 MCG: 10 INJECTION, SOLUTION INTRAMUSCULAR; INTRAVENOUS; SUBCUTANEOUS at 14:15

## 2020-02-06 RX ADMIN — LIDOCAINE HYDROCHLORIDE 100 MG: 20 INJECTION, SOLUTION INTRAVENOUS at 14:06

## 2020-02-06 RX ADMIN — ONDANSETRON 8 MG: 2 INJECTION INTRAMUSCULAR; INTRAVENOUS at 15:00

## 2020-02-06 RX ADMIN — PHENYLEPHRINE HYDROCHLORIDE 80 MCG: 10 INJECTION, SOLUTION INTRAMUSCULAR; INTRAVENOUS; SUBCUTANEOUS at 14:20

## 2020-02-06 RX ADMIN — OXYCODONE 10 MG: 5 TABLET ORAL at 17:15

## 2020-02-06 RX ADMIN — PROPOFOL 200 MG: 10 INJECTION, EMULSION INTRAVENOUS at 14:06

## 2020-02-06 RX ADMIN — METOCLOPRAMIDE 10 MG: 5 INJECTION, SOLUTION INTRAMUSCULAR; INTRAVENOUS at 19:15

## 2020-02-06 RX ADMIN — SODIUM CHLORIDE, SODIUM LACTATE, POTASSIUM CHLORIDE, AND CALCIUM CHLORIDE: 600; 310; 30; 20 INJECTION, SOLUTION INTRAVENOUS at 13:45

## 2020-02-06 RX ADMIN — ONDANSETRON 4 MG: 2 INJECTION INTRAMUSCULAR; INTRAVENOUS at 17:00

## 2020-02-06 RX ADMIN — Medication 5 MG: at 15:49

## 2020-02-06 RX ADMIN — PHENYLEPHRINE HYDROCHLORIDE 160 MCG: 10 INJECTION, SOLUTION INTRAMUSCULAR; INTRAVENOUS; SUBCUTANEOUS at 14:32

## 2020-02-06 RX ADMIN — Medication 0.2 MG: at 14:31

## 2020-02-06 RX ADMIN — PROPRANOLOL HYDROCHLORIDE 20 MG: 20 TABLET ORAL at 13:03

## 2020-02-06 RX ADMIN — FENTANYL CITRATE 100 MCG: 50 INJECTION INTRAMUSCULAR; INTRAVENOUS at 14:06

## 2020-02-06 RX ADMIN — MIDAZOLAM HYDROCHLORIDE 2 MG: 2 INJECTION, SOLUTION INTRAMUSCULAR; INTRAVENOUS at 14:06

## 2020-02-06 RX ADMIN — ROCURONIUM BROMIDE 30 MG: 10 INJECTION, SOLUTION INTRAVENOUS at 14:07

## 2020-02-06 RX ADMIN — HYDROMORPHONE HYDROCHLORIDE 1 MG: 2 INJECTION, SOLUTION INTRAMUSCULAR; INTRAVENOUS; SUBCUTANEOUS at 14:55

## 2020-02-06 RX ADMIN — Medication 0.8 MG: at 15:49

## 2020-02-06 RX ADMIN — CEFAZOLIN 2 G: 10 INJECTION, POWDER, FOR SOLUTION INTRAVENOUS at 14:13

## 2020-02-06 RX ADMIN — HYDROMORPHONE HYDROCHLORIDE 1 MG: 2 INJECTION, SOLUTION INTRAMUSCULAR; INTRAVENOUS; SUBCUTANEOUS at 15:13

## 2020-02-06 RX ADMIN — PHENYLEPHRINE HYDROCHLORIDE 80 MCG: 10 INJECTION, SOLUTION INTRAMUSCULAR; INTRAVENOUS; SUBCUTANEOUS at 14:26

## 2020-02-06 RX ADMIN — PROMETHAZINE HYDROCHLORIDE 25 MG: 25 TABLET ORAL at 17:28

## 2020-02-06 RX ADMIN — SODIUM CHLORIDE, SODIUM LACTATE, POTASSIUM CHLORIDE, AND CALCIUM CHLORIDE: 600; 310; 30; 20 INJECTION, SOLUTION INTRAVENOUS at 15:06

## 2020-02-06 ASSESSMENT — PULMONARY FUNCTION TESTS
PIF_VALUE: 21
PIF_VALUE: 20
PIF_VALUE: 18
PIF_VALUE: 17
PIF_VALUE: 15
PIF_VALUE: 20
PIF_VALUE: 20
PIF_VALUE: 1
PIF_VALUE: 20
PIF_VALUE: 20
PIF_VALUE: 13
PIF_VALUE: 20
PIF_VALUE: 21
PIF_VALUE: 20
PIF_VALUE: 20
PIF_VALUE: 19
PIF_VALUE: 20
PIF_VALUE: 18
PIF_VALUE: 18
PIF_VALUE: 19
PIF_VALUE: 20
PIF_VALUE: 20
PIF_VALUE: 21
PIF_VALUE: 20
PIF_VALUE: 15
PIF_VALUE: 20
PIF_VALUE: 22
PIF_VALUE: 15
PIF_VALUE: 1
PIF_VALUE: 20
PIF_VALUE: 23
PIF_VALUE: 20
PIF_VALUE: 21
PIF_VALUE: 18
PIF_VALUE: 20
PIF_VALUE: 18
PIF_VALUE: 17
PIF_VALUE: 21
PIF_VALUE: 1
PIF_VALUE: 20
PIF_VALUE: 20
PIF_VALUE: 1
PIF_VALUE: 20
PIF_VALUE: 2
PIF_VALUE: 21
PIF_VALUE: 21
PIF_VALUE: 19
PIF_VALUE: 20
PIF_VALUE: 18
PIF_VALUE: 20
PIF_VALUE: 21
PIF_VALUE: 21
PIF_VALUE: 15
PIF_VALUE: 12
PIF_VALUE: 20
PIF_VALUE: 19
PIF_VALUE: 26
PIF_VALUE: 20
PIF_VALUE: 21
PIF_VALUE: 20
PIF_VALUE: 21
PIF_VALUE: 15
PIF_VALUE: 20
PIF_VALUE: 1
PIF_VALUE: 21
PIF_VALUE: 21
PIF_VALUE: 20
PIF_VALUE: 21
PIF_VALUE: 21
PIF_VALUE: 20
PIF_VALUE: 18
PIF_VALUE: 20
PIF_VALUE: 23
PIF_VALUE: 23
PIF_VALUE: 18
PIF_VALUE: 20
PIF_VALUE: 18
PIF_VALUE: 20
PIF_VALUE: 21
PIF_VALUE: 21
PIF_VALUE: 20
PIF_VALUE: 3
PIF_VALUE: 20
PIF_VALUE: 18
PIF_VALUE: 20
PIF_VALUE: 19
PIF_VALUE: 20
PIF_VALUE: 20
PIF_VALUE: 2
PIF_VALUE: 20
PIF_VALUE: 20
PIF_VALUE: 11
PIF_VALUE: 20
PIF_VALUE: 21
PIF_VALUE: 20
PIF_VALUE: 20
PIF_VALUE: 13
PIF_VALUE: 18
PIF_VALUE: 20
PIF_VALUE: 19
PIF_VALUE: 20
PIF_VALUE: 22
PIF_VALUE: 20

## 2020-02-06 ASSESSMENT — PAIN DESCRIPTION - ORIENTATION: ORIENTATION: POSTERIOR;RIGHT;LEFT

## 2020-02-06 ASSESSMENT — PAIN SCALES - GENERAL
PAINLEVEL_OUTOF10: 6
PAINLEVEL_OUTOF10: 0

## 2020-02-06 ASSESSMENT — PAIN DESCRIPTION - ONSET: ONSET: ON-GOING

## 2020-02-06 ASSESSMENT — PAIN DESCRIPTION - PROGRESSION: CLINICAL_PROGRESSION: NOT CHANGED

## 2020-02-06 ASSESSMENT — PAIN DESCRIPTION - FREQUENCY: FREQUENCY: CONTINUOUS

## 2020-02-06 ASSESSMENT — PAIN - FUNCTIONAL ASSESSMENT: PAIN_FUNCTIONAL_ASSESSMENT: 0-10

## 2020-02-06 ASSESSMENT — LIFESTYLE VARIABLES: SMOKING_STATUS: 0

## 2020-02-06 ASSESSMENT — PAIN DESCRIPTION - PAIN TYPE: TYPE: ACUTE PAIN;SURGICAL PAIN

## 2020-02-06 ASSESSMENT — PAIN DESCRIPTION - LOCATION: LOCATION: SHOULDER

## 2020-02-06 NOTE — H&P
[place] [employer]    [phone]    [facility] Same Day Surgery Update H & P  Department of General Surgery   Surgical Service   Pre-operative History and Physical  Last H & P within the last 30 days.     DIAGNOSIS:   breast cancer s/p expander placement    PROCEDURE:  WI REVISE BREAST RECONSTRUCTION [30423] (REVISION BILATERAL BREAST RECONSTRUCTION;)  WI OFFICE/OUTPT VISIT,PROCEDURE ONLY [30839] (WITH HIGH VOLUME FAT GRAFTING)      HISTORY OF PRESENT ILLNESS:   Patient is S/P implant based breast reconstruction after being diagnosed at high risk for breast cancer (2015) and exchange of bilateral breast implants in (2019) presents today for the above procedure     Past Medical History:        Diagnosis Date    Arrythmia     Asthma     no problems recently    Back pain     Breast cancer ([address]     Breast lump     right    Cervical cancer ([address]     Chronic hepatitis C ([address]     Chronic pain     DDD (degenerative disc disease)     Diverticulitis     Fibromyalgia     GERD (gastroesophageal reflux disease)     Headache     migraines    Heart disease     tachycardia     Immune deficiency disorder (HCC)     Interstitial cystitis     Nausea & vomiting     Osteoarthritis     Other closed fractures of distal end of radius (alone) 12/19/2009    distal radius fx surgery 12/28/2009 Dr. Salvador Blevins    Pneumonia     PONV (postoperative nausea and vomiting)     scop patch and Phenergan work best     Rheumatoid arthritis(714.0)      Past Surgical History:        Procedure Laterality Date    APPENDECTOMY      BLADDER SURGERY      BREAST SURGERY      bilat mastectomy    CARDIOVERSION      CARPAL TUNNEL RELEASE Right 7/2/14    removal of hardware    CHOLECYSTECTOMY      COLONOSCOPY      with polyectomy    CYSTOSCOPY  8-17-11    with bladder and urethral dilatation    HERNIA REPAIR  03/24/2017    repair of umbilical hernia with primary closure, exploration of LLQ subcutaneous space without definitive findings of lipoma    HYSTERECTOMY      LAPAROSCOPIC APPENDECTOMY  3/18/13    LAPAROSCOPY  3/18/13    RECONSTRUCTION BREAST IMMEDIATE / DELAYED W/ TISSUE EXPANDER Bilateral 9/26/2019    EXCHANGE OF BILATERAL BREAST IMPLANTS ; AND BILATERAL CAPSULECTOMY, REVISION BILATERAL BREAST RECONSTRUCTION performed by Charito Redd MD at [address] [facility] ENDOSCOPY      1/09    UPPER GASTROINTESTINAL ENDOSCOPY  5/28/2010    UPPER GASTROINTESTINAL ENDOSCOPY  2/13/13    WRIST FRACTURE SURGERY  12/28/09    Open treatment with open reduction, internal fixation rightdistal radius using     Past Social History:  Social History     Socioeconomic History    Marital status:      Spouse name: Not on file    Number of children: 3    Years of education: Not on file    Highest education level: Not on file   Occupational History    Not on file   Social Needs    Financial resource strain: Not on file    Food insecurity:     Worry: Not on file     Inability: Not on file    Transportation needs:     Medical: Not on file     Non-medical: Not on file   Tobacco Use    Smoking status: Never Smoker    Smokeless tobacco: Never Used    Tobacco comment: encouraged to never smoke    Substance and Sexual Activity    Alcohol use:  Yes     Alcohol/week: 0.0 standard drinks     Comment: socially; special occasions 1 every other week    Drug use: No    Sexual activity: Yes     Partners: Male   Lifestyle    Physical activity:     Days per week: Not on file     Minutes per session: Not on file    Stress: Not on file   Relationships    Social connections:     Talks on phone: Not on file     Gets together: Not on file     Attends [employer] service: Not on file     Active member of club or organization: Not on file     Attends meetings of clubs or organizations: Not on file     Relationship status: Not on file    Intimate partner violence:     Fear of current or ex partner: Not on file

## 2020-02-06 NOTE — ANESTHESIA PRE PROCEDURE
Department of Anesthesiology  Preprocedure Note       Name:  Laura Aranda   Age:  44 y.o.  :  1980                                          MRN:  [id number]         Date:  2020      Surgeon: Everardo Saenz):  Brooke Whitaker MD    Procedure: REVISION BILATERAL BREAST RECONSTRUCTION; (Bilateral )  WITH HIGH VOLUME FAT GRAFTING (Bilateral )    Medications prior to admission:   Prior to Admission medications    Medication Sig Start Date End Date Taking? Authorizing Provider   promethazine (PHENERGAN) 25 MG tablet Take 25 mg by mouth 2 times daily as needed for Nausea   Yes Historical Provider, MD   famotidine (PEPCID) 40 MG tablet Take 1 tablet by mouth 2 times daily 19  Yes Romy Wood DO   tiZANidine (ZANAFLEX) 4 MG tablet Take 1 tablet by mouth every 8 hours as needed (muscle spasm) 19  Yes Romy Wood DO   buPROPion (WELLBUTRIN SR) 150 MG extended release tablet Take 1 tablet by mouth 2 times daily 19  Yes Romy Wood DO   albuterol sulfate  (90 Base) MCG/ACT inhaler Inhale 2 puffs into the lungs 4 times daily as needed for Wheezing 19  Yes DONALD Peace NP   OxyCODONE ER (XTAMPZA ER) 9 MG C12A Take 9 mg by mouth 2 times daily as needed. Yes Historical Provider, MD   propranolol (INDERAL) 20 MG tablet Take 1 tablet by mouth 3 times daily  Patient taking differently: Take 20 mg by mouth 2 times daily  17  Yes Romy Wood DO   naltrexone-bupropion (CONTRAVE) 8-90 MG per extended release tablet One daily for 7 days then increase to one bid for seven days   2 q am 1 q pm for one week then two bid 20   Romy Wood DO   oseltamivir (TAMIFLU) 75 MG capsule Take 1 capsule by mouth 2 times daily for 5 days 2/3/20 2/8/20  Romy Wood DO   phentermine (ADIPEX-P) 37.5 MG tablet Take 1 tablet by mouth every morning (before breakfast) for 30 days.  20  Romy Wood DO   ondansetron (ZOFRAN) 4 MG tablet Take 1 tablet by mouth every 8 hours as needed for Nausea 11/20/19   Steven Licea, APRN - CNP   NONFORMULARY EPI PEN PRN    Historical Provider, MD   famciclovir (FAMVIR) 500 MG tablet TAKE ONE TABLET BY MOUTH THREE TIMES DAILY  Patient taking differently: 3 times daily as needed TAKE ONE TABLET BY MOUTH THREE TIMES DAILY 9/18/19   Romy Wood DO   triamcinolone (KENALOG) 0.1 % cream Apply topically 2 times daily. Patient taking differently: as needed Apply topically 2 times daily. 9/11/19   Romy Wood DO   hydrochlorothiazide (HYDRODIURIL) 25 MG tablet Take 1 tablet by mouth daily  Patient taking differently: Take 25 mg by mouth daily as needed (swelling)  5/23/17   Romy Wood DO       Current medications:    Current Facility-Administered Medications   Medication Dose Route Frequency Provider Last Rate Last Dose    ceFAZolin (ANCEF) 2 g in dextrose 5 % 50 mL IVPB  2 g Intravenous Once Flor Coffey MD        lactated ringers infusion   Intravenous Continuous [place] DO Paige        scopolamine (TRANSDERM-SCOP) transdermal patch 1 patch  1 patch Transdermal Q72H Amanda Cortez DO           Allergies:     Allergies   Allergen Reactions    Latex Hives and Itching     After surgery x2     Shellfish-Derived Products Shortness Of Breath    Tape Evelyn Baptist Tape] Rash     Paper tape OK     Iodides Other (See Comments)     pt states tachycardia    Tylenol [Acetaminophen]      History of hep c    Bactrim [Sulfamethoxazole-Trimethoprim] Nausea And Vomiting and Other (See Comments)    Codeine Nausea And Vomiting and Palpitations    Morphine Itching and Nausea And Vomiting     \"makes me crazy\"     Prednisone Hives, Palpitations and Rash       Problem List:    Patient Active Problem List   Diagnosis Code    Right distal radius fracture, s/p ORIF 12/29/09 S52.599A    Arrhythmia I49.9    Anxiety F41.9    Fibromyalgia M79.7    GERD (gastroesophageal reflux disease) K21.9    Asthma J45.909    DDD (degenerative mastectomy    CARDIOVERSION      CARPAL TUNNEL RELEASE Right 7/2/14    removal of hardware    CHOLECYSTECTOMY      COLONOSCOPY      with polyectomy    CYSTOSCOPY  8-17-11    with bladder and urethral dilatation    HERNIA REPAIR  03/24/2017    repair of umbilical hernia with primary closure, exploration of LLQ subcutaneous space without definitive findings of lipoma    HYSTERECTOMY      LAPAROSCOPIC APPENDECTOMY  3/18/13    LAPAROSCOPY  3/18/13    RECONSTRUCTION BREAST IMMEDIATE / DELAYED W/ TISSUE EXPANDER Bilateral 9/26/2019    EXCHANGE OF BILATERAL BREAST IMPLANTS ; AND BILATERAL CAPSULECTOMY, REVISION BILATERAL BREAST RECONSTRUCTION performed by Roula Spann MD at [address] [facility] ENDOSCOPY      1/09    UPPER GASTROINTESTINAL ENDOSCOPY  5/28/2010    UPPER GASTROINTESTINAL ENDOSCOPY  2/13/13    WRIST FRACTURE SURGERY  12/28/09    Open treatment with open reduction, internal fixation rightdistal radius using       Social History:    Social History     Tobacco Use    Smoking status: Never Smoker    Smokeless tobacco: Never Used    Tobacco comment: encouraged to never smoke    Substance Use Topics    Alcohol use:  Yes     Alcohol/week: 0.0 standard drinks     Comment: socially; special occasions 1 every other week                                Counseling given: Not Answered  Comment: encouraged to never smoke       Vital Signs (Current):   Vitals:    02/06/20 1237   BP: 109/76   Pulse: 76   Resp: 16   Temp: 97.8 °F (36.6 °C)   TempSrc: Oral   SpO2: 96%   Weight: 149 lb (67.6 kg)   Height: 5' 1\" (1.549 m)                                              BP Readings from Last 3 Encounters:   02/06/20 109/76   01/22/20 110/62   01/13/20 126/87       NPO Status: Time of last liquid consumption: 2130                        Time of last solid consumption: 2130                        Date of last liquid consumption: 02/05/20                        Date I    Rhythm: regular  Rate: normal           Beta Blocker:  Dose within 24 Hrs         Neuro/Psych:   (+) neuromuscular disease (fibromyalgia ):,             GI/Hepatic/Renal:   (+) GERD: well controlled, hepatitis: B and C, liver disease (chronic hep c):,           Endo/Other:    (+) : arthritis: rheumatoid. , malignancy/cancer (hx of cervical ca 2002   /  breast ca  9/19  ). Abdominal:           Vascular:                                        Anesthesia Plan      general     ASA 3       Induction: intravenous. MIPS: Postoperative opioids intended and Prophylactic antiemetics administered. Anesthetic plan and risks discussed with patient and spouse. Plan discussed with CRNA.     Attending anesthesiologist reviewed and agrees with DO Ronald   2/6/2020

## 2020-02-06 NOTE — OP NOTE
[place] [address] SURGERY     OPERATIVE DICTATION    NAME: Rachid Scott   MRN: [id number]  DATE: 2/6/2020    AGE: 44 y.o. SURGEON: Werner Cano MD  ASSISTANT: Jaqui ORLANDO)    PREOPERATIVE DIAGNOSIS: Breast deformity after mastectomy with previous implant based reconstruction. POSTOPERATIVE DIAGNOSIS: Breast deformity after mastectomy with previous implant based reconstruction. OPERATION: 1) Revision bilateral breast reconstruction with fat grafting    2) Scar revision left breast (1.7 cm)    ANESTHESIA: General     ESTIMATED BLOOD LOSS: 30 mL    OPERATIVE INDICATIONS: This is a 44 y.o. female who previously underwent implant-based breast reconstruction as she was found to be at high risk for development of breast cancer at an outside facility. She developed pain, multiple masses, and significant lateralization and presented to our office. She underwent revision of her breast reconstruction with smaller implants and extensive capsule work, with some improvement. However, she has significant deficiency in the medial aspects of the bilateral breasts - worse on the left. She additionally notes that she has a significant animation deformity. We discussed options including removal of her implants or transitioning to the prepectoral position. She did not want to undergo as extensive an operation at this juncture. We additionally discussed autologous fat grafting for improvement in these deficient areas with revision of the lateral dog ear on the left breast.  The risks, benefits, alternatives, outcomes, personnel involved with the aforementioned procedure were discussed in detail with the patient. After all questions were answered in a satisfactory manner, she agreed to proceed. OPERATIVE PROCEDURE: The patient was marked in the standing position in the preoperative holding area.   She was then brought to the operating room and underwent satisfactory induction with general

## 2020-02-06 NOTE — FLOWSHEET NOTE
02/06/20 1230   Encounter Summary   Services provided to: Patient and family together   Referral/Consult From: [facility] Significant other   Continue Visiting   (2/6, ernie )   Complexity of Encounter Moderate   Length of Encounter 15 minutes   Routine   Type Pre-procedure   Assessment Calm; Approachable; Hopeful   Intervention Active listening;Explored feelings, thoughts, concerns;Nurtured hope   Outcome Comfort;Expressed gratitude   Staff [place], [place]

## 2020-02-06 NOTE — BRIEF OP NOTE
Brief Postoperative Note  ______________________________________________________________    Patient: Nicky Martinez  YOB: 1980  MRN: [id number]  Date of Procedure: 2/6/2020    Pre-Op Diagnosis: breast cancer s/p expander placement    Post-Op Diagnosis: Same       Procedure(s):  1) Revision bilateral breast reconstruction with high volume fat grafting  2) Scar revision of left breast (1.7 cm)    Anesthesia: General    Surgeon(s):  Kamala Griffith MD    Estimated Blood Loss (mL): less than 50     Complications: None    Specimens:   * No specimens in log *    Implants:  * No implants in log *      Drains:   Urethral Catheter Non-latex 16 fr (Active)     Findings: See operative dictation    Kamala Griffith MD  Date: 2/6/2020  Time: 3:45 PM [id number]

## 2020-02-07 NOTE — FLOWSHEET NOTE
Patient taking pepsi and applesauce. Seems to be perking up a bit. Now states that pain is \"tolerable\" and nausea is improving. States, \"I just want to go home\". Will progress to discharge.

## 2020-02-11 ENCOUNTER — TELEPHONE (OUTPATIENT)
Dept: SURGERY | Age: 40
End: 2020-02-11

## 2020-02-12 ENCOUNTER — OFFICE VISIT (OUTPATIENT)
Dept: SURGERY | Age: 40
End: 2020-02-12

## 2020-02-12 VITALS
BODY MASS INDEX: 29 KG/M2 | DIASTOLIC BLOOD PRESSURE: 83 MMHG | SYSTOLIC BLOOD PRESSURE: 119 MMHG | HEART RATE: 89 BPM | TEMPERATURE: 97.5 F | WEIGHT: 153.6 LBS | OXYGEN SATURATION: 100 % | RESPIRATION RATE: 16 BRPM | HEIGHT: 61 IN

## 2020-02-12 PROCEDURE — 99024 POSTOP FOLLOW-UP VISIT: CPT | Performed by: SURGERY

## 2020-02-12 NOTE — PROGRESS NOTES
[facility]    PROCEDURE: 1) Revision bilateral breast reconstruction with fat grafting                            2) Scar revision left breast (1.7 cm)  DATE: 2/6/20    Sulma Rodríguez has been recovering satisfactorily since her procedure. Pain has been well controlled with the prescribed pain management regimen. EXAM    /83   Pulse 89   Temp 97.5 °F (36.4 °C)   Resp 16   Ht 5' 1\" (1.549 m)   Wt 153 lb 9.6 oz (69.7 kg)   LMP 05/25/2002   SpO2 100%   BMI 29.02 kg/m²     GEN: NAD   BREAST: Incisions healed with appropriate ecchymosis  BACK: Appropriate ecchymosis    IMP: 44 y. o.female s/p revision breast reconstruction  PLAN: Doing well overall. Continue with compression. Will follow-up in 1 month.      Seema Morales MD  [address] Reconstructive Surgery  [phone]  02/12/20

## 2020-03-02 ENCOUNTER — OFFICE VISIT (OUTPATIENT)
Dept: FAMILY MEDICINE CLINIC | Age: 40
End: 2020-03-02
Payer: COMMERCIAL

## 2020-03-02 VITALS
HEIGHT: 61 IN | SYSTOLIC BLOOD PRESSURE: 128 MMHG | TEMPERATURE: 98.5 F | BODY MASS INDEX: 28.51 KG/M2 | WEIGHT: 151 LBS | DIASTOLIC BLOOD PRESSURE: 70 MMHG

## 2020-03-02 LAB
BILIRUBIN, POC: ABNORMAL
BLOOD URINE, POC: ABNORMAL
CLARITY, POC: ABNORMAL
COLOR, POC: YELLOW
GLUCOSE URINE, POC: ABNORMAL
KETONES, POC: ABNORMAL
LEUKOCYTE EST, POC: ABNORMAL
NITRITE, POC: ABNORMAL
PH, POC: 5.5
PROTEIN, POC: ABNORMAL
SPECIFIC GRAVITY, POC: 1.02
UROBILINOGEN, POC: 0.2

## 2020-03-02 PROCEDURE — 81002 URINALYSIS NONAUTO W/O SCOPE: CPT | Performed by: FAMILY MEDICINE

## 2020-03-02 PROCEDURE — 99214 OFFICE O/P EST MOD 30 MIN: CPT | Performed by: FAMILY MEDICINE

## 2020-03-02 RX ORDER — FAMCICLOVIR 500 MG/1
500 TABLET, FILM COATED ORAL 3 TIMES DAILY
Qty: 30 TABLET | Refills: 1 | Status: SHIPPED | OUTPATIENT
Start: 2020-03-02 | End: 2020-11-27 | Stop reason: SDUPTHER

## 2020-03-02 RX ORDER — ALPRAZOLAM 0.5 MG/1
0.5 TABLET ORAL 2 TIMES DAILY PRN
Qty: 60 TABLET | Refills: 2 | Status: SHIPPED | OUTPATIENT
Start: 2020-03-02 | End: 2020-04-01

## 2020-03-02 RX ORDER — DEXTROMETHORPHAN HYDROBROMIDE AND PROMETHAZINE HYDROCHLORIDE 15; 6.25 MG/5ML; MG/5ML
5 SYRUP ORAL 4 TIMES DAILY PRN
Qty: 180 ML | Refills: 0 | Status: SHIPPED | OUTPATIENT
Start: 2020-03-02 | End: 2020-04-01

## 2020-03-02 RX ORDER — CIPROFLOXACIN 500 MG/1
500 TABLET, FILM COATED ORAL 2 TIMES DAILY
Qty: 20 TABLET | Refills: 0 | Status: SHIPPED | OUTPATIENT
Start: 2020-03-02 | End: 2020-05-21 | Stop reason: SDUPTHER

## 2020-03-02 ASSESSMENT — ENCOUNTER SYMPTOMS
SINUS PRESSURE: 1
COUGH: 1

## 2020-03-02 NOTE — LETTER
[facility]  [facility] [address] [address]  Phone: [phone]  Fax: [phone]    Michelle Morrison DO        March 2, 2020     Patient: Nicky Martinez   YOB: 1980   Date of Visit: 3/2/2020       To Whom it May Concern:    Jackie Garcia was seen in my clinic on 3/2/2020. She was prescribed promethazine-dextromethorphan for her illness. If you have any questions or concerns, please don't hesitate to call.     Sincerely,         Michelle Morrison DO

## 2020-03-09 ENCOUNTER — TELEPHONE (OUTPATIENT)
Dept: FAMILY MEDICINE CLINIC | Age: 40
End: 2020-03-09

## 2020-03-12 ENCOUNTER — OFFICE VISIT (OUTPATIENT)
Dept: FAMILY MEDICINE CLINIC | Age: 40
End: 2020-03-12
Payer: COMMERCIAL

## 2020-03-12 VITALS
SYSTOLIC BLOOD PRESSURE: 118 MMHG | DIASTOLIC BLOOD PRESSURE: 78 MMHG | WEIGHT: 152 LBS | BODY MASS INDEX: 28.7 KG/M2 | HEIGHT: 61 IN

## 2020-03-12 PROCEDURE — 99214 OFFICE O/P EST MOD 30 MIN: CPT | Performed by: FAMILY MEDICINE

## 2020-03-12 RX ORDER — MECLIZINE HYDROCHLORIDE 25 MG/1
25 TABLET ORAL 3 TIMES DAILY PRN
Qty: 30 TABLET | Refills: 0 | Status: SHIPPED | OUTPATIENT
Start: 2020-03-12 | End: 2020-03-22

## 2020-03-12 ASSESSMENT — ENCOUNTER SYMPTOMS: BACK PAIN: 1
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
## 2020-04-02 RX ORDER — PROMETHAZINE HYDROCHLORIDE 25 MG/1
TABLET ORAL
Qty: 60 TABLET | Refills: 2 | OUTPATIENT
Start: 2020-04-02

## 2020-04-02 RX ORDER — TIZANIDINE 4 MG/1
4 TABLET ORAL EVERY 8 HOURS PRN
Qty: 30 TABLET | Refills: 2 | OUTPATIENT
Start: 2020-04-02

## 2020-04-13 ENCOUNTER — TELEPHONE (OUTPATIENT)
Dept: FAMILY MEDICINE CLINIC | Age: 40
End: 2020-04-13

## 2020-04-13 RX ORDER — FAMCICLOVIR 500 MG/1
500 TABLET, FILM COATED ORAL 3 TIMES DAILY
Qty: 30 TABLET | Refills: 0 | Status: SHIPPED | OUTPATIENT
Start: 2020-04-13 | End: 2020-04-23

## 2020-04-13 NOTE — TELEPHONE ENCOUNTER
Pt called to let Dr Karly Fish know that she has a rash that started behind her right ear on Saturday. She had jaw and ear pain that begin on Friday. The kimberly has since spreaded to her neck, hair, mouth in side and out and turned to blisters. Her cousins is a nurse and she told her that it was the shingles. she can not sleep because it is so painful and she can not eat. Please give pt a call to let her know what to do [phone].  [address] [facility] [phone]

## 2020-04-13 NOTE — TELEPHONE ENCOUNTER
Pt advised. Would like to know:    Would Elavil help with the pain? Do any of the antivirals come in liquid? Can you increase the mg of her phenergan?

## 2020-05-04 ENCOUNTER — TELEPHONE (OUTPATIENT)
Dept: SURGERY | Age: 40
End: 2020-05-04

## 2020-05-08 ENCOUNTER — TELEPHONE (OUTPATIENT)
Dept: SURGERY | Age: 40
End: 2020-05-08

## 2020-05-12 ENCOUNTER — TELEPHONE (OUTPATIENT)
Dept: FAMILY MEDICINE CLINIC | Age: 40
End: 2020-05-12

## 2020-05-12 ENCOUNTER — OFFICE VISIT (OUTPATIENT)
Dept: PRIMARY CARE CLINIC | Age: 40
End: 2020-05-12
Payer: COMMERCIAL

## 2020-05-12 VITALS — OXYGEN SATURATION: 99 % | TEMPERATURE: 97.2 F | HEART RATE: 103 BPM

## 2020-05-12 PROCEDURE — 99213 OFFICE O/P EST LOW 20 MIN: CPT | Performed by: NURSE PRACTITIONER

## 2020-05-12 RX ORDER — ALBUTEROL SULFATE 90 UG/1
2 AEROSOL, METERED RESPIRATORY (INHALATION) 4 TIMES DAILY PRN
Qty: 3 INHALER | Refills: 0 | Status: SHIPPED | OUTPATIENT
Start: 2020-05-12 | End: 2020-12-17

## 2020-05-12 NOTE — PROGRESS NOTES
(Boostrix, Adacel) 08/13/2015   ,   Health Maintenance   Topic Date Due    Hepatitis A vaccine (1 of 2 - Risk 2-dose series) 12/14/1981    Varicella vaccine (1 of 2 - 2-dose childhood series) 12/14/1981    HIV screen  12/14/1995    Hepatitis B vaccine (1 of 3 - Risk 3-dose series) 12/14/1999    Cervical cancer screen  02/12/2019    Flu vaccine (Season Ended) 09/01/2020    Potassium monitoring  01/22/2021    Creatinine monitoring  01/22/2021    DTaP/Tdap/Td vaccine (3 - Td) 08/13/2025    Pneumococcal 0-64 years Vaccine  Completed    Hib vaccine  Aged Out    Meningococcal (ACWY) vaccine  Aged Out       VITALS:  Vitals:    05/12/20 1303   Pulse: 103   Temp: 97.2 °F (36.2 °C)   SpO2: 99%       Physical Exam:  [x]Alert  [x]Oriented to person/place/time   [x]No apparent distress   []Ill appearing   []Lips are cyanotic   [x]Breathing appears normal   [x]Patient can speak in full sentences  []Appears tachypneic     TESTS ORDERED:  [] POC Flu ordered  [] POC Strep ordered  [x] COVID 19 ordered    RESULTS:  No results found for this visit on 05/12/20. ASSESSMENT/PLAN:  Radha Walker was seen today for cough. Diagnoses and all orders for this visit:    Suspected COVID-19 virus infection  -     COVID-19 Ambulatory; Future  -     COVID-19 Ambulatory    Close exposure to COVID-19 virus  -     COVID-19 Ambulatory; Future  -     COVID-19 Ambulatory      Educated patient on precautions at home and provided written CDC recommendations for COVID 19. Treat symptoms with OTC medications. May use tylenol for fever and pain. Avoid all medications containing NSAIDs. Patient instructed to call PCP if symptoms worsen or fail to improve, and to go to the ED if develops red flags (these symptoms were reviewed with patient). Patient informed can also return to this site for reassessment of related symptoms. Patient expressed understanding.   Discharge home with written instructions for:  [] Flu management including medication treatment if qualifies  [] [facility] guildelines for self-quarantine in an asymptomatic patient with COVID-19 exposure   (2 weeks if no symptoms. If symptoms develop then patient follows isolation guidelines below.)  [x] CDC guidelines for isolation in symptomatic patient with assumed COVID-19;        TO END ISOLATION: (Patient understands these guidelines are subject to change)  1. No fever for at least 72 hours without medications AND  2. Symptoms have resolved AND  3. At least 10 days from initial symptom onset  Patient understands that a hands-on exam could not be completed during this high risk assessment due to the COVID-19 pandemic. This note will be routed to the patient's PCP to inform of this limited assessment. An  electronic signature was used to authenticate this note.     --DONALD Dave CNP on 5/12/2020 at 3:09 PM

## 2020-05-12 NOTE — PATIENT INSTRUCTIONS
have a medical emergency and need to call 911, notify the dispatch personnel that you have, or are being evaluated for COVID-19. If possible, put on a facemask before emergency medical services arrive. Discontinuing home isolation  Patients with confirmed COVID-19 should remain under home isolation precautions until the risk of secondary transmission to others is thought to be low. The decision to discontinue home isolation precautions should be made on a case-by-case basis, in consultation with healthcare providers and state and local health departments. The medication list included in this document is our record of what you are currently taking, including any changes that were made at today's visit.  If you find any differences when compared to your medications at home, or have any questions that were not answered at your visit, please contact the office.     Leydi Alvarado, DO

## 2020-05-14 LAB
SARS-COV-2: NOT DETECTED
SOURCE: NORMAL

## 2020-05-14 NOTE — RESULT ENCOUNTER NOTE
Please contact patient with their testing results: Your test for COVID-19, also known as novel coronavirus, came back negative. No virus was detected from the sample from your nose. Until your symptoms are fully resolved, you may still be contagious. We recommend that you remain isolated for 7 days minimum or 72 hours after your symptoms have completely resolved, whichever is longer. For example, if all symptoms improve after 2 days, you should still remain isolated for 7 days. If your symptoms get better after 10 days, you should remain isolated for 13 days. Continually monitor symptoms. Contact a medical provider if symptoms are worsening. If you have any additional questions, contact your doctor.     For additional information, please visit the Centers for Disease Control and Prevention ([place].orgr.com.cy

## 2020-05-21 ENCOUNTER — TELEPHONE (OUTPATIENT)
Dept: FAMILY MEDICINE CLINIC | Age: 40
End: 2020-05-21

## 2020-05-21 RX ORDER — CIPROFLOXACIN 500 MG/1
500 TABLET, FILM COATED ORAL 2 TIMES DAILY
Qty: 20 TABLET | Refills: 0 | Status: SHIPPED | OUTPATIENT
Start: 2020-05-21 | End: 2020-05-31

## 2020-05-21 NOTE — TELEPHONE ENCOUNTER
Pharmacy is calling about a drug interaction the following medication ciprofloxacin (CIPRO) 500 MG tablet has with her current meds.  Please call pharmacy [phone]

## 2020-05-21 NOTE — TELEPHONE ENCOUNTER
Sp to pt. . states she believes that she has bronchitis. Started 2 weeks ago, deep cough. Chest pain. Was tested for covid and came back negative. Pt was given a inhaler and its not working. Pt states symptoms are getting worse. Wanting to see if something can be called in. Please advise.

## 2020-07-01 ENCOUNTER — OFFICE VISIT (OUTPATIENT)
Dept: FAMILY MEDICINE CLINIC | Age: 40
End: 2020-07-01
Payer: COMMERCIAL

## 2020-07-01 VITALS
SYSTOLIC BLOOD PRESSURE: 112 MMHG | BODY MASS INDEX: 27.38 KG/M2 | TEMPERATURE: 99.1 F | WEIGHT: 145 LBS | DIASTOLIC BLOOD PRESSURE: 70 MMHG | HEIGHT: 61 IN

## 2020-07-01 PROCEDURE — 93000 ELECTROCARDIOGRAM COMPLETE: CPT | Performed by: FAMILY MEDICINE

## 2020-07-01 PROCEDURE — 99242 OFF/OP CONSLTJ NEW/EST SF 20: CPT | Performed by: FAMILY MEDICINE

## 2020-07-01 RX ORDER — AZITHROMYCIN 250 MG/1
250 TABLET, FILM COATED ORAL SEE ADMIN INSTRUCTIONS
Qty: 6 TABLET | Refills: 0 | Status: SHIPPED | OUTPATIENT
Start: 2020-07-01 | End: 2020-07-06

## 2020-07-01 RX ORDER — AZITHROMYCIN 250 MG/1
250 TABLET, FILM COATED ORAL SEE ADMIN INSTRUCTIONS
Qty: 6 TABLET | Refills: 0 | Status: SHIPPED | OUTPATIENT
Start: 2020-07-01 | End: 2020-07-01 | Stop reason: SDUPTHER

## 2020-07-01 RX ORDER — TIZANIDINE 4 MG/1
4 TABLET ORAL EVERY 8 HOURS PRN
Qty: 30 TABLET | Refills: 2 | Status: SHIPPED | OUTPATIENT
Start: 2020-07-01 | End: 2020-11-27

## 2020-07-01 RX ORDER — ALPRAZOLAM 0.5 MG/1
0.5 TABLET ORAL 2 TIMES DAILY PRN
Qty: 60 TABLET | Refills: 2 | Status: SHIPPED | OUTPATIENT
Start: 2020-07-01 | End: 2020-07-31

## 2020-07-01 RX ORDER — PROMETHAZINE HYDROCHLORIDE 25 MG/1
25 TABLET ORAL 2 TIMES DAILY PRN
Qty: 60 TABLET | Refills: 2 | Status: SHIPPED | OUTPATIENT
Start: 2020-07-01 | End: 2020-12-23

## 2020-07-01 ASSESSMENT — ENCOUNTER SYMPTOMS
SINUS PAIN: 1
RESPIRATORY NEGATIVE: 1
GASTROINTESTINAL NEGATIVE: 1
RHINORRHEA: 1

## 2020-07-01 NOTE — PROGRESS NOTES
Subjective:      Patient ID: Eliza Carrera is a 44 y.o. female. HPI  preop bladder surgery  Dr Sabrina Mckenzie  7/13/20  Review of Systems   Constitutional: Negative. HENT: Positive for congestion, postnasal drip, rhinorrhea and sinus pain. Respiratory: Negative. Cardiovascular: Negative. Gastrointestinal: Negative. Skin: Negative. Neurological: Negative.       YOB: 1980    Date of Visit:  7/1/2020    Allergies   Allergen Reactions    Latex Hives and Itching     After surgery x2     Shellfish-Derived Products Shortness Of Breath    Tape Susi Bongo Tape] Rash     Paper tape OK     Iodides Other (See Comments)     pt states tachycardia    Tylenol [Acetaminophen]      History of hep c    Bactrim [Sulfamethoxazole-Trimethoprim] Nausea And Vomiting and Other (See Comments)    Codeine Nausea And Vomiting and Palpitations    Morphine Itching and Nausea And Vomiting     \"makes me crazy\"     Prednisone Hives, Palpitations and Rash       Outpatient Medications Marked as Taking for the 7/1/20 encounter (Office Visit) with Lady Flores, DO   Medication Sig Dispense Refill    ALPRAZolam (XANAX) 0.5 MG tablet TAKE ONE TABLET BY MOUTH TWICE DAILY AS NEEDED FOR SLEEP 60 tablet 0    albuterol sulfate  (90 Base) MCG/ACT inhaler Inhale 2 puffs into the lungs 4 times daily as needed for Wheezing 3 Inhaler 0    famciclovir (FAMVIR) 500 MG tablet Take 1 tablet by mouth 3 times daily TAKE ONE TABLET BY MOUTH THREE TIMES DAILY 30 tablet 1    naltrexone-bupropion (CONTRAVE) 8-90 MG per extended release tablet One daily for 7 days then increase to one bid for seven days   2 q am 1 q pm for one week then two bid 120 tablet 1    ondansetron (ZOFRAN) 4 MG tablet Take 1 tablet by mouth every 8 hours as needed for Nausea 20 tablet 0    NONFORMULARY EPI PEN PRN      promethazine (PHENERGAN) 25 MG tablet Take 25 mg by mouth 2 times daily as needed for Nausea      famotidine (PEPCID) 40 MG tablet Take 1 tablet by mouth 2 times daily 60 tablet 3    tiZANidine (ZANAFLEX) 4 MG tablet Take 1 tablet by mouth every 8 hours as needed (muscle spasm) 30 tablet 2    buPROPion (WELLBUTRIN SR) 150 MG extended release tablet Take 1 tablet by mouth 2 times daily 60 tablet 3    OxyCODONE ER (XTAMPZA ER) 9 MG C12A Take 9 mg by mouth 2 times daily as needed.  propranolol (INDERAL) 20 MG tablet Take 1 tablet by mouth 3 times daily (Patient taking differently: Take 20 mg by mouth 2 times daily ) 90 tablet 2    hydrochlorothiazide (HYDRODIURIL) 25 MG tablet Take 1 tablet by mouth daily 30 tablet 3       Vitals:    07/01/20 1303   BP: 112/70   Temp: 99.1 °F (37.3 °C)   Weight: 145 lb (65.8 kg)   Height: 5' 1\" (1.549 m)     Body mass index is 27.4 kg/m². Wt Readings from Last 3 Encounters:   07/01/20 145 lb (65.8 kg)   03/12/20 152 lb (68.9 kg)   03/02/20 151 lb (68.5 kg)     BP Readings from Last 3 Encounters:   07/01/20 112/70   03/12/20 118/78   03/02/20 128/70     Allergies   Allergen Reactions    Latex Hives and Itching     After surgery x2     Shellfish-Derived Products Shortness Of Breath    Tape [facility] Tape] Rash     Paper tape OK     Iodides Other (See Comments)     pt states tachycardia    Tylenol [Acetaminophen]      History of hep c    Bactrim [Sulfamethoxazole-Trimethoprim] Nausea And Vomiting and Other (See Comments)    Codeine Nausea And Vomiting and Palpitations    Morphine Itching and Nausea And Vomiting     \"makes me crazy\"     Prednisone Hives, Palpitations and Rash     Current Outpatient Medications   Medication Sig Dispense Refill    tiZANidine (ZANAFLEX) 4 MG tablet Take 1 tablet by mouth every 8 hours as needed (muscle spasm) 30 tablet 2    promethazine (PHENERGAN) 25 MG tablet Take 1 tablet by mouth 2 times daily as needed for Nausea 60 tablet 2    ALPRAZolam (XANAX) 0.5 MG tablet Take 1 tablet by mouth 2 times daily as needed for Sleep for up to 30 days.  60 tablet 2    azithromycin (ZITHROMAX) 250 MG tablet Take 1 tablet by mouth See Admin Instructions for 5 days 500mg on day 1 followed by 250mg on days 2 - 5 6 tablet 0    albuterol sulfate  (90 Base) MCG/ACT inhaler Inhale 2 puffs into the lungs 4 times daily as needed for Wheezing 3 Inhaler 0    famciclovir (FAMVIR) 500 MG tablet Take 1 tablet by mouth 3 times daily TAKE ONE TABLET BY MOUTH THREE TIMES DAILY 30 tablet 1    naltrexone-bupropion (CONTRAVE) 8-90 MG per extended release tablet One daily for 7 days then increase to one bid for seven days   2 q am 1 q pm for one week then two bid 120 tablet 1    ondansetron (ZOFRAN) 4 MG tablet Take 1 tablet by mouth every 8 hours as needed for Nausea 20 tablet 0    NONFORMULARY EPI PEN PRN      famotidine (PEPCID) 40 MG tablet Take 1 tablet by mouth 2 times daily 60 tablet 3    buPROPion (WELLBUTRIN SR) 150 MG extended release tablet Take 1 tablet by mouth 2 times daily 60 tablet 3    OxyCODONE ER (XTAMPZA ER) 9 MG C12A Take 9 mg by mouth 2 times daily as needed.  propranolol (INDERAL) 20 MG tablet Take 1 tablet by mouth 3 times daily (Patient taking differently: Take 20 mg by mouth 2 times daily ) 90 tablet 2    hydrochlorothiazide (HYDRODIURIL) 25 MG tablet Take 1 tablet by mouth daily 30 tablet 3    triamcinolone (KENALOG) 0.1 % cream Apply topically 2 times daily. (Patient taking differently: as needed Apply topically 2 times daily.) 45 g 1     No current facility-administered medications for this visit.       Past Medical History:   Diagnosis Date    Arrythmia     Asthma     no problems recently    Back pain     Breast [facility] ([facility] [address]     Breast lump     right    Cervical cancer (HCC)     Chronic hepatitis C (HCC)     Chronic pain     DDD (degenerative disc disease)     Diverticulitis     Fibromyalgia     GERD (gastroesophageal reflux disease)     Headache     migraines    Heart disease     tachycardia     Immune deficiency disorder ([address]     tablet; Take 1 tablet by mouth every 8 hours as needed (muscle spasm)    JACKSON (generalized anxiety disorder)  -     ALPRAZolam (XANAX) 0.5 MG tablet; Take 1 tablet by mouth 2 times daily as needed for Sleep for up to 30 days. Acute bacterial sinusitis  -     Discontinue: azithromycin (ZITHROMAX) 250 MG tablet; Take 1 tablet by mouth See Admin Instructions for 5 days 500mg on day 1 followed by 250mg on days 2 - 5  -     azithromycin (ZITHROMAX) 250 MG tablet; Take 1 tablet by mouth See Admin Instructions for 5 days 500mg on day 1 followed by 250mg on days 2 - 5    Bleeding nose  -     [facility]    Nasal drainage  -     [facility]    Other orders  -     promethazine (PHENERGAN) 25 MG tablet;  Take 1 tablet by mouth 2 times daily as needed for Nausea    pt medically clear for surgery  Tiffanie Granado, DO

## 2020-07-22 ENCOUNTER — OFFICE VISIT (OUTPATIENT)
Dept: ENT CLINIC | Age: 40
End: 2020-07-22
Payer: COMMERCIAL

## 2020-07-22 VITALS — WEIGHT: 146 LBS | TEMPERATURE: 98.1 F | BODY MASS INDEX: 27.59 KG/M2

## 2020-07-22 PROCEDURE — 30903 CONTROL OF NOSEBLEED: CPT | Performed by: OTOLARYNGOLOGY

## 2020-07-22 PROCEDURE — 99203 OFFICE O/P NEW LOW 30 MIN: CPT | Performed by: OTOLARYNGOLOGY
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
## 2020-08-05 ENCOUNTER — OFFICE VISIT (OUTPATIENT)
Dept: SURGERY | Age: 40
End: 2020-08-05
Payer: COMMERCIAL

## 2020-08-05 VITALS
DIASTOLIC BLOOD PRESSURE: 75 MMHG | BODY MASS INDEX: 27.51 KG/M2 | HEART RATE: 93 BPM | OXYGEN SATURATION: 99 % | WEIGHT: 145.6 LBS | TEMPERATURE: 97.7 F | SYSTOLIC BLOOD PRESSURE: 105 MMHG

## 2020-08-05 PROCEDURE — 99214 OFFICE O/P EST MOD 30 MIN: CPT | Performed by: SURGERY
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
## 2020-08-12 ENCOUNTER — HOSPITAL ENCOUNTER (OUTPATIENT)
Dept: MRI IMAGING | Age: 40
Discharge: HOME | End: 2020-08-12
Payer: COMMERCIAL

## 2020-08-12 PROCEDURE — 77049 MRI BREAST C-+ W/CAD BI: CPT

## 2020-08-12 PROCEDURE — 6360000004 HC RX CONTRAST MEDICATION: Performed by: SURGERY

## 2020-08-12 PROCEDURE — A9577 INJ MULTIHANCE: HCPCS | Performed by: SURGERY

## 2020-08-12 RX ADMIN — GADOBENATE DIMEGLUMINE 13 ML: 529 INJECTION, SOLUTION INTRAVENOUS at 14:05

## 2020-08-21 ENCOUNTER — OFFICE VISIT (OUTPATIENT)
Dept: FAMILY MEDICINE CLINIC | Age: 40
End: 2020-08-21
Payer: COMMERCIAL

## 2020-08-21 VITALS
SYSTOLIC BLOOD PRESSURE: 98 MMHG | BODY MASS INDEX: 27.56 KG/M2 | HEART RATE: 93 BPM | DIASTOLIC BLOOD PRESSURE: 68 MMHG | TEMPERATURE: 97.8 F | OXYGEN SATURATION: 98 % | WEIGHT: 146 LBS | HEIGHT: 61 IN

## 2020-08-21 PROCEDURE — 99242 OFF/OP CONSLTJ NEW/EST SF 20: CPT | Performed by: FAMILY MEDICINE

## 2020-08-21 ASSESSMENT — ENCOUNTER SYMPTOMS
RESPIRATORY NEGATIVE: 1
GASTROINTESTINAL NEGATIVE: 1
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
## 2020-08-24 ENCOUNTER — OFFICE VISIT (OUTPATIENT)
Dept: ENT CLINIC | Age: 40
End: 2020-08-24
Payer: COMMERCIAL

## 2020-08-24 VITALS
SYSTOLIC BLOOD PRESSURE: 112 MMHG | DIASTOLIC BLOOD PRESSURE: 76 MMHG | WEIGHT: 148 LBS | TEMPERATURE: 97.3 F | HEART RATE: 82 BPM | HEIGHT: 61 IN | BODY MASS INDEX: 27.94 KG/M2

## 2020-08-24 PROCEDURE — 99214 OFFICE O/P EST MOD 30 MIN: CPT | Performed by: OTOLARYNGOLOGY

## 2020-08-24 NOTE — PROGRESS NOTES
[facility]      Patient Name: Mimi [address] Record Number:  [id number]  Primary Care Physician:  Rivas Ramires DO  Date of Consultation: 8/24/2020          BRIEF HISTORY OF PRESENT ILLNESS  Sulma is a(n) 44 y.o. female who presents for follow-up of nasal congestion and epistaxis. Applied silver nitrate to her septum on July 22. she says she still having some mild bleeding, but it is better. The patient had fairly severe nasal congestion. She did have a mild septal deviation with large turbinates, however she also had fairly severe bilateral valve collapse. We discussed an open septorhinoplasty and turbinate reduction. The patient remains very interested in this as she feels as though she cannot get air from her nose. She says she started snoring. She is also a mouth breather at night.           Patient Active Problem List   Diagnosis    Right distal radius fracture, s/p ORIF 12/29/09    Arrhythmia    Anxiety    Fibromyalgia    GERD (gastroesophageal reflux disease)    Asthma    DDD (degenerative disc disease)    Depression    Back pain    Osteoarthritis    Rheumatoid arthritis ([address]    Chronic pain    Interstitial cystitis    Cervical cancer ([address]    CTS (carpal tunnel syndrome)    Contusion of long finger of left hand    Vomiting    Dehydration    Myofascial pain syndrome    Carpal tunnel syndrome    Chronic viral hepatitis B without delta agent and without coma ([address]    Genetic predisposition to breast cancer    Umbilical hernia without obstruction and without gangrene    Carpal tunnel syndrome of left wrist    Wrist sprain, left, initial encounter    Pneumonia possible    Sinus tachycardia    Shortness of breath    Sepsis ([address]    Breast mass, right    At high risk for breast cancer    S/P bilateral mastectomy    S/P breast reconstruction, bilateral    Breast cancer in female [facility])     Past Surgical History:   Procedure Laterality Date    APPENDECTOMY      BLADDER SURGERY      BREAST SURGERY      bilat mastectomy    BREAST SURGERY Bilateral 2/6/2020    REVISION BILATERAL BREAST RECONSTRUCTION; 1.7 CM SCAR REVISION performed by Yair Hopkins MD at [address] [place] [address] Right 7/2/14    removal of hardware    CHOLECYSTECTOMY      COLONOSCOPY      with polyectomy    CYSTOSCOPY  8-17-11    with bladder and urethral dilatation    FAT TRANSFER Bilateral 2/6/2020    WITH HIGH VOLUME FAT GRAFTING performed by Yair Hopkins MD at [address] [facility]  03/24/2017    repair of umbilical hernia with primary closure, exploration of LLQ subcutaneous space without definitive findings of lipoma    HYSTERECTOMY      LAPAROSCOPIC APPENDECTOMY  3/18/13    LAPAROSCOPY  3/18/13    RECONSTRUCTION BREAST IMMEDIATE / DELAYED W/ TISSUE EXPANDER Bilateral 9/26/2019    EXCHANGE OF BILATERAL BREAST IMPLANTS ; AND BILATERAL CAPSULECTOMY, REVISION BILATERAL BREAST RECONSTRUCTION performed by Yair Hopkins MD at [address] [facility] ENDOSCOPY      1/09    UPPER GASTROINTESTINAL ENDOSCOPY  5/28/2010    UPPER GASTROINTESTINAL ENDOSCOPY  2/13/13    WRIST FRACTURE SURGERY  12/28/09    Open treatment with open reduction, internal fixation rightdistal radius using     Family History   Problem Relation Age of Onset    Cancer Mother         Breast    Depression Mother     Cancer Father         lung    Cancer Maternal Grandmother         breast    Asthma Other     Cancer Other     Heart Disease Other     Depression Paternal Grandmother     Emphysema Paternal Grandmother     Elevated Lipids Paternal Grandmother     Cancer Paternal Grandfather         lung    Cancer Maternal Grandfather         colon     Cancer Paternal Aunt         Breast     Social History     Socioeconomic History    Marital status:      Spouse name: Not on file    Number of children: 3    Years of education: Not on file    Highest education level: Not on file   Occupational History    Not on file   Social Needs    Financial resource strain: Not on file    Food insecurity     Worry: Not on file     Inability: Not on file    Transportation needs     Medical: Not on file     Non-medical: Not on file   Tobacco Use    Smoking status: Never Smoker    Smokeless tobacco: Never Used    Tobacco comment: encouraged to never smoke    Substance and Sexual Activity    Alcohol use: Yes     Alcohol/week: 0.0 standard drinks     Comment: socially; special occasions 1 every other week    Drug use: No    Sexual activity: Yes     Partners: Male   Lifestyle    Physical activity     Days per week: Not on file     Minutes per session: Not on file    Stress: Not on file   Relationships    Social connections     Talks on phone: Not on file     Gets together: Not on file     Attends Alevism service: Not on file     Active member of club or organization: Not on file     Attends meetings of clubs or organizations: Not on file     Relationship status: Not on file    Intimate partner violence     Fear of current or ex partner: Not on file     Emotionally abused: Not on file     Physically abused: Not on file     Forced sexual activity: Not on file   Other Topics Concern    Not on file   Social History Narrative    Not on file       DRUG/FOOD ALLERGIES: Latex; Shellfish-derived products; Tape [adhesive tape]; Iodides; Tylenol [acetaminophen]; Bactrim [sulfamethoxazole-trimethoprim]; Codeine; Morphine; and Prednisone    CURRENT MEDICATIONS  Prior to Admission medications    Medication Sig Start Date End Date Taking?  Authorizing Provider   tiZANidine (ZANAFLEX) 4 MG tablet Take 1 tablet by mouth every 8 hours as needed (muscle spasm) 7/1/20   Romy Wood DO   promethazine (PHENERGAN) 25 MG tablet Take 1 tablet by mouth 2 times daily as needed for Nausea 7/1/20   Romy Israel,    albuterol sulfate  (90 Base) MCG/ACT inhaler Inhale 2 puffs into the lungs 4 times daily as needed for Wheezing 5/12/20   Romy Wood DO   famciclovir (FAMVIR) 500 MG tablet Take 1 tablet by mouth 3 times daily TAKE ONE TABLET BY MOUTH THREE TIMES DAILY 3/2/20   Romy Wood DO   naltrexone-bupropion (CONTRAVE) 8-90 MG per extended release tablet One daily for 7 days then increase to one bid for seven days   2 q am 1 q pm for one week then two bid 2/5/20   Romy Wood DO   ondansetron (ZOFRAN) 4 MG tablet Take 1 tablet by mouth every 8 hours as needed for Nausea 11/20/19   DONALD Dodge - KAMERON   NONFORMULARY EPI PEN PRN    Historical Provider, MD   famotidine (PEPCID) 40 MG tablet Take 1 tablet by mouth 2 times daily 9/18/19   Romy Wood DO   buPROPion (WELLBUTRIN SR) 150 MG extended release tablet Take 1 tablet by mouth 2 times daily 9/11/19   Romy Wood DO   triamcinolone (KENALOG) 0.1 % cream Apply topically 2 times daily. Patient not taking: Reported on 8/5/2020 9/11/19   Romy Wood DO   OxyCODONE ER (XTAMPZA ER) 9 MG C12A Take 9 mg by mouth 2 times daily as needed.      Historical Provider, MD   propranolol (INDERAL) 20 MG tablet Take 1 tablet by mouth 3 times daily  Patient taking differently: Take 20 mg by mouth 2 times daily  5/23/17   Romy Wood DO   hydrochlorothiazide (HYDRODIURIL) 25 MG tablet Take 1 tablet by mouth daily 5/23/17   Romy Wood, DO       REVIEW OF SYSTEMS  The following systems were reviewed and revealed the following in addition to any already discussed in the HPI:    CONSTITUTIONAL: no weight loss, no fever, no night sweats, no chills  EYES: no vision changes, no blurry vision  EARS: no changes in hearing, no otalgia  NOSE: Epistaxis, nasal congestion  RESPIRATORY: no  Difficulty breathing, no shortness of breath  CV: no chest pain, no Peripheral vascular disease  HEME: No coagulation disorder, no Bleeding disorder  NEURO: no TIA or stroke-like symptoms  SKIN: No new rashes in the head and neck, no recent skin cancers  MOUTH: No new ulcers, no recent teeth infections  GASTROINTESTINAL: No diarrhea, stomach pain  PSYCH: No anxiety, no depression      PHYSICAL EXAM  /76 (Site: Left Upper Arm, Position: Sitting, Cuff Size: Medium Adult)   Pulse 82   Temp 97.3 °F (36.3 °C) (Temporal)   Ht 5' 1\" (1.549 m)   Wt 148 lb (67.1 kg)   LMP 05/25/2002   BMI 27.96 kg/m²     GENERAL: No Acute Distress, Alert and Oriented, no Hoarseness, strong voice  EYES: EOMI, Anti-icteric  HENT:   Head: Normocephalic and atraumatic. Face:  Symmetric, facial nerve intact, no sinus tenderness  Right Ear: Normal external ear, normal external auditory canal, intact tympanic membrane with normal mobility and aerated middle ear  Left Ear: Normal external ear, normal external auditory canal, intact tympanic membrane with normal mobility and aerated middle ear  Mouth/Oral Cavity:  normal lips, Uvula is midline, no mucosal lesions, no trismus, normal dentition, normal salivary quality/flow  Oropharynx/Larynx:  normal oropharynx, normal tonsils; normal larynx/nasopharynx on mirror exam  Nose: Patient has a very mild hump on the right nasal dorsum. She has relatively severe bilateral internal nasal valve collapse with an external nasal valve component as well on inspiration. This is improved with a modified Glades maneuver. Anterior rhinoscopy shows prominent inferior septal spurs with a mild rightward deviation of the septum. More posteriorly she does have a bony spur to the left. Overall the septum is not significantly deviated. The area that I cauterized on the anterior septum is healing well.   NECK: Normal range of motion, no thyromegaly, trachea is midline, no lymphadenopathy, no neck masses, no crepitus  CHEST: Normal respiratory effort, no retractions, breathing comfortably  SKIN: No rashes, normal appearing skin, no evidence of skin lesions/tumors  Neuro:  cranial nerve II-XII intact; normal gait  Cardio:  no edema        ASSESSMENT/PLAN  1. Epistaxis  This is improved and secondary to prominent vessels in the nasal septum. Also think that the patient is contributing to this with digital trauma as she stuck her finger in her nose several times during the exam.  I told her not to do this. 2. Nasal congestion  Multifactorial.  This patient has a very mild septal deviation, so I do not believe that a septoplasty alone would significantly improve this. She does however have bilateral valve collapse. I feel like she would benefit from an open septorhinoplasty to both address the septal deviation as well as the valve collapse. I would likely place  grafts and potentially alar batten grafts to help with the collapse. I would also perform a turbinate reduction as these are mildly enlarged. The patient understands the risk of the surgery including cosmetic changes to the outside of her nose, ongoing nasal congestion, infection, and nosebleed, septal perforation and need for additional surgeries. She has agreed to proceed. I have taken some photographs to confirm the valve collapse letter in the medical records. We we will schedule her for surgery whenever she is preapproved from her insurance company. 3. Deviated septum  As above    4. Nasal valve collapse  As above    5. Hypertrophy of inferior nasal turbinate  As above             I have performed a head and neck physical exam personally or was physically present during the key or critical portions of the service. Medical Decision Making:   The following items were considered in medical decision making:  Independent review of images  Review / order clinical lab tests  Review / order radiology tests  Decision to obtain old records

## 2020-08-25 ENCOUNTER — TELEPHONE (OUTPATIENT)
Dept: SURGERY | Age: 40
End: 2020-08-25

## 2020-08-27 NOTE — TELEPHONE ENCOUNTER
[facility]    Will need to check to see who would like to do it.     Thanks,  NK

## 2020-08-31 ENCOUNTER — TELEPHONE (OUTPATIENT)
Dept: ENT CLINIC | Age: 40
End: 2020-08-31

## 2020-08-31 ENCOUNTER — TELEPHONE (OUTPATIENT)
Dept: SURGERY | Age: 40
End: 2020-08-31

## 2020-08-31 ENCOUNTER — OFFICE VISIT (OUTPATIENT)
Dept: SURGERY | Age: 40
End: 2020-08-31
Payer: COMMERCIAL

## 2020-08-31 VITALS
BODY MASS INDEX: 28.17 KG/M2 | DIASTOLIC BLOOD PRESSURE: 81 MMHG | TEMPERATURE: 97.3 F | HEIGHT: 61 IN | WEIGHT: 149.2 LBS | OXYGEN SATURATION: 99 % | HEART RATE: 88 BPM | SYSTOLIC BLOOD PRESSURE: 113 MMHG

## 2020-08-31 PROCEDURE — 99213 OFFICE O/P EST LOW 20 MIN: CPT | Performed by: SURGERY

## 2020-08-31 NOTE — TELEPHONE ENCOUNTER
Patient seen on 8/31/20 with below plan:  IMP: 44 y. o.female s/p revision IBR  PLAN: Her implants appear intact, thus would not recommend any surgical intervention for these. However, given the paucity of volume and rippling, may benefit from fat grafting to the bilateral breasts (potential additional donor site from her back). Will work toward scheduling toward the end of the year (November). She is to have repair of her nasal fracture/septum prior to this and will need to have recovered for several weeks. Surgery time held on 11/5/2020. Patient given pre op forms and COVID testing info. Routing to MD for surgery letter.
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
## 2020-09-01 NOTE — TELEPHONE ENCOUNTER
Surgery order received and completed and sent to scheduling via [employer]. Patient has [facility], [place] [phone] to check if PA is required. NO PA is required.  DONE

## 2020-09-02 ENCOUNTER — TELEPHONE (OUTPATIENT)
Dept: FAMILY MEDICINE CLINIC | Age: 40
End: 2020-09-02

## 2020-09-02 ENCOUNTER — TELEPHONE (OUTPATIENT)
Dept: ENT CLINIC | Age: 40
End: 2020-09-02

## 2020-09-02 NOTE — TELEPHONE ENCOUNTER
----- Message from Raiza Rosa sent at 9/2/2020  3:20 PM EDT -----  Subject: Appointment Request    Reason for Call: Routine Pre-Op    QUESTIONS  Type of Appointment? Established Patient  Reason for appointment request? Available appointments did not meet   patient need  Additional Information for Provider? Needing a Pre Op for September 15th. Having rhinoplasty surgery on September 22nd by Dr. Caty Hamm. EKG needed. ---------------------------------------------------------------------------  --------------  Helen RODRIGUEZ  What is the best way for the office to contact you? OK to leave message on   voicemail  Preferred Call Back Phone Number? [phone]  ---------------------------------------------------------------------------  --------------  SCRIPT ANSWERS  Relationship to Patient? Self  Appointment reason? Symptomatic  Select script based on patient symptoms? Adult Pre-Op  Do you have question for your provider that need to be answered prior to   scheduling your pre-op appointment? No  Have you been diagnosed with   tested for   or told that you are suspected of having COVID-19 (Coronavirus)? No  Have you had a fever or taken medication to treat a fever within the past   3 days? No  Have you had a cough   shortness of breath or flu-like symptoms within the past 3 days? No  Do you currently have flu-like symptoms including fever or chills   cough   shortness of breath   or difficulty breathing   or new loss of taste or smell? No  (Service Expert  click yes below to proceed with [employer] As Usual   Scheduling)?  Yes

## 2020-09-02 NOTE — TELEPHONE ENCOUNTER
I have her down on 09/22/2020 at 7:30 she needs to be there at 6 and get covid test 3-5 days before and make sure she see's her pcp for history and physical

## 2020-09-09 RX ORDER — ALPRAZOLAM 0.5 MG/1
TABLET ORAL
Qty: 60 TABLET | Refills: 2 | Status: SHIPPED | OUTPATIENT
Start: 2020-09-09 | End: 2020-09-25 | Stop reason: SDUPTHER

## 2020-09-15 ENCOUNTER — OFFICE VISIT (OUTPATIENT)
Dept: FAMILY MEDICINE CLINIC | Age: 40
End: 2020-09-15
Payer: COMMERCIAL

## 2020-09-15 VITALS
SYSTOLIC BLOOD PRESSURE: 110 MMHG | TEMPERATURE: 97.8 F | HEIGHT: 61 IN | OXYGEN SATURATION: 98 % | DIASTOLIC BLOOD PRESSURE: 70 MMHG | HEART RATE: 67 BPM | WEIGHT: 151 LBS | BODY MASS INDEX: 28.51 KG/M2

## 2020-09-15 PROCEDURE — 99242 OFF/OP CONSLTJ NEW/EST SF 20: CPT | Performed by: FAMILY MEDICINE

## 2020-09-15 ASSESSMENT — ENCOUNTER SYMPTOMS
GASTROINTESTINAL NEGATIVE: 1
RESPIRATORY NEGATIVE: 1
RHINORRHEA: 1

## 2020-09-15 NOTE — PROGRESS NOTES
One daily for 7 days then increase to one bid for seven days   2 q am 1 q pm for one week then two bid 120 tablet 1    ondansetron (ZOFRAN) 4 MG tablet Take 1 tablet by mouth every 8 hours as needed for Nausea 20 tablet 0    NONFORMULARY EPI PEN PRN      famotidine (PEPCID) 40 MG tablet Take 1 tablet by mouth 2 times daily 60 tablet 3    buPROPion (WELLBUTRIN SR) 150 MG extended release tablet Take 1 tablet by mouth 2 times daily 60 tablet 3    OxyCODONE ER (XTAMPZA ER) 9 MG C12A Take 9 mg by mouth 2 times daily as needed.  propranolol (INDERAL) 20 MG tablet Take 1 tablet by mouth 3 times daily (Patient taking differently: Take 20 mg by mouth 2 times daily ) 90 tablet 2    hydrochlorothiazide (HYDRODIURIL) 25 MG tablet Take 1 tablet by mouth daily 30 tablet 3       Vitals:    09/15/20 1009   BP: 110/70   Pulse: 67   Temp: 97.8 °F (36.6 °C)   SpO2: 98%   Weight: 151 lb (68.5 kg)   Height: 5' 1\" (1.549 m)     Body mass index is 28.53 kg/m².      Wt Readings from Last 3 Encounters:   09/15/20 151 lb (68.5 kg)   08/31/20 149 lb 3.2 oz (67.7 kg)   08/24/20 148 lb (67.1 kg)     BP Readings from Last 3 Encounters:   09/15/20 110/70   08/31/20 113/81   08/24/20 112/76     Allergies   Allergen Reactions    Latex Hives and Itching     After surgery x2     Shellfish-Derived Products Shortness Of Breath    Tape Alfonso Du Tape] Rash     Paper tape OK     Iodides Other (See Comments)     pt states tachycardia    Tylenol [Acetaminophen]      History of hep c    Bactrim [Sulfamethoxazole-Trimethoprim] Nausea And Vomiting and Other (See Comments)    Codeine Nausea And Vomiting and Palpitations    Morphine Itching and Nausea And Vomiting     \"makes me crazy\"     Prednisone Hives, Palpitations and Rash     Current Outpatient Medications   Medication Sig Dispense Refill    ALPRAZolam (XANAX) 0.5 MG tablet TAKE ONE TABLET BY MOUTH TWICE DAILY AS NEEDED FOR SLEEP 60 tablet 2    tiZANidine (ZANAFLEX) 4 MG tablet Take 1 tablet by mouth every 8 hours as needed (muscle spasm) 30 tablet 2    promethazine (PHENERGAN) 25 MG tablet Take 1 tablet by mouth 2 times daily as needed for Nausea 60 tablet 2    albuterol sulfate  (90 Base) MCG/ACT inhaler Inhale 2 puffs into the lungs 4 times daily as needed for Wheezing 3 Inhaler 0    famciclovir (FAMVIR) 500 MG tablet Take 1 tablet by mouth 3 times daily TAKE ONE TABLET BY MOUTH THREE TIMES DAILY 30 tablet 1    naltrexone-bupropion (CONTRAVE) 8-90 MG per extended release tablet One daily for 7 days then increase to one bid for seven days   2 q am 1 q pm for one week then two bid 120 tablet 1    ondansetron (ZOFRAN) 4 MG tablet Take 1 tablet by mouth every 8 hours as needed for Nausea 20 tablet 0    NONFORMULARY EPI PEN PRN      famotidine (PEPCID) 40 MG tablet Take 1 tablet by mouth 2 times daily 60 tablet 3    buPROPion (WELLBUTRIN SR) 150 MG extended release tablet Take 1 tablet by mouth 2 times daily 60 tablet 3    OxyCODONE ER (XTAMPZA ER) 9 MG C12A Take 9 mg by mouth 2 times daily as needed.  propranolol (INDERAL) 20 MG tablet Take 1 tablet by mouth 3 times daily (Patient taking differently: Take 20 mg by mouth 2 times daily ) 90 tablet 2    hydrochlorothiazide (HYDRODIURIL) 25 MG tablet Take 1 tablet by mouth daily 30 tablet 3    triamcinolone (KENALOG) 0.1 % cream Apply topically 2 times daily. (Patient not taking: Reported on 8/5/2020) 45 g 1     No current facility-administered medications for this visit.       Past Medical History:   Diagnosis Date    Arrythmia     Asthma     no problems recently    Back pain     [facility] [address]     Breast lump     right    Cervical cancer (HCC)     Chronic hepatitis C (HCC)     Chronic pain     DDD (degenerative disc disease)     Diverticulitis     Fibromyalgia     GERD (gastroesophageal reflux disease)     Headache     migraines    Heart disease     tachycardia     Immune deficiency disorder ([address]     Interstitial cystitis     Nausea & vomiting     Osteoarthritis     Other closed fractures of distal end of radius (alone) 12/19/2009    distal radius fx surgery 12/28/2009 Dr. Riley Ruiz Pneumonia     PONV (postoperative nausea and vomiting)     scop patch and Phenergan work best     Rheumatoid arthritis(714.0)      Past Surgical History:   Procedure Laterality Date    APPENDECTOMY      BLADDER SURGERY      BREAST SURGERY      bilat mastectomy    BREAST SURGERY Bilateral 2/6/2020    REVISION BILATERAL BREAST RECONSTRUCTION; 1.7 CM SCAR REVISION performed by Etha Severs, MD at [address] [place] [address] 7/2/14    removal of hardware    CHOLECYSTECTOMY      COLONOSCOPY      with polyectomy    CYSTOSCOPY  8-17-11    with bladder and urethral dilatation    FAT TRANSFER Bilateral 2/6/2020    WITH HIGH VOLUME FAT GRAFTING performed by Etha Severs, MD at [address] [place] [address]  03/24/2017    repair of umbilical hernia with primary closure, exploration of LLQ subcutaneous space without definitive findings of lipoma    HYSTERECTOMY      LAPAROSCOPIC APPENDECTOMY  3/18/13    LAPAROSCOPY  3/18/13    RECONSTRUCTION BREAST IMMEDIATE / DELAYED W/ TISSUE EXPANDER Bilateral 9/26/2019    EXCHANGE OF BILATERAL BREAST IMPLANTS ; AND BILATERAL CAPSULECTOMY, REVISION BILATERAL BREAST RECONSTRUCTION performed by Etha Severs, MD at [address] [place] [address]      1/09    UPPER GASTROINTESTINAL ENDOSCOPY  5/28/2010    UPPER GASTROINTESTINAL ENDOSCOPY  2/13/13    WRIST FRACTURE SURGERY  12/28/09    Open treatment with open reduction, internal fixation rightdistal radius using     Social History     Tobacco Use    Smoking status: Never Smoker    Smokeless tobacco: Never Used    Tobacco comment: encouraged to never smoke    Substance Use Topics    Alcohol use:  Yes     Alcohol/week: 0.0 standard drinks     Comment: socially; special occasions 1 every other week    Drug use: No     Family History   Problem Relation Age of Onset    Cancer Mother         Breast    Depression Mother     Cancer Father         lung    Cancer Maternal Grandmother         breast    Asthma Other     Cancer Other     Heart Disease Other     Depression Paternal Grandmother     Emphysema Paternal Grandmother     Elevated Lipids Paternal Grandmother     Cancer Paternal Grandfather         lung    Cancer Maternal Grandfather         colon     Cancer Paternal Aunt         Breast         Objective:   Physical Exam  Vitals signs and nursing note reviewed. Constitutional:       Appearance: She is well-developed. HENT:      Head: Normocephalic. Neck:      Thyroid: No thyromegaly. Cardiovascular:      Rate and Rhythm: Normal rate and regular rhythm. Heart sounds: Normal heart sounds. Pulmonary:      Effort: Pulmonary effort is normal.      Breath sounds: Normal breath sounds. Lymphadenopathy:      Cervical: No cervical adenopathy. Neurological:      Mental Status: She is alert and oriented to person, place, and time. Psychiatric:         Behavior: Behavior normal.         Thought Content:  Thought content normal.         Judgment: Judgment normal.         Assessment:      Assessment/plan;  Sulma was seen today for pre-op exam.    Diagnoses and all orders for this visit:    Preop examination    Nasal turbinate hypertrophy      Pt medically clear for surgery    Jennifer Blanco DO

## 2020-09-18 ENCOUNTER — OFFICE VISIT (OUTPATIENT)
Dept: PRIMARY CARE CLINIC | Age: 40
End: 2020-09-18
Payer: COMMERCIAL

## 2020-09-18 LAB — SARS-COV-2, PCR: NOT DETECTED

## 2020-09-18 PROCEDURE — 99211 OFF/OP EST MAY X REQ PHY/QHP: CPT | Performed by: NURSE PRACTITIONER

## 2020-09-18 NOTE — PROGRESS NOTES
C-Difficile admission screening and protocol:     * Admitted with diarrhea?no     *Prior history of C-Diff. In last 3 months?no     *Antibiotic use in the past 6-8 weeks?no     *Prior hospitalization or nursing home in the last month? No      [address] time_0600 per pt___________        Surgery time____0730 ________    Take the following medications with a sip of water: Follow your MD/Surgeons pre-procedure instructions regarding your medications    Do not eat or drink anything after 12:00 midnight prior to your surgery. This includes water chewing gum, mints and ice chips. You may brush your teeth and gargle the morning of your surgery, but do not swallow the water     Please see your family doctor/pediatrician for a history and physical and/or concerning medications. Bring any test results/reports from your physicians office. If you are under the care of a heart doctor or specialist doctor, please be aware that you may be asked to them for clearance    You may be asked to stop blood thinners such as Coumadin, Plavix, Fragmin, Lovenox, etc., or any anti-inflammatories such as:  Aspirin, Ibuprofen, Advil, Naproxen prior to your surgery. We also ask that you stop any OTC medications such as fish oil, vitamin E, glucosamine, garlic, Multivitamins, COQ 10, etc.    We ask that you do not smoke 24 hours prior to surgery  We ask that you do not  drink any alcoholic beverages 24 hours prior to surgery     You must make arrangements for a responsible adult to take you home after your surgery. For your safety you will not be allowed to leave alone or drive yourself home. Your surgery will be cancelled if you do not have a ride home. Also for your safety, it is strongly suggested that someone stay with you the first 24 hours after your surgery.      A parent or legal guardian must accompany a child scheduled for surgery and plan to stay at the hospital until [facility] phone number:  [phone] PAT fax number:  [phone]  Please note these are generalized instructions for all surgical cases, you may be provided with more specific instructions according to your surgery.

## 2020-09-18 NOTE — PROGRESS NOTES
Patient presented to [facility] drive up clinic for preop testing. Patient was swabbed and given information advising them to remain isolated until procedure date.

## 2020-09-21 ENCOUNTER — TELEPHONE (OUTPATIENT)
Dept: ENT CLINIC | Age: 40
End: 2020-09-21

## 2020-09-21 ENCOUNTER — ANESTHESIA EVENT (OUTPATIENT)
Dept: OPERATING ROOM | Age: 40
End: 2020-09-21
Payer: COMMERCIAL

## 2020-09-22 ENCOUNTER — ANESTHESIA (OUTPATIENT)
Dept: OPERATING ROOM | Age: 40
End: 2020-09-22
Payer: COMMERCIAL

## 2020-09-22 ENCOUNTER — HOSPITAL ENCOUNTER (OUTPATIENT)
Age: 40
Setting detail: OUTPATIENT SURGERY
Discharge: HOME | End: 2020-09-22
Attending: OTOLARYNGOLOGY | Admitting: OTOLARYNGOLOGY
Payer: COMMERCIAL

## 2020-09-22 VITALS
SYSTOLIC BLOOD PRESSURE: 129 MMHG | DIASTOLIC BLOOD PRESSURE: 78 MMHG | TEMPERATURE: 96.6 F | OXYGEN SATURATION: 100 % | RESPIRATION RATE: 1 BRPM

## 2020-09-22 VITALS
RESPIRATION RATE: 16 BRPM | OXYGEN SATURATION: 99 % | HEIGHT: 61 IN | TEMPERATURE: 97.1 F | BODY MASS INDEX: 28.6 KG/M2 | DIASTOLIC BLOOD PRESSURE: 83 MMHG | HEART RATE: 87 BPM | SYSTOLIC BLOOD PRESSURE: 119 MMHG | WEIGHT: 151.46 LBS

## 2020-09-22 PROCEDURE — 6360000002 HC RX W HCPCS: Performed by: ANESTHESIOLOGY

## 2020-09-22 PROCEDURE — 2500000003 HC RX 250 WO HCPCS: Performed by: OTOLARYNGOLOGY

## 2020-09-22 PROCEDURE — 3700000001 HC ADD 15 MINUTES (ANESTHESIA): Performed by: OTOLARYNGOLOGY

## 2020-09-22 PROCEDURE — 6370000000 HC RX 637 (ALT 250 FOR IP): Performed by: OTOLARYNGOLOGY

## 2020-09-22 PROCEDURE — 6370000000 HC RX 637 (ALT 250 FOR IP): Performed by: ANESTHESIOLOGY

## 2020-09-22 PROCEDURE — 3600000004 HC SURGERY LEVEL 4 BASE: Performed by: OTOLARYNGOLOGY

## 2020-09-22 PROCEDURE — 7100000011 HC PHASE II RECOVERY - ADDTL 15 MIN: Performed by: OTOLARYNGOLOGY

## 2020-09-22 PROCEDURE — 6360000002 HC RX W HCPCS

## 2020-09-22 PROCEDURE — 30520 REPAIR OF NASAL SEPTUM: CPT | Performed by: OTOLARYNGOLOGY

## 2020-09-22 PROCEDURE — 2580000003 HC RX 258: Performed by: ANESTHESIOLOGY

## 2020-09-22 PROCEDURE — 7100000000 HC PACU RECOVERY - FIRST 15 MIN: Performed by: OTOLARYNGOLOGY

## 2020-09-22 PROCEDURE — 30465 REPAIR NASAL STENOSIS: CPT | Performed by: OTOLARYNGOLOGY

## 2020-09-22 PROCEDURE — 2500000003 HC RX 250 WO HCPCS

## 2020-09-22 PROCEDURE — 6370000000 HC RX 637 (ALT 250 FOR IP)

## 2020-09-22 PROCEDURE — 7100000001 HC PACU RECOVERY - ADDTL 15 MIN: Performed by: OTOLARYNGOLOGY

## 2020-09-22 PROCEDURE — 2720000010 HC SURG SUPPLY STERILE: Performed by: OTOLARYNGOLOGY

## 2020-09-22 PROCEDURE — 3600000014 HC SURGERY LEVEL 4 ADDTL 15MIN: Performed by: OTOLARYNGOLOGY

## 2020-09-22 PROCEDURE — 30140 RESECT INFERIOR TURBINATE: CPT | Performed by: OTOLARYNGOLOGY

## 2020-09-22 PROCEDURE — 2709999900 HC NON-CHARGEABLE SUPPLY: Performed by: OTOLARYNGOLOGY

## 2020-09-22 PROCEDURE — 3700000000 HC ANESTHESIA ATTENDED CARE: Performed by: OTOLARYNGOLOGY

## 2020-09-22 PROCEDURE — 7100000010 HC PHASE II RECOVERY - FIRST 15 MIN: Performed by: OTOLARYNGOLOGY

## 2020-09-22 RX ORDER — ONDANSETRON 2 MG/ML
INJECTION INTRAMUSCULAR; INTRAVENOUS PRN
Status: DISCONTINUED | OUTPATIENT
Start: 2020-09-22 | End: 2020-09-22 | Stop reason: SDUPTHER

## 2020-09-22 RX ORDER — MIDAZOLAM HYDROCHLORIDE 1 MG/ML
INJECTION INTRAMUSCULAR; INTRAVENOUS PRN
Status: DISCONTINUED | OUTPATIENT
Start: 2020-09-22 | End: 2020-09-22 | Stop reason: SDUPTHER

## 2020-09-22 RX ORDER — ONDANSETRON 2 MG/ML
4 INJECTION INTRAMUSCULAR; INTRAVENOUS
Status: DISCONTINUED | OUTPATIENT
Start: 2020-09-22 | End: 2020-09-22 | Stop reason: HOSPADM

## 2020-09-22 RX ORDER — LABETALOL HYDROCHLORIDE 5 MG/ML
5 INJECTION, SOLUTION INTRAVENOUS EVERY 10 MIN PRN
Status: DISCONTINUED | OUTPATIENT
Start: 2020-09-22 | End: 2020-09-22 | Stop reason: HOSPADM

## 2020-09-22 RX ORDER — SODIUM CHLORIDE 0.9 % (FLUSH) 0.9 %
10 SYRINGE (ML) INJECTION EVERY 12 HOURS SCHEDULED
Status: DISCONTINUED | OUTPATIENT
Start: 2020-09-22 | End: 2020-09-22 | Stop reason: HOSPADM

## 2020-09-22 RX ORDER — PROMETHAZINE HYDROCHLORIDE 25 MG/ML
INJECTION, SOLUTION INTRAMUSCULAR; INTRAVENOUS PRN
Status: DISCONTINUED | OUTPATIENT
Start: 2020-09-22 | End: 2020-09-22 | Stop reason: SDUPTHER

## 2020-09-22 RX ORDER — FENTANYL CITRATE 50 UG/ML
INJECTION, SOLUTION INTRAMUSCULAR; INTRAVENOUS PRN
Status: DISCONTINUED | OUTPATIENT
Start: 2020-09-22 | End: 2020-09-22 | Stop reason: SDUPTHER

## 2020-09-22 RX ORDER — LIDOCAINE HYDROCHLORIDE AND EPINEPHRINE 10; 10 MG/ML; UG/ML
INJECTION, SOLUTION INFILTRATION; PERINEURAL
Status: COMPLETED | OUTPATIENT
Start: 2020-09-22 | End: 2020-09-22

## 2020-09-22 RX ORDER — OXYCODONE HYDROCHLORIDE AND ACETAMINOPHEN 5; 325 MG/1; MG/1
1 TABLET ORAL PRN
Status: DISCONTINUED | OUTPATIENT
Start: 2020-09-22 | End: 2020-09-22 | Stop reason: HOSPADM

## 2020-09-22 RX ORDER — ALBUTEROL SULFATE 90 UG/1
AEROSOL, METERED RESPIRATORY (INHALATION) PRN
Status: DISCONTINUED | OUTPATIENT
Start: 2020-09-22 | End: 2020-09-22 | Stop reason: SDUPTHER

## 2020-09-22 RX ORDER — APREPITANT 40 MG/1
40 CAPSULE ORAL ONCE
Status: COMPLETED | OUTPATIENT
Start: 2020-09-22 | End: 2020-09-22

## 2020-09-22 RX ORDER — DEXAMETHASONE SODIUM PHOSPHATE 4 MG/ML
INJECTION, SOLUTION INTRA-ARTICULAR; INTRALESIONAL; INTRAMUSCULAR; INTRAVENOUS; SOFT TISSUE PRN
Status: DISCONTINUED | OUTPATIENT
Start: 2020-09-22 | End: 2020-09-22 | Stop reason: SDUPTHER

## 2020-09-22 RX ORDER — SUCCINYLCHOLINE/SOD CL,ISO/PF 200MG/10ML
SYRINGE (ML) INTRAVENOUS PRN
Status: DISCONTINUED | OUTPATIENT
Start: 2020-09-22 | End: 2020-09-22 | Stop reason: SDUPTHER

## 2020-09-22 RX ORDER — GLYCOPYRROLATE 0.2 MG/ML
INJECTION INTRAMUSCULAR; INTRAVENOUS PRN
Status: DISCONTINUED | OUTPATIENT
Start: 2020-09-22 | End: 2020-09-22 | Stop reason: SDUPTHER

## 2020-09-22 RX ORDER — SCOLOPAMINE TRANSDERMAL SYSTEM 1 MG/1
1 PATCH, EXTENDED RELEASE TRANSDERMAL ONCE
Status: DISCONTINUED | OUTPATIENT
Start: 2020-09-22 | End: 2020-09-22 | Stop reason: HOSPADM

## 2020-09-22 RX ORDER — ROCURONIUM BROMIDE 10 MG/ML
INJECTION, SOLUTION INTRAVENOUS PRN
Status: DISCONTINUED | OUTPATIENT
Start: 2020-09-22 | End: 2020-09-22 | Stop reason: SDUPTHER

## 2020-09-22 RX ORDER — PHENYLEPHRINE HCL IN 0.9% NACL 1 MG/10 ML
SYRINGE (ML) INTRAVENOUS PRN
Status: DISCONTINUED | OUTPATIENT
Start: 2020-09-22 | End: 2020-09-22 | Stop reason: SDUPTHER

## 2020-09-22 RX ORDER — BACITRACIN ZINC AND POLYMYXIN B SULFATE 500; 1000 [USP'U]/G; [USP'U]/G
OINTMENT TOPICAL
Qty: 1 TUBE | Refills: 0 | Status: SHIPPED | OUTPATIENT
Start: 2020-09-22 | End: 2020-09-29

## 2020-09-22 RX ORDER — ONDANSETRON 4 MG/1
4 TABLET, FILM COATED ORAL DAILY PRN
Qty: 30 TABLET | Refills: 0 | Status: ON HOLD | OUTPATIENT
Start: 2020-09-22 | End: 2020-11-05

## 2020-09-22 RX ORDER — APREPITANT 40 MG/1
40 CAPSULE ORAL ONCE
Status: DISCONTINUED | OUTPATIENT
Start: 2020-09-22 | End: 2020-09-22 | Stop reason: SDUPTHER

## 2020-09-22 RX ORDER — PROMETHAZINE HYDROCHLORIDE 25 MG/ML
6.25 INJECTION, SOLUTION INTRAMUSCULAR; INTRAVENOUS EVERY 6 HOURS PRN
Status: DISCONTINUED | OUTPATIENT
Start: 2020-09-22 | End: 2020-09-22

## 2020-09-22 RX ORDER — OXYMETAZOLINE HYDROCHLORIDE 0.05 G/100ML
SPRAY NASAL
Status: COMPLETED | OUTPATIENT
Start: 2020-09-22 | End: 2020-09-22

## 2020-09-22 RX ORDER — OXYCODONE HYDROCHLORIDE AND ACETAMINOPHEN 5; 325 MG/1; MG/1
2 TABLET ORAL PRN
Status: DISCONTINUED | OUTPATIENT
Start: 2020-09-22 | End: 2020-09-22 | Stop reason: HOSPADM

## 2020-09-22 RX ORDER — AMOXICILLIN AND CLAVULANATE POTASSIUM 875; 125 MG/1; MG/1
1 TABLET, FILM COATED ORAL 2 TIMES DAILY
Qty: 14 TABLET | Refills: 0 | Status: SHIPPED | OUTPATIENT
Start: 2020-09-22 | End: 2020-09-29

## 2020-09-22 RX ORDER — SODIUM CHLORIDE 9 MG/ML
INJECTION, SOLUTION INTRAVENOUS CONTINUOUS
Status: DISCONTINUED | OUTPATIENT
Start: 2020-09-22 | End: 2020-09-22 | Stop reason: HOSPADM

## 2020-09-22 RX ORDER — APREPITANT 40 MG/1
40 CAPSULE ORAL ONCE
Status: DISCONTINUED | OUTPATIENT
Start: 2020-09-22 | End: 2020-09-22

## 2020-09-22 RX ORDER — LIDOCAINE HYDROCHLORIDE 20 MG/ML
INJECTION, SOLUTION EPIDURAL; INFILTRATION; INTRACAUDAL; PERINEURAL PRN
Status: DISCONTINUED | OUTPATIENT
Start: 2020-09-22 | End: 2020-09-22 | Stop reason: SDUPTHER

## 2020-09-22 RX ORDER — SODIUM CHLORIDE 0.9 % (FLUSH) 0.9 %
10 SYRINGE (ML) INJECTION PRN
Status: DISCONTINUED | OUTPATIENT
Start: 2020-09-22 | End: 2020-09-22 | Stop reason: HOSPADM

## 2020-09-22 RX ORDER — HYDRALAZINE HYDROCHLORIDE 20 MG/ML
5 INJECTION INTRAMUSCULAR; INTRAVENOUS
Status: DISCONTINUED | OUTPATIENT
Start: 2020-09-22 | End: 2020-09-22 | Stop reason: HOSPADM

## 2020-09-22 RX ORDER — ECHINACEA PURPUREA EXTRACT 125 MG
1 TABLET ORAL PRN
Qty: 1 BOTTLE | Refills: 3 | Status: ON HOLD | OUTPATIENT
Start: 2020-09-22 | End: 2020-11-05

## 2020-09-22 RX ORDER — KETAMINE HCL IN NACL, ISO-OSM 100MG/10ML
SYRINGE (ML) INJECTION PRN
Status: DISCONTINUED | OUTPATIENT
Start: 2020-09-22 | End: 2020-09-22 | Stop reason: SDUPTHER

## 2020-09-22 RX ORDER — MEPERIDINE HYDROCHLORIDE 25 MG/ML
12.5 INJECTION INTRAMUSCULAR; INTRAVENOUS; SUBCUTANEOUS EVERY 5 MIN PRN
Status: DISCONTINUED | OUTPATIENT
Start: 2020-09-22 | End: 2020-09-22 | Stop reason: HOSPADM

## 2020-09-22 RX ORDER — PROMETHAZINE HYDROCHLORIDE 25 MG/ML
6.25 INJECTION, SOLUTION INTRAMUSCULAR; INTRAVENOUS ONCE
Status: COMPLETED | OUTPATIENT
Start: 2020-09-22 | End: 2020-09-22

## 2020-09-22 RX ORDER — PROPOFOL 10 MG/ML
INJECTION, EMULSION INTRAVENOUS PRN
Status: DISCONTINUED | OUTPATIENT
Start: 2020-09-22 | End: 2020-09-22 | Stop reason: SDUPTHER

## 2020-09-22 RX ADMIN — FAMOTIDINE 20 MG: 10 INJECTION, SOLUTION INTRAVENOUS at 07:30

## 2020-09-22 RX ADMIN — Medication 30 MG: at 07:51

## 2020-09-22 RX ADMIN — Medication 100 MCG: at 08:14

## 2020-09-22 RX ADMIN — MIDAZOLAM 2 MG: 1 INJECTION INTRAMUSCULAR; INTRAVENOUS at 07:34

## 2020-09-22 RX ADMIN — APREPITANT 40 MG: 40 CAPSULE ORAL at 07:16

## 2020-09-22 RX ADMIN — DEXAMETHASONE SODIUM PHOSPHATE 10 MG: 4 INJECTION, SOLUTION INTRAMUSCULAR; INTRAVENOUS at 07:48

## 2020-09-22 RX ADMIN — Medication 100 MCG: at 09:13

## 2020-09-22 RX ADMIN — HYDROMORPHONE HYDROCHLORIDE 0.25 MG: 1 INJECTION, SOLUTION INTRAMUSCULAR; INTRAVENOUS; SUBCUTANEOUS at 11:29

## 2020-09-22 RX ADMIN — Medication 100 MCG: at 08:08

## 2020-09-22 RX ADMIN — PROMETHAZINE HYDROCHLORIDE 6.25 MG: 25 INJECTION INTRAMUSCULAR; INTRAVENOUS at 14:30

## 2020-09-22 RX ADMIN — GLYCOPYRROLATE 0.2 MG: 0.2 INJECTION, SOLUTION INTRAMUSCULAR; INTRAVENOUS at 07:50

## 2020-09-22 RX ADMIN — Medication 100 MCG: at 09:09

## 2020-09-22 RX ADMIN — Medication 100 MCG: at 08:56

## 2020-09-22 RX ADMIN — Medication 100 MCG: at 08:26

## 2020-09-22 RX ADMIN — PROPOFOL 150 MG: 10 INJECTION, EMULSION INTRAVENOUS at 07:41

## 2020-09-22 RX ADMIN — HYDROMORPHONE HYDROCHLORIDE 0.25 MG: 1 INJECTION, SOLUTION INTRAMUSCULAR; INTRAVENOUS; SUBCUTANEOUS at 11:43

## 2020-09-22 RX ADMIN — Medication 100 MCG: at 08:24

## 2020-09-22 RX ADMIN — PROMETHAZINE HYDROCHLORIDE 10 MG: 25 INJECTION INTRAMUSCULAR; INTRAVENOUS at 10:20

## 2020-09-22 RX ADMIN — SUGAMMADEX 300 MG: 100 INJECTION, SOLUTION INTRAVENOUS at 09:36

## 2020-09-22 RX ADMIN — SODIUM CHLORIDE: 9 INJECTION, SOLUTION INTRAVENOUS at 09:43

## 2020-09-22 RX ADMIN — ROCURONIUM BROMIDE 40 MG: 10 INJECTION INTRAVENOUS at 07:50

## 2020-09-22 RX ADMIN — Medication 100 MCG: at 09:02

## 2020-09-22 RX ADMIN — ONDANSETRON 4 MG: 2 INJECTION INTRAMUSCULAR; INTRAVENOUS at 09:32

## 2020-09-22 RX ADMIN — ROCURONIUM BROMIDE 10 MG: 10 INJECTION INTRAVENOUS at 07:41

## 2020-09-22 RX ADMIN — LIDOCAINE HYDROCHLORIDE 40 MG: 20 INJECTION, SOLUTION EPIDURAL; INFILTRATION; INTRACAUDAL; PERINEURAL at 09:35

## 2020-09-22 RX ADMIN — Medication 100 MCG: at 08:50

## 2020-09-22 RX ADMIN — Medication 100 MCG: at 09:38

## 2020-09-22 RX ADMIN — LIDOCAINE HYDROCHLORIDE 60 MG: 20 INJECTION, SOLUTION EPIDURAL; INFILTRATION; INTRACAUDAL; PERINEURAL at 07:41

## 2020-09-22 RX ADMIN — FENTANYL CITRATE 25 MCG: 50 INJECTION INTRAMUSCULAR; INTRAVENOUS at 09:59

## 2020-09-22 RX ADMIN — Medication 2 PUFF: at 07:34

## 2020-09-22 RX ADMIN — FENTANYL CITRATE 25 MCG: 50 INJECTION INTRAMUSCULAR; INTRAVENOUS at 09:50

## 2020-09-22 RX ADMIN — Medication 100 MCG: at 09:20

## 2020-09-22 RX ADMIN — Medication 100 MCG: at 09:29

## 2020-09-22 RX ADMIN — Medication 100 MCG: at 09:43

## 2020-09-22 RX ADMIN — Medication 140 MG: at 07:41

## 2020-09-22 RX ADMIN — SODIUM CHLORIDE: 9 INJECTION, SOLUTION INTRAVENOUS at 06:49

## 2020-09-22 RX ADMIN — HYDROMORPHONE HYDROCHLORIDE 0.5 MG: 1 INJECTION, SOLUTION INTRAMUSCULAR; INTRAVENOUS; SUBCUTANEOUS at 12:21

## 2020-09-22 RX ADMIN — Medication 100 MCG: at 08:32

## 2020-09-22 RX ADMIN — FENTANYL CITRATE 50 MCG: 50 INJECTION INTRAMUSCULAR; INTRAVENOUS at 07:41

## 2020-09-22 ASSESSMENT — PAIN DESCRIPTION - PAIN TYPE
TYPE: SURGICAL PAIN
TYPE: CHRONIC PAIN
TYPE: SURGICAL PAIN

## 2020-09-22 ASSESSMENT — PULMONARY FUNCTION TESTS
PIF_VALUE: 19
PIF_VALUE: 9
PIF_VALUE: 19
PIF_VALUE: 1
PIF_VALUE: 19
PIF_VALUE: 19
PIF_VALUE: 1
PIF_VALUE: 16
PIF_VALUE: 2
PIF_VALUE: 18
PIF_VALUE: 20
PIF_VALUE: 19
PIF_VALUE: 15
PIF_VALUE: 15
PIF_VALUE: 18
PIF_VALUE: 17
PIF_VALUE: 4
PIF_VALUE: 20
PIF_VALUE: 19
PIF_VALUE: 18
PIF_VALUE: 20
PIF_VALUE: 20
PIF_VALUE: 15
PIF_VALUE: 17
PIF_VALUE: 17
PIF_VALUE: 21
PIF_VALUE: 1
PIF_VALUE: 19
PIF_VALUE: 11
PIF_VALUE: 19
PIF_VALUE: 20
PIF_VALUE: 13
PIF_VALUE: 19
PIF_VALUE: 2
PIF_VALUE: 16
PIF_VALUE: 16
PIF_VALUE: 20
PIF_VALUE: 19
PIF_VALUE: 3
PIF_VALUE: 19
PIF_VALUE: 3
PIF_VALUE: 20
PIF_VALUE: 19
PIF_VALUE: 3
PIF_VALUE: 19
PIF_VALUE: 19
PIF_VALUE: 20
PIF_VALUE: 20
PIF_VALUE: 19
PIF_VALUE: 20
PIF_VALUE: 19
PIF_VALUE: 6
PIF_VALUE: 19
PIF_VALUE: 20
PIF_VALUE: 10
PIF_VALUE: 20
PIF_VALUE: 19
PIF_VALUE: 2
PIF_VALUE: 16
PIF_VALUE: 3
PIF_VALUE: 16
PIF_VALUE: 19
PIF_VALUE: 20
PIF_VALUE: 19
PIF_VALUE: 15
PIF_VALUE: 19
PIF_VALUE: 0
PIF_VALUE: 20
PIF_VALUE: 19
PIF_VALUE: 14
PIF_VALUE: 15
PIF_VALUE: 20
PIF_VALUE: 3
PIF_VALUE: 19
PIF_VALUE: 15
PIF_VALUE: 20
PIF_VALUE: 19
PIF_VALUE: 3
PIF_VALUE: 19
PIF_VALUE: 3
PIF_VALUE: 19
PIF_VALUE: 20
PIF_VALUE: 19
PIF_VALUE: 1
PIF_VALUE: 17
PIF_VALUE: 19
PIF_VALUE: 20
PIF_VALUE: 6
PIF_VALUE: 19
PIF_VALUE: 3
PIF_VALUE: 19
PIF_VALUE: 3
PIF_VALUE: 20
PIF_VALUE: 17
PIF_VALUE: 1
PIF_VALUE: 19
PIF_VALUE: 19
PIF_VALUE: 24
PIF_VALUE: 6
PIF_VALUE: 19
PIF_VALUE: 20
PIF_VALUE: 20
PIF_VALUE: 19
PIF_VALUE: 16
PIF_VALUE: 18
PIF_VALUE: 19
PIF_VALUE: 20
PIF_VALUE: 21
PIF_VALUE: 19
PIF_VALUE: 19
PIF_VALUE: 6
PIF_VALUE: 1
PIF_VALUE: 19
PIF_VALUE: 6
PIF_VALUE: 2
PIF_VALUE: 21
PIF_VALUE: 19
PIF_VALUE: 13
PIF_VALUE: 21
PIF_VALUE: 17
PIF_VALUE: 3
PIF_VALUE: 19

## 2020-09-22 ASSESSMENT — PAIN DESCRIPTION - PROGRESSION
CLINICAL_PROGRESSION: NOT CHANGED
CLINICAL_PROGRESSION: NOT CHANGED
CLINICAL_PROGRESSION: GRADUALLY WORSENING
CLINICAL_PROGRESSION: GRADUALLY IMPROVING
CLINICAL_PROGRESSION: NOT CHANGED

## 2020-09-22 ASSESSMENT — PAIN DESCRIPTION - LOCATION
LOCATION: BACK;CHEST
LOCATION: NOSE
LOCATION: ARM;NOSE
LOCATION: NOSE

## 2020-09-22 ASSESSMENT — ENCOUNTER SYMPTOMS: SHORTNESS OF BREATH: 1

## 2020-09-22 ASSESSMENT — PAIN SCALES - GENERAL
PAINLEVEL_OUTOF10: 8
PAINLEVEL_OUTOF10: 5
PAINLEVEL_OUTOF10: 6
PAINLEVEL_OUTOF10: 6
PAINLEVEL_OUTOF10: 0
PAINLEVEL_OUTOF10: 7
PAINLEVEL_OUTOF10: 6
PAINLEVEL_OUTOF10: 7
PAINLEVEL_OUTOF10: 4

## 2020-09-22 ASSESSMENT — PAIN DESCRIPTION - DESCRIPTORS
DESCRIPTORS: BURNING
DESCRIPTORS: DISCOMFORT
DESCRIPTORS: ACHING;CONSTANT
DESCRIPTORS: DISCOMFORT
DESCRIPTORS: BURNING
DESCRIPTORS: ACHING;CONSTANT
DESCRIPTORS: BURNING

## 2020-09-22 ASSESSMENT — PAIN - FUNCTIONAL ASSESSMENT
PAIN_FUNCTIONAL_ASSESSMENT: ACTIVITIES ARE NOT PREVENTED
PAIN_FUNCTIONAL_ASSESSMENT: PREVENTS OR INTERFERES SOME ACTIVE ACTIVITIES AND ADLS
PAIN_FUNCTIONAL_ASSESSMENT: ACTIVITIES ARE NOT PREVENTED
PAIN_FUNCTIONAL_ASSESSMENT: PREVENTS OR INTERFERES SOME ACTIVE ACTIVITIES AND ADLS
PAIN_FUNCTIONAL_ASSESSMENT: ACTIVITIES ARE NOT PREVENTED
PAIN_FUNCTIONAL_ASSESSMENT: 0-10
PAIN_FUNCTIONAL_ASSESSMENT: ACTIVITIES ARE NOT PREVENTED

## 2020-09-22 ASSESSMENT — PAIN DESCRIPTION - ORIENTATION
ORIENTATION: UPPER

## 2020-09-22 ASSESSMENT — PAIN DESCRIPTION - FREQUENCY
FREQUENCY: CONTINUOUS

## 2020-09-22 ASSESSMENT — PAIN DESCRIPTION - ONSET
ONSET: ON-GOING

## 2020-09-22 NOTE — H&P
I have reviewed this patient's history and physical and there have been no significant changes. The patient was counseled at length about the risks of alfred Covid-19 during their perioperative period and any recovery window from their procedure. The patient was made aware that alfred Covid-19  may worsen their prognosis for recovering from their procedure  and lend to a higher morbidity and/or mortality risk. All material risks, benefits, and reasonable alternatives including postponing the procedure were discussed. The patient does wish to proceed with the procedure at this time. The patient understands the risk of surgery including epistaxis, ongoing nasal obstruction, septal perforation and cosmetic change to her nose. She agrees to proceed.

## 2020-09-22 NOTE — OP NOTE
she underwent uncomplicated general anesthesia with endotracheal intubation. The head of bed was turned 180 degrees. Exam showed a very small dorsal hump. She had prominent bilateral inferior septal spurs and what appeared to be prominent leftward deviated vomer. She also had large turbinates. 15 mL 1% lidocaine with epinephrine were injected into the septum, turbinates and the external nose. She was prepped and draped in sterile fashion. The nasal vibrissae were trimmed. A left-sided hemitransfixion incision was made. The left-sided mucoperichondrial flap was raised. An L-shaped incision was made into the quadrangular cartilage leaving at least 1.5 cm anteriorly and superiorly. The contralateral mucoperichondrial flap was then raised. Cartilage was disarticulated from the vomer as well as a maxillary crest.  This resulted in a straighter anterior septum. There was quite a prominent leftward spur the vomer that was sharply removed at this time. The hemitransfixion incision was then closed with a quilting 4-0 plain gut suture. Next a columellar and bilateral marginal incisions were made. The soft tissue was reflected off of the lower lateral upper lateral cartilages. the nasal bones were identified and a subperiosteal dissection was carried out on them. A rasp was used to remove a small rightward bony hump. Next the bilateral upper lateral cartilages were  from the septum. Bilateral  grafts were fashioned from the removed septal cartilage. These were placed between the septal cartilage and upper lateral cartilages on each side. These were secured with quilting 5-0 PDS suture. Exam did show what appeared to be a weak right lower lateral cartilage from possible previous trauma. This appeared to be very thin in the region of the intermediate and lateral crura. Another graft was fashioned to cover this area as a alar rim graft. This was secured with 5-0 PDS as well.     Next

## 2020-09-22 NOTE — PROGRESS NOTES
From PACU. Alert and oriented. C/o 7/10 surgical nose pain. No visible drainage at this time. Vss. Ambulate to restroom and voided.

## 2020-09-22 NOTE — PROGRESS NOTES
Phenergan was given and patient says she is starting to feel better. She says she is ready to go home.

## 2020-09-22 NOTE — PROGRESS NOTES
Alert and oriented. C/o 7/10 surgical nose/face discomfort and increasing nausea. Asking for nausea medication. Dr Jordyn Ahumada notified and phenergan ordered. Says she wants to wait til she gets home to take analgesic. Nose dressing is clean dry intact. Sling under her nose has a small amount red drainage. No visible drainage in mouth/throat area. Tolerated sitting up and took sips of drink.  at bedside. Verbalized understanding of discharge instructions.

## 2020-09-22 NOTE — ANESTHESIA POSTPROCEDURE EVALUATION
Department of Anesthesiology  Postprocedure Note    Patient: Rickie Fuentes  MRN: [id number]  YOB: 1980  Date of evaluation: 9/22/2020  Time:  2:05 PM     Procedure Summary     Date:  09/22/20 Room / Location:  [address] [place] [address]    Anesthesia Start:  9086 Anesthesia Stop:  3614    Procedure:  OPEN SEPTORHINOPLASTY, BILATERAL NASAL VALVE REPAIR, BILATERAL INFERIOR TURBINATE REDUCTION (Bilateral ) Diagnosis:       Nasal congestion      Deviated septum      (NASAL CONGESTION, DEVIATED SEPTUM, INTERNAL AND EXTERNAL NASAL VALVE COLLAPSE, INFERIOR TURBINATE HYPERTROPHY)    Surgeon:  Aminata Titus MD Responsible Provider:  Cinthya Menard MD    Anesthesia Type:  general ASA Status:  3          Anesthesia Type: general    Ehsan Phase I: Ehsan Score: 9    Ehsan Phase II: Ehsan Score: 10    Last vitals: Reviewed and per EMR flowsheets.        Anesthesia Post Evaluation    Patient location during evaluation: PACU  Level of consciousness: awake and alert  Airway patency: patent  Nausea & Vomiting: no nausea and no vomiting  Complications: no  Cardiovascular status: blood pressure returned to baseline  Respiratory status: acceptable  Hydration status: euvolemic  Comments: Postoperative Anesthesia Note    Name:    Rickie Fuentes  MRN:      [id number]    Patient Vitals in the past 12 hrs:  09/22/20 1340, BP:117/80, Temp:97.1 °F (36.2 °C), Temp src:Temporal, Pulse:75, Resp:16, SpO2:98 %  09/22/20 1331, BP:109/77, Pulse:63, Resp:18, SpO2:97 %  09/22/20 1316, BP:116/81, Pulse:69, Resp:20, SpO2:97 %  09/22/20 1302, BP:116/78, Pulse:75, Resp:18, SpO2:97 %  09/22/20 1300, Temp:97 °F (36.1 °C), Temp src:Temporal, Pulse:68, Resp:19, SpO2:97 %  09/22/20 1232, BP:113/80, Pulse:84, Resp:21, SpO2:96 %  09/22/20 1217, BP:122/83, Pulse:81, Resp:24, SpO2:97 %  09/22/20 1202, BP:124/79, Pulse:74, Resp:12, SpO2:98 %  09/22/20 1146, BP:125/86, Pulse:81, Resp:23, SpO2:98 %  09/22/20 1131, BP:127/87, Pulse:89, Resp:19, SpO2:97 %  09/22/20 1116, BP:119/83, Pulse:79, Resp:15, SpO2:95 %  09/22/20 1102, BP:121/82, Pulse:84, Resp:14, SpO2:97 %  09/22/20 1047, BP:115/84, Pulse:88, Resp:17, SpO2:97 %  09/22/20 1037, BP:118/80, Pulse:97, Resp:15, SpO2:99 %  09/22/20 1032, BP:118/83, Pulse:97, Resp:19, SpO2:97 %  09/22/20 1030, Pulse:100, Resp:20  09/22/20 1027, BP:118/84, Pulse:94, Resp:15, SpO2:99 %  09/22/20 1022, BP:108/73, Pulse:95, Resp:17, SpO2:100 %  09/22/20 1020, BP:110/85, SpO2:100 %  09/22/20 1018, Temp:96.6 °F (35.9 °C), Temp src:Temporal, SpO2:97 %  09/22/20 0634, BP:106/74, Temp:96.9 °F (36.1 °C), Temp src:Temporal, Pulse:57, Resp:16, SpO2:99 %, Height:5' 1\" (1.549 m), Weight:151 lb 7.3 oz (68.7 kg)     LABS:    CBC  Lab Results       Component                Value               Date/Time                  WBC                      8.4                 01/22/2020 11:41 AM        HGB                      14.5                01/22/2020 11:41 AM        HCT                      43.0                01/22/2020 11:41 AM        PLT                      271                 01/22/2020 11:41 AM   RENAL  Lab Results       Component                Value               Date/Time                  NA                       145                 01/22/2020 11:41 AM        K                        4.4                 01/22/2020 11:41 AM        K                        3.9                 01/07/2020 03:41 PM        CL                       106                 01/22/2020 11:41 AM        CO2                      21                  01/22/2020 11:41 AM        BUN                      7                   01/22/2020 11:41 AM        CREATININE               0.8                 01/22/2020 11:41 AM        GLUCOSE                  105 (H)             01/22/2020 11:41 AM        GLUCOSE                  90                  07/05/2011 04:00 PM   MUKESHGS  Lab Results       Component                Value               Date/Time PROTIME                  10.7                01/22/2020 11:41 AM        INR                      0.92                01/22/2020 11:41 AM        APTT                     35.7                01/22/2020 11:41 AM     Intake & Output:  @09EQWN@    Nausea & Vomiting:  No    Level of Consciousness:  Awake    Pain Assessment:  Adequate analgesia    Anesthesia Complications:  No apparent anesthetic complications    SUMMARY      Vital signs stable  OK to discharge from Stage I post anesthesia care.   Care transferred from Anesthesiology department on discharge from perioperative area

## 2020-09-22 NOTE — ANESTHESIA PRE PROCEDURE
Breast cancer in female [facility])     Past Medical History:   Diagnosis Date    Arrythmia     Asthma     no problems recently    Back pain     Breast cancer ([address]     Breast lump     right    Cervical cancer (HCC)     Chronic hepatitis C (HCC)     Chronic pain     DDD (degenerative disc disease)     Diverticulitis     Fibromyalgia     GERD (gastroesophageal reflux disease)     Headache     migraines    Immune deficiency disorder (HCC)     Interstitial cystitis     Nausea & vomiting     Osteoarthritis     Other closed fractures of distal end of radius (alone) 12/19/2009    distal radius fx surgery 12/28/2009 Dr. Kala Silvestre Pneumonia     PONV (postoperative nausea and vomiting)     scop patch and Phenergan work best     Rheumatoid arthritis(714.0)     Tachycardia      Past Surgical History:   Procedure Laterality Date    APPENDECTOMY      BLADDER SURGERY      BREAST ENHANCEMENT SURGERY Bilateral     BREAST SURGERY      bilat mastectomy    BREAST SURGERY Bilateral 2/6/2020    REVISION BILATERAL BREAST RECONSTRUCTION; 1.7 CM SCAR REVISION performed by Daljit Hobbs MD at [address] [place] [address] 7/2/14    removal of hardware    CHOLECYSTECTOMY      COLONOSCOPY      with polyectomy    CYSTOSCOPY  8-17-11    with bladder and urethral dilatation    FAT TRANSFER Bilateral 2/6/2020    WITH HIGH VOLUME FAT GRAFTING performed by Daljit Hobbs MD at [address] [place] [address]  03/24/2017    repair of umbilical hernia with primary closure, exploration of LLQ subcutaneous space without definitive findings of lipoma    HYSTERECTOMY      LAPAROSCOPIC APPENDECTOMY  3/18/13    LAPAROSCOPY  3/18/13    RECONSTRUCTION BREAST IMMEDIATE / DELAYED W/ TISSUE EXPANDER Bilateral 9/26/2019    EXCHANGE OF BILATERAL BREAST IMPLANTS ; AND BILATERAL CAPSULECTOMY, REVISION BILATERAL BREAST RECONSTRUCTION performed [facility] Daljit Hobbs MD at [facility]  UPPER GASTROINTESTINAL ENDOSCOPY      1/09    UPPER GASTROINTESTINAL ENDOSCOPY  5/28/2010    UPPER GASTROINTESTINAL ENDOSCOPY  2/13/13    WRIST FRACTURE SURGERY Right 12/28/2009    Open treatment with open reduction, internal fixation rightdistal radius using     Social History     Tobacco Use    Smoking status: Never Smoker    Smokeless tobacco: Never Used    Tobacco comment: encouraged to never smoke    Substance Use Topics    Alcohol use: Yes     Alcohol/week: 0.0 standard drinks     Comment: socially; special occasions 1 every other week    Drug use: No     Medications  No current facility-administered medications on file prior to encounter. Current Outpatient Medications on File Prior to Encounter   Medication Sig Dispense Refill    famotidine (PEPCID) 40 MG tablet Take 1 tablet by mouth 2 times daily 60 tablet 3    buPROPion (WELLBUTRIN SR) 150 MG extended release tablet Take 1 tablet by mouth 2 times daily 60 tablet 3    OxyCODONE ER (XTAMPZA ER) 9 MG C12A Take 9 mg by mouth 2 times daily as needed.        propranolol (INDERAL) 20 MG tablet Take 1 tablet by mouth 3 times daily (Patient taking differently: Take 20 mg by mouth 2 times daily ) 90 tablet 2    tiZANidine (ZANAFLEX) 4 MG tablet Take 1 tablet by mouth every 8 hours as needed (muscle spasm) 30 tablet 2    promethazine (PHENERGAN) 25 MG tablet Take 1 tablet by mouth 2 times daily as needed for Nausea 60 tablet 2    albuterol sulfate  (90 Base) MCG/ACT inhaler Inhale 2 puffs into the lungs 4 times daily as needed for Wheezing 3 Inhaler 0    famciclovir (FAMVIR) 500 MG tablet Take 1 tablet by mouth 3 times daily TAKE ONE TABLET BY MOUTH THREE TIMES DAILY (Patient taking differently: Take 500 mg by mouth as needed For fever blister) 30 tablet 1    NONFORMULARY EPI PEN PRN      hydrochlorothiazide (HYDRODIURIL) 25 MG tablet Take 1 tablet by mouth daily (Patient taking differently: Take 25 mg by mouth as needed ) 30 tablet 3     Current Facility-Administered Medications   Medication Dose Route Frequency Provider Last Rate Last Dose    0.9 % sodium chloride infusion   Intravenous Continuous Harriet Gabriel  mL/hr at 20 0649      sodium chloride flush 0.9 % injection 10 mL  10 mL Intravenous 2 times per day Harriet Gabriel MD        sodium chloride flush 0.9 % injection 10 mL  10 mL Intravenous PRN Harriet Gabriel MD         Vital Signs (Current)   Vitals:    20 0634   BP: 106/74   Pulse: 57   Resp: 16   Temp: 96.9 °F (36.1 °C)   TempSrc: Temporal   SpO2: 99%   Weight: 151 lb 7.3 oz (68.7 kg)   Height: 5' 1\" (1.549 m)                                          BP Readings from Last 3 Encounters:   20 106/74   09/15/20 110/70   20 113/81     Vital Signs Statistics (for past 48 hrs)     Temp  Av.9 °F (36.1 °C)  Min: 96.9 °F (36.1 °C)   Min taken time: 20 0634  Max: 96.9 °F (36.1 °C)   Max taken time: 20 0634  Pulse  Av  Min: 62   Min taken time: 20 0634  Max: 62   Max taken time: 20 0634  Resp  Av  Min: 12   Min taken time: 20 0634  Max: 12   Max taken time: 20 0634  BP  Min: 106/74   Min taken time: 20 0634  Max: 106/74   Max taken time: 20 0634  SpO2  Av %  Min: 99 %   Min taken time: 20 0634  Max: 99 %   Max taken time: 20 0634  BP Readings from Last 3 Encounters:   20 106/74   09/15/20 110/70   20 113/81       BMI  Body mass index is 28.62 kg/m². Estimated body mass index is 28.62 kg/m² as calculated from the following:    Height as of this encounter: 5' 1\" (1.549 m). Weight as of this encounter: 151 lb 7.3 oz (68.7 kg).     CBC   Lab Results   Component Value Date    WBC 8.4 2020    RBC 4.72 2020    HGB 14.5 2020    HCT 43.0 2020    MCV 91.1 2020    RDW 13.3 2020     2020     CMP    Lab Results   Component Value Date     2020    K 4.4 2020    K 3.9 01/07/2020     01/22/2020    CO2 21 01/22/2020    BUN 7 01/22/2020    CREATININE 0.8 01/22/2020    GFRAA >60 01/22/2020    GFRAA >60 04/17/2013    AGRATIO 2.4 01/22/2020    LABGLOM >60 01/22/2020    LABGLOM 98.3 07/05/2011    GLUCOSE 105 01/22/2020    GLUCOSE 90 07/05/2011    PROT 6.8 01/22/2020    PROT 5.8 03/18/2013    PROT 5.8 03/18/2013    CALCIUM 10.2 01/22/2020    BILITOT <0.2 01/22/2020    ALKPHOS 95 01/22/2020    AST 17 01/22/2020    ALT 39 01/22/2020     BMP    Lab Results   Component Value Date     01/22/2020    K 4.4 01/22/2020    K 3.9 01/07/2020     01/22/2020    CO2 21 01/22/2020    BUN 7 01/22/2020    CREATININE 0.8 01/22/2020    CALCIUM 10.2 01/22/2020    GFRAA >60 01/22/2020    GFRAA >60 04/17/2013    LABGLOM >60 01/22/2020    LABGLOM 98.3 07/05/2011    GLUCOSE 105 01/22/2020    GLUCOSE 90 07/05/2011     POCGlucose  No results for input(s): GLUCOSE in the last 72 hours. Coags    Lab Results   Component Value Date    PROTIME 10.7 01/22/2020    INR 0.92 01/22/2020    APTT 35.7 78/45/1541     HCG (If Applicable)   Lab Results   Component Value Date    PREGTESTUR Negative 05/15/2017      ABGs No results found for: PHART, PO2ART, QBG1TFQ, NXD1DQL, BEART, O4QOAULR   Type & Screen (If Applicable)  No results found for: LABABO, LABRH                         BMI: Wt Readings from Last 3 Encounters:       NPO Status:   Date of last liquid consumption: 09/21/20   Time of last liquid consumption: 2330   Date of last solid food consumption: 09/21/20      Time of last solid consumption: 2200       Anesthesia Evaluation  Patient summary reviewed   history of anesthetic complications: PONV.   Airway: Mallampati: II  TM distance: >3 FB   Neck ROM: full  Mouth opening: > = 3 FB Dental: normal exam         Pulmonary:   (+) pneumonia:  shortness of breath:  asthma:     (-) COPD                           Cardiovascular:        (-) hypertension, valvular problems/murmurs, past MI, CAD, CABG/stent, dysrhythmias and  angina    ECG reviewed                        Neuro/Psych:   (+) neuromuscular disease:, headaches:, psychiatric history:depression/anxiety    (-) seizures, TIA and CVA            ROS comment: Fibromyalgia,   DDD  Back pain  Chronic Pain  Myofacial pain GI/Hepatic/Renal:   (+) GERD:, hepatitis: B, liver disease:,      (-) PUD and no renal disease       Endo/Other:    (+) : arthritis: rheumatoid. , malignancy/cancer (breast, cervical). (-) diabetes mellitus               Abdominal:           Vascular: negative vascular ROS. Anesthesia Plan      general     ASA 3     (I discussed with the patient the risks and benefits of PIV, general anesthesia, IV Narcotics, PACU. All questions were answered the patient agrees with the plan.)  Induction: intravenous. MIPS: Postoperative opioids intended and Prophylactic antiemetics administered. Anesthetic plan and risks discussed with patient. Plan discussed with CRNA. This pre-anesthesia assessment may be used as a history and physical.    DOS STAFF ADDENDUM:    Pt seen and examined, chart reviewed (including anesthesia, drug and allergy history). No interval changes to history and physical examination. Anesthetic plan, risks, benefits, alternatives, and personnel involved discussed with patient. Patient verbalized an understanding and agrees to proceed.       Merlin Burrows, MD  September 22, 2020  6:55 AM

## 2020-09-23 ENCOUNTER — OFFICE VISIT (OUTPATIENT)
Dept: ENT CLINIC | Age: 40
End: 2020-09-23

## 2020-09-23 VITALS — WEIGHT: 153 LBS | TEMPERATURE: 97.1 F | BODY MASS INDEX: 28.91 KG/M2

## 2020-09-23 PROCEDURE — 99024 POSTOP FOLLOW-UP VISIT: CPT | Performed by: OTOLARYNGOLOGY

## 2020-09-23 NOTE — PROGRESS NOTES
Patient is following up for postoperative day 1 open septorhinoplasty and turbinate reduction. The patient has had some oozing. She had a little bit of nausea. She does have some pain. However she actually feels better than she expected. Exam  Mild periorbital ecchymosis bilaterally. The columellar sutures are intact. Packing and Silastic splints are in place. Combination of a little bit of blood and serosanguineous drainage from the nose    Plan  Patient seems to be recovering exactly as expected postoperative day 1 from a rhinoplasty. I answered all of her questions and we discussed management. She will follow-up next Wednesday for me to remove splints and sutures. If she has any issues prior to that time she should follow-up sooner.

## 2020-09-25 ENCOUNTER — TELEPHONE (OUTPATIENT)
Dept: FAMILY MEDICINE CLINIC | Age: 40
End: 2020-09-25

## 2020-09-25 RX ORDER — ALPRAZOLAM 0.5 MG/1
TABLET ORAL
Qty: 90 TABLET | Refills: 1 | Status: SHIPPED | OUTPATIENT
Start: 2020-09-25 | End: 2020-10-25

## 2020-09-25 NOTE — TELEPHONE ENCOUNTER
----- Message from Tara Germain sent at 9/24/2020  4:40 PM EDT -----  Subject: Message to Provider    QUESTIONS  Information for Provider? patient is stating she is experiencing really   bad anxiety and wants to know if she can get her Xanax dose higher from   the 0.5 mg  ---------------------------------------------------------------------------  --------------  CALL BACK INFO  What is the best way for the office to contact you? OK to leave message on   voicemail  Preferred Call Back Phone Number? [phone]  ---------------------------------------------------------------------------  --------------  SCRIPT ANSWERS  Relationship to Patient?  Self

## 2020-09-30 ENCOUNTER — OFFICE VISIT (OUTPATIENT)
Dept: ENT CLINIC | Age: 40
End: 2020-09-30

## 2020-09-30 VITALS — BODY MASS INDEX: 28.53 KG/M2 | TEMPERATURE: 97.2 F | WEIGHT: 151 LBS

## 2020-09-30 PROCEDURE — 99024 POSTOP FOLLOW-UP VISIT: CPT | Performed by: OTOLARYNGOLOGY

## 2020-09-30 NOTE — PROGRESS NOTES
Patient is following up for a open septorhinoplasty and turbinate reduction that I did on September 22. She says that she is had a lot of nasal congestion and pressure, but is overall doing pretty well. Exam  I removed the cast off the nasal dorsum. At this time her the source appears to be very symmetric and is healing well. Some mild residual edema. Most of the columellar sutures appear to have already fallen out somehow. I remove the one that was left. The columellar incision appears to be healing well even with these being removed. I removed the bilateral Silastic splints I then applied Afrin and lidocaine the bilateral nasal cavity debrided with a Mcfarlane suction. The patient has a very midline septum with well reduced turbinates. You can already see a significant improvement in the amount of valve collapse she was having when compared to previous to surgery. Plan  Patient appears to be recovering very well from surgery. I would like for her to be gentle with her nose over the next couple of weeks and to slowly start reintroducing blowing her nose. I would like for her to continue using nasal saline to keep things moist.  I would like to see her in about a month to make sure things are continuing to heal well, but at this time it appears as though she is can have a widely patent nasal cavity.

## 2020-10-05 ENCOUNTER — TELEPHONE (OUTPATIENT)
Dept: ENT CLINIC | Age: 40
End: 2020-10-05

## 2020-10-05 ENCOUNTER — TELEPHONE (OUTPATIENT)
Dept: SURGERY | Age: 40
End: 2020-10-05

## 2020-10-05 NOTE — TELEPHONE ENCOUNTER
Returned patient call to attempt to ask questions. Patient may request a oral COVID testing. Patient is considering an exchange of implant. Scheduled for an appointment.  DONE

## 2020-10-05 NOTE — TELEPHONE ENCOUNTER
Sulma has questions regarding her surgery on the 5th as far as sizing and the amount of fat that will be grafted. She is inquiring if he will be doing 360 lipo with this procedure as well. She also let me know that she has had recent nasal reconstruction and asked if it was ok to have her pre-op COVID testing as she is still healing. I let her know that she needs to call her ENT Surgeon to discuss that as they are more aware of the anatomy in her nose and can determine whether or not this is ok to have done. If it is not, I asked her to call us back to let us know and we will inquire with the OR of how to best proceed with covid testing.  The best number for Dr. Kamala Marquis to reach [facility] is [phone]

## 2020-10-06 ENCOUNTER — TELEPHONE (OUTPATIENT)
Dept: FAMILY MEDICINE CLINIC | Age: 40
End: 2020-10-06

## 2020-10-06 RX ORDER — AZITHROMYCIN 250 MG/1
250 TABLET, FILM COATED ORAL SEE ADMIN INSTRUCTIONS
Qty: 6 TABLET | Refills: 0 | Status: SHIPPED | OUTPATIENT
Start: 2020-10-06 | End: 2020-10-11

## 2020-10-06 NOTE — TELEPHONE ENCOUNTER
Patient thinks she has an infection in her nose. States she was on amoxicillin following her nasal surgery but she still feels the infection. Patient requesting to see if Dr. Neetu Greer could send in a Z-pack for her. States she really needs this today and her surgeon is not available. Please return call and advise.

## 2020-10-12 ENCOUNTER — OFFICE VISIT (OUTPATIENT)
Dept: SURGERY | Age: 40
End: 2020-10-12
Payer: COMMERCIAL

## 2020-10-12 VITALS
HEIGHT: 61 IN | BODY MASS INDEX: 28.28 KG/M2 | WEIGHT: 149.8 LBS | TEMPERATURE: 96.6 F | OXYGEN SATURATION: 99 % | HEART RATE: 68 BPM

## 2020-10-12 PROCEDURE — 99214 OFFICE O/P EST MOD 30 MIN: CPT | Performed by: SURGERY
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
## 2020-10-27 NOTE — PROGRESS NOTES
[facility] / [facility] ([facility]) [address] [facility], [address]    Acknowledgment of Informed Consent for Surgical or Medical Procedure and Sedation  I agree to allow doctor(s) Salomon Gu and his/her associates or assistants, including residents and/or other qualified medical practitioner to perform the following medical treatment or procedure and to administer or direct the administration of sedation as necessary:  Procedure(s): BILATERAL BREAST FAT GRAFTING  My doctor has explained the following regarding the proposed procedure:   the explanation of the procedure   the benefits of the procedure   the potential problems that might occur during recuperation   the risks and side effects of the procedure which could include but are not limited to severe blood loss, infection, stroke or death   the benefits, risks and side effect of alternative procedures including the consequences of declining this procedure or any alternative procedures   the likelihood of achieving satisfactory results. I acknowledge no guarantee or assurance has been made to me regarding the results. I understand that during the course of this treatment/procedure, unforeseen conditions can occur which require an additional or different procedure. I agree to allow my physician or assistants to perform such extension of the original procedure as they may find necessary. I understand that sedation will often result in temporary impairment of memory and fine motor skills and that sedation can occasionally progress to a state of deep sedation or general anesthesia. I understand the risks of anesthesia for surgery include, but are not limited to, sore throat, hoarseness, injury to face, mouth, or teeth; nausea; headache; injury to blood vessels or nerves; death, brain damage, or paralysis.     I understand that if I have a Limitation of Treatment order in effect during my hospitalization, the order may or may not be in effect during this procedure. I give my doctor permission to give me blood or blood products. I understand that there are risks with receiving blood such as hepatitis, AIDS, fever, or allergic reaction. I acknowledge that the risks, benefits, and alternatives of this treatment have been explained to me and that no express or implied warranty has been given by the hospital, any blood bank, or any person or entity as to the blood or blood components transfused. At the discretion of my doctor, I agree to allow observers, equipment/product representatives and allow photographing, and/or televising of the procedure, provided my name or identity is maintained confidentially. I agree the hospital may dispose of or use for scientific or educational purposes any tissue, fluid, or body parts which may be removed.     ________________________________Date________Time______ am/pm  ([place] [facility])  Patient or Signature of Closest Relative or Legal Guardian    ________________________________Date________Time______am/pm      Page 1 of  1  Witness

## 2020-10-29 ENCOUNTER — TELEPHONE (OUTPATIENT)
Dept: SURGERY | Age: 40
End: 2020-10-29

## 2020-10-29 NOTE — TELEPHONE ENCOUNTER
Pt has consultation for lip injections on 11.19  She would like to know what her cost will be?     Please call

## 2020-10-30 ENCOUNTER — NURSE ONLY (OUTPATIENT)
Dept: PRIMARY CARE CLINIC | Age: 40
End: 2020-10-30
Payer: COMMERCIAL

## 2020-10-30 ENCOUNTER — OFFICE VISIT (OUTPATIENT)
Dept: FAMILY MEDICINE CLINIC | Age: 40
End: 2020-10-30
Payer: COMMERCIAL

## 2020-10-30 VITALS
WEIGHT: 149.6 LBS | SYSTOLIC BLOOD PRESSURE: 100 MMHG | BODY MASS INDEX: 28.25 KG/M2 | TEMPERATURE: 97.4 F | HEIGHT: 61 IN | DIASTOLIC BLOOD PRESSURE: 70 MMHG

## 2020-10-30 LAB
A/G RATIO: 2.9 (ref 1.1–2.2)
ALBUMIN SERPL-MCNC: 4.6 G/DL (ref 3.4–5)
ALP BLD-CCNC: 74 U/L (ref 40–129)
ALT SERPL-CCNC: 8 U/L (ref 10–40)
ANION GAP SERPL CALCULATED.3IONS-SCNC: 11 MMOL/L (ref 3–16)
APTT: 35.4 SEC (ref 24.2–36.2)
AST SERPL-CCNC: 10 U/L (ref 15–37)
BILIRUB SERPL-MCNC: 0.4 MG/DL (ref 0–1)
BUN BLDV-MCNC: 11 MG/DL (ref 7–20)
CALCIUM SERPL-MCNC: 9.4 MG/DL (ref 8.3–10.6)
CHLORIDE BLD-SCNC: 108 MMOL/L (ref 99–110)
CO2: 24 MMOL/L (ref 21–32)
CREAT SERPL-MCNC: 0.7 MG/DL (ref 0.6–1.1)
GFR AFRICAN AMERICAN: >60
GFR NON-AFRICAN AMERICAN: >60
GLOBULIN: 1.6 G/DL
GLUCOSE BLD-MCNC: 92 MG/DL (ref 70–99)
HCT VFR BLD CALC: 39.6 % (ref 36–48)
HEMOGLOBIN: 13.1 G/DL (ref 12–16)
INR BLD: 0.96 (ref 0.86–1.14)
MCH RBC QN AUTO: 30 PG (ref 26–34)
MCHC RBC AUTO-ENTMCNC: 33 G/DL (ref 31–36)
MCV RBC AUTO: 90.8 FL (ref 80–100)
PDW BLD-RTO: 13.7 % (ref 12.4–15.4)
PLATELET # BLD: 206 K/UL (ref 135–450)
PMV BLD AUTO: 8.4 FL (ref 5–10.5)
POTASSIUM SERPL-SCNC: 3.9 MMOL/L (ref 3.5–5.1)
PROTHROMBIN TIME: 11.1 SEC (ref 10–13.2)
RBC # BLD: 4.36 M/UL (ref 4–5.2)
SODIUM BLD-SCNC: 143 MMOL/L (ref 136–145)
TOTAL PROTEIN: 6.2 G/DL (ref 6.4–8.2)
WBC # BLD: 8.8 K/UL (ref 4–11)

## 2020-10-30 PROCEDURE — 99211 OFF/OP EST MAY X REQ PHY/QHP: CPT | Performed by: NURSE PRACTITIONER

## 2020-10-30 PROCEDURE — 99242 OFF/OP CONSLTJ NEW/EST SF 20: CPT | Performed by: FAMILY MEDICINE

## 2020-10-30 PROCEDURE — 93000 ELECTROCARDIOGRAM COMPLETE: CPT | Performed by: FAMILY MEDICINE

## 2020-10-30 PROCEDURE — 36415 COLL VENOUS BLD VENIPUNCTURE: CPT | Performed by: FAMILY MEDICINE

## 2020-10-30 ASSESSMENT — ENCOUNTER SYMPTOMS
RESPIRATORY NEGATIVE: 1
GASTROINTESTINAL NEGATIVE: 1
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
## 2020-11-01 LAB — SARS-COV-2, PCR: NOT DETECTED

## 2020-11-02 ENCOUNTER — TELEPHONE (OUTPATIENT)
Dept: ENT CLINIC | Age: 40
End: 2020-11-02

## 2020-11-02 ENCOUNTER — OFFICE VISIT (OUTPATIENT)
Dept: ENT CLINIC | Age: 40
End: 2020-11-02

## 2020-11-02 VITALS
DIASTOLIC BLOOD PRESSURE: 77 MMHG | BODY MASS INDEX: 28.89 KG/M2 | HEART RATE: 77 BPM | HEIGHT: 61 IN | WEIGHT: 153 LBS | TEMPERATURE: 97.7 F | SYSTOLIC BLOOD PRESSURE: 115 MMHG

## 2020-11-02 PROCEDURE — 99024 POSTOP FOLLOW-UP VISIT: CPT | Performed by: OTOLARYNGOLOGY

## 2020-11-02 RX ORDER — CLINDAMYCIN HYDROCHLORIDE 300 MG/1
300 CAPSULE ORAL 3 TIMES DAILY
Qty: 42 CAPSULE | Refills: 0 | Status: SHIPPED | OUTPATIENT
Start: 2020-11-02 | End: 2020-11-16

## 2020-11-02 RX ORDER — FLUTICASONE PROPIONATE 50 MCG
1 SPRAY, SUSPENSION (ML) NASAL DAILY
Qty: 2 BOTTLE | Refills: 1 | Status: SHIPPED | OUTPATIENT
Start: 2020-11-02 | End: 2020-12-23

## 2020-11-02 NOTE — PROGRESS NOTES
[place]      Patient Name: Mimi [address] Record Number:  [id number]  Primary Care Physician:  Beatriz Victoria DO  Date of Consultation: 11/2/2020          BRIEF HISTORY OF PRESENT ILLNESS  Sulma is a(n) 44 y.o. female who presents for follow-up of her nasal congestion. I performed an open septorhinoplasty to address her deviated septum and nasal valve collapse on September 22. She has done well since that time. she has had some crusting and rhinorrhea since that time. Overall she feels as though she is breathing better. She is pleased with the appearance of her nose as well. She is not using Flonase, but is using nasal saline irrigation. She says she has had 3-4 minor nosebleeds since surgery, but was having them daily prior to.           Patient Active Problem List   Diagnosis    Right distal radius fracture, s/p ORIF 12/29/09    Arrhythmia    Anxiety    Fibromyalgia    GERD (gastroesophageal reflux disease)    Asthma    DDD (degenerative disc disease)    Depression    Back pain    Osteoarthritis    Rheumatoid arthritis ([address]    Chronic pain    Interstitial cystitis    Cervical cancer ([address]    CTS (carpal tunnel syndrome)    Contusion of long finger of left hand    Vomiting    Dehydration    Myofascial pain syndrome    Carpal tunnel syndrome    Chronic viral hepatitis B without delta agent and without coma ([address]    Genetic predisposition to breast cancer    Umbilical hernia without obstruction and without gangrene    Carpal tunnel syndrome of left wrist    Wrist sprain, left, initial encounter    Pneumonia possible    Sinus tachycardia    Shortness of breath    Sepsis ([address]    Breast mass, right    At high risk for breast cancer    S/P bilateral mastectomy    S/P breast reconstruction, bilateral    Breast cancer in female [facility])     Past Surgical History:   Procedure Laterality Date    APPENDECTOMY      BLADDER SURGERY      BREAST ENHANCEMENT SURGERY Bilateral     BREAST SURGERY      bilat mastectomy    BREAST SURGERY Bilateral 2/6/2020    REVISION BILATERAL BREAST RECONSTRUCTION; 1.7 CM SCAR REVISION performed by Aster Hayes MD at [address] [place] [address] Right 7/2/14    removal of hardware    CHOLECYSTECTOMY      COLONOSCOPY      with polyectomy    CYSTOSCOPY  8-17-11    with bladder and urethral dilatation    FAT TRANSFER Bilateral 2/6/2020    WITH HIGH VOLUME FAT GRAFTING performed by Aster Hayes MD at [address]  03/24/2017    repair of umbilical hernia with primary closure, exploration of LLQ subcutaneous space without definitive findings of lipoma    HYSTERECTOMY      LAPAROSCOPIC APPENDECTOMY  3/18/13    LAPAROSCOPY  3/18/13    NOSE SURGERY Bilateral 9/22/2020    OPEN SEPTORHINOPLASTY, BILATERAL NASAL VALVE REPAIR, BILATERAL INFERIOR TURBINATE REDUCTION performed by Chad Castillo MD at [address] / DELAYED W/ TISSUE EXPANDER Bilateral 9/26/2019    EXCHANGE OF BILATERAL BREAST IMPLANTS ; AND BILATERAL CAPSULECTOMY, REVISION BILATERAL BREAST RECONSTRUCTION performed by Aster Hayes MD at [address] [place] [address]      1/09    UPPER GASTROINTESTINAL ENDOSCOPY  5/28/2010    UPPER GASTROINTESTINAL ENDOSCOPY  2/13/13    WRIST FRACTURE SURGERY Right 12/28/2009    Open treatment with open reduction, internal fixation rightdistal radius using     Family History   Problem Relation Age of Onset    Cancer Mother         Breast    Depression Mother     Cancer Father         lung    Cancer Maternal Grandmother         breast    Asthma Other     Cancer Other     Heart Disease Other     Depression Paternal Grandmother     Emphysema Paternal Grandmother     Elevated Lipids Paternal Grandmother     Cancer Paternal Grandfather         lung    Cancer Maternal Grandfather         colon     Cancer Paternal Aunt         Breast     Social History     Socioeconomic History    Marital status:      Spouse name: Not on file    Number of children: 3    Years of education: Not on file    Highest education level: Not on file   Occupational History    Not on file   Social Needs    Financial resource strain: Not on file    Food insecurity     Worry: Not on file     Inability: Not on file   [employer] needs     Medical: Not on file     Non-medical: Not on file   Tobacco Use    Smoking status: Never Smoker    Smokeless tobacco: Never Used    Tobacco comment: encouraged to never smoke    Substance and Sexual Activity    Alcohol use: Yes     Alcohol/week: 0.0 standard drinks     Comment: socially; special occasions 1 every other week    Drug use: No    Sexual activity: Yes     Partners: Male   Lifestyle    Physical activity     Days per week: Not on file     Minutes per session: Not on file    Stress: Not on file   Relationships    Social connections     Talks on phone: Not on file     Gets together: Not on file     Attends Yazidi service: Not on file     Active member of club or organization: Not on file     Attends meetings of clubs or organizations: Not on file     Relationship status: Not on file    Intimate partner violence     Fear of current or ex partner: Not on file     Emotionally abused: Not on file     Physically abused: Not on file     Forced sexual activity: Not on file   Other Topics Concern    Not on file   Social History Narrative    Not on file       DRUG/FOOD ALLERGIES: Latex; Shellfish-derived products; Tape [adhesive tape]; Iodides; Tylenol [acetaminophen]; Bactrim [sulfamethoxazole-trimethoprim]; Codeine; Morphine; and Prednisone    CURRENT MEDICATIONS  Prior to Admission medications    Medication Sig Start Date End Date Taking?  Authorizing Provider   clindamycin (CLEOCIN) 300 MG capsule Take 1 capsule by mouth 3 times daily for 14 days 11/2/20 11/16/20 Yes Britton Mendez MD   ondansetron [facility]) 4 MG tablet Take 1 tablet by mouth daily as needed for Nausea or Vomiting 9/22/20   Britton Mendez MD   sodium chloride (ALTAMIST SPRAY) 0.65 % nasal spray 1 spray by Nasal route as needed for Congestion 9/22/20   Britton Mendez MD   tiZANidine (ZANAFLEX) 4 MG tablet Take 1 tablet by mouth every 8 hours as needed (muscle spasm) 7/1/20   Romy Wood DO   promethazine (PHENERGAN) 25 MG tablet Take 1 tablet by mouth 2 times daily as needed for Nausea 7/1/20   Romy Wood, DO   albuterol sulfate  (90 Base) MCG/ACT inhaler Inhale 2 puffs into the lungs 4 times daily as needed for Wheezing 5/12/20   Romy Wood DO   famciclovir (FAMVIR) 500 MG tablet Take 1 tablet by mouth 3 times daily TAKE ONE TABLET BY MOUTH THREE TIMES DAILY  Patient taking differently: Take 500 mg by mouth as needed For fever blister 3/2/20   Kris Notasulga, DO   NONFORMULARY EPI PEN PRN    Historical Provider, MD   famotidine (PEPCID) 40 MG tablet Take 1 tablet by mouth 2 times daily 9/18/19   Romy Wood DO   buPROPion (WELLBUTRIN SR) 150 MG extended release tablet Take 1 tablet by mouth 2 times daily 9/11/19   Romy Wood DO   OxyCODONE ER (XTAMPZA ER) 9 MG C12A Take 9 mg by mouth 2 times daily as needed.      Historical Provider, MD   propranolol (INDERAL) 20 MG tablet Take 1 tablet by mouth 3 times daily  Patient taking differently: Take 20 mg by mouth 2 times daily  5/23/17   Romy Wood DO   hydrochlorothiazide (HYDRODIURIL) 25 MG tablet Take 1 tablet by mouth daily  Patient taking differently: Take 25 mg by mouth as needed  5/23/17   Romy Wood, DO       REVIEW OF SYSTEMS  The following systems were reviewed and revealed the following in addition to any already discussed in the HPI:    CONSTITUTIONAL: no weight loss, no fever, no night sweats, no chills  EYES: no vision changes, no blurry vision  EARS: no changes in hearing, no otalgia  NOSE: Some rhinorrhea  RESPIRATORY: no  Difficulty breathing, no shortness of breath  CV: no chest pain, no Peripheral vascular disease  HEME: No coagulation disorder, no Bleeding disorder  NEURO: no TIA or stroke-like symptoms  SKIN: No new rashes in the head and neck, no recent skin cancers  MOUTH: No new ulcers, no recent teeth infections  GASTROINTESTINAL: No diarrhea, stomach pain  PSYCH: No anxiety, no depression      PHYSICAL EXAM  /77 (Site: Left Upper Arm, Position: Sitting, Cuff Size: Medium Adult)   Pulse 77   Temp 97.7 °F (36.5 °C) (Temporal)   Ht 5' 1\" (1.549 m)   Wt 153 lb (69.4 kg)   LMP 05/25/2002   BMI 28.91 kg/m²     GENERAL: No Acute Distress, Alert and Oriented, no Hoarseness, strong voice  EYES: EOMI, Anti-icteric  HENT:   Head: Normocephalic and atraumatic. Face:  Symmetric, facial nerve intact, no sinus tenderness  Right Ear: Normal external ear, normal external auditory canal, intact tympanic membrane with normal mobility and aerated middle ear  Left Ear: Normal external ear, normal external auditory canal, intact tympanic membrane with normal mobility and aerated middle ear  Mouth/Oral Cavity:  normal lips, Uvula is midline, no mucosal lesions, no trismus, normal dentition, normal salivary quality/flow  Oropharynx/Larynx:  normal oropharynx, normal tonsils; normal larynx/nasopharynx on mirror exam  Nose: The patient's nasal dorsum is very symmetric. The columellar incision is healing well. Septum is midline. There is a lot of anterior nasal crusting and some purulent drainage. Turbinates well reduced  NECK: Normal range of motion, no thyromegaly, trachea is midline, no lymphadenopathy, no neck masses, no crepitus  CHEST: Normal respiratory effort, no retractions, breathing comfortably  SKIN: No rashes, normal appearing skin, no evidence of skin lesions/tumors  Neuro:  cranial nerve II-XII intact; normal gait  Cardio:  no edema    ASSESSMENT/PLAN  1. Acute sinusitis, recurrence not specified, unspecified location  The patient appears to have an acute sinusitis today. I am going to give her a couple weeks of clindamycin. The thick best yellow suggested could be a staph infection. I would like for her to see me in a couple weeks to make sure this is resolved. If it does not we will start some Bactroban rinses as well. 2. Nasal congestion  Patient is doing very well from her surgery. Widely patent nasal cavity. Patient is happy so far. 3. Epistaxis  She has had a significant reduction in her nosebleeds. Continue nasal saline to keep things moist throughout the winter             I have performed a head and neck physical exam personally or was physically present during the key or critical portions of the service. Medical Decision Making:   The following items were considered in medical decision making:  Independent review of images  Review / order clinical lab tests  Review / order radiology tests  Decision to obtain old records

## 2020-11-02 NOTE — PROGRESS NOTES

## 2020-11-02 NOTE — TELEPHONE ENCOUNTER
Patient was under the impression that she would have some sort of cream that goes in her nose for staff infection. She also would like a prescription for flonase.

## 2020-11-03 NOTE — RESULT ENCOUNTER NOTE

## 2020-11-04 ENCOUNTER — ANESTHESIA EVENT (OUTPATIENT)
Dept: OPERATING ROOM | Age: 40
End: 2020-11-04
Payer: COMMERCIAL

## 2020-11-05 ENCOUNTER — ANESTHESIA (OUTPATIENT)
Dept: OPERATING ROOM | Age: 40
End: 2020-11-05
Payer: COMMERCIAL

## 2020-11-05 ENCOUNTER — HOSPITAL ENCOUNTER (OUTPATIENT)
Age: 40
Setting detail: OUTPATIENT SURGERY
Discharge: HOME | End: 2020-11-05
Attending: SURGERY | Admitting: SURGERY
Payer: COMMERCIAL

## 2020-11-05 VITALS
TEMPERATURE: 97.7 F | RESPIRATION RATE: 18 BRPM | BODY MASS INDEX: 27.38 KG/M2 | SYSTOLIC BLOOD PRESSURE: 101 MMHG | OXYGEN SATURATION: 96 % | WEIGHT: 145 LBS | HEIGHT: 61 IN | HEART RATE: 80 BPM | DIASTOLIC BLOOD PRESSURE: 64 MMHG

## 2020-11-05 VITALS — SYSTOLIC BLOOD PRESSURE: 117 MMHG | OXYGEN SATURATION: 100 % | TEMPERATURE: 93.9 F | DIASTOLIC BLOOD PRESSURE: 76 MMHG

## 2020-11-05 PROCEDURE — 3700000000 HC ANESTHESIA ATTENDED CARE: Performed by: SURGERY

## 2020-11-05 PROCEDURE — 6360000002 HC RX W HCPCS: Performed by: NURSE ANESTHETIST, CERTIFIED REGISTERED

## 2020-11-05 PROCEDURE — 6360000002 HC RX W HCPCS: Performed by: SURGERY

## 2020-11-05 PROCEDURE — 2580000003 HC RX 258: Performed by: ANESTHESIOLOGY

## 2020-11-05 PROCEDURE — 7100000000 HC PACU RECOVERY - FIRST 15 MIN: Performed by: SURGERY

## 2020-11-05 PROCEDURE — 2500000003 HC RX 250 WO HCPCS: Performed by: SURGERY

## 2020-11-05 PROCEDURE — 2500000003 HC RX 250 WO HCPCS: Performed by: NURSE ANESTHETIST, CERTIFIED REGISTERED

## 2020-11-05 PROCEDURE — 7100000010 HC PHASE II RECOVERY - FIRST 15 MIN: Performed by: SURGERY

## 2020-11-05 PROCEDURE — 7100000011 HC PHASE II RECOVERY - ADDTL 15 MIN: Performed by: SURGERY

## 2020-11-05 PROCEDURE — 15771 GRFG AUTOL FAT LIPO 50 CC/<: CPT | Performed by: SURGERY

## 2020-11-05 PROCEDURE — 2709999900 HC NON-CHARGEABLE SUPPLY: Performed by: SURGERY

## 2020-11-05 PROCEDURE — 7100000001 HC PACU RECOVERY - ADDTL 15 MIN: Performed by: SURGERY

## 2020-11-05 PROCEDURE — 93005 ELECTROCARDIOGRAM TRACING: CPT | Performed by: ANESTHESIOLOGY

## 2020-11-05 PROCEDURE — 3600000004 HC SURGERY LEVEL 4 BASE: Performed by: SURGERY

## 2020-11-05 PROCEDURE — 6360000002 HC RX W HCPCS: Performed by: ANESTHESIOLOGY

## 2020-11-05 PROCEDURE — 3700000001 HC ADD 15 MINUTES (ANESTHESIA): Performed by: SURGERY

## 2020-11-05 PROCEDURE — 3600000014 HC SURGERY LEVEL 4 ADDTL 15MIN: Performed by: SURGERY

## 2020-11-05 PROCEDURE — 2720000010 HC SURG SUPPLY STERILE: Performed by: SURGERY

## 2020-11-05 PROCEDURE — 6370000000 HC RX 637 (ALT 250 FOR IP): Performed by: ANESTHESIOLOGY

## 2020-11-05 PROCEDURE — 2580000003 HC RX 258: Performed by: NURSE ANESTHETIST, CERTIFIED REGISTERED

## 2020-11-05 PROCEDURE — 15772 GRFG AUTOL FAT LIPO EA ADDL: CPT | Performed by: SURGERY

## 2020-11-05 RX ORDER — SODIUM CHLORIDE 9 MG/ML
INJECTION, SOLUTION INTRAVENOUS CONTINUOUS PRN
Status: DISCONTINUED | OUTPATIENT
Start: 2020-11-05 | End: 2020-11-05 | Stop reason: SDUPTHER

## 2020-11-05 RX ORDER — MIDAZOLAM HYDROCHLORIDE 1 MG/ML
2 INJECTION INTRAMUSCULAR; INTRAVENOUS ONCE
Status: COMPLETED | OUTPATIENT
Start: 2020-11-05 | End: 2020-11-05

## 2020-11-05 RX ORDER — APREPITANT 40 MG/1
80 CAPSULE ORAL ONCE
Status: COMPLETED | OUTPATIENT
Start: 2020-11-05 | End: 2020-11-05

## 2020-11-05 RX ORDER — DEXAMETHASONE SODIUM PHOSPHATE 4 MG/ML
INJECTION, SOLUTION INTRA-ARTICULAR; INTRALESIONAL; INTRAMUSCULAR; INTRAVENOUS; SOFT TISSUE PRN
Status: DISCONTINUED | OUTPATIENT
Start: 2020-11-05 | End: 2020-11-05 | Stop reason: SDUPTHER

## 2020-11-05 RX ORDER — PROPOFOL 10 MG/ML
INJECTION, EMULSION INTRAVENOUS PRN
Status: DISCONTINUED | OUTPATIENT
Start: 2020-11-05 | End: 2020-11-05 | Stop reason: SDUPTHER

## 2020-11-05 RX ORDER — FENTANYL CITRATE 0.05 MG/ML
INJECTION, SOLUTION INTRAMUSCULAR; INTRAVENOUS PRN
Status: DISCONTINUED | OUTPATIENT
Start: 2020-11-05 | End: 2020-11-05 | Stop reason: SDUPTHER

## 2020-11-05 RX ORDER — NALOXONE HYDROCHLORIDE 0.4 MG/ML
INJECTION, SOLUTION INTRAMUSCULAR; INTRAVENOUS; SUBCUTANEOUS PRN
Status: DISCONTINUED | OUTPATIENT
Start: 2020-11-05 | End: 2020-11-05 | Stop reason: SDUPTHER

## 2020-11-05 RX ORDER — SUCCINYLCHOLINE CHLORIDE 20 MG/ML
INJECTION INTRAMUSCULAR; INTRAVENOUS PRN
Status: DISCONTINUED | OUTPATIENT
Start: 2020-11-05 | End: 2020-11-05 | Stop reason: SDUPTHER

## 2020-11-05 RX ORDER — LIDOCAINE HYDROCHLORIDE 10 MG/ML
INJECTION, SOLUTION EPIDURAL; INFILTRATION; INTRACAUDAL; PERINEURAL PRN
Status: DISCONTINUED | OUTPATIENT
Start: 2020-11-05 | End: 2020-11-05 | Stop reason: SDUPTHER

## 2020-11-05 RX ORDER — ONDANSETRON 2 MG/ML
INJECTION INTRAMUSCULAR; INTRAVENOUS PRN
Status: DISCONTINUED | OUTPATIENT
Start: 2020-11-05 | End: 2020-11-05 | Stop reason: SDUPTHER

## 2020-11-05 RX ORDER — FENTANYL CITRATE 50 UG/ML
50 INJECTION, SOLUTION INTRAMUSCULAR; INTRAVENOUS EVERY 5 MIN PRN
Status: DISCONTINUED | OUTPATIENT
Start: 2020-11-05 | End: 2020-11-05 | Stop reason: HOSPADM

## 2020-11-05 RX ORDER — CEPHALEXIN 500 MG/1
500 CAPSULE ORAL 4 TIMES DAILY
Qty: 28 CAPSULE | Refills: 0 | Status: SHIPPED | OUTPATIENT
Start: 2020-11-05 | End: 2020-11-12

## 2020-11-05 RX ORDER — ONDANSETRON 2 MG/ML
4 INJECTION INTRAMUSCULAR; INTRAVENOUS
Status: DISCONTINUED | OUTPATIENT
Start: 2020-11-05 | End: 2020-11-05 | Stop reason: HOSPADM

## 2020-11-05 RX ORDER — ALPRAZOLAM 0.5 MG/1
0.5 TABLET ORAL NIGHTLY PRN
COMMUNITY
End: 2020-11-27

## 2020-11-05 RX ORDER — LIDOCAINE HYDROCHLORIDE 10 MG/ML
1 INJECTION, SOLUTION EPIDURAL; INFILTRATION; INTRACAUDAL; PERINEURAL
Status: DISCONTINUED | OUTPATIENT
Start: 2020-11-05 | End: 2020-11-05 | Stop reason: HOSPADM

## 2020-11-05 RX ORDER — SODIUM CHLORIDE 0.9 % (FLUSH) 0.9 %
10 SYRINGE (ML) INJECTION EVERY 12 HOURS SCHEDULED
Status: DISCONTINUED | OUTPATIENT
Start: 2020-11-05 | End: 2020-11-05 | Stop reason: HOSPADM

## 2020-11-05 RX ORDER — PROMETHAZINE HYDROCHLORIDE 25 MG/ML
12.5 INJECTION, SOLUTION INTRAMUSCULAR; INTRAVENOUS ONCE
Status: COMPLETED | OUTPATIENT
Start: 2020-11-05 | End: 2020-11-05

## 2020-11-05 RX ORDER — FENTANYL CITRATE 50 UG/ML
25 INJECTION, SOLUTION INTRAMUSCULAR; INTRAVENOUS EVERY 5 MIN PRN
Status: DISCONTINUED | OUTPATIENT
Start: 2020-11-05 | End: 2020-11-05 | Stop reason: HOSPADM

## 2020-11-05 RX ORDER — CEFAZOLIN SODIUM 2 G/50ML
2 SOLUTION INTRAVENOUS ONCE
Status: COMPLETED | OUTPATIENT
Start: 2020-11-05 | End: 2020-11-05

## 2020-11-05 RX ORDER — GLYCOPYRROLATE 1 MG/5 ML
SYRINGE (ML) INTRAVENOUS PRN
Status: DISCONTINUED | OUTPATIENT
Start: 2020-11-05 | End: 2020-11-05 | Stop reason: SDUPTHER

## 2020-11-05 RX ORDER — SODIUM CHLORIDE 0.9 % (FLUSH) 0.9 %
10 SYRINGE (ML) INJECTION PRN
Status: DISCONTINUED | OUTPATIENT
Start: 2020-11-05 | End: 2020-11-05 | Stop reason: HOSPADM

## 2020-11-05 RX ORDER — SCOLOPAMINE TRANSDERMAL SYSTEM 1 MG/1
1 PATCH, EXTENDED RELEASE TRANSDERMAL ONCE
Status: DISCONTINUED | OUTPATIENT
Start: 2020-11-05 | End: 2020-11-05 | Stop reason: HOSPADM

## 2020-11-05 RX ORDER — EPINEPHRINE 1 MG/ML
INJECTION, SOLUTION, CONCENTRATE INTRAVENOUS PRN
Status: DISCONTINUED | OUTPATIENT
Start: 2020-11-05 | End: 2020-11-05 | Stop reason: ALTCHOICE

## 2020-11-05 RX ORDER — LIDOCAINE HYDROCHLORIDE 10 MG/ML
INJECTION, SOLUTION INFILTRATION; PERINEURAL PRN
Status: DISCONTINUED | OUTPATIENT
Start: 2020-11-05 | End: 2020-11-05 | Stop reason: ALTCHOICE

## 2020-11-05 RX ORDER — ROCURONIUM BROMIDE 10 MG/ML
INJECTION, SOLUTION INTRAVENOUS PRN
Status: DISCONTINUED | OUTPATIENT
Start: 2020-11-05 | End: 2020-11-05 | Stop reason: SDUPTHER

## 2020-11-05 RX ORDER — HYDRALAZINE HYDROCHLORIDE 20 MG/ML
5 INJECTION INTRAMUSCULAR; INTRAVENOUS EVERY 10 MIN PRN
Status: DISCONTINUED | OUTPATIENT
Start: 2020-11-05 | End: 2020-11-05 | Stop reason: HOSPADM

## 2020-11-05 RX ORDER — SODIUM CHLORIDE, SODIUM LACTATE, POTASSIUM CHLORIDE, CALCIUM CHLORIDE 600; 310; 30; 20 MG/100ML; MG/100ML; MG/100ML; MG/100ML
INJECTION, SOLUTION INTRAVENOUS CONTINUOUS
Status: DISCONTINUED | OUTPATIENT
Start: 2020-11-05 | End: 2020-11-05 | Stop reason: HOSPADM

## 2020-11-05 RX ORDER — NEOSTIGMINE METHYLSULFATE 5 MG/5 ML
SYRINGE (ML) INTRAVENOUS PRN
Status: DISCONTINUED | OUTPATIENT
Start: 2020-11-05 | End: 2020-11-05 | Stop reason: SDUPTHER

## 2020-11-05 RX ORDER — HYDROMORPHONE HCL 110MG/55ML
PATIENT CONTROLLED ANALGESIA SYRINGE INTRAVENOUS PRN
Status: DISCONTINUED | OUTPATIENT
Start: 2020-11-05 | End: 2020-11-05 | Stop reason: SDUPTHER

## 2020-11-05 RX ORDER — LABETALOL 20 MG/4 ML (5 MG/ML) INTRAVENOUS SYRINGE
5 EVERY 10 MIN PRN
Status: DISCONTINUED | OUTPATIENT
Start: 2020-11-05 | End: 2020-11-05 | Stop reason: HOSPADM

## 2020-11-05 RX ADMIN — Medication 0.6 MG: at 14:39

## 2020-11-05 RX ADMIN — HYDROMORPHONE HYDROCHLORIDE 2 MG: 2 INJECTION, SOLUTION INTRAMUSCULAR; INTRAVENOUS; SUBCUTANEOUS at 13:39

## 2020-11-05 RX ADMIN — DEXAMETHASONE SODIUM PHOSPHATE 4 MG: 4 INJECTION, SOLUTION INTRAMUSCULAR; INTRAVENOUS at 13:44

## 2020-11-05 RX ADMIN — NALOXONE HYDROCHLORIDE 0.08 MG: 0.4 INJECTION, SOLUTION INTRAMUSCULAR; INTRAVENOUS; SUBCUTANEOUS at 15:04

## 2020-11-05 RX ADMIN — APREPITANT 80 MG: 40 CAPSULE ORAL at 13:09

## 2020-11-05 RX ADMIN — SODIUM CHLORIDE: 9 INJECTION, SOLUTION INTRAVENOUS at 14:53

## 2020-11-05 RX ADMIN — CEFAZOLIN SODIUM 2 G: 2 SOLUTION INTRAVENOUS at 13:28

## 2020-11-05 RX ADMIN — FENTANYL CITRATE 100 MCG: 50 INJECTION, SOLUTION INTRAMUSCULAR; INTRAVENOUS at 13:25

## 2020-11-05 RX ADMIN — SUCCINYLCHOLINE CHLORIDE 100 MG: 20 INJECTION, SOLUTION INTRAMUSCULAR; INTRAVENOUS; PARENTERAL at 13:25

## 2020-11-05 RX ADMIN — MIDAZOLAM 2 MG: 1 INJECTION INTRAMUSCULAR; INTRAVENOUS at 13:10

## 2020-11-05 RX ADMIN — FENTANYL CITRATE 50 MCG: 50 INJECTION, SOLUTION INTRAMUSCULAR; INTRAVENOUS at 15:34

## 2020-11-05 RX ADMIN — FENTANYL CITRATE 50 MCG: 50 INJECTION, SOLUTION INTRAMUSCULAR; INTRAVENOUS at 15:45

## 2020-11-05 RX ADMIN — ROCURONIUM BROMIDE 10 MG: 10 INJECTION INTRAVENOUS at 13:25

## 2020-11-05 RX ADMIN — PROMETHAZINE HYDROCHLORIDE 12.5 MG: 25 INJECTION INTRAMUSCULAR; INTRAVENOUS at 16:22

## 2020-11-05 RX ADMIN — ONDANSETRON 4 MG: 2 INJECTION INTRAMUSCULAR; INTRAVENOUS at 13:44

## 2020-11-05 RX ADMIN — Medication 5 MG: at 14:39

## 2020-11-05 RX ADMIN — NALOXONE HYDROCHLORIDE 0.04 MG: 0.4 INJECTION, SOLUTION INTRAMUSCULAR; INTRAVENOUS; SUBCUTANEOUS at 15:01

## 2020-11-05 RX ADMIN — FENTANYL CITRATE 100 MCG: 50 INJECTION, SOLUTION INTRAMUSCULAR; INTRAVENOUS at 13:50

## 2020-11-05 RX ADMIN — PROPOFOL 200 MG: 10 INJECTION, EMULSION INTRAVENOUS at 13:25

## 2020-11-05 RX ADMIN — SODIUM CHLORIDE, SODIUM LACTATE, POTASSIUM CHLORIDE, AND CALCIUM CHLORIDE: 600; 310; 30; 20 INJECTION, SOLUTION INTRAVENOUS at 13:20

## 2020-11-05 RX ADMIN — FENTANYL CITRATE 100 MCG: 50 INJECTION, SOLUTION INTRAMUSCULAR; INTRAVENOUS at 14:03

## 2020-11-05 RX ADMIN — ROCURONIUM BROMIDE 40 MG: 10 INJECTION INTRAVENOUS at 13:28

## 2020-11-05 RX ADMIN — LIDOCAINE HYDROCHLORIDE 50 MG: 10 INJECTION, SOLUTION EPIDURAL; INFILTRATION; INTRACAUDAL; PERINEURAL at 13:25

## 2020-11-05 ASSESSMENT — PULMONARY FUNCTION TESTS
PIF_VALUE: 22
PIF_VALUE: 19
PIF_VALUE: 1
PIF_VALUE: 20
PIF_VALUE: 20
PIF_VALUE: 23
PIF_VALUE: 21
PIF_VALUE: 7
PIF_VALUE: 22
PIF_VALUE: 1
PIF_VALUE: 24
PIF_VALUE: 20
PIF_VALUE: 17
PIF_VALUE: 21
PIF_VALUE: 22
PIF_VALUE: 19
PIF_VALUE: 17
PIF_VALUE: 19
PIF_VALUE: 1
PIF_VALUE: 17
PIF_VALUE: 2
PIF_VALUE: 1
PIF_VALUE: 22
PIF_VALUE: 20
PIF_VALUE: 16
PIF_VALUE: 22
PIF_VALUE: 22
PIF_VALUE: 20
PIF_VALUE: 21
PIF_VALUE: 22
PIF_VALUE: 22
PIF_VALUE: 21
PIF_VALUE: 19
PIF_VALUE: 21
PIF_VALUE: 20
PIF_VALUE: 22
PIF_VALUE: 21
PIF_VALUE: 22
PIF_VALUE: 19
PIF_VALUE: 5
PIF_VALUE: 22
PIF_VALUE: 22
PIF_VALUE: 25
PIF_VALUE: 21
PIF_VALUE: 21
PIF_VALUE: 19
PIF_VALUE: 20
PIF_VALUE: 19
PIF_VALUE: 21
PIF_VALUE: 22
PIF_VALUE: 20
PIF_VALUE: 21
PIF_VALUE: 23
PIF_VALUE: 21
PIF_VALUE: 19
PIF_VALUE: 17
PIF_VALUE: 21
PIF_VALUE: 21
PIF_VALUE: 22
PIF_VALUE: 20
PIF_VALUE: 21
PIF_VALUE: 23
PIF_VALUE: 19
PIF_VALUE: 9
PIF_VALUE: 17
PIF_VALUE: 0
PIF_VALUE: 4
PIF_VALUE: 14
PIF_VALUE: 21
PIF_VALUE: 19
PIF_VALUE: 22
PIF_VALUE: 21
PIF_VALUE: 21
PIF_VALUE: 1
PIF_VALUE: 1
PIF_VALUE: 22
PIF_VALUE: 2
PIF_VALUE: 1
PIF_VALUE: 21
PIF_VALUE: 17
PIF_VALUE: 22
PIF_VALUE: 2
PIF_VALUE: 0
PIF_VALUE: 22
PIF_VALUE: 23
PIF_VALUE: 20
PIF_VALUE: 23
PIF_VALUE: 17
PIF_VALUE: 22
PIF_VALUE: 25
PIF_VALUE: 18
PIF_VALUE: 17
PIF_VALUE: 21
PIF_VALUE: 22
PIF_VALUE: 22
PIF_VALUE: 19
PIF_VALUE: 22
PIF_VALUE: 17
PIF_VALUE: 23
PIF_VALUE: 17
PIF_VALUE: 21
PIF_VALUE: 22

## 2020-11-05 ASSESSMENT — PAIN SCALES - GENERAL
PAINLEVEL_OUTOF10: 5
PAINLEVEL_OUTOF10: 7
PAINLEVEL_OUTOF10: 6
PAINLEVEL_OUTOF10: 3

## 2020-11-05 ASSESSMENT — PAIN DESCRIPTION - LOCATION
LOCATION: CHEST
LOCATION: CHEST
LOCATION: BREAST

## 2020-11-05 ASSESSMENT — PAIN - FUNCTIONAL ASSESSMENT: PAIN_FUNCTIONAL_ASSESSMENT: 0-10

## 2020-11-05 ASSESSMENT — PAIN DESCRIPTION - ORIENTATION
ORIENTATION: RIGHT;LEFT
ORIENTATION: RIGHT

## 2020-11-05 ASSESSMENT — PAIN DESCRIPTION - DESCRIPTORS
DESCRIPTORS_2: HEAVINESS
DESCRIPTORS: ACHING;BURNING;DISCOMFORT

## 2020-11-05 ASSESSMENT — PAIN DESCRIPTION - PROGRESSION: CLINICAL_PROGRESSION: GRADUALLY WORSENING

## 2020-11-05 ASSESSMENT — PAIN DESCRIPTION - FREQUENCY: FREQUENCY: CONTINUOUS

## 2020-11-05 ASSESSMENT — PAIN DESCRIPTION - INTENSITY: RATING_2: 7

## 2020-11-05 ASSESSMENT — PAIN DESCRIPTION - ONSET: ONSET: AWAKENED FROM SLEEP

## 2020-11-05 ASSESSMENT — PAIN DESCRIPTION - PAIN TYPE
TYPE: SURGICAL PAIN
TYPE: SURGICAL PAIN

## 2020-11-05 ASSESSMENT — PAIN DESCRIPTION - DURATION: DURATION_2: CONTINUOUS

## 2020-11-05 ASSESSMENT — ENCOUNTER SYMPTOMS: SHORTNESS OF BREATH: 1

## 2020-11-05 NOTE — PROGRESS NOTES
Phenergan 12.5 mg given.  in PACU, informed patient seems little anxious because her thighs feel funny. PPP, able to wiggle feet and slowly lift off bed. Per  could have been from positioning strap. At boots in place.

## 2020-11-05 NOTE — OP NOTE
[place] [id number] SURGERY     OPERATIVE DICTATION    NAME: Carlos Manuel Hurley   MRN: [id number]  DATE: 11/5/2020    AGE: 44 y.o. SURGEON: Vashti Gross MD  ASSISTANT: Shanell ORLANDO)     PREOPERATIVE DIAGNOSIS: Acquired absence bilateral breast, status revision breast reconstruction with implants  POSTOPERATIVE DIAGNOSIS: Same     OPERATION: 1) Bilateral breast fat grafting    ANESTHESIA: General     ESTIMATED BLOOD LOSS: 20 mL    OPERATIVE INDICATIONS: This is a 44 y.o. female who previously underwent implant-based breast reconstruction as she was found to be at high risk for development of breast cancer at an outside facility. She developed pain, multiple masses, and significant lateralization and presented to our office. She underwent revision of her breast reconstruction with smaller implants and extensive capsule work, with some improvement. However, she has significant deficiency in the medial aspects of the bilateral breasts - worse on the left. She additionally notes that she has a significant animation deformity. We discussed options including removal of her implants or transitioning to the prepectoral position. She did not want to undergo as extensive an operation. She previously underwent fat grafting with some improvement, however noted persistent deficiency. Thus, she presented back for a second round of fat grafting. The risks, benefits, alternatives, outcomes, personnel involved with the aforementioned procedure were discussed in detail with the patient. Dar Munguia all questions were answered in a satisfactory manner, she agreed to proceed.      OPERATIVE PROCEDURE: The patient was marked in the standing position in the preoperative holding area. She was then brought to the operating room and underwent satisfactory induction with general endotracheal anesthesia, the patient was then placed in the prone position and prepped and draped in the usual sterile fashion.  A timeout was performed confirming the patient and procedures to be performed. The operation commenced by infiltrating tumescent solution to the back (including flanks). After a satisfactory amount of time for effect, liposuction was perform utilizing a Revolve system removing approximately 140cc of lipoaspirate that was prepared for fat grafting. The sites were closed with 5-0 Fast Gut and dressed. The patient was then transferred to another OR table in the supine position. She was then reprepped and draped in the same manner. Two stab incisions were performed with an 11 blade. The fat was transferred to the superomedial aspects of the breasts. These sites were then closed with 5-0 Monocryl. There was significant improvement in these areas after fat grafting.     A sterile dressing was then applied. The patient was then awakened, extubated, and taken to the PACU in stable condition. At the end of the case, all counts were correct and there were no immediate complications. The patient tolerated the procedure well. The patient was then awakened, extubated, and taken to the PACU in stable condition. At the end of the case, all counts were correct and there were no immediate complications. The patient tolerated the procedure well.     DRAINS: None    SPECIMEN: None    CONDITION: Stable    Mercedes Guevara

## 2020-11-05 NOTE — PROGRESS NOTES
Patient arrived from OR to PACU # 8 s/p BILATERAL BREAST FAT GRAFTING (Bilateral ) per . Attached to PACU monitoring device. Report received from CRNA who stated patient received narcan @ end of case d/t not breathing well and was tachycardic. Patient sedated with oral airway.

## 2020-11-05 NOTE — ANESTHESIA POSTPROCEDURE EVALUATION
Department of Anesthesiology  Postprocedure Note    Patient: Naeem Julio  MRN: [id number]  YOB: 1980  Date of evaluation: 11/5/2020  Time:  3:29 PM     Procedure Summary     Date:  11/05/20 Room / Location:  [address] [place] [address]    Anesthesia Start:  1320 Anesthesia Stop:  4158    Procedure:  BILATERAL BREAST FAT GRAFTING (Bilateral ) Diagnosis:  (BREAST CANCER STATUS-POST IMPLANT BASED RECONSTRUCTION)    Surgeon:  Tete Guerrero MD Responsible Provider:  Danuta Bacon DO    Anesthesia Type:  general ASA Status:  3          Anesthesia Type: general    Ehsan Phase I: Ehsan Score: 5    Ehsan Phase II:      Last vitals: Reviewed and per EMR flowsheets.        Anesthesia Post Evaluation    Patient location during evaluation: PACU  Patient participation: complete - patient participated  Level of consciousness: awake  Pain score: 2  Airway patency: patent  Nausea & Vomiting: no nausea and no vomiting  Complications: no  Cardiovascular status: blood pressure returned to baseline  Respiratory status: acceptable  Hydration status: euvolemic

## 2020-11-05 NOTE — ANESTHESIA PRE PROCEDURE
Date    Arrythmia     Asthma     no problems recently    Back pain     Breast cancer ([facility]     Breast lump     right    Cervical cancer (HCC)     Chronic hepatitis C (HCC)     Chronic pain     DDD (degenerative disc disease)     Diverticulitis     Fibromyalgia     GERD (gastroesophageal reflux disease)     Headache     migraines    Immune deficiency disorder (HCC)     Interstitial cystitis     Nausea & vomiting     Osteoarthritis     Other closed fractures of distal end of radius (alone) 12/19/2009    distal radius fx surgery 12/28/2009 Dr. Jane Ty Pneumonia     PONV (postoperative nausea and vomiting)     scop patch and Phenergan work best     Rheumatoid arthritis(714.0)     Tachycardia      Past Surgical History:   Procedure Laterality Date    APPENDECTOMY      BLADDER SURGERY      BREAST ENHANCEMENT SURGERY Bilateral     BREAST SURGERY      bilat mastectomy    BREAST SURGERY Bilateral 2/6/2020    REVISION BILATERAL BREAST RECONSTRUCTION; 1.7 CM SCAR REVISION performed by Eric Murillo MD at [address] [place] [address] 7/2/14    removal of hardware    CHOLECYSTECTOMY      COLONOSCOPY      with polyectomy    CYSTOSCOPY  8-17-11    with bladder and urethral dilatation    FAT TRANSFER Bilateral 2/6/2020    WITH HIGH VOLUME FAT GRAFTING performed by Eric Murillo MD at [address] [place] [address]  03/24/2017    repair of umbilical hernia with primary closure, exploration of LLQ subcutaneous space without definitive findings of lipoma    HYSTERECTOMY      LAPAROSCOPIC APPENDECTOMY  3/18/13    LAPAROSCOPY  3/18/13    NOSE SURGERY Bilateral 9/22/2020    OPEN SEPTORHINOPLASTY, BILATERAL NASAL VALVE REPAIR, BILATERAL INFERIOR TURBINATE REDUCTION [facility] [address] / DELAYED W/ TISSUE EXPANDER Bilateral 9/26/2019    EXCHANGE OF BILATERAL BREAST IMPLANTS ; AND BILATERAL CAPSULECTOMY, REVISION BILATERAL BREAST RECONSTRUCTION performed by Larry Braden MD at [address] [facility] ENDOSCOPY      1/09    UPPER GASTROINTESTINAL ENDOSCOPY  5/28/2010    UPPER GASTROINTESTINAL ENDOSCOPY  2/13/13    WRIST FRACTURE SURGERY Right 12/28/2009    Open treatment with open reduction, internal fixation rightdistal radius using     Social History     Tobacco Use    Smoking status: Never Smoker    Smokeless tobacco: Never Used    Tobacco comment: encouraged to never smoke    Substance Use Topics    Alcohol use:  Yes     Alcohol/week: 0.0 standard drinks     Comment: socially; special occasions 1 every other week    Drug use: No     Medications  Current Facility-Administered Medications on File Prior to Visit   Medication Dose Route Frequency Provider Last Rate Last Dose    ceFAZolin (ANCEF) 2 g in dextrose 3 % 50 mL IVPB (duplex)  2 g Intravenous Once Larry Braden MD        lactated ringers infusion   Intravenous Continuous Ambika Morrissey MD        sodium chloride flush 0.9 % injection 10 mL  10 mL Intravenous 2 times per day Ambika Morrissey MD        sodium chloride flush 0.9 % injection 10 mL  10 mL Intravenous PRN Ambika Morrissey MD        lidocaine PF 1 % injection 1 mL  1 mL Intradermal Once PRN Ambika Morrissey MD         Current Outpatient Medications on File Prior to Visit   Medication Sig Dispense Refill    clindamycin (CLEOCIN) 300 MG capsule Take 1 capsule by mouth 3 times daily for 14 days 42 capsule 0    fluticasone (FLONASE) 50 MCG/ACT nasal spray 1 spray by Each Nostril route daily 2 Bottle 1    ondansetron (ZOFRAN) 4 MG tablet Take 1 tablet by mouth daily as needed for Nausea or Vomiting 30 tablet 0    sodium chloride (ALTAMIST SPRAY) 0.65 % nasal spray 1 spray by Nasal route as needed for Congestion 1 Bottle 3    tiZANidine (ZANAFLEX) 4 MG tablet Take 1 tablet by mouth every 8 hours as needed (muscle spasm) 30 tablet 2    promethazine (PHENERGAN) 25 MG tablet Take 1 tablet by mouth 2 times daily as needed for Nausea 60 tablet 2    albuterol sulfate  (90 Base) MCG/ACT inhaler Inhale 2 puffs into the lungs 4 times daily as needed for Wheezing 3 Inhaler 0    famciclovir (FAMVIR) 500 MG tablet Take 1 tablet by mouth 3 times daily TAKE ONE TABLET BY MOUTH THREE TIMES DAILY (Patient taking differently: Take 500 mg by mouth as needed For fever blister) 30 tablet 1    NONFORMULARY EPI PEN PRN      famotidine (PEPCID) 40 MG tablet Take 1 tablet by mouth 2 times daily 60 tablet 3    buPROPion (WELLBUTRIN SR) 150 MG extended release tablet Take 1 tablet by mouth 2 times daily 60 tablet 3    OxyCODONE ER (XTAMPZA ER) 9 MG C12A Take 9 mg by mouth 2 times daily as needed.  propranolol (INDERAL) 20 MG tablet Take 1 tablet by mouth 3 times daily (Patient taking differently: Take 20 mg by mouth 2 times daily ) 90 tablet 2    hydrochlorothiazide (HYDRODIURIL) 25 MG tablet Take 1 tablet by mouth daily (Patient taking differently: Take 25 mg by mouth as needed ) 30 tablet 3     No current facility-administered medications for this visit. No current outpatient medications on file. Facility-Administered Medications Ordered in Other Visits   Medication Dose Route Frequency Provider Last Rate Last Dose    ceFAZolin (ANCEF) 2 g in dextrose 3 % 50 mL IVPB (duplex)  2 g Intravenous Once Kaiden Knight MD        lactated ringers infusion   Intravenous Continuous Ewa Kahn MD        sodium chloride flush 0.9 % injection 10 mL  10 mL Intravenous 2 times per day Ewa Kahn MD        sodium chloride flush 0.9 % injection 10 mL  10 mL Intravenous PRN Ewa Kahn MD        lidocaine PF 1 % injection 1 mL  1 mL Intradermal Once PRN Ewa Kahn MD         Vital Signs (Current)   There were no vitals filed for this visit.                                        BP Readings from Last 3 Encounters:   11/05/20 111/78 20 115/77   10/30/20 100/70     Vital Signs Statistics (for past 48 hrs)     Temp  Av.4 °F (36.9 °C)  Min: 98.4 °F (36.9 °C)   Min taken time: 20 1109  Max: 98.4 °F (36.9 °C)   Max taken time: 20 1109  Pulse  Av  Min: 80   Min taken time: 20 110  Max: [de-identified]   Max taken time: 20 1109  Resp  Av  Min: 16   Min taken time: 20 110  Max: 16   Max taken time: 20 110  BP  Min: 111/78   Min taken time: 20 110  Max: 111/78   Max taken time: 20 110  SpO2  Av %  Min: 98 %   Min taken time: 20 110  Max: 98 %   Max taken time: 20 110  BP Readings from Last 3 Encounters:   20 111/78   20 115/77   10/30/20 100/70       BMI  There is no height or weight on file to calculate BMI. Estimated body mass index is 27.4 kg/m² as calculated from the following:    Height as of an earlier encounter on 20: 5' 1\" (1.549 m). Weight as of an earlier encounter on 20: 145 lb (65.8 kg).     CBC   Lab Results   Component Value Date    WBC 8.8 10/30/2020    RBC 4.36 10/30/2020    HGB 13.1 10/30/2020    HCT 39.6 10/30/2020    MCV 90.8 10/30/2020    RDW 13.7 10/30/2020     10/30/2020     CMP    Lab Results   Component Value Date     10/30/2020    K 3.9 10/30/2020    K 3.9 2020     10/30/2020    CO2 24 10/30/2020    BUN 11 10/30/2020    CREATININE 0.7 10/30/2020    GFRAA >60 10/30/2020    GFRAA >60 2013    AGRATIO 2.9 10/30/2020    LABGLOM >60 10/30/2020    LABGLOM 98.3 2011    GLUCOSE 92 10/30/2020    GLUCOSE 90 2011    PROT 6.2 10/30/2020    PROT 5.8 2013    PROT 5.8 2013    CALCIUM 9.4 10/30/2020    BILITOT 0.4 10/30/2020    ALKPHOS 74 10/30/2020    AST 10 10/30/2020    ALT 8 10/30/2020     BMP    Lab Results   Component Value Date     10/30/2020    K 3.9 10/30/2020    K 3.9 2020     10/30/2020    CO2 24 10/30/2020    BUN 11 10/30/2020    CREATININE 0.7 10/30/2020

## 2020-11-05 NOTE — PROGRESS NOTES
PACU Transfer to [facility]    Vitals:    11/05/20 1700   BP: 101/64   Pulse: 81   Resp: 17   Temp: 96.8 °F (36 °C)   SpO2: 95%         Intake/Output Summary (Last 24 hours) at 11/5/2020 1715  Last data filed at 11/5/2020 1715  Gross per 24 hour   Intake 1240 ml   Output --   Net 1240 ml       Pain assessment:  present - adequately treated  Pain Level: 5    Patient transferred to [facility] bed #8 via PACU transport with all belongings, prescription and chart.  Patient's nausea has resolved, sipping on soda per request.     11/5/2020 5:15 PM

## 2020-11-05 NOTE — PROGRESS NOTES
Ambulatory Surgery/Procedure Discharge Note    Vitals:    11/05/20 1738   BP: 101/64   Pulse: 80   Resp: 18   Temp:    SpO2: 96%       In: 480 [P.O.:240; I.V.:240]  Out: -     Restroom use offered before discharge. Yes    Pain assessment:  not receiving treatment  Pain Level: 3 Pt states 'pain in chest area is almost gone'    1815 Dr. Aliza Rocha to review EKG and states \"OK to discharge home and have pt call anesthesia if thighs worsen in the next 2 days\" Drsg to bilat breast dry and intact and abd binder intact with  at bedside and DC instructions given to pt and spouse with verbalization of understanding of both pt and spouse stating 'ready to go home'. Pt tolerates standing and states 'thighs feeling a little better'. Patient discharged to home/self care.  Patient discharged via wheel chair by transporter to waiting family/S.O.       11/5/2020 6:40 PM

## 2020-11-05 NOTE — PROGRESS NOTES
Ambulatory Surgery/Procedure Discharge Note    Vitals:    11/05/20 1721   BP: 98/63   Pulse: 80   Resp: 18   Temp: 97.7 °F (36.5 °C)   SpO2: 96%       In: 240 [I.V.:240]  Out: -     Restroom use offered before discharge. {YES     Pain assessment:  {Pain assessment:  Pain Level: 6    Bra on with clean breast dressings and clean back dressing. She states she is having pain to the right of the sternum. Respirations unlabored. Given fluids and crackers. 1746 C/O pain in bilateral thighs. Skin is non remarkable. 1750 Report to [facility]. EKG has been called for. 1800 She now states she feels the chest pain is lessening. To bring spouse to bed after EKG. Skin color pink. Unlabored respirations.         11/5/2020 5:32 PM

## 2020-11-05 NOTE — H&P
Jennifer Yanes    [id number]    [facility] [employer] Same Day Surgery Update H & P  Department of General Surgery   Surgical Service   Pre-operative History and Physical  Last H & P within the last 30 days. DIAGNOSIS:   BREAST CANCER STATUS-POST IMPLANT BASED RECONSTRUCTION    Procedure(s):  BILATERAL BREAST FAT GRAFTING     HISTORY OF PRESENT ILLNESS:   Patient had breast cancer surgery and is here today for steps to reconstruction. Covid 19:  Patient denies fever, chills, cough or known exposure to Covid-19.        Past Medical History:        Diagnosis Date    Arrythmia     Asthma     no problems recently    Back pain     Breast cancer ([address]     Breast lump     right    Cervical cancer (HCC)     Chronic hepatitis C (HCC)     Chronic pain     DDD (degenerative disc disease)     Diverticulitis     Fibromyalgia     GERD (gastroesophageal reflux disease)     Headache     migraines    Immune deficiency disorder (HCC)     Interstitial cystitis     Nausea & vomiting     Osteoarthritis     Other closed fractures of distal end of radius (alone) 12/19/2009    distal radius fx surgery 12/28/2009 Dr. Ailin Iraheta    Pneumonia     PONV (postoperative nausea and vomiting)     scop patch and Phenergan work best     Rheumatoid arthritis(714.0)     Tachycardia      Past Surgical History:        Procedure Laterality Date    APPENDECTOMY      BLADDER SURGERY      BREAST ENHANCEMENT SURGERY Bilateral     BREAST SURGERY      bilat mastectomy    BREAST SURGERY Bilateral 2/6/2020    REVISION BILATERAL BREAST RECONSTRUCTION; 1.7 CM SCAR REVISION performed by Amor Stover MD at [address] [place] [address] 7/2/14    removal of hardware    CHOLECYSTECTOMY      COLONOSCOPY      with polyectomy    CYSTOSCOPY  8-17-11    with bladder and urethral dilatation    FAT TRANSFER Bilateral 2/6/2020    WITH HIGH VOLUME FAT GRAFTING performed by Amor Stover MD at [place] [address] 03/24/2017    repair of umbilical hernia with primary closure, exploration of LLQ subcutaneous space without definitive findings of lipoma    HYSTERECTOMY      LAPAROSCOPIC APPENDECTOMY  3/18/13    LAPAROSCOPY  3/18/13    NOSE SURGERY Bilateral 9/22/2020    OPEN SEPTORHINOPLASTY, BILATERAL NASAL VALVE REPAIR, BILATERAL INFERIOR TURBINATE REDUCTION performed by Laquita Dawkins MD at [address] / DELAYED W/ TISSUE EXPANDER Bilateral 9/26/2019    EXCHANGE OF BILATERAL BREAST IMPLANTS ; AND BILATERAL CAPSULECTOMY, REVISION BILATERAL BREAST RECONSTRUCTION performed by Anthony Woodruff MD at [address] [facility] ENDOSCOPY      1/09    UPPER GASTROINTESTINAL ENDOSCOPY  5/28/2010    UPPER GASTROINTESTINAL ENDOSCOPY  2/13/13    WRIST FRACTURE SURGERY Right 12/28/2009    Open treatment with open reduction, internal fixation rightdistal radius using     Past Social History:  Social History     Socioeconomic History    Marital status:      Spouse name: None    Number of children: 3    Years of education: None    Highest education level: None   Occupational History    None   Social Needs    Financial resource strain: None    Food insecurity     Worry: None     Inability: None    Transportation needs     Medical: None     Non-medical: None   Tobacco Use    Smoking status: Never Smoker    Smokeless tobacco: Never Used    Tobacco comment: encouraged to never smoke    Substance and Sexual Activity    Alcohol use:  Yes     Alcohol/week: 0.0 standard drinks     Comment: socially; special occasions 1 every other week    Drug use: No    Sexual activity: Yes     Partners: Male   Lifestyle    Physical activity     Days per week: None     Minutes per session: None    Stress: None   Relationships    Social connections     Talks on phone: None     Gets together: None     Attends Sikh service: None     Active member of club or organization: None     Attends meetings of clubs or organizations: None     Relationship status: None    Intimate partner violence     Fear of current or ex partner: None     Emotionally abused: None     Physically abused: None     Forced sexual activity: None   Other Topics Concern    None   Social History Narrative    None         Medications Prior to Admission:      Prior to Admission medications    Medication Sig Start Date End Date Taking? Authorizing Provider   tiZANidine (ZANAFLEX) 4 MG tablet Take 1 tablet by mouth every 8 hours as needed (muscle spasm) 7/1/20  Yes Romy Wood DO   promethazine (PHENERGAN) 25 MG tablet Take 1 tablet by mouth 2 times daily as needed for Nausea 7/1/20  Yes Romy Wood DO   albuterol sulfate  (90 Base) MCG/ACT inhaler Inhale 2 puffs into the lungs 4 times daily as needed for Wheezing 5/12/20  Yes Romy Wood DO   famciclovir (FAMVIR) 500 MG tablet Take 1 tablet by mouth 3 times daily TAKE ONE TABLET BY MOUTH THREE TIMES DAILY  Patient taking differently: Take 500 mg by mouth as needed For fever blister 3/2/20  Yes Romy Wood, DO   NONFORMULARY EPI PEN PRN   Yes Historical Provider, MD   famotidine (PEPCID) 40 MG tablet Take 1 tablet by mouth 2 times daily 9/18/19  Yes Romy Wood DO   buPROPion (WELLBUTRIN SR) 150 MG extended release tablet Take 1 tablet by mouth 2 times daily 9/11/19  Yes Romy Wood DO   OxyCODONE ER (XTAMPZA ER) 9 MG C12A Take 9 mg by mouth 2 times daily as needed.     Yes Historical Provider, MD   propranolol (INDERAL) 20 MG tablet Take 1 tablet by mouth 3 times daily  Patient taking differently: Take 20 mg by mouth 2 times daily  5/23/17  Yes Romy Wood DO   hydrochlorothiazide (HYDRODIURIL) 25 MG tablet Take 1 tablet by mouth daily  Patient taking differently: Take 25 mg by mouth as needed  5/23/17  Yes Romy Wood DO   clindamycin (CLEOCIN) 300 MG capsule Take 1 capsule by mouth 3 times daily for 14 days 11/2/20 11/16/20  Harmony Gipson MD   fluticasone [facility]) 50 MCG/ACT nasal spray 1 spray by Each Nostril route daily 11/2/20   Harmony Gipson MD   ondansetron [facility]) 4 MG tablet Take 1 tablet by mouth daily as needed for Nausea or Vomiting 9/22/20   Harmony Gipson MD   sodium chloride (ALTAMIST SPRAY) 0.65 % nasal spray 1 spray by Nasal route as needed for Congestion 9/22/20   Harmony Gipson MD         Allergies:  Latex; Shellfish-derived products; Tape [adhesive tape]; Iodides; Tylenol [acetaminophen]; Bactrim [sulfamethoxazole-trimethoprim]; Codeine; Morphine; and Prednisone    PHYSICAL EXAM:      /78   Pulse 80   Temp 98.4 °F (36.9 °C) (Oral)   Resp 16   Ht 5' 1\" (1.549 m)   Wt 145 lb (65.8 kg)   LMP 05/25/2002   SpO2 98%   BMI 27.40 kg/m²      Airway:  Airway patent with no audible stridor    Heart:  regular rate and rhythm, No murmur noted    Lungs:  No increased work of breathing, good air exchange, clear to auscultation bilaterally, no crackles or wheezing    Abdomen:  soft, non-distended, non-tender, no rebound tenderness or guarding, normal active bowel sounds and no masses palpated    ASSESSMENT AND PLAN     Patient is a 44 y.o. female with above specified procedure planned. 1.  Patient seen and focused exam done today- no new changes since last physical exam on 10-    2. Access to ancillary services are available per request of the provider.     Mary Rocha     11/5/2020

## 2020-11-06 ENCOUNTER — TELEPHONE (OUTPATIENT)
Dept: SURGERY | Age: 40
End: 2020-11-06

## 2020-11-06 LAB
EKG ATRIAL RATE: 82 BPM
EKG DIAGNOSIS: NORMAL
EKG P AXIS: 52 DEGREES
EKG P-R INTERVAL: 176 MS
EKG Q-T INTERVAL: 366 MS
EKG QRS DURATION: 80 MS
EKG QTC CALCULATION (BAZETT): 427 MS
EKG R AXIS: -50 DEGREES
EKG T AXIS: 54 DEGREES
EKG VENTRICULAR RATE: 82 BPM

## 2020-11-06 PROCEDURE — 93010 ELECTROCARDIOGRAM REPORT: CPT | Performed by: INTERNAL MEDICINE

## 2020-11-11 ENCOUNTER — TELEPHONE (OUTPATIENT)
Dept: SURGERY | Age: 40
End: 2020-11-11

## 2020-11-11 NOTE — TELEPHONE ENCOUNTER
Pt had surgery last Thursday Nov 5  Cancelled post op this morning because she is running a fever  Last night temp was 101.6 and went down after she took Advil  This morning pt took a cool shower and laid down for a bit and then took temp and it was 101.9  Pt is hot and cold     Also: Pt states the antibiotic, Cephalexin, is making her nauseas and she did vomit a couple of time after taking the antibiotic-pt  would like to know if a different antibiotic should be called in?     Please call pt

## 2020-11-12 NOTE — TELEPHONE ENCOUNTER
[facility]    She can stop her antibiotic and not use any additional antibiotics. Breast normally does feel warm to touch right after fat grafting and this should subside. Continue to take her temperature and work on her breathing to ensure she does not have atelectasis. Will see her in office again next week. If her fevers worsens, persists, call office. Thanks!   NK

## 2020-11-18 ENCOUNTER — OFFICE VISIT (OUTPATIENT)
Dept: SURGERY | Age: 40
End: 2020-11-18

## 2020-11-18 VITALS
DIASTOLIC BLOOD PRESSURE: 78 MMHG | OXYGEN SATURATION: 99 % | TEMPERATURE: 97.3 F | WEIGHT: 151.8 LBS | SYSTOLIC BLOOD PRESSURE: 108 MMHG | BODY MASS INDEX: 28.66 KG/M2 | HEIGHT: 61 IN | HEART RATE: 94 BPM

## 2020-11-18 PROCEDURE — 99024 POSTOP FOLLOW-UP VISIT: CPT | Performed by: SURGERY

## 2020-11-18 NOTE — PROGRESS NOTES
[facility]    PROCEDURE: 1) Bilateral breast fat grafting  DATE: 11/5/20    Sulma Sparks has been recovering well since her procedure. Pain has been well controlled without pain medications. EXAM    /78 (Site: Right Upper Arm, Position: Sitting, Cuff Size: Small Adult)   Pulse 94   Temp 97.3 °F (36.3 °C) (Temporal)   Ht 5' 1\" (1.549 m)   Wt 151 lb 12.8 oz (68.9 kg)   LMP 05/25/2002   SpO2 99%   BMI 28.68 kg/m²     GEN: NAD   BREAST: Improvement in contour to the right breast  BACK: Good contour    IMP: 44 y. o.female s/p bilateral breast fat grafting  PLAN: Doing well. She is very happy with her outcome. Will follow-up in 1 month.     Annette Quintanilla MD  [address] [place] [address] [address] Reconstructive Surgery  [phone]  11/18/20

## 2020-11-27 RX ORDER — FAMCICLOVIR 500 MG/1
500 TABLET, FILM COATED ORAL 3 TIMES DAILY
Qty: 30 TABLET | Refills: 1 | Status: SHIPPED | OUTPATIENT
Start: 2020-11-27 | End: 2020-12-23

## 2020-11-27 RX ORDER — TIZANIDINE 4 MG/1
4 TABLET ORAL EVERY 8 HOURS PRN
Qty: 30 TABLET | Refills: 2 | Status: SHIPPED | OUTPATIENT
Start: 2020-11-27 | End: 2021-04-08 | Stop reason: SDUPTHER

## 2020-11-27 RX ORDER — TIZANIDINE 4 MG/1
TABLET ORAL
Qty: 30 TABLET | Refills: 2 | Status: SHIPPED | OUTPATIENT
Start: 2020-11-27 | End: 2021-04-08 | Stop reason: SDUPTHER

## 2020-11-27 RX ORDER — ALPRAZOLAM 0.5 MG/1
TABLET ORAL
Qty: 90 TABLET | Refills: 1 | OUTPATIENT
Start: 2020-11-27 | End: 2020-12-27

## 2020-11-27 RX ORDER — ALPRAZOLAM 0.5 MG/1
TABLET ORAL
Qty: 90 TABLET | Refills: 1 | Status: SHIPPED | OUTPATIENT
Start: 2020-11-27 | End: 2020-12-23

## 2020-11-30 ENCOUNTER — OFFICE VISIT (OUTPATIENT)
Dept: ENT CLINIC | Age: 40
End: 2020-11-30

## 2020-11-30 VITALS — DIASTOLIC BLOOD PRESSURE: 74 MMHG | TEMPERATURE: 97.8 F | SYSTOLIC BLOOD PRESSURE: 116 MMHG | HEART RATE: 80 BPM

## 2020-11-30 PROCEDURE — 99024 POSTOP FOLLOW-UP VISIT: CPT | Performed by: OTOLARYNGOLOGY

## 2020-11-30 NOTE — PROGRESS NOTES
Patient is following up for an acute sinusitis. Her on November 2. She appeared to have a bit of a acute sinusitis. She said overall she is feeling better, but her only complaint now is a foul smell in her nose. Significant improvement in her nasal congestion since a rhinoplasty    Exam  Columellar incision well-healed. Afrin lidocaine were applied the bilateral nasal cavity a 0 to scope was used to visualize the bilateral nasal cavity. I did not appreciate any polyps or purulent drainage    Plan  Patient is recovering well. I do not see any evidence of a sinus infection. I would like for her to start nasal saline irrigations as perhaps this will help with the foul smell that she has in her nose. Otherwise follow-up in 3 months.

## 2020-12-02 ENCOUNTER — TELEPHONE (OUTPATIENT)
Dept: FAMILY MEDICINE CLINIC | Age: 40
End: 2020-12-02

## 2020-12-02 NOTE — TELEPHONE ENCOUNTER
----- Message from Laura Renato sent at 12/2/2020 10:10 AM EST -----  Subject: Appointment Request    Reason for Call: Routine Existing Condition Follow Up    QUESTIONS  Type of Appointment? Established Patient  Reason for appointment request? Available appointments did not meet   patient need  Additional Information for Provider? Patient needs an in-person   appointment for weight management and potential bloodwork. You can set it   for any day from the 7th-14th anytime after 11.   ---------------------------------------------------------------------------  --------------  CALL BACK INFO  What is the best way for the office to contact you? OK to leave message on   voicemail  Preferred Call Back Phone Number? [phone]  ---------------------------------------------------------------------------  --------------  SCRIPT ANSWERS  Relationship to Patient? Self  Appointment reason? Well Care/Follow Ups  Select a Well Care/Follow Ups appointment reason? Adult Existing Condition   Follow Up [Diabetes   CHF   COPD   Hypertension/Blood Pressure Check]  (Is the patient requesting to be seen urgently for their symptoms?)? No  Is this follow up request related to routine Diabetes Management? No  Are you having any new concerns about your existing condition? No  Have you been diagnosed with   tested for   or told that you are suspected of having COVID-19 (Coronavirus)? Yes  Did your symptoms begin or have you been tested for COVID-19 in the last   10 days? No  Have you had a fever or taken medication to treat a fever within the past   3 days? No  Have you had a cough   shortness of breath or flu-like symptoms within the past 3 days? No  Do you currently have flu-like symptoms including fever or chills   cough   shortness of breath   or difficulty breathing   or new loss of taste or smell? No  (Service Expert  click yes below to proceed with [employer] As Usual   Scheduling)?  Yes

## 2020-12-08 ENCOUNTER — OFFICE VISIT (OUTPATIENT)
Dept: FAMILY MEDICINE CLINIC | Age: 40
End: 2020-12-08
Payer: COMMERCIAL

## 2020-12-08 VITALS
TEMPERATURE: 97.2 F | HEIGHT: 61 IN | SYSTOLIC BLOOD PRESSURE: 118 MMHG | BODY MASS INDEX: 28.7 KG/M2 | OXYGEN SATURATION: 98 % | DIASTOLIC BLOOD PRESSURE: 80 MMHG | HEART RATE: 82 BPM | WEIGHT: 152 LBS

## 2020-12-08 LAB
HCT VFR BLD CALC: 43.1 % (ref 36–48)
HEMOGLOBIN: 14.2 G/DL (ref 12–16)
MCH RBC QN AUTO: 30.1 PG (ref 26–34)
MCHC RBC AUTO-ENTMCNC: 33 G/DL (ref 31–36)
MCV RBC AUTO: 91.3 FL (ref 80–100)
PDW BLD-RTO: 13.7 % (ref 12.4–15.4)
PLATELET # BLD: 242 K/UL (ref 135–450)
PMV BLD AUTO: 8.4 FL (ref 5–10.5)
RBC # BLD: 4.72 M/UL (ref 4–5.2)
SARS-COV-2 ANTIBODY, TOTAL: POSITIVE
WBC # BLD: 6.6 K/UL (ref 4–11)

## 2020-12-08 PROCEDURE — 36415 COLL VENOUS BLD VENIPUNCTURE: CPT | Performed by: FAMILY MEDICINE

## 2020-12-08 PROCEDURE — 99214 OFFICE O/P EST MOD 30 MIN: CPT | Performed by: FAMILY MEDICINE

## 2020-12-08 RX ORDER — PHENTERMINE HYDROCHLORIDE 37.5 MG/1
37.5 TABLET ORAL
Qty: 30 TABLET | Refills: 0 | Status: SHIPPED | OUTPATIENT
Start: 2020-12-08 | End: 2021-01-07

## 2020-12-08 ASSESSMENT — PATIENT HEALTH QUESTIONNAIRE - PHQ9
1. LITTLE INTEREST OR PLEASURE IN DOING THINGS: 1
SUM OF ALL RESPONSES TO PHQ9 QUESTIONS 1 & 2: 2
2. FEELING DOWN, DEPRESSED OR HOPELESS: 1
SUM OF ALL RESPONSES TO PHQ QUESTIONS 1-9: 2

## 2020-12-08 ASSESSMENT — ENCOUNTER SYMPTOMS
GASTROINTESTINAL NEGATIVE: 1
RESPIRATORY NEGATIVE: 1

## 2020-12-08 NOTE — PROGRESS NOTES
Subjective:      Patient ID: Gilles Mark is a 44 y.o. female. HPI  Painful lymph node in her neck  Has been there for several months to years  Gets larger and smaller  Tender  Hurts when enlarged  Noticed gets larger if she has an infection or if she has surgery    Overweight  Would like to take the adipex one month to get back on track with her weight   Helped in the past   She is up seven pounds  No problems with the medicine in the past     Exposure to covid  Would like to have her antibody status checked  She has symptoms and her family had documented covid in March of this year  Congested  Chest tight  Loss of taste and smell  Wants to see if she has antibodies   Review of Systems   Constitutional: Negative. HENT: Negative. Respiratory: Negative. Cardiovascular: Negative. Gastrointestinal: Negative. Musculoskeletal: Positive for neck pain. Neurological: Negative. Psychiatric/Behavioral: Positive for decreased concentration.      YOB: 1980    Date of Visit:  12/8/2020    Allergies   Allergen Reactions    Latex Hives and Itching     After surgery x2     Shellfish-Derived Products Anaphylaxis    Tape [place] Tape] Rash     Paper tape OK     Iodides Other (See Comments)     pt states tachycardia    Tylenol [Acetaminophen]      History of hep c    Bactrim [Sulfamethoxazole-Trimethoprim] Nausea And Vomiting and Other (See Comments)    Codeine Nausea And Vomiting and Palpitations    Morphine Itching and Nausea And Vomiting     \"makes me crazy\"     Prednisone Hives, Palpitations and Rash       Outpatient Medications Marked as Taking for the 12/8/20 encounter (Office Visit) with Greg Loges, DO   Medication Sig Dispense Refill    tiZANidine (ZANAFLEX) 4 MG tablet Take 1 tablet by mouth every 8 hours as needed (muscle spasm) 30 tablet 2    famciclovir (FAMVIR) 500 MG tablet Take 1 tablet by mouth 3 times daily TAKE ONE TABLET BY MOUTH THREE TIMES DAILY 30 tablet 1 regular rhythm. Heart sounds: Normal heart sounds. Pulmonary:      Effort: Pulmonary effort is normal.      Breath sounds: Normal breath sounds. Lymphadenopathy:      Cervical: Cervical adenopathy present. Neurological:      Mental Status: She is alert and oriented to person, place, and time. Psychiatric:         Behavior: Behavior normal.         Thought Content: Thought content normal.         Judgment: Judgment normal.         Assessment:      Assessment/plan;  Sulma was seen today for weight management and blood work. Diagnoses and all orders for this visit:    Over weight  -     phentermine (ADIPEX-P) 37.5 MG tablet; Take 1 tablet by mouth every morning (before breakfast) for 30 days. Exposure to COVID-19 virus  -     Covid-19, Antibody, Total    Cervical lymphadenopathy  -     CBC  Reassurance  Node is enlarged when doing what it is supposed to do   No need for concerns unless enlarges more     No follow-ups on file.     Helayne How, DO

## 2020-12-17 RX ORDER — ALBUTEROL SULFATE 90 UG/1
AEROSOL, METERED RESPIRATORY (INHALATION)
Qty: 25.5 G | Refills: 0 | Status: SHIPPED | OUTPATIENT
Start: 2020-12-17 | End: 2021-10-04

## 2020-12-23 RX ORDER — FLUTICASONE PROPIONATE 50 MCG
SPRAY, SUSPENSION (ML) NASAL
Qty: 16 G | Refills: 1 | Status: SHIPPED | OUTPATIENT
Start: 2020-12-23

## 2020-12-23 RX ORDER — FAMCICLOVIR 500 MG/1
TABLET, FILM COATED ORAL
Qty: 30 TABLET | Refills: 1 | Status: SHIPPED | OUTPATIENT
Start: 2020-12-23 | End: 2021-12-20 | Stop reason: SDUPTHER

## 2020-12-23 RX ORDER — FAMOTIDINE 40 MG/1
40 TABLET, FILM COATED ORAL 2 TIMES DAILY
Qty: 60 TABLET | Refills: 3 | Status: SHIPPED | OUTPATIENT
Start: 2020-12-23 | End: 2021-12-20 | Stop reason: SDUPTHER

## 2020-12-23 RX ORDER — ALPRAZOLAM 0.5 MG/1
TABLET ORAL
Qty: 90 TABLET | Refills: 1 | Status: SHIPPED | OUTPATIENT
Start: 2020-12-27 | End: 2021-01-29 | Stop reason: SDUPTHER

## 2020-12-23 RX ORDER — PROMETHAZINE HYDROCHLORIDE 25 MG/1
TABLET ORAL
Qty: 60 TABLET | Refills: 2 | Status: SHIPPED | OUTPATIENT
Start: 2020-12-23 | End: 2021-04-08 | Stop reason: SDUPTHER

## 2021-01-01 NOTE — TELEPHONE ENCOUNTER
Patient had surgery recently involving her nose.   She has another surgery scheduled on 10/30/  Can she have a covid test for the upcoming surgery? (putting swab nose or is there another way to do covid testing/) Parent(s)

## 2021-01-05 ENCOUNTER — TELEPHONE (OUTPATIENT)
Dept: ENT CLINIC | Age: 41
End: 2021-01-05
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
## 2021-01-21 ENCOUNTER — TELEPHONE (OUTPATIENT)
Dept: FAMILY MEDICINE CLINIC | Age: 41
End: 2021-01-21

## 2021-01-21 NOTE — TELEPHONE ENCOUNTER
----- Message from Francesca Jensen sent at 1/21/2021 10:25 AM EST -----  Subject: Message to Provider    QUESTIONS  Information for Provider? Pt checking on status of her paperwork being   filled out please advise.  ---------------------------------------------------------------------------  --------------  CALL BACK INFO  What is the best way for the office to contact you? OK to leave message on   voicemail  Preferred Call Back Phone Number? [phone]  ---------------------------------------------------------------------------  --------------  SCRIPT ANSWERS  Relationship to Patient?  Self

## 2021-01-25 ENCOUNTER — TELEPHONE (OUTPATIENT)
Dept: FAMILY MEDICINE CLINIC | Age: 41
End: 2021-01-25
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
## 2021-01-27 ENCOUNTER — OFFICE VISIT (OUTPATIENT)
Dept: SURGERY | Age: 41
End: 2021-01-27

## 2021-01-27 ENCOUNTER — TELEPHONE (OUTPATIENT)
Dept: FAMILY MEDICINE CLINIC | Age: 41
End: 2021-01-27

## 2021-01-27 VITALS
RESPIRATION RATE: 16 BRPM | BODY MASS INDEX: 29.04 KG/M2 | WEIGHT: 153.8 LBS | SYSTOLIC BLOOD PRESSURE: 119 MMHG | HEIGHT: 61 IN | OXYGEN SATURATION: 98 % | HEART RATE: 106 BPM | TEMPERATURE: 97.1 F | DIASTOLIC BLOOD PRESSURE: 76 MMHG

## 2021-01-27 DIAGNOSIS — K43.2 INCISIONAL HERNIA, WITHOUT OBSTRUCTION OR GANGRENE: ICD-10-CM

## 2021-01-27 DIAGNOSIS — Z09 POSTOP CHECK: Primary | ICD-10-CM

## 2021-01-27 PROCEDURE — 99024 POSTOP FOLLOW-UP VISIT: CPT | Performed by: SURGERY

## 2021-01-27 NOTE — PROGRESS NOTES
[facility]    PROCEDURE: 1) Bilateral breast fat grafting  DATE: 11/5/20    Sulma Panchal has been recovering well since her procedure. Pain has been well controlled without pain medications. EXAM    /76   Pulse 106   Temp 97.1 °F (36.2 °C)   Resp 16   Ht 5' 1\" (1.549 m)   Wt 153 lb 12.8 oz (69.8 kg)   LMP 05/25/2002   SpO2 98%   BMI 29.06 kg/m²     GEN: NAD   BREAST: Improvement in contour to the right breast  ABD: soft/some TTP at her umbiilicus/ND  Small palpable defect likely from previous abdominoplasty ([facility])  BACK: Good contour    IMP: 36 y. o.female s/p bilateral breast fat grafting  PLAN: Doing well. She is very happy with her outcome. She has a likely umbilical hernia from her previous abdominoplasty surgery, and will refer to Dr. Stefani Carpenter to evaluate for robotic incisional hernia repair.     Frankey Boatman, MD  [address] Reconstructive Surgery  [phone]  01/27/21

## 2021-01-29 ENCOUNTER — OFFICE VISIT (OUTPATIENT)
Dept: FAMILY MEDICINE CLINIC | Age: 41
End: 2021-01-29
Payer: COMMERCIAL

## 2021-01-29 VITALS
DIASTOLIC BLOOD PRESSURE: 80 MMHG | SYSTOLIC BLOOD PRESSURE: 116 MMHG | TEMPERATURE: 98 F | BODY MASS INDEX: 29.27 KG/M2 | WEIGHT: 155 LBS | HEIGHT: 61 IN

## 2021-01-29 DIAGNOSIS — F41.1 GAD (GENERALIZED ANXIETY DISORDER): ICD-10-CM

## 2021-01-29 PROCEDURE — 99213 OFFICE O/P EST LOW 20 MIN: CPT | Performed by: FAMILY MEDICINE
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
## 2021-01-30 RX ORDER — ALPRAZOLAM 0.5 MG/1
TABLET ORAL
Qty: 90 TABLET | Refills: 1 | Status: SHIPPED | OUTPATIENT
Start: 2021-02-17 | End: 2021-02-27

## 2021-01-30 ASSESSMENT — ENCOUNTER SYMPTOMS
GASTROINTESTINAL NEGATIVE: 1
RESPIRATORY NEGATIVE: 1

## 2021-01-30 NOTE — PROGRESS NOTES
OUTPATIENT PROGRESS NOTE  Date of Service:  1/29/2021  Address: [facility]  [address] [place] [address],First Floor [address]  Dept: [phone]  Loc: [phone]    Subjective:      Patient ID:  [id number]  Harsh Mccarty is a 36 y.o. female     anxiety  Pt here for refill  Taking her medication   No side effects  She also needs a note for her  about any history of nasal sinus issues    She is doing better  Rare nose bleed now      Review of Systems   Constitutional: Negative. HENT: Negative. Respiratory: Negative. Cardiovascular: Negative. Gastrointestinal: Negative. Neurological: Negative. Psychiatric/Behavioral: Positive for agitation and decreased concentration. The patient is nervous/anxious.         Objective:   YOB: 1980    Date of Visit:  1/29/2021       Allergies   Allergen Reactions    Latex Hives and Itching     After surgery x2     Shellfish-Derived Products Anaphylaxis    Tape Dyane Nordmann Tape] Rash     Paper tape OK     Iodides Other (See Comments)     pt states tachycardia    Tylenol [Acetaminophen]      History of hep c    Bactrim [Sulfamethoxazole-Trimethoprim] Nausea And Vomiting and Other (See Comments)    Codeine Nausea And Vomiting and Palpitations    Morphine Itching and Nausea And Vomiting     \"makes me crazy\"     Prednisone Hives, Palpitations and Rash       Outpatient Medications Marked as Taking for the 1/29/21 encounter (Office Visit) with Paramjit Crouch, DO   Medication Sig Dispense Refill    [START ON 2/17/2021] ALPRAZolam (XANAX) 0.5 MG tablet TAKE ONE TABLET BY MOUTH THREE TIMES DAILY AS NEEDED 90 tablet 1    famotidine (PEPCID) 40 MG tablet Take 1 tablet by mouth 2 times daily 60 tablet 3    famciclovir (FAMVIR) 500 MG tablet TAKE ONE TABLET BY MOUTH THREE TIMES DAILY 30 tablet 1    promethazine (PHENERGAN) 25 MG tablet TAKE ONE TABLET BY MOUTH TWICE DAILY AS NEEDED FOR NAUSEA 60 tablet 2    fluticasone (FLONASE) 50 MCG/ACT nasal spray SPRAY ONE SPRAY IN EACH NOSTRIL EVERY DAY 16 g 1    albuterol sulfate  (90 Base) MCG/ACT inhaler inhale TWO PUFFS BY MOUTH FOUR TIMES DAILY AS NEEDED FOR WHEEZING 25.5 g 0    tiZANidine (ZANAFLEX) 4 MG tablet Take 1 tablet by mouth every 8 hours as needed (muscle spasm) 30 tablet 2    tiZANidine (ZANAFLEX) 4 MG tablet TAKE ONE TABLET BY MOUTH EVERY 8 HOURS AS NEEDED FOR MUSCLE SPASM 30 tablet 2    NONFORMULARY EPI PEN PRN      buPROPion (WELLBUTRIN SR) 150 MG extended release tablet Take 1 tablet by mouth 2 times daily 60 tablet 3    OxyCODONE ER (XTAMPZA ER) 9 MG C12A Take 9 mg by mouth 2 times daily as needed.  propranolol (INDERAL) 20 MG tablet Take 1 tablet by mouth 3 times daily (Patient taking differently: Take 20 mg by mouth 2 times daily ) 90 tablet 2    hydrochlorothiazide (HYDRODIURIL) 25 MG tablet Take 1 tablet by mouth daily (Patient taking differently: Take 25 mg by mouth as needed ) 30 tablet 3       Vitals:    01/29/21 1312   BP: 116/80   Temp: 98 °F (36.7 °C)   Weight: 155 lb (70.3 kg)   Height: 5' 1\" (1.549 m)     Body mass index is 29.29 kg/m². Wt Readings from Last 3 Encounters:   01/29/21 155 lb (70.3 kg)   01/27/21 153 lb 12.8 oz (69.8 kg)   12/08/20 152 lb (68.9 kg)     BP Readings from Last 3 Encounters:   01/29/21 116/80   01/27/21 119/76   12/08/20 118/80       Physical Exam  Constitutional:       General: She is not in acute distress. Appearance: She is well-developed. HENT:      Head: Normocephalic. Neurological:      Mental Status: She is alert and oriented to person, place, and time. Psychiatric:         Behavior: Behavior normal.         Thought Content: Thought content normal.         Judgment: Judgment normal.            Assessment/Plan       Sulma was seen today for follow-up.     Diagnoses and all orders for this visit:    JACKSON (generalized anxiety disorder)  -     ALPRAZolam Danetta Cowden) 0.5 MG tablet; TAKE ONE TABLET BY MOUTH THREE TIMES DAILY AS NEEDED      No follow-ups on file.                     Angeline Mcwilliams, DO

## 2021-02-03 ENCOUNTER — OFFICE VISIT (OUTPATIENT)
Dept: BARIATRICS/WEIGHT MGMT | Age: 41
End: 2021-02-03
Payer: COMMERCIAL

## 2021-02-03 VITALS
DIASTOLIC BLOOD PRESSURE: 82 MMHG | WEIGHT: 154 LBS | HEIGHT: 61 IN | BODY MASS INDEX: 29.07 KG/M2 | HEART RATE: 87 BPM | TEMPERATURE: 97.1 F | SYSTOLIC BLOOD PRESSURE: 113 MMHG

## 2021-02-03 DIAGNOSIS — R10.33 PERIUMBILICAL ABDOMINAL PAIN: Primary | ICD-10-CM

## 2021-02-03 PROCEDURE — 99213 OFFICE O/P EST LOW 20 MIN: CPT | Performed by: SURGERY

## 2021-02-07 NOTE — PROGRESS NOTES
[facility]) Physicians   General & Laparoscopic Surgery  [employer]       HPI:    Tam Jensen is very pleasant 36 y.o. female who is referred by Dr. Asad Wall for consultation with regards to periumbilical abdominal bulge possible hernia.     Had abdominoplasty with plication of rectus muscles  Recently started working out and concerned about hernia  No changes in bowel function or habits  No N/V      Past Medical History:   Diagnosis Date    Arrythmia     Asthma     no problems recently    Back pain     Breast cancer ([address]     Breast lump     right    Cervical cancer (HCC)     Chronic hepatitis C ([facility] [address]     Chronic pain     DDD (degenerative disc disease)     Diverticulitis     Fibromyalgia     GERD (gastroesophageal reflux disease)     Headache     migraines    Immune deficiency disorder (HCC)     Interstitial cystitis     Nausea & vomiting     Osteoarthritis     Other closed fractures of distal end of radius (alone) 12/19/2009    distal radius fx surgery 12/28/2009 Dr. Taylor Comment    Pneumonia     PONV (postoperative nausea and vomiting)     scop patch and Phenergan work best     Rheumatoid arthritis(714.0)     Tachycardia      Past Surgical History:   Procedure Laterality Date    APPENDECTOMY      BLADDER SURGERY      BREAST ENHANCEMENT SURGERY Bilateral     BREAST SURGERY      bilat mastectomy    BREAST SURGERY Bilateral 2/6/2020    REVISION BILATERAL BREAST RECONSTRUCTION; 1.7 CM SCAR REVISION performed by Brennon Mir MD at [address] [place] [address] 7/2/14    removal of hardware    CHOLECYSTECTOMY      COLONOSCOPY      with polyectomy    CYSTOSCOPY  8-17-11    with bladder and urethral dilatation    FAT TRANSFER Bilateral 2/6/2020    WITH HIGH VOLUME FAT GRAFTING performed by Brennon Mir MD at [address] FAT TRANSFER Bilateral 11/5/2020    BILATERAL BREAST FAT GRAFTING performed by Brennon Mir MD at [address]  HERNIA REPAIR  03/24/2017    repair of umbilical hernia with primary closure, exploration of LLQ subcutaneous space without definitive findings of lipoma    HYSTERECTOMY      LAPAROSCOPIC APPENDECTOMY  3/18/13    LAPAROSCOPY  3/18/13    NOSE SURGERY Bilateral 9/22/2020    OPEN SEPTORHINOPLASTY, BILATERAL NASAL VALVE REPAIR, BILATERAL INFERIOR TURBINATE REDUCTION performed by Lianet Guzmán MD at [address] / DELAYED W/ TISSUE EXPANDER Bilateral 9/26/2019    EXCHANGE OF BILATERAL BREAST IMPLANTS ; AND BILATERAL CAPSULECTOMY, REVISION BILATERAL BREAST RECONSTRUCTION performed by Lani Diggs MD at [address] [facility] ENDOSCOPY      1/09    UPPER GASTROINTESTINAL ENDOSCOPY  5/28/2010    UPPER GASTROINTESTINAL ENDOSCOPY  2/13/13    WRIST FRACTURE SURGERY Right 12/28/2009    Open treatment with open reduction, internal fixation rightdistal radius using     Family History   Problem Relation Age of Onset    Cancer Mother         Breast    Depression Mother     Cancer Father         lung    Cancer Maternal Grandmother         breast    Asthma Other     Cancer Other     Heart Disease Other     Depression Paternal Grandmother     Emphysema Paternal Grandmother     Elevated Lipids Paternal Grandmother     Cancer Paternal Grandfather         lung    Cancer Maternal Grandfather         colon     Cancer Paternal Aunt         Breast     Social History     Tobacco Use    Smoking status: Never Smoker    Smokeless tobacco: Never Used    Tobacco comment: encouraged to never smoke    Substance Use Topics    Alcohol use: Yes     Alcohol/week: 0.0 standard drinks     Comment: socially; special occasions 1 every other week     I counseled the patient on the importance of not smoking and risks of ETOH.    Allergies   Allergen Reactions    Latex Hives and Itching     After surgery x2  Shellfish-Derived Products Anaphylaxis    Tape Glenice [employer] Tape] Rash     Paper tape OK     Iodides Other (See Comments)     pt states tachycardia    Tylenol [Acetaminophen]      History of hep c    Bactrim [Sulfamethoxazole-Trimethoprim] Nausea And Vomiting and Other (See Comments)    Codeine Nausea And Vomiting and Palpitations    Morphine Itching and Nausea And Vomiting     \"makes me crazy\"     Prednisone Hives, Palpitations and Rash     Vitals:    02/03/21 1417   BP: 113/82   Pulse: 87   Temp: 97.1 °F (36.2 °C)   Weight: 154 lb (69.9 kg)   Height: 5' 1\" (1.549 m)       Body mass index is 29.1 kg/m². Current Outpatient Medications:     [START ON 2/17/2021] ALPRAZolam (XANAX) 0.5 MG tablet, TAKE ONE TABLET BY MOUTH THREE TIMES DAILY AS NEEDED, Disp: 90 tablet, Rfl: 1    famotidine (PEPCID) 40 MG tablet, Take 1 tablet by mouth 2 times daily, Disp: 60 tablet, Rfl: 3    famciclovir (FAMVIR) 500 MG tablet, TAKE ONE TABLET BY MOUTH THREE TIMES DAILY, Disp: 30 tablet, Rfl: 1    promethazine (PHENERGAN) 25 MG tablet, TAKE ONE TABLET BY MOUTH TWICE DAILY AS NEEDED FOR NAUSEA, Disp: 60 tablet, Rfl: 2    fluticasone (FLONASE) 50 MCG/ACT nasal spray, SPRAY ONE SPRAY IN EACH NOSTRIL EVERY DAY, Disp: 16 g, Rfl: 1    albuterol sulfate  (90 Base) MCG/ACT inhaler, inhale TWO PUFFS BY MOUTH FOUR TIMES DAILY AS NEEDED FOR WHEEZING, Disp: 25.5 g, Rfl: 0    tiZANidine (ZANAFLEX) 4 MG tablet, Take 1 tablet by mouth every 8 hours as needed (muscle spasm), Disp: 30 tablet, Rfl: 2    tiZANidine (ZANAFLEX) 4 MG tablet, TAKE ONE TABLET BY MOUTH EVERY 8 HOURS AS NEEDED FOR MUSCLE SPASM, Disp: 30 tablet, Rfl: 2    NONFORMULARY, EPI PEN PRN, Disp: , Rfl:     buPROPion (WELLBUTRIN SR) 150 MG extended release tablet, Take 1 tablet by mouth 2 times daily, Disp: 60 tablet, Rfl: 3    OxyCODONE ER (XTAMPZA ER) 9 MG C12A, Take 9 mg by mouth 2 times daily as needed.  , Disp: , Rfl:   propranolol (INDERAL) 20 MG tablet, Take 1 tablet by mouth 3 times daily (Patient taking differently: Take 20 mg by mouth 2 times daily ), Disp: 90 tablet, Rfl: 2    hydrochlorothiazide (HYDRODIURIL) 25 MG tablet, Take 1 tablet by mouth daily (Patient taking differently: Take 25 mg by mouth as needed ), Disp: 30 tablet, Rfl: 3      Review of Systems - History obtained from the patient  General ROS: negative  Psychological ROS: negative  Ophthalmic ROS: negative  Neurological ROS: negative  ENT ROS: negative  Allergy and Immunology ROS: negative  Hematological and Lymphatic ROS: negative  Endocrine ROS: negative  Respiratory ROS: negative  Cardiovascular ROS: negative  Gastrointestinal ROS: abdominal pain  Genito-Urinary ROS: negative  Musculoskeletal ROS: negative   Skin ROS: negative    Physical Exam   Constitutional: Patient is oriented to person, place, and time. Vital signs are normal. Patient  appears well-developed and well-nourished. Patient  is active and cooperative. Non-toxic appearance. No distress. HENT:   Head: Normocephalic and atraumatic. Head is without laceration. Right Ear: External ear normal. No lacerations. No drainage, swelling or tenderness. Left Ear: External ear normal. No lacerations. No drainage, swelling or tenderness. Nose: Nose normal. No nose lacerations or nasal deformity. Mouth/Throat: Uvula is midline, oropharynx is clear and moist and mucous membranes are normal. No oropharyngeal exudate. Eyes: Conjunctivae, EOM and lids are normal. Pupils are equal, round, and reactive to light. Right eye exhibits no discharge. No foreign body present in the right eye. Left eye exhibits no discharge. No foreign body present in the left eye. No scleral icterus. Neck: Trachea normal and normal range of motion. Neck supple. No JVD present. No tracheal tenderness present. Carotid bruit is not present. No rigidity. No tracheal deviation and no edema present. No thyromegaly present. Cardiovascular: Normal rate, regular rhythm, normal heart sounds, intact distal pulses and normal pulses. Pulmonary/Chest: Effort normal and breath sounds normal. No stridor. No respiratory distress. Patient  has no wheezes. Patient has no rales. Patient exhibits no tenderness and no crepitus. Abdominal: Soft. Normal appearance and bowel sounds are normal. Patient exhibits no distension, no abdominal bruit, no ascites and no mass. There is no hepatosplenomegaly. There is no tenderness. There is no rigidity, no rebound, no guarding and no CVA tenderness. Hernia confirmed negative in the ventral area. Musculoskeletal: Normal range of motion. Patient exhibits no edema or tenderness. Neurological: Patient is alert and oriented to person, place, and time. Patient has normal strength. Coordination and gait normal. GCS eye subscore is 4. GCS verbal subscore is 5. GCS motor subscore is 6. Skin: Skin is warm and dry. No abrasion and no rash noted. Patient  is not diaphoretic. No cyanosis or erythema. Psychiatric: Patient has a normal mood and affect.   speech is normal and behavior is normal. Cognition and memory are normal.         Data  CT reviewed with patient        A/P      1- No appreciable hernia on physical exam or imaging  2- Continue diet and exercise as tolerated    Amee Pavon

## 2021-02-27 DIAGNOSIS — F41.1 GAD (GENERALIZED ANXIETY DISORDER): ICD-10-CM

## 2021-02-27 RX ORDER — ALPRAZOLAM 0.5 MG/1
TABLET ORAL
Qty: 90 TABLET | Refills: 1 | Status: SHIPPED | OUTPATIENT
Start: 2021-02-27 | End: 2021-04-08 | Stop reason: SDUPTHER

## 2021-04-08 ENCOUNTER — TELEPHONE (OUTPATIENT)
Dept: FAMILY MEDICINE CLINIC | Age: 41
End: 2021-04-08

## 2021-04-08 ENCOUNTER — OFFICE VISIT (OUTPATIENT)
Dept: FAMILY MEDICINE CLINIC | Age: 41
End: 2021-04-08
Payer: COMMERCIAL

## 2021-04-08 VITALS
WEIGHT: 157 LBS | DIASTOLIC BLOOD PRESSURE: 72 MMHG | BODY MASS INDEX: 30.82 KG/M2 | HEIGHT: 60 IN | SYSTOLIC BLOOD PRESSURE: 108 MMHG

## 2021-04-08 DIAGNOSIS — K05.10 GINGIVITIS: Primary | ICD-10-CM

## 2021-04-08 DIAGNOSIS — E66.9 OBESITY (BMI 30-39.9): ICD-10-CM

## 2021-04-08 DIAGNOSIS — F41.1 GAD (GENERALIZED ANXIETY DISORDER): ICD-10-CM

## 2021-04-08 DIAGNOSIS — M79.7 FIBROMYALGIA: ICD-10-CM

## 2021-04-08 PROCEDURE — 99214 OFFICE O/P EST MOD 30 MIN: CPT | Performed by: FAMILY MEDICINE

## 2021-04-08 RX ORDER — ALPRAZOLAM 0.5 MG/1
TABLET ORAL
Qty: 90 TABLET | Refills: 1 | Status: SHIPPED | OUTPATIENT
Start: 2021-04-08 | End: 2021-05-14

## 2021-04-08 RX ORDER — TIZANIDINE 4 MG/1
TABLET ORAL
Qty: 30 TABLET | Refills: 2 | Status: SHIPPED | OUTPATIENT
Start: 2021-04-08 | End: 2021-04-09

## 2021-04-08 RX ORDER — AMOXICILLIN 500 MG/1
500 CAPSULE ORAL 3 TIMES DAILY
Qty: 21 CAPSULE | Refills: 0 | Status: SHIPPED | OUTPATIENT
Start: 2021-04-08 | End: 2021-04-15

## 2021-04-08 RX ORDER — LIRAGLUTIDE 6 MG/ML
0.6 INJECTION, SOLUTION SUBCUTANEOUS WEEKLY
Qty: 2 PEN | Refills: 1 | Status: SHIPPED | OUTPATIENT
Start: 2021-04-08 | End: 2021-12-20 | Stop reason: SDUPTHER

## 2021-04-08 RX ORDER — PROMETHAZINE HYDROCHLORIDE 25 MG/1
TABLET ORAL
Qty: 60 TABLET | Refills: 2 | Status: SHIPPED | OUTPATIENT
Start: 2021-04-08 | End: 2021-09-27 | Stop reason: SDUPTHER

## 2021-04-08 ASSESSMENT — ENCOUNTER SYMPTOMS: BACK PAIN: 1

## 2021-04-08 NOTE — TELEPHONE ENCOUNTER
----- Message from Julius South sent at 4/8/2021  1:11 PM EDT -----  Subject: Message to Provider    QUESTIONS  Information for Provider? Patient was supposed to get medications- Saxenda   Xanax   trazadoine   and amoxicillian. She's at the pharmacy and nothing is there. Please   call.   ---------------------------------------------------------------------------  --------------  CALL BACK INFO  What is the best way for the office to contact you? OK to leave message on   voicemail  Preferred Call Back Phone Number? [phone]  ---------------------------------------------------------------------------  --------------  SCRIPT ANSWERS  Relationship to Patient?  Self

## 2021-04-08 NOTE — PROGRESS NOTES
OUTPATIENT PROGRESS NOTE  Date of Service:  4/8/2021  [facility] [address] [facility],First Floor [address]  Dept: [phone]  Loc: [phone]    Subjective:      Patient ID:  [id number]  Moises Lyons is a 36 y.o. female     HPI  Gum pain  She was eating something and it scratched her upper gum    Now it is red and tender  Does not have a dentist right now  Brushed area last night and there was blood and pus    Weight gain  She wants to try something to help her get her weight back down  She has used adipex in past  Wants to discuss try saxenda    Fibromyalgia  She needs refills on her medicine  Taking her muscle relaxant   Gets her pain meds from specialist    Review of Systems   Constitutional: Positive for activity change, fatigue and unexpected weight change. Musculoskeletal: Positive for arthralgias, back pain and myalgias. Neurological: Positive for weakness and headaches. Psychiatric/Behavioral: Positive for agitation and sleep disturbance. The patient is nervous/anxious.         Objective:   YOB: 1980    Date of Visit:  4/8/2021       Allergies   Allergen Reactions    Latex Hives and Itching     After surgery x2     Shellfish-Derived Products Anaphylaxis    Tape Diaz Efrain Tape] Rash     Paper tape OK     Iodides Other (See Comments)     pt states tachycardia    Tylenol [Acetaminophen]      History of hep c    Bactrim [Sulfamethoxazole-Trimethoprim] Nausea And Vomiting and Other (See Comments)    Codeine Nausea And Vomiting and Palpitations    Morphine Itching and Nausea And Vomiting     \"makes me crazy\"     Prednisone Hives, Palpitations and Rash       Outpatient Medications Marked as Taking for the 4/8/21 encounter (Office Visit) with Renella Sicard, DO   Medication Sig Dispense Refill    ALPRAZolam (XANAX) 0.5 MG tablet TAKE ONE TABLET BY MOUTH THREE TIMES DAILY AS NEEDED 90 tablet 1    Mental Status: She is alert and oriented to person, place, and time. Psychiatric:         Behavior: Behavior normal.         Thought Content: Thought content normal.         Judgment: Judgment normal.            Assessment/Plan             Assessment/plan;  Sulma was seen today for weight management and hip pain. Diagnoses and all orders for this visit:    Gingivitis  -     amoxicillin (AMOXIL) 500 MG capsule; Take 1 capsule by mouth 3 times daily for 7 days    JACKSON (generalized anxiety disorder)  -     ALPRAZolam (XANAX) 0.5 MG tablet; q 8 hours prn    Fibromyalgia  -     tiZANidine (ZANAFLEX) 4 MG tablet; TAKE ONE TABLET BY MOUTH EVERY 8 HOURS AS NEEDED FOR MUSCLE SPASM    Obesity (BMI 30-39.9)  -     liraglutide-weight management (SAXENDA) 18 MG/3ML SOPN; Inject 0.6 mg into the skin once a week    Other orders  -     promethazine (PHENERGAN) 25 MG tablet; TAKE ONE TABLET BY MOUTH TWICE DAILY AS NEEDED FOR NAUSEA      No follow-ups on file.              Wai Boyle DO

## 2021-04-08 NOTE — TELEPHONE ENCOUNTER
Spoke with pt informed her that meds are pending in her chart and will be sent over to pharmacy when Dr Coral Tejeda has completed her charting for her appointment,  She requested the antibiotic be sent over today so she could start medication    Sent antibiotic to requested pharmacy

## 2021-04-09 ENCOUNTER — TELEPHONE (OUTPATIENT)
Dept: FAMILY MEDICINE CLINIC | Age: 41
End: 2021-04-09

## 2021-04-09 DIAGNOSIS — M79.7 FIBROMYALGIA: ICD-10-CM

## 2021-04-09 RX ORDER — TIZANIDINE 4 MG/1
TABLET ORAL
Qty: 30 TABLET | Refills: 2 | Status: SHIPPED | OUTPATIENT
Start: 2021-04-09 | End: 2021-07-12

## 2021-04-09 NOTE — TELEPHONE ENCOUNTER
Carol Prajapati from [facility] is calling stating the claudette needs a PA also she states she only ordered 2 pens and they normally come in a box of 5 and they are not allowed to break the box also he states the  is suggesting patient to take this medication daily please advise

## 2021-04-12 ENCOUNTER — TELEPHONE (OUTPATIENT)
Dept: FAMILY MEDICINE CLINIC | Age: 41
End: 2021-04-12

## 2021-04-12 DIAGNOSIS — E66.09 CLASS 1 OBESITY DUE TO EXCESS CALORIES WITH SERIOUS COMORBIDITY AND BODY MASS INDEX (BMI) OF 30.0 TO 30.9 IN ADULT: Primary | ICD-10-CM

## 2021-04-12 RX ORDER — PHENTERMINE HYDROCHLORIDE 37.5 MG/1
37.5 TABLET ORAL
Qty: 30 TABLET | Refills: 0 | Status: SHIPPED | OUTPATIENT
Start: 2021-04-12 | End: 2021-05-11 | Stop reason: SDUPTHER

## 2021-05-11 ENCOUNTER — OFFICE VISIT (OUTPATIENT)
Dept: FAMILY MEDICINE CLINIC | Age: 41
End: 2021-05-11
Payer: COMMERCIAL

## 2021-05-11 VITALS
WEIGHT: 149 LBS | BODY MASS INDEX: 28.13 KG/M2 | DIASTOLIC BLOOD PRESSURE: 74 MMHG | HEIGHT: 61 IN | SYSTOLIC BLOOD PRESSURE: 114 MMHG

## 2021-05-11 DIAGNOSIS — R53.83 FATIGUE, UNSPECIFIED TYPE: Primary | ICD-10-CM

## 2021-05-11 DIAGNOSIS — E66.09 CLASS 1 OBESITY DUE TO EXCESS CALORIES WITH SERIOUS COMORBIDITY AND BODY MASS INDEX (BMI) OF 30.0 TO 30.9 IN ADULT: ICD-10-CM

## 2021-05-11 LAB — TSH SERPL DL<=0.05 MIU/L-ACNC: 2.8 UIU/ML (ref 0.27–4.2)

## 2021-05-11 PROCEDURE — 36415 COLL VENOUS BLD VENIPUNCTURE: CPT | Performed by: FAMILY MEDICINE

## 2021-05-11 PROCEDURE — 99213 OFFICE O/P EST LOW 20 MIN: CPT | Performed by: FAMILY MEDICINE

## 2021-05-11 RX ORDER — PHENTERMINE HYDROCHLORIDE 37.5 MG/1
37.5 TABLET ORAL
Qty: 30 TABLET | Refills: 0 | Status: SHIPPED | OUTPATIENT
Start: 2021-05-11 | End: 2021-06-10

## 2021-05-11 RX ORDER — HYDROCHLOROTHIAZIDE 25 MG/1
25 TABLET ORAL EVERY MORNING
Qty: 45 TABLET | Refills: 1 | Status: SHIPPED | OUTPATIENT
Start: 2021-05-11 | End: 2021-12-20 | Stop reason: SDUPTHER

## 2021-05-11 ASSESSMENT — ENCOUNTER SYMPTOMS
RESPIRATORY NEGATIVE: 1
GASTROINTESTINAL NEGATIVE: 1

## 2021-05-11 NOTE — PROGRESS NOTES
OUTPATIENT PROGRESS NOTE  Date of Service:  5/11/2021  Address: [address] [place] [id number]  Dept: [phone]  Loc: [phone]    Subjective:      Patient ID:  [id number]  Moises Lyons is a 36 y.o. female     HPI    Obesity  She has done well on the adipex  She is going to gym and watching her calories  No side effects with the problem  She wants to have her thyroid rechecked   Review of Systems   Constitutional: Negative. HENT: Negative. Respiratory: Negative. Cardiovascular: Negative. Gastrointestinal: Negative. Neurological: Negative. Psychiatric/Behavioral: Positive for decreased concentration. Objective:   YOB: 1980    Date of Visit:  5/11/2021       Allergies   Allergen Reactions    Latex Hives and Itching     After surgery x2     Shellfish-Derived Products Anaphylaxis    Tape [place] Tape] Rash     Paper tape OK     Iodides Other (See Comments)     pt states tachycardia    Tylenol [Acetaminophen]      History of hep c    Bactrim [Sulfamethoxazole-Trimethoprim] Nausea And Vomiting and Other (See Comments)    Codeine Nausea And Vomiting and Palpitations    Morphine Itching and Nausea And Vomiting     \"makes me crazy\"     Prednisone Hives, Palpitations and Rash       Outpatient Medications Marked as Taking for the 5/11/21 encounter (Office Visit) with Renella Sicard, DO   Medication Sig Dispense Refill    phentermine (ADIPEX-P) 37.5 MG tablet Take 1 tablet by mouth every morning (before breakfast) for 30 days.  30 tablet 0    tiZANidine (ZANAFLEX) 4 MG tablet TAKE ONE TABLET BY MOUTH EVERY 8 HOURS AS NEEDED FOR MUSCLE SPASM 30 tablet 2    promethazine (PHENERGAN) 25 MG tablet TAKE ONE TABLET BY MOUTH TWICE DAILY AS NEEDED FOR NAUSEA 60 tablet 2    ALPRAZolam (XANAX) 0.5 MG tablet q 8 hours prn 90 tablet 1    liraglutide-weight management (SAXENDA) 18 MG/3ML SOPN Inject 0.6 mg into the skin once a week 2 pen 1    famotidine (PEPCID) 40 MG tablet Take 1 tablet by mouth 2 times daily 60 tablet 3    famciclovir (FAMVIR) 500 MG tablet TAKE ONE TABLET BY MOUTH THREE TIMES DAILY 30 tablet 1    fluticasone (FLONASE) 50 MCG/ACT nasal spray SPRAY ONE SPRAY IN EACH NOSTRIL EVERY DAY 16 g 1    albuterol sulfate  (90 Base) MCG/ACT inhaler inhale TWO PUFFS BY MOUTH FOUR TIMES DAILY AS NEEDED FOR WHEEZING 25.5 g 0    NONFORMULARY EPI PEN PRN      OxyCODONE ER (XTAMPZA ER) 9 MG C12A Take 9 mg by mouth 2 times daily as needed.  propranolol (INDERAL) 20 MG tablet Take 1 tablet by mouth 3 times daily (Patient taking differently: Take 20 mg by mouth 2 times daily ) 90 tablet 2    hydrochlorothiazide (HYDRODIURIL) 25 MG tablet Take 1 tablet by mouth daily (Patient taking differently: Take 25 mg by mouth as needed ) 30 tablet 3       Vitals:    05/11/21 1136   BP: 114/74   Weight: 149 lb (67.6 kg)   Height: 5' 1\" (1.549 m)     Body mass index is 28.15 kg/m². Wt Readings from Last 3 Encounters:   05/11/21 149 lb (67.6 kg)   04/08/21 157 lb (71.2 kg)   02/03/21 154 lb (69.9 kg)     BP Readings from Last 3 Encounters:   05/11/21 114/74   04/08/21 108/72   02/03/21 113/82       Physical Exam  Constitutional:       General: She is not in acute distress. Appearance: She is well-developed. HENT:      Head: Normocephalic. Neurological:      Mental Status: She is alert and oriented to person, place, and time. Psychiatric:         Behavior: Behavior normal.         Thought Content: Thought content normal.         Judgment: Judgment normal.            Assessment/Plan       Sulma was seen today for medication refill, medication check, weight management and thyroid problem.     Diagnoses and all orders for this visit:    Class 1 obesity due to excess calories with serious comorbidity and body mass index (BMI) of 30.0 to 30.9 in adult      Await lab  Due to her bmi this will be her last refill of adipex                  Wai Boyle, DO

## 2021-05-12 ENCOUNTER — TELEPHONE (OUTPATIENT)
Dept: FAMILY MEDICINE CLINIC | Age: 41
End: 2021-05-12

## 2021-05-12 DIAGNOSIS — E66.09 CLASS 1 OBESITY DUE TO EXCESS CALORIES WITH SERIOUS COMORBIDITY AND BODY MASS INDEX (BMI) OF 30.0 TO 30.9 IN ADULT: ICD-10-CM

## 2021-05-12 RX ORDER — PHENTERMINE HYDROCHLORIDE 37.5 MG/1
37.5 TABLET ORAL
Qty: 30 TABLET | Refills: 0 | Status: CANCELLED | OUTPATIENT
Start: 2021-05-12 | End: 2021-06-11

## 2021-05-12 NOTE — TELEPHONE ENCOUNTER
----- Message from Phoenix Weiner sent at 5/11/2021  4:13 PM EDT -----  Subject: Refill Request    QUESTIONS  Name of Medication? phentermine (ADIPEX-P) 37.5 MG tablet  Patient-reported dosage and instructions? 1x daily  How many days do you have left? 0  [facility] [phone]  Additional Information for Provider? Patient is needing their water pill   sent as well  ---------------------------------------------------------------------------  --------------  CALL BACK INFO  What is the best way for the office to contact you? OK to leave message on   voicemail  Preferred Call Back Phone Number?  [phone]

## 2021-05-14 DIAGNOSIS — F41.1 GAD (GENERALIZED ANXIETY DISORDER): ICD-10-CM

## 2021-05-14 RX ORDER — ALPRAZOLAM 0.5 MG/1
TABLET ORAL
Qty: 90 TABLET | Refills: 1 | Status: SHIPPED | OUTPATIENT
Start: 2021-05-14 | End: 2021-08-09

## 2021-06-07 ENCOUNTER — HOSPITAL ENCOUNTER (OUTPATIENT)
Dept: ULTRASOUND IMAGING | Age: 41
Discharge: HOME | End: 2021-06-07
Payer: COMMERCIAL

## 2021-06-07 ENCOUNTER — OFFICE VISIT (OUTPATIENT)
Dept: BREAST CENTER | Age: 41
End: 2021-06-07
Payer: COMMERCIAL

## 2021-06-07 VITALS
WEIGHT: 150.2 LBS | BODY MASS INDEX: 28.36 KG/M2 | SYSTOLIC BLOOD PRESSURE: 113 MMHG | HEART RATE: 78 BPM | HEIGHT: 61 IN | DIASTOLIC BLOOD PRESSURE: 68 MMHG

## 2021-06-07 DIAGNOSIS — N63.0 BREAST LUMP: ICD-10-CM

## 2021-06-07 DIAGNOSIS — N63.10 BREAST MASS, RIGHT: Primary | ICD-10-CM

## 2021-06-07 PROCEDURE — 99213 OFFICE O/P EST LOW 20 MIN: CPT | Performed by: SURGERY

## 2021-06-07 PROCEDURE — 76642 ULTRASOUND BREAST LIMITED: CPT

## 2021-06-07 NOTE — PATIENT INSTRUCTIONS
Breast exam was unremarkable for any palpable masses  Continue with monthly self breast exams  US ordered  Will call with results    Healthy Lifestyle Recommendations: healthy diet (decrease consumption of red meat, increase fresh fruits and vegetables), decreased alcohol consumption (less than 4 drinks/week), adequate sleep (goal 6-8 hours), routine exercise (goal 150 minutes/week or greater), weight control.

## 2021-06-07 NOTE — PROGRESS NOTES
Subjective:      Patient ID: Berna Silva is a 36 y.o. female. HPI   Chief Complaint   Patient presents with    Other     R. Breast Sm. knot -  x 2 months- getting bigger     Patient is here for evaluation of a new right breast mass, noticed about 2 months ago, and it is growing. It also causes discomfort when she is working out. I saw her in 2019 for right breast mass evaluation. U/S of the right breast in 2019 showed 2 heterogeneous lesions 3:00, largest 1.4 cm, probable benign fat necrosis. Patient is s/p bilateral prophylactic mastectomy and reconstruction 11/2014 by Dr. Mohan Graf (benign path) for high risk (29% lifetime risk. ). Had implant replacement and scar revision 2015. She then had revision of her breast reconstruction with smaller implants and capsule work with Dr. Amor Vidales in 2019, then underwent fat grafting in 2020. Patient has a strong family history of breast cancer. She is BRCA negative. MRI 8/2020 showed small round and oval masses medial right breast c/w fat necrosis, BIRADS 2.     Past Medical History:   Diagnosis Date    Arrythmia     Asthma     no problems recently    Back pain     Breast cancer ([address]     Breast lump     right    Cervical cancer (HCC)     Chronic hepatitis C (HCC)     Chronic pain     DDD (degenerative disc disease)     Diverticulitis     Fibromyalgia     GERD (gastroesophageal reflux disease)     Headache     migraines    Immune deficiency disorder (HCC)     Interstitial cystitis     Nausea & vomiting     Osteoarthritis     Other closed fractures of distal end of radius (alone) 12/19/2009    distal radius fx surgery 12/28/2009 Dr. Rosy Stephenson    Pneumonia     PONV (postoperative nausea and vomiting)     scop patch and Phenergan work best     Rheumatoid arthritis(714.0)     Tachycardia        Past Surgical History:   Procedure Laterality Date    APPENDECTOMY      BLADDER SURGERY      BREAST ENHANCEMENT SURGERY Bilateral     BREAST SURGERY tablet 1    tiZANidine (ZANAFLEX) 4 MG tablet TAKE ONE TABLET BY MOUTH EVERY 8 HOURS AS NEEDED FOR MUSCLE SPASM 30 tablet 2    promethazine (PHENERGAN) 25 MG tablet TAKE ONE TABLET BY MOUTH TWICE DAILY AS NEEDED FOR NAUSEA 60 tablet 2    liraglutide-weight management (SAXENDA) 18 MG/3ML SOPN Inject 0.6 mg into the skin once a week 2 pen 1    famotidine (PEPCID) 40 MG tablet Take 1 tablet by mouth 2 times daily 60 tablet 3    famciclovir (FAMVIR) 500 MG tablet TAKE ONE TABLET BY MOUTH THREE TIMES DAILY 30 tablet 1    fluticasone (FLONASE) 50 MCG/ACT nasal spray SPRAY ONE SPRAY IN EACH NOSTRIL EVERY DAY 16 g 1    albuterol sulfate  (90 Base) MCG/ACT inhaler inhale TWO PUFFS BY MOUTH FOUR TIMES DAILY AS NEEDED FOR WHEEZING 25.5 g 0    NONFORMULARY EPI PEN PRN      OxyCODONE ER (XTAMPZA ER) 9 MG C12A Take 9 mg by mouth 2 times daily as needed.  propranolol (INDERAL) 20 MG tablet Take 1 tablet by mouth 3 times daily (Patient taking differently: Take 20 mg by mouth 2 times daily ) 90 tablet 2    hydrochlorothiazide (HYDRODIURIL) 25 MG tablet Take 1 tablet by mouth daily (Patient taking differently: Take 25 mg by mouth as needed ) 30 tablet 3     No current facility-administered medications for this visit. Social History     Socioeconomic History    Marital status:      Spouse name: Not on file    Number of children: 3    Years of education: Not on file    Highest education level: Not on file   Occupational History    Not on file   Tobacco Use    Smoking status: Never Smoker    Smokeless tobacco: Never Used    Tobacco comment: encouraged to never smoke    Vaping Use    Vaping Use: Never used   Substance and Sexual Activity    Alcohol use:  Yes     Alcohol/week: 0.0 standard drinks     Comment: socially; special occasions 1 every other week    Drug use: No    Sexual activity: Yes     Partners: Male   Other Topics Concern    Not on file   Social History Narrative    Not on file     Social Determinants of Health     Financial Resource Strain:     Difficulty of Paying Living Expenses:    Food Insecurity:     Worried About Running Out of Food in the Last Year:     [address] in the Last Year:    Transportation Needs:     Lack of Transportation (Medical):  Lack of Transportation (Non-Medical):    Physical Activity:     Days of Exercise per Week:     Minutes of Exercise per Session:    Stress:     Feeling of Stress :    Social Connections:     Frequency of Communication with Friends and Family:     Frequency of Social Gatherings with Friends and Family:     Attends Zoroastrianism Services:     Active Member of Clubs or Organizations:     Attends Club or Organization Meetings:     Marital Status:    Intimate Partner Violence:     Fear of Current or Ex-Partner:     Emotionally Abused:     Physically Abused:     Sexually Abused:        Objective:   Physical Exam    Right breast - Implant present, 1.2 cm mass upper inner quadrant, 10 cmfn, round, mobile. No other masses. Left breast - Implant present, no masses, no skin changes  No cervical or axillary adenopathy. Assessment:     Right breast mass     Plan:     Patient has a new right breast mass. Will check an ultrasound today to further evaluate the area. Ultrasound shows several small masses in the upper inner right breast, c/w benign fat necrosis. BIRADS 2. Recommend continue monthly self breast exam, f/u 4 months for reevaluation. On this date 06/07/21 I have spent 20 minutes reviewing previous notes, test results, and face to face with the patient discussing the diagnosis and importance of compliance with the treatment plan as well as documenting on the day of the visit.      Electronically signed by Maria Isabel Mariee MD on 6/7/2021 at 12:36 PM

## 2021-06-15 ENCOUNTER — CLINICAL DOCUMENTATION (OUTPATIENT)
Dept: OTHER | Age: 41
End: 2021-06-15

## 2021-07-06 ENCOUNTER — TELEPHONE (OUTPATIENT)
Dept: FAMILY MEDICINE CLINIC | Age: 41
End: 2021-07-06

## 2021-07-06 DIAGNOSIS — J01.90 ACUTE BACTERIAL SINUSITIS: Primary | ICD-10-CM

## 2021-07-06 DIAGNOSIS — B96.89 ACUTE BACTERIAL SINUSITIS: Primary | ICD-10-CM

## 2021-07-06 RX ORDER — AZITHROMYCIN 250 MG/1
250 TABLET, FILM COATED ORAL SEE ADMIN INSTRUCTIONS
Qty: 6 TABLET | Refills: 0 | Status: SHIPPED | OUTPATIENT
Start: 2021-07-06 | End: 2021-07-11

## 2021-07-06 NOTE — TELEPHONE ENCOUNTER
Patient states she has a sinus infection. Reports brown-green discharge, foul odor to drainage and it's all in the right nostril since last Thursday. Patient also reports pressure in upper nose near eyes. Patient has tried nasal washes and Flonase. Patient is requesting an antibiotic to be called into pharmacy.      [address] [address]

## 2021-07-12 DIAGNOSIS — M79.7 FIBROMYALGIA: ICD-10-CM

## 2021-07-12 RX ORDER — TIZANIDINE 4 MG/1
TABLET ORAL
Qty: 30 TABLET | Refills: 2 | Status: SHIPPED | OUTPATIENT
Start: 2021-07-12 | End: 2021-09-27 | Stop reason: SDUPTHER

## 2021-07-27 ENCOUNTER — TELEPHONE (OUTPATIENT)
Dept: FAMILY MEDICINE CLINIC | Age: 41
End: 2021-07-27

## 2021-07-27 DIAGNOSIS — B96.89 ACUTE BACTERIAL SINUSITIS: Primary | ICD-10-CM

## 2021-07-27 DIAGNOSIS — J01.90 ACUTE BACTERIAL SINUSITIS: Primary | ICD-10-CM

## 2021-07-27 RX ORDER — AZITHROMYCIN 250 MG/1
250 TABLET, FILM COATED ORAL SEE ADMIN INSTRUCTIONS
Qty: 6 TABLET | Refills: 0 | Status: SHIPPED | OUTPATIENT
Start: 2021-07-27 | End: 2021-08-01

## 2021-07-27 NOTE — TELEPHONE ENCOUNTER
Patient called stating her daughter was diagnosed with an upper respiratory infection and pink eye on Sunday.  Patient states she now has the same symptoms and is requesting  prescription for an antibiotic for respiratory infection and an ointment for pink eye

## 2021-07-28 ENCOUNTER — TELEPHONE (OUTPATIENT)
Dept: FAMILY MEDICINE CLINIC | Age: 41
End: 2021-07-28

## 2021-07-28 RX ORDER — GENTAMICIN SULFATE 3 MG/G
OINTMENT OPHTHALMIC
Qty: 3.5 G | Refills: 0 | Status: SHIPPED | OUTPATIENT
Start: 2021-07-28 | End: 2021-12-20

## 2021-07-28 NOTE — TELEPHONE ENCOUNTER
Patient called stating the pharmacy is stating gentamicin is on back order.  Patient requesting a different medication be called into the pharmacy

## 2021-07-29 ENCOUNTER — TELEPHONE (OUTPATIENT)
Dept: FAMILY MEDICINE CLINIC | Age: 41
End: 2021-07-29

## 2021-07-30 ENCOUNTER — TELEPHONE (OUTPATIENT)
Dept: FAMILY MEDICINE CLINIC | Age: 41
End: 2021-07-30

## 2021-07-30 RX ORDER — ERYTHROMYCIN 5 MG/G
OINTMENT OPHTHALMIC
Qty: 1 TUBE | Refills: 0 | Status: SHIPPED | OUTPATIENT
Start: 2021-07-30 | End: 2021-08-09

## 2021-07-30 NOTE — TELEPHONE ENCOUNTER
Pt called to ask Dr Hoang Nunes to send a different rx to [facility] [phone] for pink eye in both eyes. She is still trying to get a medication sent to the pharmacy. The 2 rx's that were sent to the pharmacy are on back order .  Please give pt a call [phone]

## 2021-08-09 DIAGNOSIS — F41.1 GAD (GENERALIZED ANXIETY DISORDER): ICD-10-CM

## 2021-08-09 RX ORDER — ALPRAZOLAM 0.5 MG/1
TABLET ORAL
Qty: 90 TABLET | Refills: 0 | Status: SHIPPED | OUTPATIENT
Start: 2021-08-09 | End: 2021-09-27 | Stop reason: SDUPTHER

## 2021-08-31 ENCOUNTER — TELEPHONE (OUTPATIENT)
Dept: FAMILY MEDICINE CLINIC | Age: 41
End: 2021-08-31

## 2021-08-31 DIAGNOSIS — J01.90 ACUTE BACTERIAL SINUSITIS: Primary | ICD-10-CM

## 2021-08-31 DIAGNOSIS — B96.89 ACUTE BACTERIAL SINUSITIS: Primary | ICD-10-CM

## 2021-08-31 RX ORDER — AZITHROMYCIN 250 MG/1
250 TABLET, FILM COATED ORAL SEE ADMIN INSTRUCTIONS
Qty: 6 TABLET | Refills: 0 | Status: SHIPPED | OUTPATIENT
Start: 2021-08-31 | End: 2021-09-05

## 2021-08-31 NOTE — TELEPHONE ENCOUNTER
----- Message from Polo Nolan sent at 8/31/2021 11:35 AM EDT -----  Subject: Message to Provider    QUESTIONS  Information for Provider? Patient would like to know if Dr. Sally Leyva would   prescribed her something for a sinus infection as she has done in the past   as it is an ongoing issue. Patient states this infection has been worse   than the last one.   ---------------------------------------------------------------------------  --------------  CALL BACK INFO  What is the best way for the office to contact you? OK to leave message on   voicemail  Preferred Call Back Phone Number? [phone]  ---------------------------------------------------------------------------  --------------  SCRIPT ANSWERS  Relationship to Patient?  Self

## 2021-09-08 ENCOUNTER — TELEPHONE (OUTPATIENT)
Dept: FAMILY MEDICINE CLINIC | Age: 41
End: 2021-09-08

## 2021-09-08 NOTE — TELEPHONE ENCOUNTER
Pt called  To let Dr Constance Langford know that she hs a infected belly button, infected earring holes, sinus infection,  Blisters all over her body. The infected areas has drainage coming from them that consist of blood and puss. The blisters feels like cuts that salt has been poured into an skin is on fire. The antibiotic she has been on is not helping. she is available on Friday.  Please give pt a call [phone]

## 2021-09-10 RX ORDER — DOXYCYCLINE HYCLATE 100 MG
100 TABLET ORAL 2 TIMES DAILY
Qty: 20 TABLET | Refills: 0 | Status: SHIPPED | OUTPATIENT
Start: 2021-09-10 | End: 2021-09-20

## 2021-09-27 ENCOUNTER — OFFICE VISIT (OUTPATIENT)
Dept: FAMILY MEDICINE CLINIC | Age: 41
End: 2021-09-27
Payer: COMMERCIAL

## 2021-09-27 VITALS
OXYGEN SATURATION: 97 % | DIASTOLIC BLOOD PRESSURE: 66 MMHG | SYSTOLIC BLOOD PRESSURE: 102 MMHG | WEIGHT: 150 LBS | HEIGHT: 61 IN | BODY MASS INDEX: 28.32 KG/M2 | HEART RATE: 83 BPM

## 2021-09-27 DIAGNOSIS — F41.1 GAD (GENERALIZED ANXIETY DISORDER): ICD-10-CM

## 2021-09-27 DIAGNOSIS — R53.83 FATIGUE, UNSPECIFIED TYPE: ICD-10-CM

## 2021-09-27 DIAGNOSIS — M79.7 FIBROMYALGIA: ICD-10-CM

## 2021-09-27 DIAGNOSIS — R50.9 INTERMITTENT FEVER: Primary | ICD-10-CM

## 2021-09-27 LAB
A/G RATIO: 2.4 (ref 1.1–2.2)
ALBUMIN SERPL-MCNC: 4.6 G/DL (ref 3.4–5)
ALP BLD-CCNC: 83 U/L (ref 40–129)
ALT SERPL-CCNC: 12 U/L (ref 10–40)
ANION GAP SERPL CALCULATED.3IONS-SCNC: 12 MMOL/L (ref 3–16)
AST SERPL-CCNC: 15 U/L (ref 15–37)
BILIRUB SERPL-MCNC: 0.4 MG/DL (ref 0–1)
BUN BLDV-MCNC: 10 MG/DL (ref 7–20)
CALCIUM SERPL-MCNC: 10.1 MG/DL (ref 8.3–10.6)
CHLORIDE BLD-SCNC: 106 MMOL/L (ref 99–110)
CO2: 25 MMOL/L (ref 21–32)
CREAT SERPL-MCNC: 0.8 MG/DL (ref 0.6–1.1)
GFR AFRICAN AMERICAN: >60
GFR NON-AFRICAN AMERICAN: >60
GLOBULIN: 1.9 G/DL
GLUCOSE BLD-MCNC: 83 MG/DL (ref 70–99)
HAV IGM SER IA-ACNC: NORMAL
HCT VFR BLD CALC: 39.5 % (ref 36–48)
HEMOGLOBIN: 13.3 G/DL (ref 12–16)
HEPATITIS B CORE IGM ANTIBODY: NORMAL
HEPATITIS B SURFACE ANTIGEN INTERPRETATION: NORMAL
HEPATITIS C ANTIBODY INTERPRETATION: NORMAL
MCH RBC QN AUTO: 30.4 PG (ref 26–34)
MCHC RBC AUTO-ENTMCNC: 33.5 G/DL (ref 31–36)
MCV RBC AUTO: 90.7 FL (ref 80–100)
PDW BLD-RTO: 13.4 % (ref 12.4–15.4)
PLATELET # BLD: 256 K/UL (ref 135–450)
PMV BLD AUTO: 8.5 FL (ref 5–10.5)
POTASSIUM SERPL-SCNC: 4.2 MMOL/L (ref 3.5–5.1)
RBC # BLD: 4.36 M/UL (ref 4–5.2)
SEDIMENTATION RATE, ERYTHROCYTE: 10 MM/HR (ref 0–20)
SODIUM BLD-SCNC: 143 MMOL/L (ref 136–145)
TOTAL PROTEIN: 6.5 G/DL (ref 6.4–8.2)
WBC # BLD: 9.9 K/UL (ref 4–11)

## 2021-09-27 PROCEDURE — 99214 OFFICE O/P EST MOD 30 MIN: CPT | Performed by: FAMILY MEDICINE

## 2021-09-27 PROCEDURE — 36415 COLL VENOUS BLD VENIPUNCTURE: CPT | Performed by: FAMILY MEDICINE

## 2021-09-27 RX ORDER — ALPRAZOLAM 0.5 MG/1
TABLET ORAL
Qty: 90 TABLET | Refills: 0 | Status: CANCELLED | OUTPATIENT
Start: 2021-09-27 | End: 2021-10-28

## 2021-09-27 RX ORDER — ALPRAZOLAM 0.5 MG/1
TABLET ORAL
Qty: 90 TABLET | Refills: 0 | Status: SHIPPED | OUTPATIENT
Start: 2021-09-27 | End: 2021-10-28

## 2021-09-27 RX ORDER — PROMETHAZINE HYDROCHLORIDE 25 MG/1
TABLET ORAL
Qty: 60 TABLET | Refills: 2 | Status: SHIPPED | OUTPATIENT
Start: 2021-09-27 | End: 2021-09-27 | Stop reason: SDUPTHER

## 2021-09-27 RX ORDER — TIZANIDINE 4 MG/1
TABLET ORAL
Qty: 30 TABLET | Refills: 2 | Status: SHIPPED | OUTPATIENT
Start: 2021-09-27 | End: 2021-12-20 | Stop reason: SDUPTHER

## 2021-09-27 RX ORDER — PROMETHAZINE HYDROCHLORIDE 25 MG/1
TABLET ORAL
Qty: 60 TABLET | Refills: 2 | Status: SHIPPED | OUTPATIENT
Start: 2021-09-27 | End: 2021-12-20 | Stop reason: SDUPTHER

## 2021-09-27 RX ORDER — TIZANIDINE 4 MG/1
TABLET ORAL
Qty: 30 TABLET | Refills: 2 | Status: SHIPPED | OUTPATIENT
Start: 2021-09-27 | End: 2021-09-27 | Stop reason: SDUPTHER

## 2021-09-27 SDOH — ECONOMIC STABILITY: FOOD INSECURITY: WITHIN THE PAST 12 MONTHS, THE FOOD YOU BOUGHT JUST DIDN'T LAST AND YOU DIDN'T HAVE MONEY TO GET MORE.: NEVER TRUE

## 2021-09-27 SDOH — ECONOMIC STABILITY: FOOD INSECURITY: WITHIN THE PAST 12 MONTHS, YOU WORRIED THAT YOUR FOOD WOULD RUN OUT BEFORE YOU GOT MONEY TO BUY MORE.: NEVER TRUE

## 2021-09-27 ASSESSMENT — ENCOUNTER SYMPTOMS: RESPIRATORY NEGATIVE: 1

## 2021-09-27 ASSESSMENT — PATIENT HEALTH QUESTIONNAIRE - PHQ9
2. FEELING DOWN, DEPRESSED OR HOPELESS: 1
SUM OF ALL RESPONSES TO PHQ QUESTIONS 1-9: 2
SUM OF ALL RESPONSES TO PHQ9 QUESTIONS 1 & 2: 2
1. LITTLE INTEREST OR PLEASURE IN DOING THINGS: 1

## 2021-09-27 ASSESSMENT — SOCIAL DETERMINANTS OF HEALTH (SDOH): HOW HARD IS IT FOR YOU TO PAY FOR THE VERY BASICS LIKE FOOD, HOUSING, MEDICAL CARE, AND HEATING?: NOT HARD AT ALL

## 2021-09-27 NOTE — PROGRESS NOTES
OUTPATIENT PROGRESS NOTE  Date of Service:  9/27/2021  Address: [facility] [address] [facility] [address] [facility] [address]  Dept: [phone]  Loc: [phone]    Subjective:      Patient ID:  [id number]  Tiffanie Ambriz is a 36 y.o. female     HPI  Fever fatigue  Has not felt well [place]  She has had three rounds of antibiotic  Had an infection above her belly buttons and her piercing   Body aches  Went to urgent care    They did blood and all was normal  Has headache     Review of Systems   Constitutional: Positive for activity change, fatigue and fever. HENT: Negative. Respiratory: Negative. Cardiovascular: Negative. Neurological: Positive for weakness and headaches. Objective:   YOB: 1980    Date of Visit:  9/27/2021       Allergies   Allergen Reactions    Latex Hives and Itching     After surgery x2     Shellfish-Derived Products Anaphylaxis    Tape Jasmyn Díaz Tape] Rash     Paper tape OK     Iodides Other (See Comments)     pt states tachycardia    Tylenol [Acetaminophen]      History of hep c    Bactrim [Sulfamethoxazole-Trimethoprim] Nausea And Vomiting and Other (See Comments)    Codeine Nausea And Vomiting and Palpitations    Morphine Itching and Nausea And Vomiting     \"makes me crazy\"     Prednisone Hives, Palpitations and Rash       No outpatient medications have been marked as taking for the 9/27/21 encounter (Office Visit) with Steffen Thomas DO. Vitals:    09/27/21 1518   BP: 102/66   Site: Left Upper Arm   Position: Sitting   Cuff Size: Small Adult   Pulse: 83   SpO2: 97%   Weight: 150 lb (68 kg)   Height: 5' 1\" (1.549 m)     Body mass index is 28.34 kg/m².      Wt Readings from Last 3 Encounters:   09/27/21 150 lb (68 kg)   06/07/21 150 lb 3.2 oz (68.1 kg)   05/11/21 149 lb (67.6 kg)     BP Readings from Last 3 Encounters:   09/27/21 102/66   06/07/21 113/68   05/11/21 114/74       Physical Exam  Vitals and nursing note reviewed. Constitutional:       Appearance: She is well-developed. HENT:      Head: Normocephalic. Neck:      Thyroid: No thyromegaly. Cardiovascular:      Rate and Rhythm: Normal rate and regular rhythm. Heart sounds: Normal heart sounds. Pulmonary:      Effort: Pulmonary effort is normal.      Breath sounds: Normal breath sounds. Lymphadenopathy:      Cervical: No cervical adenopathy. Neurological:      Mental Status: She is alert and oriented to person, place, and time. Psychiatric:         Behavior: Behavior normal.         Thought Content: Thought content normal.         Judgment: Judgment normal.            Assessment/Plan          Assessment/plan;  Sulma was seen today for swelling, blister and medication refill. Diagnoses and all orders for this visit:    Intermittent fever  -     CBC  -     Comprehensive Metabolic Panel  -     SEDIMENTATION RATE  -     HEPATITIS PANEL, ACUTE    Fatigue, unspecified type    Fibromyalgia  -     Discontinue: tiZANidine (ZANAFLEX) 4 MG tablet; One q 6 hours prn  -     CBC  -     Comprehensive Metabolic Panel  -     SEDIMENTATION RATE  -     HEPATITIS PANEL, ACUTE  -     tiZANidine (ZANAFLEX) 4 MG tablet; One q 6 hours prn    JACKSON (generalized anxiety disorder)  -     ALPRAZolam (XANAX) 0.5 MG tablet; One q 8 hours prn anxiety    Other orders  -     Discontinue: promethazine (PHENERGAN) 25 MG tablet; TAKE ONE TABLET BY MOUTH TWICE DAILY AS NEEDED FOR NAUSEA  -     promethazine (PHENERGAN) 25 MG tablet; TAKE ONE TABLET BY MOUTH TWICE DAILY AS NEEDED FOR NAUSEA      No follow-ups on file.                  Berna Smith DO

## 2021-10-04 RX ORDER — ALBUTEROL SULFATE 90 UG/1
AEROSOL, METERED RESPIRATORY (INHALATION)
Qty: 25.5 G | Refills: 0 | Status: SHIPPED | OUTPATIENT
Start: 2021-10-04 | End: 2021-12-20 | Stop reason: SDUPTHER

## 2021-11-03 ENCOUNTER — TELEPHONE (OUTPATIENT)
Dept: FAMILY MEDICINE CLINIC | Age: 41
End: 2021-11-03

## 2021-11-03 RX ORDER — DEXTROMETHORPHAN HYDROBROMIDE AND PROMETHAZINE HYDROCHLORIDE 15; 6.25 MG/5ML; MG/5ML
5 SYRUP ORAL 4 TIMES DAILY PRN
Qty: 180 ML | Refills: 1 | Status: SHIPPED | OUTPATIENT
Start: 2021-11-03 | End: 2021-11-13

## 2021-11-03 NOTE — TELEPHONE ENCOUNTER
Patient called in stating her whole family suspects they have covid. Patient has not been tested but has had covid before and feels the same. Patient states her cough is the worse. Requesting to see if something can be called in to help with the cough.

## 2021-11-04 DIAGNOSIS — F41.1 GENERALIZED ANXIETY DISORDER: ICD-10-CM

## 2021-11-04 RX ORDER — ALPRAZOLAM 0.5 MG/1
TABLET ORAL
Qty: 90 TABLET | Refills: 0 | Status: SHIPPED | OUTPATIENT
Start: 2021-11-04 | End: 2021-12-09

## 2021-12-09 DIAGNOSIS — F41.1 GENERALIZED ANXIETY DISORDER: ICD-10-CM

## 2021-12-09 RX ORDER — ALPRAZOLAM 0.5 MG/1
TABLET ORAL
Qty: 90 TABLET | Refills: 0 | Status: SHIPPED | OUTPATIENT
Start: 2021-12-09 | End: 2022-01-06 | Stop reason: SDUPTHER

## 2021-12-09 NOTE — TELEPHONE ENCOUNTER
----- Message from Devin See sent at 12/9/2021  3:38 PM EST -----  Subject: Refill Request    QUESTIONS  Name of Medication? ALPRAZolam (XANAX) 0.5 MG tablet  Patient-reported dosage and instructions? One q 8 hours prn anxiety  How many days do you have left? 0  Preferred Pharmacy? [facility] phone number (if available)? [phone]  ---------------------------------------------------------------------------  --------------  [place] INFO  What is the best way for the office to contact you? OK to leave message on   voicemail  Preferred Call Back Phone Number?  [phone]

## 2021-12-20 ENCOUNTER — OFFICE VISIT (OUTPATIENT)
Dept: FAMILY MEDICINE CLINIC | Age: 41
End: 2021-12-20
Payer: COMMERCIAL

## 2021-12-20 VITALS
DIASTOLIC BLOOD PRESSURE: 62 MMHG | SYSTOLIC BLOOD PRESSURE: 100 MMHG | BODY MASS INDEX: 29.04 KG/M2 | HEIGHT: 61 IN | WEIGHT: 153.8 LBS

## 2021-12-20 DIAGNOSIS — N30.10 INTERSTITIAL CYSTITIS: ICD-10-CM

## 2021-12-20 DIAGNOSIS — J01.90 ACUTE BACTERIAL SINUSITIS: ICD-10-CM

## 2021-12-20 DIAGNOSIS — Z01.818 PREOP EXAMINATION: Primary | ICD-10-CM

## 2021-12-20 DIAGNOSIS — K21.9 GASTROESOPHAGEAL REFLUX DISEASE WITHOUT ESOPHAGITIS: ICD-10-CM

## 2021-12-20 DIAGNOSIS — B96.89 ACUTE BACTERIAL SINUSITIS: ICD-10-CM

## 2021-12-20 DIAGNOSIS — B00.1 FEVER BLISTER: ICD-10-CM

## 2021-12-20 DIAGNOSIS — E66.9 OBESITY (BMI 30-39.9): ICD-10-CM

## 2021-12-20 DIAGNOSIS — M79.7 FIBROMYALGIA: ICD-10-CM

## 2021-12-20 PROCEDURE — 93000 ELECTROCARDIOGRAM COMPLETE: CPT | Performed by: FAMILY MEDICINE

## 2021-12-20 PROCEDURE — 99242 OFF/OP CONSLTJ NEW/EST SF 20: CPT | Performed by: FAMILY MEDICINE

## 2021-12-20 RX ORDER — LIRAGLUTIDE 6 MG/ML
0.6 INJECTION, SOLUTION SUBCUTANEOUS DAILY
Qty: 2 PEN | Refills: 1 | Status: SHIPPED | OUTPATIENT
Start: 2021-12-20 | End: 2022-05-02

## 2021-12-20 RX ORDER — FAMOTIDINE 40 MG/1
40 TABLET, FILM COATED ORAL 2 TIMES DAILY
Qty: 60 TABLET | Refills: 3 | Status: SHIPPED | OUTPATIENT
Start: 2021-12-20 | End: 2022-06-03 | Stop reason: SDUPTHER

## 2021-12-20 RX ORDER — FAMCICLOVIR 500 MG/1
TABLET, FILM COATED ORAL
Qty: 30 TABLET | Refills: 1 | Status: SHIPPED | OUTPATIENT
Start: 2021-12-20 | End: 2022-06-03 | Stop reason: SDUPTHER

## 2021-12-20 RX ORDER — PROMETHAZINE HYDROCHLORIDE 25 MG/1
TABLET ORAL
Qty: 60 TABLET | Refills: 2 | Status: SHIPPED | OUTPATIENT
Start: 2021-12-20 | End: 2022-06-03 | Stop reason: SDUPTHER

## 2021-12-20 RX ORDER — ALBUTEROL SULFATE 90 UG/1
AEROSOL, METERED RESPIRATORY (INHALATION)
Qty: 25.5 G | Refills: 0 | Status: SHIPPED | OUTPATIENT
Start: 2021-12-20

## 2021-12-20 RX ORDER — TIZANIDINE 4 MG/1
TABLET ORAL
Qty: 30 TABLET | Refills: 2 | Status: SHIPPED | OUTPATIENT
Start: 2021-12-20

## 2021-12-20 RX ORDER — DOXYCYCLINE HYCLATE 100 MG
100 TABLET ORAL 2 TIMES DAILY
Qty: 20 TABLET | Refills: 0 | Status: SHIPPED | OUTPATIENT
Start: 2021-12-20 | End: 2021-12-30

## 2021-12-20 RX ORDER — HYDROCHLOROTHIAZIDE 25 MG/1
25 TABLET ORAL EVERY MORNING
Qty: 45 TABLET | Refills: 1 | Status: SHIPPED | OUTPATIENT
Start: 2021-12-20 | End: 2022-11-04

## 2021-12-20 ASSESSMENT — ENCOUNTER SYMPTOMS
RHINORRHEA: 1
GASTROINTESTINAL NEGATIVE: 1
RESPIRATORY NEGATIVE: 1

## 2021-12-20 NOTE — PROGRESS NOTES
OUTPATIENT PROGRESS NOTE  Date of Service:  12/20/2021  Address: [facility] PHYSICIAN PRACTICES  [facility]  [address] [place] [address],First Floor [address]  Dept: [phone]  Loc: [phone]    Subjective:      Patient ID:  [id number]  Flores Lobo is a 39 y.o. female     HPI    1/4/22  Dr Sangeeta Sierra   Cystoscopy, urethral dilation   hydrodistention of bladder with interstitial cystitis cocktail instillation     Review of Systems   Constitutional: Negative. HENT: Positive for congestion, postnasal drip and rhinorrhea. Respiratory: Negative. Cardiovascular: Negative. Gastrointestinal: Negative. Skin: Negative. Neurological: Negative.         Objective:   YOB: 1980    Date of Visit:  12/20/2021       Allergies   Allergen Reactions    Latex Hives and Itching     After surgery x2     Shellfish-Derived Products Anaphylaxis    Tape Percell Pratibha Tape] Rash     Paper tape OK     Iodides Other (See Comments)     pt states tachycardia    Tylenol [Acetaminophen]      History of hep c    Bactrim [Sulfamethoxazole-Trimethoprim] Nausea And Vomiting and Other (See Comments)    Codeine Nausea And Vomiting and Palpitations    Morphine Itching and Nausea And Vomiting     \"makes me crazy\"     Prednisone Hives, Palpitations and Rash       Outpatient Medications Marked as Taking for the 12/20/21 encounter (Office Visit) with Clayton Calvert, DO   Medication Sig Dispense Refill    liraglutide-weight management (SAXENDA) 18 MG/3ML SOPN Inject 0.6 mg into the skin daily 2 pen 1    doxycycline hyclate (VIBRA-TABS) 100 MG tablet Take 1 tablet by mouth 2 times daily for 10 days 20 tablet 0    ALPRAZolam (XANAX) 0.5 MG tablet TAKE ONE TABLET BY MOUTH EVERY 8 HOURS AS NEEDED FOR ANXIETY 90 tablet 0    albuterol sulfate  (90 Base) MCG/ACT inhaler inhale TWO PUFFS BY MOUTH FOUR TIMES DAILY AS NEEDED FOR WHEEZING 25.5 g 0    promethazine (PHENERGAN) 25 MG tablet TAKE ONE TABLET BY MOUTH TWICE DAILY AS NEEDED FOR NAUSEA 60 tablet 2    tiZANidine (ZANAFLEX) 4 MG tablet One q 6 hours prn 30 tablet 2    famotidine (PEPCID) 40 MG tablet Take 1 tablet by mouth 2 times daily 60 tablet 3    famciclovir (FAMVIR) 500 MG tablet TAKE ONE TABLET BY MOUTH THREE TIMES DAILY 30 tablet 1    fluticasone (FLONASE) 50 MCG/ACT nasal spray SPRAY ONE SPRAY IN EACH NOSTRIL EVERY DAY 16 g 1    NONFORMULARY EPI PEN PRN      OxyCODONE ER (XTAMPZA ER) 9 MG C12A Take 9 mg by mouth 2 times daily as needed.  propranolol (INDERAL) 20 MG tablet Take 1 tablet by mouth 3 times daily (Patient taking differently: Take 20 mg by mouth 2 times daily ) 90 tablet 2       Vitals:    12/20/21 1249   BP: 100/62   Weight: 153 lb 12.8 oz (69.8 kg)   Height: 5' 1\" (1.549 m)     Body mass index is 29.06 kg/m².      Wt Readings from Last 3 Encounters:   12/20/21 153 lb 12.8 oz (69.8 kg)   09/27/21 150 lb (68 kg)   06/07/21 150 lb 3.2 oz (68.1 kg)     BP Readings from Last 3 Encounters:   12/20/21 100/62   09/27/21 102/66   06/07/21 113/68     Allergies   Allergen Reactions    Latex Hives and Itching     After surgery x2     Shellfish-Derived Products Anaphylaxis    Tape Asha Coty Tape] Rash     Paper tape OK     Iodides Other (See Comments)     pt states tachycardia    Tylenol [Acetaminophen]      History of hep c    Bactrim [Sulfamethoxazole-Trimethoprim] Nausea And Vomiting and Other (See Comments)    Codeine Nausea And Vomiting and Palpitations    Morphine Itching and Nausea And Vomiting     \"makes me crazy\"     Prednisone Hives, Palpitations and Rash     Current Outpatient Medications   Medication Sig Dispense Refill    liraglutide-weight management (SAXENDA) 18 MG/3ML SOPN Inject 0.6 mg into the skin daily 2 pen 1    doxycycline hyclate (VIBRA-TABS) 100 MG tablet Take 1 tablet by mouth 2 times daily for 10 days 20 tablet 0    ALPRAZolam (XANAX) 0.5 MG tablet TAKE ONE TABLET BY MOUTH EVERY 8 HOURS AS NEEDED FOR ANXIETY 90 tablet 0    albuterol sulfate  (90 Base) MCG/ACT inhaler inhale TWO PUFFS BY MOUTH FOUR TIMES DAILY AS NEEDED FOR WHEEZING 25.5 g 0    promethazine (PHENERGAN) 25 MG tablet TAKE ONE TABLET BY MOUTH TWICE DAILY AS NEEDED FOR NAUSEA 60 tablet 2    tiZANidine (ZANAFLEX) 4 MG tablet One q 6 hours prn 30 tablet 2    famotidine (PEPCID) 40 MG tablet Take 1 tablet by mouth 2 times daily 60 tablet 3    famciclovir (FAMVIR) 500 MG tablet TAKE ONE TABLET BY MOUTH THREE TIMES DAILY 30 tablet 1    fluticasone (FLONASE) 50 MCG/ACT nasal spray SPRAY ONE SPRAY IN EACH NOSTRIL EVERY DAY 16 g 1    NONFORMULARY EPI PEN PRN      OxyCODONE ER (XTAMPZA ER) 9 MG C12A Take 9 mg by mouth 2 times daily as needed.  propranolol (INDERAL) 20 MG tablet Take 1 tablet by mouth 3 times daily (Patient taking differently: Take 20 mg by mouth 2 times daily ) 90 tablet 2    hydroCHLOROthiazide (HYDRODIURIL) 25 MG tablet Take 1 tablet by mouth every morning One qod 45 tablet 1     No current facility-administered medications for this visit.      Past Medical History:   Diagnosis Date    Arrythmia     Asthma     no problems recently    Back pain     Breast cancer ([facility] [address]     Breast lump     right    Cervical cancer (HCC)     Chronic hepatitis C (HCC)     Chronic pain     DDD (degenerative disc disease)     Diverticulitis     Fibromyalgia     GERD (gastroesophageal reflux disease)     Headache     migraines    Immune deficiency disorder (HCC)     Interstitial cystitis     Nausea & vomiting     Osteoarthritis     Other closed fractures of distal end of radius (alone) 12/19/2009    distal radius fx surgery 12/28/2009 Dr. Gerardo Zaragoza Pneumonia     PONV (postoperative nausea and vomiting)     scop patch and Phenergan work best     Rheumatoid arthritis(714.0)     Tachycardia      Past Surgical History:   Procedure Laterality Date    APPENDECTOMY      BLADDER SURGERY      BREAST ENHANCEMENT SURGERY Bilateral     BREAST SURGERY      bilat mastectomy    BREAST SURGERY Bilateral 2/6/2020    REVISION BILATERAL BREAST RECONSTRUCTION; 1.7 CM SCAR REVISION performed by Temi Abdi MD at [address] [place] [address] Right 7/2/14    removal of hardware    CHOLECYSTECTOMY      COLONOSCOPY      with polyectomy    CYSTOSCOPY  8-17-11    with bladder and urethral dilatation    FAT TRANSFER Bilateral 2/6/2020    WITH HIGH VOLUME FAT GRAFTING performed by Temi Abdi MD at [address] FAT TRANSFER Bilateral 11/5/2020    BILATERAL BREAST FAT GRAFTING performed by Temi Abdi MD at [address] [facility]  03/24/2017    repair of umbilical hernia with primary closure, exploration of LLQ subcutaneous space without definitive findings of lipoma    HYSTERECTOMY      LAPAROSCOPIC APPENDECTOMY  3/18/13    LAPAROSCOPY  3/18/13    NOSE SURGERY Bilateral 9/22/2020    OPEN SEPTORHINOPLASTY, BILATERAL NASAL VALVE REPAIR, BILATERAL INFERIOR TURBINATE REDUCTION performed by Beata Ang MD at [address] / DELAYED W/ TISSUE EXPANDER Bilateral 9/26/2019    EXCHANGE OF BILATERAL BREAST IMPLANTS ; AND BILATERAL CAPSULECTOMY, REVISION BILATERAL BREAST RECONSTRUCTION performed by Temi Abdi MD at [address] [place] [address]      1/09    UPPER GASTROINTESTINAL ENDOSCOPY  5/28/2010    UPPER GASTROINTESTINAL ENDOSCOPY  2/13/13    WRIST FRACTURE SURGERY Right 12/28/2009    Open treatment with open reduction, internal fixation rightdistal radius using     Social History     Tobacco Use    Smoking status: Never Smoker    Smokeless tobacco: Never Used    Tobacco comment: encouraged to never smoke    Vaping Use    Vaping Use: Never used   Substance Use Topics    Alcohol use:  Yes     Alcohol/week: 0.0 standard drinks     Comment: socially; special occasions 1 every other week    Drug use: No     Family History   Problem Relation Age of Onset    Cancer Mother         Breast    Depression Mother     Cancer Father         lung    Cancer Maternal Grandmother         breast    Asthma Other     Cancer Other     Heart Disease Other     Depression Paternal Grandmother     Emphysema Paternal Grandmother     Elevated Lipids Paternal Grandmother     Cancer Paternal Grandfather         lung    Cancer Maternal Grandfather         colon     Cancer Paternal Aunt         Breast       Physical Exam  Vitals and nursing note reviewed. Constitutional:       General: She is not in acute distress. Appearance: She is well-developed. HENT:      Head: Normocephalic. Right Ear: External ear normal.      Left Ear: External ear normal.      Nose: Congestion and rhinorrhea present. Eyes:      General:         Left eye: No discharge. Conjunctiva/sclera: Conjunctivae normal.      Pupils: Pupils are equal, round, and reactive to light. Neck:      Thyroid: No thyromegaly. Cardiovascular:      Rate and Rhythm: Normal rate and regular rhythm. Heart sounds: Normal heart sounds. No murmur heard. Pulmonary:      Effort: Pulmonary effort is normal. No respiratory distress. Breath sounds: No wheezing or rales. Abdominal:      General: There is no distension. Palpations: Abdomen is soft. There is no mass. Tenderness: There is no guarding. Musculoskeletal:      Cervical back: Normal range of motion. Lymphadenopathy:      Cervical: No cervical adenopathy. Skin:     General: Skin is warm and dry. Findings: No erythema or rash. Neurological:      Mental Status: She is alert and oriented to person, place, and time. Deep Tendon Reflexes: Reflexes are normal and symmetric. Psychiatric:         Behavior: Behavior normal.         Thought Content:  Thought content normal.         Judgment: Judgment normal.            Assessment/Plan       Sulma was seen today for pre-op exam and medication refill. Diagnoses and all orders for this visit:    Preop examination  -     EKG 12 Lead    Interstitial cystitis    Acute bacterial sinusitis    Obesity (BMI 30-39.9)  -     liraglutide-weight management (SAXENDA) 18 MG/3ML SOPN; Inject 0.6 mg into the skin daily    Other orders  -     doxycycline hyclate (VIBRA-TABS) 100 MG tablet; Take 1 tablet by mouth 2 times daily for 10 days      No follow-ups on file.                     Flores Russo DO

## 2021-12-27 ENCOUNTER — TELEPHONE (OUTPATIENT)
Dept: FAMILY MEDICINE CLINIC | Age: 41
End: 2021-12-27

## 2021-12-27 NOTE — TELEPHONE ENCOUNTER
----- Message from Jose Sarmiento LPN sent at 50/33/0854 10:40 AM EST -----  Subject: Message to Provider    QUESTIONS  Information for Provider? patient called to let us know that the injection   for the weight loss medication needs a Prior auth she is hoping to get   this in before the end of the year so her deductible doesn't start over   with this   ---------------------------------------------------------------------------  --------------  CALL BACK INFO  What is the best way for the office to contact you? OK to leave message on   voicemail  Preferred Call Back Phone Number? [phone]  ---------------------------------------------------------------------------  --------------  SCRIPT ANSWERS  Relationship to Patient?  Self

## 2021-12-29 NOTE — TELEPHONE ENCOUNTER
Received DENIAL for Saxenda 18MG/3ML pen-injectors as medications for weight loss/obesity are a PLAN EXCLUSION. Denial letter attached. Please advise patient. Thank you.

## 2022-01-06 DIAGNOSIS — F41.1 GENERALIZED ANXIETY DISORDER: ICD-10-CM

## 2022-01-06 RX ORDER — ALPRAZOLAM 0.5 MG/1
TABLET ORAL
Qty: 90 TABLET | Refills: 0 | Status: SHIPPED | OUTPATIENT
Start: 2022-01-09 | End: 2022-02-07

## 2022-01-06 NOTE — TELEPHONE ENCOUNTER
----- Message from Idris Yani sent at 1/6/2022 11:08 AM EST -----  Subject: Refill Request    QUESTIONS  Name of Medication? ALPRAZolam (XANAX) 0.5 MG tablet  Patient-reported dosage and instructions? TAKE ONE TABLET BY MOUTH EVERY 8   HOURS AS NEEDED FOR ANXIETY  How many days do you have left? 2  Preferred Pharmacy? [facility] phone number (if available)? [phone]  Additional Information for Provider? pt pharmacy is closed on sundays pt   is asking to get refill before sunday  ---------------------------------------------------------------------------  --------------  CALL BACK INFO  What is the best way for the office to contact you? OK to leave message on   voicemail  Preferred Call Back Phone Number?  [phone]

## 2022-02-07 DIAGNOSIS — F41.1 GENERALIZED ANXIETY DISORDER: ICD-10-CM

## 2022-02-07 RX ORDER — ALPRAZOLAM 0.5 MG/1
TABLET ORAL
Qty: 90 TABLET | Refills: 0 | Status: SHIPPED | OUTPATIENT
Start: 2022-02-07 | End: 2022-03-09

## 2022-02-22 ENCOUNTER — TELEPHONE (OUTPATIENT)
Dept: FAMILY MEDICINE CLINIC | Age: 42
End: 2022-02-22

## 2022-02-22 NOTE — TELEPHONE ENCOUNTER
Patient reports ear pain for a couple weeks, sore throat since Friday, and lymph nodes in neck swollen. States she feels warm. Patient is requesting an appointment tomorrow if possible. Please advise.

## 2022-02-23 ENCOUNTER — TELEMEDICINE (OUTPATIENT)
Dept: FAMILY MEDICINE CLINIC | Age: 42
End: 2022-02-23
Payer: COMMERCIAL

## 2022-02-23 DIAGNOSIS — J03.90 TONSILLITIS: Primary | ICD-10-CM

## 2022-02-23 PROCEDURE — 99213 OFFICE O/P EST LOW 20 MIN: CPT | Performed by: FAMILY MEDICINE

## 2022-02-23 RX ORDER — AZITHROMYCIN 250 MG/1
250 TABLET, FILM COATED ORAL SEE ADMIN INSTRUCTIONS
Qty: 6 TABLET | Refills: 0 | Status: SHIPPED | OUTPATIENT
Start: 2022-02-23 | End: 2022-02-28

## 2022-02-23 RX ORDER — LIDOCAINE HYDROCHLORIDE 20 MG/ML
15 SOLUTION OROPHARYNGEAL
Qty: 300 ML | Refills: 1 | Status: SHIPPED | OUTPATIENT
Start: 2022-02-23

## 2022-02-23 ASSESSMENT — ENCOUNTER SYMPTOMS
SWOLLEN GLANDS: 1
COUGH: 1
SORE THROAT: 1

## 2022-02-24 NOTE — PROGRESS NOTES
[facility] (:  1980) is a Established patient, here for evaluation of the following:    Assessment & Plan   Below is the assessment and plan developed based on review of pertinent history, physical exam, labs, studies, and medications. 1. Tonsillitis  -     azithromycin (ZITHROMAX) 250 MG tablet; Take 1 tablet by mouth See Admin Instructions for 5 days 500mg on day 1 followed by 250mg on days 2 - 5, Disp-6 tablet, R-0Normal    Return if symptoms worsen or fail to improve. Subjective   Pharyngitis  This is a new problem. The current episode started in the past 7 days. The problem occurs constantly. The problem has been gradually worsening. Associated symptoms include chills, congestion, coughing, headaches, a sore throat and swollen glands. The symptoms are aggravated by drinking, eating and swallowing. She has tried acetaminophen for the symptoms. The treatment provided no relief. Review of Systems   Constitutional: Positive for chills. HENT: Positive for congestion and sore throat. Respiratory: Positive for cough. Neurological: Positive for headaches. Objective   Patient-Reported Vitals  No data recorded     Physical Exam  Constitutional:       General: She is not in acute distress. Appearance: She is well-developed. HENT:      Head: Normocephalic. Neurological:      Mental Status: She is alert and oriented to person, place, and time. Psychiatric:         Behavior: Behavior normal.         Thought Content: Thought content normal.         Judgment: Judgment normal.     Sulma JOSS Mcdaniels, was evaluated through a synchronous (real-time) audio-video encounter. The patient (or guardian if applicable) is aware that this is a billable service, which includes applicable co-pays. This Virtual Visit was conducted with patient's (and/or legal guardian's) consent.  The visit was conducted pursuant to the emergency declaration under the [facility], [facility] waiver authority and the [employer] Act. Patient identification was verified, and a caregiver was present when appropriate. The patient was located at home in a state where the provider was licensed to provide care.        --Shruti Schrader, DO

## 2022-03-09 DIAGNOSIS — F41.1 GENERALIZED ANXIETY DISORDER: ICD-10-CM

## 2022-03-09 RX ORDER — ALPRAZOLAM 0.5 MG/1
TABLET ORAL
Qty: 90 TABLET | Refills: 0 | Status: SHIPPED | OUTPATIENT
Start: 2022-03-09 | End: 2022-04-08

## 2022-03-09 NOTE — TELEPHONE ENCOUNTER
----- Message from Chito Lugo sent at 3/9/2022  9:15 AM EST -----  Subject: Refill Request    QUESTIONS  Name of Medication? ALPRAZolam (XANAX) 0.5 MG tablet  Patient-reported dosage and instructions? take 1 tablet every 8 hours   How many days do you have left? 0  Preferred Pharmacy? [facility] phone number (if available)? [phone]  ---------------------------------------------------------------------------  --------------  Raghu Colinvercony RODRIGUEZ  What is the best way for the office to contact you? OK to leave message on   voicemail  Preferred Call Back Phone Number?  [phone]

## 2022-04-08 DIAGNOSIS — F41.1 GENERALIZED ANXIETY DISORDER: ICD-10-CM

## 2022-04-08 RX ORDER — ALPRAZOLAM 0.5 MG/1
TABLET ORAL
Qty: 90 TABLET | Refills: 0 | Status: SHIPPED | OUTPATIENT
Start: 2022-04-08 | End: 2022-05-02 | Stop reason: SDUPTHER

## 2022-04-15 ENCOUNTER — E-VISIT (OUTPATIENT)
Dept: FAMILY MEDICINE CLINIC | Age: 42
End: 2022-04-15
Payer: COMMERCIAL

## 2022-04-15 ENCOUNTER — TELEPHONE (OUTPATIENT)
Dept: FAMILY MEDICINE CLINIC | Age: 42
End: 2022-04-15

## 2022-04-15 DIAGNOSIS — J01.90 ACUTE BACTERIAL SINUSITIS: Primary | ICD-10-CM

## 2022-04-15 DIAGNOSIS — B96.89 ACUTE BACTERIAL SINUSITIS: Primary | ICD-10-CM

## 2022-04-15 PROCEDURE — 99421 OL DIG E/M SVC 5-10 MIN: CPT | Performed by: FAMILY MEDICINE

## 2022-04-15 ASSESSMENT — LIFESTYLE VARIABLES: SMOKING_STATUS: NO, I'VE NEVER SMOKED

## 2022-04-15 NOTE — TELEPHONE ENCOUNTER
Pt called to ask Dr Janis Sawyer to send a rx for a sinus infection. She has brownish green mucus, right side nostril has a bad smell, drainage, pressure in eyes and pressure between eyes, cheek bones  and nose. Please give pt a call [phone]. [address] [phone]

## 2022-04-16 RX ORDER — DOXYCYCLINE HYCLATE 100 MG
100 TABLET ORAL 2 TIMES DAILY
Qty: 20 TABLET | Refills: 0 | Status: SHIPPED | OUTPATIENT
Start: 2022-04-16 | End: 2022-06-03 | Stop reason: SDUPTHER

## 2022-05-02 ENCOUNTER — OFFICE VISIT (OUTPATIENT)
Dept: FAMILY MEDICINE CLINIC | Age: 42
End: 2022-05-02
Payer: COMMERCIAL

## 2022-05-02 VITALS
HEIGHT: 61 IN | SYSTOLIC BLOOD PRESSURE: 112 MMHG | DIASTOLIC BLOOD PRESSURE: 80 MMHG | WEIGHT: 162.8 LBS | BODY MASS INDEX: 30.73 KG/M2

## 2022-05-02 DIAGNOSIS — F41.1 GENERALIZED ANXIETY DISORDER: Primary | ICD-10-CM

## 2022-05-02 DIAGNOSIS — R60.0 LOCALIZED EDEMA: ICD-10-CM

## 2022-05-02 DIAGNOSIS — E66.09 CLASS 1 OBESITY DUE TO EXCESS CALORIES WITH SERIOUS COMORBIDITY AND BODY MASS INDEX (BMI) OF 30.0 TO 30.9 IN ADULT: ICD-10-CM

## 2022-05-02 LAB
A/G RATIO: 2.8 (ref 1.1–2.2)
ALBUMIN SERPL-MCNC: 4.5 G/DL (ref 3.4–5)
ALP BLD-CCNC: 69 U/L (ref 40–129)
ALT SERPL-CCNC: 18 U/L (ref 10–40)
ANION GAP SERPL CALCULATED.3IONS-SCNC: 11 MMOL/L (ref 3–16)
AST SERPL-CCNC: 18 U/L (ref 15–37)
BILIRUB SERPL-MCNC: 0.5 MG/DL (ref 0–1)
BUN BLDV-MCNC: 12 MG/DL (ref 7–20)
CALCIUM SERPL-MCNC: 9.3 MG/DL (ref 8.3–10.6)
CHLORIDE BLD-SCNC: 108 MMOL/L (ref 99–110)
CO2: 25 MMOL/L (ref 21–32)
CREAT SERPL-MCNC: 0.8 MG/DL (ref 0.6–1.1)
GFR AFRICAN AMERICAN: >60
GFR NON-AFRICAN AMERICAN: >60
GLUCOSE BLD-MCNC: 90 MG/DL (ref 70–99)
POTASSIUM SERPL-SCNC: 4.3 MMOL/L (ref 3.5–5.1)
SODIUM BLD-SCNC: 144 MMOL/L (ref 136–145)
TOTAL PROTEIN: 6.1 G/DL (ref 6.4–8.2)

## 2022-05-02 PROCEDURE — 36415 COLL VENOUS BLD VENIPUNCTURE: CPT | Performed by: FAMILY MEDICINE

## 2022-05-02 PROCEDURE — 99214 OFFICE O/P EST MOD 30 MIN: CPT | Performed by: FAMILY MEDICINE

## 2022-05-02 RX ORDER — PHENTERMINE HYDROCHLORIDE 37.5 MG/1
37.5 TABLET ORAL
Qty: 30 TABLET | Refills: 0 | Status: SHIPPED | OUTPATIENT
Start: 2022-05-02 | End: 2022-06-01

## 2022-05-02 RX ORDER — PHENTERMINE HYDROCHLORIDE 37.5 MG/1
37.5 TABLET ORAL
Qty: 30 TABLET | Refills: 0 | Status: SHIPPED | OUTPATIENT
Start: 2022-05-02 | End: 2022-05-02 | Stop reason: SDUPTHER

## 2022-05-02 RX ORDER — OXYCODONE HYDROCHLORIDE 10 MG/1
TABLET ORAL
COMMUNITY
Start: 2022-04-08 | End: 2022-05-02

## 2022-05-02 RX ORDER — ALPRAZOLAM 0.5 MG/1
TABLET ORAL
Qty: 90 TABLET | Refills: 2 | Status: SHIPPED | OUTPATIENT
Start: 2022-05-13 | End: 2022-08-03 | Stop reason: SDUPTHER

## 2022-05-02 ASSESSMENT — ENCOUNTER SYMPTOMS
RESPIRATORY NEGATIVE: 1
GASTROINTESTINAL NEGATIVE: 1

## 2022-05-03 NOTE — PROGRESS NOTES
Edward Simmons (:  1980) is a 39 y.o. female,Established patient, here for evaluation of the following chief complaint(s):  3 Month Follow-Up, Medication Refill, and Discuss Labs         ASSESSMENT/PLAN:  1. Generalized anxiety disorder  -     ALPRAZolam (XANAX) 0.5 MG tablet; One q 8 hours prn, Disp-90 tablet, R-2Print  2. Class 1 obesity due to excess calories with serious comorbidity and body mass index (BMI) of 30.0 to 30.9 in adult  -     phentermine (ADIPEX-P) 37.5 MG tablet; Take 1 tablet by mouth every morning (before breakfast) for 30 days. , Disp-30 tablet, R-0Print  3. Localized edema  -     Comprehensive Metabolic Panel      No follow-ups on file. Subjective   SUBJECTIVE/OBJECTIVE:  HPI  Anxiety  She needs her xanax refilled  No problems with medication  The medication does help    Obesity  She wants to try the adipex again  This has been successful in the past  Watching sugar and carbs  No side effects in the past     Edema  She was told by her pain doctor that her kidney function was decreased  This was two months ago  Wants to start a water pill  Review of Systems   Constitutional: Negative. HENT: Negative. Respiratory: Negative. Cardiovascular: Positive for leg swelling. Gastrointestinal: Negative. Neurological: Negative. Psychiatric/Behavioral: Positive for decreased concentration. Objective   Physical Exam  Constitutional:       General: She is not in acute distress. Appearance: She is well-developed. HENT:      Head: Normocephalic. Neurological:      Mental Status: She is alert and oriented to person, place, and time. Psychiatric:         Behavior: Behavior normal.         Thought Content: Thought content normal.         Judgment: Judgment normal.              An electronic signature was used to authenticate this note.     --Navya Dee DO

## 2022-06-03 ENCOUNTER — OFFICE VISIT (OUTPATIENT)
Dept: FAMILY MEDICINE CLINIC | Age: 42
End: 2022-06-03
Payer: COMMERCIAL

## 2022-06-03 VITALS
HEIGHT: 60 IN | SYSTOLIC BLOOD PRESSURE: 106 MMHG | BODY MASS INDEX: 30.78 KG/M2 | DIASTOLIC BLOOD PRESSURE: 80 MMHG | WEIGHT: 156.8 LBS

## 2022-06-03 DIAGNOSIS — B00.1 FEVER BLISTER: ICD-10-CM

## 2022-06-03 DIAGNOSIS — B96.89 ACUTE BACTERIAL SINUSITIS: ICD-10-CM

## 2022-06-03 DIAGNOSIS — E66.3 OVERWEIGHT WITH BODY MASS INDEX (BMI) 25.0-29.9: Primary | ICD-10-CM

## 2022-06-03 DIAGNOSIS — K21.9 GASTROESOPHAGEAL REFLUX DISEASE WITHOUT ESOPHAGITIS: ICD-10-CM

## 2022-06-03 DIAGNOSIS — J01.90 ACUTE BACTERIAL SINUSITIS: ICD-10-CM

## 2022-06-03 PROCEDURE — 99214 OFFICE O/P EST MOD 30 MIN: CPT | Performed by: FAMILY MEDICINE

## 2022-06-03 RX ORDER — OXYCODONE HYDROCHLORIDE 100 MG/5ML
SOLUTION ORAL
COMMUNITY
End: 2022-06-03

## 2022-06-03 RX ORDER — FAMOTIDINE 40 MG/1
40 TABLET, FILM COATED ORAL 2 TIMES DAILY
Qty: 60 TABLET | Refills: 3 | Status: SHIPPED | OUTPATIENT
Start: 2022-06-03

## 2022-06-03 RX ORDER — PROMETHAZINE HYDROCHLORIDE 25 MG/1
TABLET ORAL
Qty: 60 TABLET | Refills: 2 | Status: SHIPPED | OUTPATIENT
Start: 2022-06-03 | End: 2022-08-02 | Stop reason: SDUPTHER

## 2022-06-03 RX ORDER — FUROSEMIDE 20 MG/1
20 TABLET ORAL DAILY
Qty: 30 TABLET | Refills: 3 | Status: SHIPPED | OUTPATIENT
Start: 2022-06-03

## 2022-06-03 RX ORDER — FAMCICLOVIR 500 MG/1
TABLET, FILM COATED ORAL
Qty: 30 TABLET | Refills: 1 | Status: SHIPPED | OUTPATIENT
Start: 2022-06-03 | End: 2022-08-02 | Stop reason: SDUPTHER

## 2022-06-03 RX ORDER — POTASSIUM CHLORIDE 750 MG/1
10 TABLET, EXTENDED RELEASE ORAL DAILY
Qty: 90 TABLET | Refills: 1 | Status: SHIPPED | OUTPATIENT
Start: 2022-06-03 | End: 2022-11-04

## 2022-06-03 RX ORDER — DOXYCYCLINE HYCLATE 100 MG
100 TABLET ORAL 2 TIMES DAILY
Qty: 20 TABLET | Refills: 0 | Status: SHIPPED | OUTPATIENT
Start: 2022-06-03 | End: 2022-06-13

## 2022-06-03 RX ORDER — PHENTERMINE HYDROCHLORIDE 37.5 MG/1
37.5 TABLET ORAL
Qty: 30 TABLET | Refills: 0 | Status: SHIPPED | OUTPATIENT
Start: 2022-06-03 | End: 2022-07-02 | Stop reason: SDUPTHER

## 2022-06-03 RX ORDER — BENZONATATE 200 MG/1
200 CAPSULE ORAL 3 TIMES DAILY PRN
Qty: 30 CAPSULE | Refills: 1 | Status: SHIPPED | OUTPATIENT
Start: 2022-06-03 | End: 2022-06-13

## 2022-06-03 ASSESSMENT — PATIENT HEALTH QUESTIONNAIRE - PHQ9
10. IF YOU CHECKED OFF ANY PROBLEMS, HOW DIFFICULT HAVE THESE PROBLEMS MADE IT FOR YOU TO DO YOUR WORK, TAKE CARE OF THINGS AT HOME, OR GET ALONG WITH OTHER PEOPLE: 0
2. FEELING DOWN, DEPRESSED OR HOPELESS: 0
6. FEELING BAD ABOUT YOURSELF - OR THAT YOU ARE A FAILURE OR HAVE LET YOURSELF OR YOUR FAMILY DOWN: 0
9. THOUGHTS THAT YOU WOULD BE BETTER OFF DEAD, OR OF HURTING YOURSELF: 0
SUM OF ALL RESPONSES TO PHQ QUESTIONS 1-9: 0
SUM OF ALL RESPONSES TO PHQ9 QUESTIONS 1 & 2: 0
4. FEELING TIRED OR HAVING LITTLE ENERGY: 0
SUM OF ALL RESPONSES TO PHQ QUESTIONS 1-9: 0
8. MOVING OR SPEAKING SO SLOWLY THAT OTHER PEOPLE COULD HAVE NOTICED. OR THE OPPOSITE, BEING SO FIGETY OR RESTLESS THAT YOU HAVE BEEN MOVING AROUND A LOT MORE THAN USUAL: 0
3. TROUBLE FALLING OR STAYING ASLEEP: 0
1. LITTLE INTEREST OR PLEASURE IN DOING THINGS: 0
SUM OF ALL RESPONSES TO PHQ QUESTIONS 1-9: 0
5. POOR APPETITE OR OVEREATING: 0
SUM OF ALL RESPONSES TO PHQ QUESTIONS 1-9: 0
7. TROUBLE CONCENTRATING ON THINGS, SUCH AS READING THE NEWSPAPER OR WATCHING TELEVISION: 0

## 2022-06-04 ASSESSMENT — ENCOUNTER SYMPTOMS
RHINORRHEA: 1
SINUS PRESSURE: 1
GASTROINTESTINAL NEGATIVE: 1
COUGH: 1

## 2022-06-05 NOTE — PROGRESS NOTES
Brandin Gatica (:  1980) is a 39 y.o. female,Established patient, here for evaluation of the following chief complaint(s):  Follow-up and Discuss Medications         ASSESSMENT/PLAN:  1. Overweight with body mass index (BMI) 25.0-29.9  -     phentermine (ADIPEX-P) 37.5 MG tablet; Take 1 tablet by mouth every morning (before breakfast) for 30 days. , Disp-30 tablet, R-0Normal  2. Fever blister  -     famciclovir (FAMVIR) 500 MG tablet; TAKE ONE TABLET BY MOUTH THREE TIMES DAILY, Disp-30 tablet, R-1This prescription was filled on 2020. Any refills authorized will be placed on file. Normal  3. Gastroesophageal reflux disease without esophagitis  -     famotidine (PEPCID) 40 MG tablet; Take 1 tablet by mouth 2 times daily, Disp-60 tablet, R-3Normal  4. Acute bacterial sinusitis  -     benzonatate (TESSALON) 200 MG capsule; Take 1 capsule by mouth 3 times daily as needed for Cough, Disp-30 capsule, R-1Normal  -     doxycycline hyclate (VIBRA-TABS) 100 MG tablet; Take 1 tablet by mouth 2 times daily for 10 days, Disp-20 tablet, R-0Normal      No follow-ups on file. Subjective   SUBJECTIVE/OBJECTIVE:  Sinusitis  This is a chronic problem. The current episode started more than 1 year ago. The problem is unchanged. The pain is moderate. Associated symptoms include congestion, coughing, headaches and sinus pressure. Past treatments include acetaminophen and oral decongestants. The treatment provided no relief. maxwell  Wants to try the adipex again   Gaining weight   Watches her diet and goes to the gym but still gaining weight   No side effects with this     Recurrent fever blister  She needs her medication refilled  The medication does help  No side effects  Review of Systems   Constitutional: Negative. HENT: Positive for congestion, postnasal drip, rhinorrhea and sinus pressure. Respiratory: Positive for cough. Cardiovascular: Negative. Gastrointestinal: Negative. Skin: Negative. Neurological: Positive for headaches. Objective   Physical Exam  Vitals and nursing note reviewed. Constitutional:       Appearance: She is well-developed. HENT:      Head: Normocephalic. Neck:      Thyroid: No thyromegaly. Cardiovascular:      Rate and Rhythm: Normal rate and regular rhythm. Heart sounds: Normal heart sounds. Pulmonary:      Effort: Pulmonary effort is normal.      Breath sounds: Normal breath sounds. Lymphadenopathy:      Cervical: No cervical adenopathy. Neurological:      Mental Status: She is alert and oriented to person, place, and time. Psychiatric:         Behavior: Behavior normal.         Thought Content: Thought content normal.         Judgment: Judgment normal.                  An electronic signature was used to authenticate this note.     --Martín Alcantar, DO

## 2022-07-01 ENCOUNTER — OFFICE VISIT (OUTPATIENT)
Dept: FAMILY MEDICINE CLINIC | Age: 42
End: 2022-07-01
Payer: COMMERCIAL

## 2022-07-01 VITALS
BODY MASS INDEX: 29.49 KG/M2 | DIASTOLIC BLOOD PRESSURE: 76 MMHG | SYSTOLIC BLOOD PRESSURE: 114 MMHG | WEIGHT: 156.2 LBS | HEIGHT: 61 IN

## 2022-07-01 DIAGNOSIS — E66.3 OVERWEIGHT WITH BODY MASS INDEX (BMI) 25.0-29.9: ICD-10-CM

## 2022-07-01 PROCEDURE — 99213 OFFICE O/P EST LOW 20 MIN: CPT | Performed by: FAMILY MEDICINE

## 2022-07-02 RX ORDER — PHENTERMINE HYDROCHLORIDE 37.5 MG/1
37.5 TABLET ORAL
Qty: 30 TABLET | Refills: 0 | Status: SHIPPED | OUTPATIENT
Start: 2022-07-02 | End: 2022-08-01

## 2022-07-02 ASSESSMENT — ENCOUNTER SYMPTOMS
RESPIRATORY NEGATIVE: 1
GASTROINTESTINAL NEGATIVE: 1

## 2022-07-03 NOTE — PROGRESS NOTES
Marybeth Giles (:  1980) is a 39 y.o. female,Established patient, here for evaluation of the following chief complaint(s):  Follow-up (1 month weight check, currently taking Adipex)         ASSESSMENT/PLAN:  1. Overweight with body mass index (BMI) 25.0-29.9  -     phentermine (ADIPEX-P) 37.5 MG tablet; Take 1 tablet by mouth every morning (before breakfast) for 30 days. , Disp-30 tablet, R-0Normal      Return in about 4 weeks (around 2022). Subjective   SUBJECTIVE/OBJECTIVE:  HPI  Overweight  Just finished her second month  Weight about the same  Does feel like it helps help her appetite  No problems with the medicine   Watching sugar and carbs   Review of Systems   Constitutional: Negative. HENT: Negative. Respiratory: Negative. Cardiovascular: Negative. Gastrointestinal: Negative. Neurological: Negative. Psychiatric/Behavioral: Positive for decreased concentration. Objective   Physical Exam  Constitutional:       General: She is not in acute distress. Appearance: She is well-developed. HENT:      Head: Normocephalic. Neurological:      Mental Status: She is alert and oriented to person, place, and time. Psychiatric:         Behavior: Behavior normal.         Thought Content: Thought content normal.         Judgment: Judgment normal.              An electronic signature was used to authenticate this note.     --Jose Juan Leblanc DO

## 2022-07-07 DIAGNOSIS — F41.1 GENERALIZED ANXIETY DISORDER: ICD-10-CM

## 2022-07-07 RX ORDER — ALPRAZOLAM 0.5 MG/1
TABLET ORAL
Qty: 90 TABLET | Refills: 2 | OUTPATIENT
Start: 2022-07-07

## 2022-07-07 NOTE — TELEPHONE ENCOUNTER
Last office visit 7/1/2022     Last written     Next office visit scheduled Visit date not found    Requested Prescriptions     Pending Prescriptions Disp Refills    ALPRAZolam (XANAX) 0.5 MG tablet [Pharmacy Med Name: alprazolam 0.5 mg tablet] 90 tablet 2     Sig: TAKE ONE TABLET BY MOUTH EVERY 8 HOURS AS NEEDED FOR ANXIETY

## 2022-08-02 ENCOUNTER — PATIENT MESSAGE (OUTPATIENT)
Dept: FAMILY MEDICINE CLINIC | Age: 42
End: 2022-08-02

## 2022-08-02 DIAGNOSIS — F41.1 GENERALIZED ANXIETY DISORDER: ICD-10-CM

## 2022-08-02 DIAGNOSIS — B00.1 FEVER BLISTER: ICD-10-CM

## 2022-08-02 RX ORDER — FAMCICLOVIR 500 MG/1
TABLET, FILM COATED ORAL
Qty: 30 TABLET | Refills: 1 | Status: SHIPPED | OUTPATIENT
Start: 2022-08-02

## 2022-08-02 RX ORDER — PROMETHAZINE HYDROCHLORIDE 25 MG/1
TABLET ORAL
Qty: 60 TABLET | Refills: 2 | Status: SHIPPED | OUTPATIENT
Start: 2022-08-02 | End: 2022-11-04

## 2022-08-03 RX ORDER — ALPRAZOLAM 0.5 MG/1
TABLET ORAL
Qty: 90 TABLET | Refills: 2 | Status: SHIPPED | OUTPATIENT
Start: 2022-08-03 | End: 2022-10-06

## 2022-08-03 NOTE — TELEPHONE ENCOUNTER
From: Ashlie Edwards  To: Dr. Girish Power  Sent: 8/2/2022 6:51 PM EDT  Subject: Medication Refill     Hello Dr. Roma Murry,   I need a refill on my alprazolam 2 MG Tabs. I tried looking it up on my medication page and it isnt on there. Not sure why, I did request refills for my fever blister medication and my medication for my nausea on the medication refill page but was unable to do it for the alprazolam 2 MG Tabs. That is why Im sending you this message. So I need 3 medication refills total.   [facility] [place]. Thank You and Have a Wonderful Day. .   Sincerely,    Daxa Cadena

## 2022-09-01 ENCOUNTER — E-VISIT (OUTPATIENT)
Dept: FAMILY MEDICINE CLINIC | Age: 42
End: 2022-09-01
Payer: COMMERCIAL

## 2022-09-01 DIAGNOSIS — B96.89 ACUTE BACTERIAL SINUSITIS: Primary | ICD-10-CM

## 2022-09-01 DIAGNOSIS — J01.90 ACUTE BACTERIAL SINUSITIS: Primary | ICD-10-CM

## 2022-09-01 PROCEDURE — 99421 OL DIG E/M SVC 5-10 MIN: CPT | Performed by: FAMILY MEDICINE

## 2022-09-01 RX ORDER — DOXYCYCLINE HYCLATE 100 MG
100 TABLET ORAL 2 TIMES DAILY
Qty: 20 TABLET | Refills: 0 | Status: SHIPPED | OUTPATIENT
Start: 2022-09-01 | End: 2022-09-11

## 2022-09-01 ASSESSMENT — LIFESTYLE VARIABLES: SMOKING_STATUS: NO, I'VE NEVER SMOKED

## 2022-09-01 NOTE — PROGRESS NOTES
Anayeli Barry (1980) initiated an asynchronous digital communication through [address] [place] [address]. HPI: per patient questionnaire     Exam: not applicable    Diagnoses and all orders for this visit:  Diagnoses and all orders for this visit:    Acute bacterial sinusitis    Other orders  -     doxycycline hyclate (VIBRA-TABS) 100 MG tablet; Take 1 tablet by mouth 2 times daily for 10 days          Time: EV1 - 5-10 minutes were spent on the digital evaluation and management of this patient.     Priscilla Morin, DO

## 2022-10-06 DIAGNOSIS — F41.1 GENERALIZED ANXIETY DISORDER: ICD-10-CM

## 2022-10-06 RX ORDER — ALPRAZOLAM 0.5 MG/1
TABLET ORAL
Qty: 90 TABLET | Refills: 0 | Status: SHIPPED | OUTPATIENT
Start: 2022-10-06 | End: 2022-10-13 | Stop reason: SDUPTHER

## 2022-10-06 NOTE — TELEPHONE ENCOUNTER
Last office visit 9/1/2022     Last written     Next office visit scheduled 10/13/2022    Requested Prescriptions     Pending Prescriptions Disp Refills    ALPRAZolam (XANAX) 0.5 MG tablet [Pharmacy Med Name: alprazolam 0.5 mg tablet] 90 tablet 2     Sig: TAKE ONE TABLET BY MOUTH EVERY 8 HOURS AS NEEDED

## 2022-10-13 ENCOUNTER — OFFICE VISIT (OUTPATIENT)
Dept: FAMILY MEDICINE CLINIC | Age: 42
End: 2022-10-13
Payer: COMMERCIAL

## 2022-10-13 VITALS
HEIGHT: 61 IN | SYSTOLIC BLOOD PRESSURE: 102 MMHG | DIASTOLIC BLOOD PRESSURE: 70 MMHG | WEIGHT: 156 LBS | BODY MASS INDEX: 29.45 KG/M2

## 2022-10-13 DIAGNOSIS — R73.9 BLOOD GLUCOSE ELEVATED: ICD-10-CM

## 2022-10-13 DIAGNOSIS — J01.90 ACUTE BACTERIAL SINUSITIS: Primary | ICD-10-CM

## 2022-10-13 DIAGNOSIS — B96.89 ACUTE BACTERIAL SINUSITIS: Primary | ICD-10-CM

## 2022-10-13 DIAGNOSIS — E66.09 CLASS 1 OBESITY DUE TO EXCESS CALORIES WITH SERIOUS COMORBIDITY AND BODY MASS INDEX (BMI) OF 30.0 TO 30.9 IN ADULT: ICD-10-CM

## 2022-10-13 DIAGNOSIS — F41.1 GENERALIZED ANXIETY DISORDER: ICD-10-CM

## 2022-10-13 LAB — HBA1C MFR BLD: 4.9 %

## 2022-10-13 PROCEDURE — 99214 OFFICE O/P EST MOD 30 MIN: CPT | Performed by: FAMILY MEDICINE

## 2022-10-13 PROCEDURE — 83036 HEMOGLOBIN GLYCOSYLATED A1C: CPT | Performed by: FAMILY MEDICINE

## 2022-10-13 RX ORDER — BENZONATATE 200 MG/1
CAPSULE ORAL
COMMUNITY
Start: 2022-08-25 | End: 2022-11-04

## 2022-10-13 RX ORDER — AMOXICILLIN 875 MG/1
TABLET, COATED ORAL
COMMUNITY
Start: 2022-08-25 | End: 2022-11-04

## 2022-10-13 RX ORDER — OXYCODONE HYDROCHLORIDE 10 MG/1
TABLET ORAL
COMMUNITY
Start: 2022-10-06

## 2022-10-13 RX ORDER — AZITHROMYCIN 250 MG/1
250 TABLET, FILM COATED ORAL SEE ADMIN INSTRUCTIONS
Qty: 6 TABLET | Refills: 0 | Status: SHIPPED | OUTPATIENT
Start: 2022-10-13 | End: 2022-10-18

## 2022-10-13 RX ORDER — ALPRAZOLAM 0.5 MG/1
TABLET ORAL
Qty: 90 TABLET | Refills: 0 | Status: SHIPPED | OUTPATIENT
Start: 2022-10-13 | End: 2022-11-04 | Stop reason: SDUPTHER

## 2022-10-13 RX ORDER — OXYCODONE 9 MG/1
CAPSULE, EXTENDED RELEASE ORAL
COMMUNITY
Start: 2022-10-06

## 2022-10-13 SDOH — ECONOMIC STABILITY: FOOD INSECURITY: WITHIN THE PAST 12 MONTHS, YOU WORRIED THAT YOUR FOOD WOULD RUN OUT BEFORE YOU GOT MONEY TO BUY MORE.: NEVER TRUE

## 2022-10-13 SDOH — ECONOMIC STABILITY: FOOD INSECURITY: WITHIN THE PAST 12 MONTHS, THE FOOD YOU BOUGHT JUST DIDN'T LAST AND YOU DIDN'T HAVE MONEY TO GET MORE.: NEVER TRUE

## 2022-10-13 ASSESSMENT — SOCIAL DETERMINANTS OF HEALTH (SDOH): HOW HARD IS IT FOR YOU TO PAY FOR THE VERY BASICS LIKE FOOD, HOUSING, MEDICAL CARE, AND HEATING?: NOT HARD AT ALL

## 2022-10-13 ASSESSMENT — ENCOUNTER SYMPTOMS
RHINORRHEA: 1
SINUS PRESSURE: 1

## 2022-10-13 NOTE — ED TRIAGE NOTES
C/o cough and shortness of breath for a month. Has taken 2 courses of antibiotics and feels worse today. Azelaic Acid Counseling: Patient counseled that medicine may cause skin irritation and to avoid applying near the eyes.  In the event of skin irritation, the patient was advised to reduce the amount of the drug applied or use it less frequently.   The patient verbalized understanding of the proper use and possible adverse effects of azelaic acid.  All of the patient's questions and concerns were addressed.

## 2022-10-13 NOTE — PROGRESS NOTES
Kiara Wilson (:  1980) is a 39 y.o. female,Established patient, here for evaluation of the following chief complaint(s):  Follow-up (Blood sugar high ,and headache  once eating sugar .) and Office Visit for Anticoagulation Management         ASSESSMENT/PLAN:  1. Acute bacterial sinusitis  -     azithromycin (ZITHROMAX) 250 MG tablet; Take 1 tablet by mouth See Admin Instructions for 5 days 500mg on day 1 followed by 250mg on days 2 - 5, Disp-6 tablet, R-0Normal  2. Generalized anxiety disorder  -     ALPRAZolam (XANAX) 0.5 MG tablet; TAKE ONE TABLET BY MOUTH EVERY 8 HOURS AS NEEDED, Disp-90 tablet, R-0Normal  3. Blood glucose elevated  -     POCT glycosylated hemoglobin (Hb A1C)  4. Class 1 obesity due to excess calories with serious comorbidity and body mass index (BMI) of 30.0 to 30.9 in adult  -     Tirzepatide (MOUNJARO) 2.5 MG/0.5ML SOPN SC injection; Inject 0.5 mLs into the skin once a week, Disp-4 Adjustable Dose Pre-filled Pen Syringe, R-0Normal      No follow-ups on file. Subjective   SUBJECTIVE/OBJECTIVE:  Sinusitis  This is a new problem. The current episode started 1 to 4 weeks ago. The problem has been gradually worsening since onset. There has been no fever. The pain is moderate. Associated symptoms include congestion, headaches and sinus pressure. (Teeth hurt) Past treatments include acetaminophen. The treatment provided no relief. Obesity  Wants to discuss Mounjaro  She does well on phentermine but when she stops she gains her weight back  Exercising and watching her diet   Review of Systems   Constitutional:  Positive for activity change and fatigue. HENT:  Positive for congestion, rhinorrhea and sinus pressure. Musculoskeletal:  Positive for arthralgias. Neurological:  Positive for headaches. Psychiatric/Behavioral:  Positive for agitation and sleep disturbance. The patient is nervous/anxious.          Objective   Physical Exam  Constitutional:       General: She is not in acute distress. Appearance: She is well-developed. HENT:      Head: Normocephalic. Right Ear: Tympanic membrane, ear canal and external ear normal.      Left Ear: Tympanic membrane, ear canal and external ear normal.      Nose: Mucosal edema present. Mouth/Throat:      Pharynx: Posterior oropharyngeal erythema present. Eyes:      General:         Left eye: Left eye discharge: pnd . Conjunctiva/sclera: Conjunctivae normal.   Neck:      Thyroid: No thyromegaly. Cardiovascular:      Rate and Rhythm: Normal rate. Pulmonary:      Effort: Pulmonary effort is normal. No respiratory distress. Breath sounds: Normal breath sounds. No wheezing or rales. Lymphadenopathy:      Cervical: Cervical adenopathy present. Skin:     General: Skin is warm and dry. Findings: No rash. Neurological:      Mental Status: She is alert and oriented to person, place, and time. Psychiatric:         Behavior: Behavior normal.         Thought Content: Thought content normal.         Judgment: Judgment normal.                An electronic signature was used to authenticate this note.     --Billie Birch DO

## 2022-11-04 ENCOUNTER — OFFICE VISIT (OUTPATIENT)
Dept: FAMILY MEDICINE CLINIC | Age: 42
End: 2022-11-04
Payer: COMMERCIAL

## 2022-11-04 VITALS
BODY MASS INDEX: 27.98 KG/M2 | DIASTOLIC BLOOD PRESSURE: 62 MMHG | SYSTOLIC BLOOD PRESSURE: 128 MMHG | HEIGHT: 61 IN | WEIGHT: 148.2 LBS

## 2022-11-04 DIAGNOSIS — Z23 NEEDS FLU SHOT: ICD-10-CM

## 2022-11-04 DIAGNOSIS — N30.00 ACUTE CYSTITIS WITHOUT HEMATURIA: Primary | ICD-10-CM

## 2022-11-04 DIAGNOSIS — N30.10 INTERSTITIAL CYSTITIS: ICD-10-CM

## 2022-11-04 DIAGNOSIS — F41.1 GENERALIZED ANXIETY DISORDER: ICD-10-CM

## 2022-11-04 DIAGNOSIS — E66.09 CLASS 1 OBESITY DUE TO EXCESS CALORIES WITH SERIOUS COMORBIDITY AND BODY MASS INDEX (BMI) OF 30.0 TO 30.9 IN ADULT: ICD-10-CM

## 2022-11-04 PROCEDURE — 99214 OFFICE O/P EST MOD 30 MIN: CPT | Performed by: FAMILY MEDICINE

## 2022-11-04 PROCEDURE — 90471 IMMUNIZATION ADMIN: CPT | Performed by: FAMILY MEDICINE

## 2022-11-04 PROCEDURE — 90674 CCIIV4 VAC NO PRSV 0.5 ML IM: CPT | Performed by: FAMILY MEDICINE

## 2022-11-04 RX ORDER — TIRZEPATIDE 5 MG/.5ML
5 INJECTION, SOLUTION SUBCUTANEOUS WEEKLY
Qty: 4 ADJUSTABLE DOSE PRE-FILLED PEN SYRINGE | Refills: 1 | Status: SHIPPED | OUTPATIENT
Start: 2022-11-04 | End: 2022-12-01 | Stop reason: SDUPTHER

## 2022-11-04 RX ORDER — NITROFURANTOIN 25; 75 MG/1; MG/1
100 CAPSULE ORAL 2 TIMES DAILY
Qty: 6 CAPSULE | Refills: 0 | Status: SHIPPED | OUTPATIENT
Start: 2022-11-04 | End: 2022-11-07

## 2022-11-04 RX ORDER — BUPROPION HYDROCHLORIDE 150 MG/1
150 TABLET ORAL EVERY MORNING
Qty: 30 TABLET | Refills: 3 | Status: SHIPPED | OUTPATIENT
Start: 2022-11-04 | End: 2022-12-01 | Stop reason: SDUPTHER

## 2022-11-04 RX ORDER — ALPRAZOLAM 0.5 MG/1
TABLET ORAL
Qty: 90 TABLET | Refills: 0 | Status: SHIPPED | OUTPATIENT
Start: 2022-11-04 | End: 2022-12-01 | Stop reason: SDUPTHER

## 2022-11-04 NOTE — PROGRESS NOTES
OUTPATIENT PROGRESS NOTE  Date of Service:  11/4/2022  Address:  [facility] [address] [facility],First [address] [address]  Dept: [phone]  Loc: [phone]    Subjective:      Patient ID:  [id number]  Solis Keller is a 39 y.o. female     Urinary Tract Infection  This is a recurrent problem. The current episode started in the past 7 days. The problem has been gradually worsening since onset. Risk factors: has interstitial cystitis. Obesity  Does feel like the Liya Nishant is helping    Has lost 8 pounds this month   Wants to talk about increasing the dosage    Anxiety  She is doing fine on her medication  The medication does help  No side effects   Review of Systems   Constitutional: Negative. Respiratory: Negative. Cardiovascular: Negative. Gastrointestinal: Negative. Skin: Negative. Neurological: Negative.       Objective:   YOB: 1980    Date of Visit:  11/4/2022       Allergies   Allergen Reactions    Latex Hives and Itching     After surgery x2     Shellfish-Derived Products Anaphylaxis    Tape [Adhesive Tape] Rash     Paper tape OK     Iodides Other (See Comments)     pt states tachycardia    Tylenol [Acetaminophen]      History of hep c    Bactrim [Sulfamethoxazole-Trimethoprim] Nausea And Vomiting and Other (See Comments)    Codeine Nausea And Vomiting and Palpitations    Morphine Itching and Nausea And Vomiting     \"makes me crazy\"     Prednisone Hives, Palpitations and Rash       Outpatient Medications Marked as Taking for the 11/4/22 encounter (Office Visit) with Raghu Man, DO   Medication Sig Dispense Refill    oxyCODONE HCl (OXY-IR) 10 MG immediate release tablet TAKE ONE TABLET BY MOUTH EVERY 6 HOURS      XTAMPZA ER 9 MG C12A TAKE ONE CAPSULE BY MOUTH WITH FOOD EVERY TWELVE HOURS      Tirzepatide (MOUNJARO) 2.5 MG/0.5ML SOPN SC injection Inject 0.5 mLs into the skin once a week 4 Adjustable Dose Pre-filled Pen Syringe 0    ALPRAZolam (XANAX) 0.5 MG tablet TAKE ONE TABLET BY MOUTH EVERY 8 HOURS AS NEEDED 90 tablet 0    famciclovir (FAMVIR) 500 MG tablet TAKE ONE TABLET BY MOUTH THREE TIMES DAILY 30 tablet 1    famotidine (PEPCID) 40 MG tablet Take 1 tablet by mouth 2 times daily 60 tablet 3    furosemide (LASIX) 20 MG tablet Take 1 tablet by mouth daily 30 tablet 3    albuterol sulfate  (90 Base) MCG/ACT inhaler inhale TWO PUFFS BY MOUTH FOUR TIMES DAILY AS NEEDED FOR WHEEZING 25.5 g 0    tiZANidine (ZANAFLEX) 4 MG tablet One q 6 hours prn 30 tablet 2    fluticasone (FLONASE) 50 MCG/ACT nasal spray SPRAY ONE SPRAY IN EACH NOSTRIL EVERY DAY 16 g 1    propranolol (INDERAL) 20 MG tablet Take 1 tablet by mouth 3 times daily (Patient taking differently: Take 20 mg by mouth 2 times daily) 90 tablet 2       Vitals:    11/04/22 1118   BP: 128/62   Weight: 148 lb 3.2 oz (67.2 kg)   Height: 5' 1\" (1.549 m)     Body mass index is 28 kg/m². Wt Readings from Last 3 Encounters:   11/04/22 148 lb 3.2 oz (67.2 kg)   10/13/22 156 lb (70.8 kg)   07/01/22 156 lb 3.2 oz (70.9 kg)     BP Readings from Last 3 Encounters:   11/04/22 128/62   10/13/22 102/70   07/01/22 114/76       Physical Exam  Constitutional:       General: She is not in acute distress. Appearance: She is well-developed. HENT:      Head: Normocephalic. Neurological:      Mental Status: She is alert and oriented to person, place, and time. Psychiatric:         Behavior: Behavior normal.         Thought Content: Thought content normal.         Judgment: Judgment normal.          Assessment/Plan         Assessment/plan;  Sulma was seen today for 3 month follow-up, medication refill and depression. Diagnoses and all orders for this visit:    Acute cystitis without hematuria  -     nitrofurantoin, macrocrystal-monohydrate, (MACROBID) 100 MG capsule;  Take 1 capsule by mouth 2 times daily for 3 days    Class 1 obesity due to excess calories with serious comorbidity and body mass index (BMI) of 30.0 to 30.9 in adult  -     Tirzepatide (MOUNJARO) 5 MG/0.5ML SOPN SC injection; Inject 0.5 mLs into the skin once a week    Generalized anxiety disorder  -     ALPRAZolam (XANAX) 0.5 MG tablet; TAKE ONE TABLET BY MOUTH EVERY 8 HOURS AS NEEDED  -     buPROPion (WELLBUTRIN XL) 150 MG extended release tablet; Take 1 tablet by mouth every morning    Needs flu shot  -     Influenza, FLUCELVAX, (age 10 mo+), IM, Preservative Free, 0.5 mL    Interstitial cystitis  -     nitrofurantoin, macrocrystal-monohydrate, (MACROBID) 100 MG capsule; Take 1 capsule by mouth 2 times daily for 3 days      No follow-ups on file.        Kelly Walker, DO

## 2022-11-05 ASSESSMENT — ENCOUNTER SYMPTOMS
GASTROINTESTINAL NEGATIVE: 1
RESPIRATORY NEGATIVE: 1

## 2022-12-01 DIAGNOSIS — E66.09 CLASS 1 OBESITY DUE TO EXCESS CALORIES WITH SERIOUS COMORBIDITY AND BODY MASS INDEX (BMI) OF 30.0 TO 30.9 IN ADULT: ICD-10-CM

## 2022-12-01 DIAGNOSIS — F41.1 GENERALIZED ANXIETY DISORDER: ICD-10-CM

## 2022-12-01 RX ORDER — BUPROPION HYDROCHLORIDE 150 MG/1
150 TABLET ORAL EVERY MORNING
Qty: 30 TABLET | Refills: 3 | Status: SHIPPED | OUTPATIENT
Start: 2022-12-01

## 2022-12-01 RX ORDER — TIRZEPATIDE 5 MG/.5ML
5 INJECTION, SOLUTION SUBCUTANEOUS WEEKLY
Qty: 4 ADJUSTABLE DOSE PRE-FILLED PEN SYRINGE | Refills: 1 | Status: SHIPPED | OUTPATIENT
Start: 2022-12-01

## 2022-12-01 RX ORDER — ALPRAZOLAM 0.5 MG/1
TABLET ORAL
Qty: 90 TABLET | Refills: 0 | Status: SHIPPED | OUTPATIENT
Start: 2022-12-01 | End: 2023-01-01

## 2022-12-29 ENCOUNTER — PATIENT MESSAGE (OUTPATIENT)
Dept: FAMILY MEDICINE CLINIC | Age: 42
End: 2022-12-29

## 2022-12-29 DIAGNOSIS — E66.09 CLASS 1 OBESITY DUE TO EXCESS CALORIES WITH SERIOUS COMORBIDITY AND BODY MASS INDEX (BMI) OF 30.0 TO 30.9 IN ADULT: ICD-10-CM

## 2022-12-29 DIAGNOSIS — B00.1 FEVER BLISTER: ICD-10-CM

## 2022-12-29 DIAGNOSIS — F41.1 GENERALIZED ANXIETY DISORDER: ICD-10-CM

## 2022-12-29 DIAGNOSIS — M79.7 FIBROMYALGIA: ICD-10-CM

## 2022-12-29 DIAGNOSIS — R00.2 HEART PALPITATIONS: ICD-10-CM

## 2022-12-29 DIAGNOSIS — K21.9 GASTROESOPHAGEAL REFLUX DISEASE WITHOUT ESOPHAGITIS: ICD-10-CM

## 2022-12-29 RX ORDER — PROMETHAZINE HYDROCHLORIDE 25 MG/1
25 TABLET ORAL EVERY 8 HOURS PRN
Qty: 60 TABLET | Refills: 2 | Status: SHIPPED | OUTPATIENT
Start: 2022-12-29 | End: 2023-01-28

## 2022-12-29 RX ORDER — ALBUTEROL SULFATE 90 UG/1
AEROSOL, METERED RESPIRATORY (INHALATION)
Qty: 25.5 G | Refills: 0 | Status: SHIPPED | OUTPATIENT
Start: 2022-12-29

## 2022-12-29 RX ORDER — GREEN TEA/HOODIA GORDONII 315-12.5MG
1 CAPSULE ORAL DAILY
Qty: 30 TABLET | Refills: 0 | Status: SHIPPED | OUTPATIENT
Start: 2022-12-29 | End: 2023-01-28

## 2022-12-29 RX ORDER — TIZANIDINE 4 MG/1
TABLET ORAL
Qty: 30 TABLET | Refills: 2 | Status: SHIPPED | OUTPATIENT
Start: 2022-12-29

## 2022-12-29 RX ORDER — FAMOTIDINE 40 MG/1
40 TABLET, FILM COATED ORAL 2 TIMES DAILY
Qty: 60 TABLET | Refills: 3 | Status: SHIPPED | OUTPATIENT
Start: 2022-12-29

## 2022-12-29 RX ORDER — PROPRANOLOL HYDROCHLORIDE 20 MG/1
20 TABLET ORAL 3 TIMES DAILY
Qty: 90 TABLET | Refills: 2 | Status: SHIPPED | OUTPATIENT
Start: 2022-12-29

## 2022-12-29 RX ORDER — FAMCICLOVIR 500 MG/1
TABLET, FILM COATED ORAL
Qty: 30 TABLET | Refills: 1 | Status: SHIPPED | OUTPATIENT
Start: 2022-12-29

## 2022-12-29 RX ORDER — BUPROPION HYDROCHLORIDE 150 MG/1
150 TABLET ORAL EVERY MORNING
Qty: 30 TABLET | Refills: 3 | Status: SHIPPED | OUTPATIENT
Start: 2022-12-29

## 2022-12-29 RX ORDER — TIRZEPATIDE 5 MG/.5ML
5 INJECTION, SOLUTION SUBCUTANEOUS WEEKLY
Qty: 4 ADJUSTABLE DOSE PRE-FILLED PEN SYRINGE | Refills: 1 | Status: SHIPPED | OUTPATIENT
Start: 2022-12-29

## 2022-12-29 RX ORDER — ALPRAZOLAM 0.5 MG/1
TABLET ORAL
Qty: 90 TABLET | Refills: 0 | Status: SHIPPED | OUTPATIENT
Start: 2022-12-29 | End: 2023-01-29

## 2022-12-29 NOTE — TELEPHONE ENCOUNTER
From: Matthew Martinez  To: Dr. Eliseo Alvarado  Sent: 12/29/2022 12:55 PM EST  Subject: Insurance to restart Friday/ sick    Hello Dr. Elvin Duggan,   I hope you had an amazing [place]. My insurance starts over tomorrow which is Friday I went ahead and sent request for all my medications. Including my Mounjaro. The only two I cant find is the Acidophilus Capsule which is a probiotic that really helps me. Also my Promethazine. So Ill need that refilled also. Everything else I checked to refill on my meds list. To get all this filled before my co-pay starts back to $6,000. Which starts tomorrow. Before they pay anything. I have also been sick for almost 2 weeks, having trouble breathing, chest/ back/ throat hurts. Ive been sleeping a lot, to the point I have to be woke up to eat. I was vomiting for 3 days but that has gone away and now its just continued nausea with abdominal pain in the middle by the belly button. I have no energy and just to get from my bed to my bathroom I almost pass out. I havent been this sick in a very long time. Please help. I know Im dehydrated. Should I do an E-visit, come in or go to the ER?     Thanks So Very Much,    [employer]    (Sorry so long)

## 2023-01-13 ENCOUNTER — OFFICE VISIT (OUTPATIENT)
Dept: FAMILY MEDICINE CLINIC | Age: 43
End: 2023-01-13

## 2023-01-13 VITALS
BODY MASS INDEX: 25.45 KG/M2 | WEIGHT: 134.8 LBS | HEIGHT: 61 IN | SYSTOLIC BLOOD PRESSURE: 124 MMHG | DIASTOLIC BLOOD PRESSURE: 84 MMHG

## 2023-01-13 DIAGNOSIS — E74.39 GLUCOSE INTOLERANCE: ICD-10-CM

## 2023-01-13 DIAGNOSIS — J40 BRONCHITIS: Primary | ICD-10-CM

## 2023-01-13 LAB — HBA1C MFR BLD: 4.8 %

## 2023-01-13 RX ORDER — AZITHROMYCIN 250 MG/1
250 TABLET, FILM COATED ORAL SEE ADMIN INSTRUCTIONS
Qty: 6 TABLET | Refills: 0 | Status: SHIPPED | OUTPATIENT
Start: 2023-01-13 | End: 2023-01-18

## 2023-01-13 ASSESSMENT — PATIENT HEALTH QUESTIONNAIRE - PHQ9
9. THOUGHTS THAT YOU WOULD BE BETTER OFF DEAD, OR OF HURTING YOURSELF: 0
1. LITTLE INTEREST OR PLEASURE IN DOING THINGS: 0
SUM OF ALL RESPONSES TO PHQ QUESTIONS 1-9: 0
4. FEELING TIRED OR HAVING LITTLE ENERGY: 0
5. POOR APPETITE OR OVEREATING: 0
10. IF YOU CHECKED OFF ANY PROBLEMS, HOW DIFFICULT HAVE THESE PROBLEMS MADE IT FOR YOU TO DO YOUR WORK, TAKE CARE OF THINGS AT HOME, OR GET ALONG WITH OTHER PEOPLE: 0
7. TROUBLE CONCENTRATING ON THINGS, SUCH AS READING THE NEWSPAPER OR WATCHING TELEVISION: 0
SUM OF ALL RESPONSES TO PHQ QUESTIONS 1-9: 0
SUM OF ALL RESPONSES TO PHQ9 QUESTIONS 1 & 2: 0
2. FEELING DOWN, DEPRESSED OR HOPELESS: 0
8. MOVING OR SPEAKING SO SLOWLY THAT OTHER PEOPLE COULD HAVE NOTICED. OR THE OPPOSITE, BEING SO FIGETY OR RESTLESS THAT YOU HAVE BEEN MOVING AROUND A LOT MORE THAN USUAL: 0
SUM OF ALL RESPONSES TO PHQ QUESTIONS 1-9: 0
3. TROUBLE FALLING OR STAYING ASLEEP: 0
6. FEELING BAD ABOUT YOURSELF - OR THAT YOU ARE A FAILURE OR HAVE LET YOURSELF OR YOUR FAMILY DOWN: 0
SUM OF ALL RESPONSES TO PHQ QUESTIONS 1-9: 0

## 2023-01-13 NOTE — PROGRESS NOTES
OUTPATIENT PROGRESS NOTE  Date of Service:  1/13/2023  Address: [facility] [address] [facility] [address] [facility],[address] [address]  Dept: [phone]  Loc: [phone]    Subjective:      Patient ID:  [id number]  Betty Sunshine is a 43 y.o. female     Cough  This is a new problem. The current episode started in the past 7 days. The problem has been gradually worsening. The cough is Productive of sputum. Associated symptoms include headaches, myalgias, nasal congestion, postnasal drip, shortness of breath and wheezing. She has tried OTC cough suppressant for the symptoms. The treatment provided no relief. Her past medical history is significant for asthma and environmental allergies. There is no history of bronchitis. Review of Systems   HENT:  Positive for postnasal drip. Respiratory:  Positive for cough, shortness of breath and wheezing. Musculoskeletal:  Positive for myalgias. Allergic/Immunologic: Positive for environmental allergies. Neurological:  Positive for headaches.      Objective:   YOB: 1980    Date of Visit:  1/13/2023       Allergies   Allergen Reactions    Latex Hives and Itching     After surgery x2     Shellfish-Derived Products Anaphylaxis    Tape [Adhesive Tape] Rash     Paper tape OK     Iodides Other (See Comments)     pt states tachycardia    Tylenol [Acetaminophen]      History of hep c    Bactrim [Sulfamethoxazole-Trimethoprim] Nausea And Vomiting and Other (See Comments)    Codeine Nausea And Vomiting and Palpitations    Morphine Itching and Nausea And Vomiting     \"makes me crazy\"     Prednisone Hives, Palpitations and Rash       Outpatient Medications Marked as Taking for the 1/13/23 encounter (Office Visit) with Zachary Lowe, DO   Medication Sig Dispense Refill    tiZANidine (ZANAFLEX) 4 MG tablet TAKE ONE TABLET BY MOUTH EVERY 6 HOURS AS NEEDED 30 tablet 2    propranolol (INDERAL) 20 MG tablet Take 1 tablet by mouth 3 times daily 90 tablet 2    albuterol sulfate HFA (PROVENTIL;VENTOLIN;PROAIR) 108 (90 Base) MCG/ACT inhaler inhale TWO PUFFS BY MOUTH FOUR TIMES DAILY AS NEEDED FOR WHEEZING 25.5 g 0    famotidine (PEPCID) 40 MG tablet Take 1 tablet by mouth 2 times daily 60 tablet 3    famciclovir (FAMVIR) 500 MG tablet TAKE ONE TABLET BY MOUTH THREE TIMES DAILY 30 tablet 1    ALPRAZolam (XANAX) 0.5 MG tablet TAKE ONE TABLET BY MOUTH EVERY 8 HOURS AS NEEDED 90 tablet 0    Tirzepatide (MOUNJARO) 5 MG/0.5ML SOPN SC injection Inject 0.5 mLs into the skin once a week 4 Adjustable Dose Pre-filled Pen Syringe 1    buPROPion (WELLBUTRIN XL) 150 MG extended release tablet Take 1 tablet by mouth every morning 30 tablet 3    Probiotic Acidophilus (FLORANEX) TABS Take 1 tablet by mouth daily 30 tablet 0    oxyCODONE HCl (OXY-IR) 10 MG immediate release tablet TAKE ONE TABLET BY MOUTH EVERY 6 HOURS      XTAMPZA ER 9 MG C12A TAKE ONE CAPSULE BY MOUTH WITH FOOD EVERY TWELVE HOURS      furosemide (LASIX) 20 MG tablet Take 1 tablet by mouth daily 30 tablet 3    fluticasone (FLONASE) 50 MCG/ACT nasal spray SPRAY ONE SPRAY IN EACH NOSTRIL EVERY DAY 16 g 1       Vitals:    01/13/23 1123   BP: 124/84   Weight: 134 lb 12.8 oz (61.1 kg)   Height: 5' 1\" (1.549 m)     Body mass index is 25.47 kg/m². Wt Readings from Last 3 Encounters:   01/13/23 134 lb 12.8 oz (61.1 kg)   11/04/22 148 lb 3.2 oz (67.2 kg)   10/13/22 156 lb (70.8 kg)     BP Readings from Last 3 Encounters:   01/13/23 124/84   11/04/22 128/62   10/13/22 102/70       Physical Exam  Constitutional:       General: She is not in acute distress. Appearance: She is well-developed. HENT:      Head: Normocephalic. Right Ear: Tympanic membrane, ear canal and external ear normal.      Left Ear: Tympanic membrane, ear canal and external ear normal.      Nose: Mucosal edema present. Mouth/Throat:      Pharynx: Posterior oropharyngeal erythema present.    Eyes: General:         Left eye: Left eye discharge: pnd . Conjunctiva/sclera: Conjunctivae normal.   Neck:      Thyroid: No thyromegaly. Cardiovascular:      Rate and Rhythm: Normal rate. Pulmonary:      Effort: Pulmonary effort is normal. No respiratory distress. Breath sounds: Normal breath sounds. No wheezing or rales. Lymphadenopathy:      Cervical: Cervical adenopathy present. Skin:     General: Skin is warm and dry. Findings: No rash. Neurological:      Mental Status: She is alert and oriented to person, place, and time. Psychiatric:         Behavior: Behavior normal.         Thought Content: Thought content normal.         Judgment: Judgment normal.          Assessment/Plan            Assessment/plan;  Sulma was seen today for diabetes. Diagnoses and all orders for this visit:    Bronchitis  -     azithromycin (ZITHROMAX) 250 MG tablet; Take 1 tablet by mouth See Admin Instructions for 5 days 500mg on day 1 followed by 250mg on days 2 - 5    Glucose intolerance  -     POCT glycosylated hemoglobin (Hb A1C)      No follow-ups on file.              Susan Kaplan DO

## 2023-01-14 ASSESSMENT — ENCOUNTER SYMPTOMS
COUGH: 1
SHORTNESS OF BREATH: 1
WHEEZING: 1

## 2023-01-16 ENCOUNTER — TELEPHONE (OUTPATIENT)
Dept: FAMILY MEDICINE CLINIC | Age: 43
End: 2023-01-16

## 2023-01-16 NOTE — TELEPHONE ENCOUNTER
Patient called stating she just found out today she needs to have both breast implants removed due to rupture and new ones placed. Procedure is scheduled for 1/25 @ [facility] by [facility], Dr Hinojosa. Patient was just seen on 1/13 by Dr Wood and states she just needs history and physical form filled out and an EKG. Patient states ok to LM with time and date of appointment and she will make appointment

## 2023-01-18 ENCOUNTER — OFFICE VISIT (OUTPATIENT)
Dept: FAMILY MEDICINE CLINIC | Age: 43
End: 2023-01-18

## 2023-01-18 VITALS
DIASTOLIC BLOOD PRESSURE: 70 MMHG | HEIGHT: 61 IN | WEIGHT: 137 LBS | SYSTOLIC BLOOD PRESSURE: 92 MMHG | BODY MASS INDEX: 25.86 KG/M2

## 2023-01-18 DIAGNOSIS — T85.44XS CAPSULAR CONTRACTURE OF BREAST IMPLANT, SEQUELA: ICD-10-CM

## 2023-01-18 DIAGNOSIS — Z01.818 PRE-OP EXAM: Primary | ICD-10-CM

## 2023-01-18 ASSESSMENT — ENCOUNTER SYMPTOMS
RESPIRATORY NEGATIVE: 1
GASTROINTESTINAL NEGATIVE: 1

## 2023-01-18 NOTE — PROGRESS NOTES
Subjective:      Patient ID: Dustin Maldonado is a 43 y.o. female. HPI  Removal bilateral breast implant removal   1/25/23  [facility]   Dr Peyman Newman  Review of Systems   Constitutional: Negative. Respiratory: Negative. Cardiovascular: Negative. Gastrointestinal: Negative. Musculoskeletal:  Positive for arthralgias and myalgias. Skin: Negative. Neurological: Negative.     YOB: 1980    Date of Visit:  1/18/2023    Allergies   Allergen Reactions    Latex Hives and Itching     After surgery x2     Shellfish-Derived Products Anaphylaxis    Tape [Adhesive Tape] Rash     Paper tape OK     Iodides Other (See Comments)     pt states tachycardia    Tylenol [Acetaminophen]      History of hep c    Bactrim [Sulfamethoxazole-Trimethoprim] Nausea And Vomiting and Other (See Comments)    Codeine Nausea And Vomiting and Palpitations    Morphine Itching and Nausea And Vomiting     \"makes me crazy\"     Prednisone Hives, Palpitations and Rash       Outpatient Medications Marked as Taking for the 1/18/23 encounter (Office Visit) with Tanna Navarrete, DO   Medication Sig Dispense Refill    azithromycin (ZITHROMAX) 250 MG tablet Take 1 tablet by mouth See Admin Instructions for 5 days 500mg on day 1 followed by 250mg on days 2 - 5 6 tablet 0    tiZANidine (ZANAFLEX) 4 MG tablet TAKE ONE TABLET BY MOUTH EVERY 6 HOURS AS NEEDED 30 tablet 2    propranolol (INDERAL) 20 MG tablet Take 1 tablet by mouth 3 times daily 90 tablet 2    albuterol sulfate HFA (PROVENTIL;VENTOLIN;PROAIR) 108 (90 Base) MCG/ACT inhaler inhale TWO PUFFS BY MOUTH FOUR TIMES DAILY AS NEEDED FOR WHEEZING 25.5 g 0    famotidine (PEPCID) 40 MG tablet Take 1 tablet by mouth 2 times daily 60 tablet 3    famciclovir (FAMVIR) 500 MG tablet TAKE ONE TABLET BY MOUTH THREE TIMES DAILY 30 tablet 1    ALPRAZolam (XANAX) 0.5 MG tablet TAKE ONE TABLET BY MOUTH EVERY 8 HOURS AS NEEDED 90 tablet 0    Tirzepatide (MOUNJARO) 5 MG/0.5ML SOPN SC injection Inject 0.5 mLs into the skin once a week 4 Adjustable Dose Pre-filled Pen Syringe 1    buPROPion (WELLBUTRIN XL) 150 MG extended release tablet Take 1 tablet by mouth every morning 30 tablet 3    Probiotic Acidophilus (FLORANEX) TABS Take 1 tablet by mouth daily 30 tablet 0    promethazine (PHENERGAN) 25 MG tablet Take 1 tablet by mouth every 8 hours as needed for Nausea 60 tablet 2    oxyCODONE HCl (OXY-IR) 10 MG immediate release tablet TAKE ONE TABLET BY MOUTH EVERY 6 HOURS      XTAMPZA ER 9 MG C12A TAKE ONE CAPSULE BY MOUTH WITH FOOD EVERY TWELVE HOURS      furosemide (LASIX) 20 MG tablet Take 1 tablet by mouth daily 30 tablet 3    lidocaine viscous hcl (XYLOCAINE) 2 % SOLN solution Take 15 mLs by mouth every 3 hours as needed for Pain 300 mL 1    fluticasone (FLONASE) 50 MCG/ACT nasal spray SPRAY ONE SPRAY IN EACH NOSTRIL EVERY DAY 16 g 1    NONFORMULARY EPI PEN PRN         Vitals:    01/18/23 1445   BP: 92/70   Weight: 137 lb (62.1 kg)   Height: 5' 1\" (1.549 m)     Body mass index is 25.89 kg/m².      Wt Readings from Last 3 Encounters:   01/18/23 137 lb (62.1 kg)   01/13/23 134 lb 12.8 oz (61.1 kg)   11/04/22 148 lb 3.2 oz (67.2 kg)     BP Readings from Last 3 Encounters:   01/18/23 92/70   01/13/23 124/84   11/04/22 128/62     Allergies   Allergen Reactions    Latex Hives and Itching     After surgery x2     Shellfish-Derived Products Anaphylaxis    Tape [Adhesive Tape] Rash     Paper tape OK     Iodides Other (See Comments)     pt states tachycardia    Tylenol [Acetaminophen]      History of hep c    Bactrim [Sulfamethoxazole-Trimethoprim] Nausea And Vomiting and Other (See Comments)    Codeine Nausea And Vomiting and Palpitations    Morphine Itching and Nausea And Vomiting     \"makes me crazy\"     Prednisone Hives, Palpitations and Rash     Current Outpatient Medications   Medication Sig Dispense Refill    azithromycin (ZITHROMAX) 250 MG tablet Take 1 tablet by mouth See Admin Instructions for 5 days 500mg on day 1 followed by 250mg on days 2 - 5 6 tablet 0    tiZANidine (ZANAFLEX) 4 MG tablet TAKE ONE TABLET BY MOUTH EVERY 6 HOURS AS NEEDED 30 tablet 2    propranolol (INDERAL) 20 MG tablet Take 1 tablet by mouth 3 times daily 90 tablet 2    albuterol sulfate HFA (PROVENTIL;VENTOLIN;PROAIR) 108 (90 Base) MCG/ACT inhaler inhale TWO PUFFS BY MOUTH FOUR TIMES DAILY AS NEEDED FOR WHEEZING 25.5 g 0    famotidine (PEPCID) 40 MG tablet Take 1 tablet by mouth 2 times daily 60 tablet 3    famciclovir (FAMVIR) 500 MG tablet TAKE ONE TABLET BY MOUTH THREE TIMES DAILY 30 tablet 1    ALPRAZolam (XANAX) 0.5 MG tablet TAKE ONE TABLET BY MOUTH EVERY 8 HOURS AS NEEDED 90 tablet 0    Tirzepatide (MOUNJARO) 5 MG/0.5ML SOPN SC injection Inject 0.5 mLs into the skin once a week 4 Adjustable Dose Pre-filled Pen Syringe 1    buPROPion (WELLBUTRIN XL) 150 MG extended release tablet Take 1 tablet by mouth every morning 30 tablet 3    Probiotic Acidophilus (FLORANEX) TABS Take 1 tablet by mouth daily 30 tablet 0    promethazine (PHENERGAN) 25 MG tablet Take 1 tablet by mouth every 8 hours as needed for Nausea 60 tablet 2    oxyCODONE HCl (OXY-IR) 10 MG immediate release tablet TAKE ONE TABLET BY MOUTH EVERY 6 HOURS      XTAMPZA ER 9 MG C12A TAKE ONE CAPSULE BY MOUTH WITH FOOD EVERY TWELVE HOURS      furosemide (LASIX) 20 MG tablet Take 1 tablet by mouth daily 30 tablet 3    lidocaine viscous hcl (XYLOCAINE) 2 % SOLN solution Take 15 mLs by mouth every 3 hours as needed for Pain 300 mL 1    fluticasone (FLONASE) 50 MCG/ACT nasal spray SPRAY ONE SPRAY IN EACH NOSTRIL EVERY DAY 16 g 1    NONFORMULARY EPI PEN PRN       No current facility-administered medications for this visit.      Past Medical History:   Diagnosis Date    Arrythmia     Asthma     no problems recently    Back pain     Breast cancer (HCC)     Breast lump     right    Cervical cancer (HCC)     Chronic hepatitis C (HCC)     Chronic pain     DDD (degenerative disc disease) Diverticulitis     Fibromyalgia     GERD (gastroesophageal reflux disease)     Headache     migraines    Immune deficiency disorder (HCC)     Interstitial cystitis     Nausea & vomiting     Osteoarthritis     Other closed fractures of distal end of radius (alone) 12/19/2009    distal radius fx surgery 12/28/2009 Dr. Carrera Ridnona    Pneumonia     PONV (postoperative nausea and vomiting)     scop patch and Phenergan work best     Rheumatoid arthritis(714.0)     Tachycardia      Past Surgical History:   Procedure Laterality Date    APPENDECTOMY      BLADDER SURGERY      BREAST ENHANCEMENT SURGERY Bilateral     BREAST SURGERY Bilateral 02/06/2020    REVISION BILATERAL BREAST RECONSTRUCTION; 1.7 CM SCAR REVISION performed by Dana Daniel MD at [address] Right 07/02/2014    removal of hardware    CHOLECYSTECTOMY      COLONOSCOPY      with polyectomy    CYSTOSCOPY  08/17/2011    with bladder and urethral dilatation    FAT TRANSFER Bilateral 02/06/2020    WITH HIGH VOLUME FAT GRAFTING performed by Dana Daniel MD at [place] [address] Bilateral 11/05/2020    BILATERAL BREAST FAT GRAFTING performed by Dana Daniel MD at [place] [address]  03/24/2017    repair of umbilical hernia with primary closure, exploration of LLQ subcutaneous space without definitive findings of lipoma    HYSTERECTOMY (CERVIX STATUS UNKNOWN)      HYSTERECTOMY, TOTAL ABDOMINAL (CERVIX REMOVED)      LAPAROSCOPIC APPENDECTOMY  03/18/2013    LAPAROSCOPY  03/18/2013    MASTECTOMY, BILATERAL Bilateral     NOSE SURGERY Bilateral 09/22/2020    OPEN SEPTORHINOPLASTY, BILATERAL NASAL VALVE REPAIR, BILATERAL INFERIOR TURBINATE REDUCTION performed by Santa Hopson MD at [address] / DELAYED W/ TISSUE EXPANDER Bilateral 09/26/2019    EXCHANGE OF BILATERAL BREAST IMPLANTS ; AND BILATERAL CAPSULECTOMY, REVISION BILATERAL BREAST RECONSTRUCTION [facility]      1/[facility]    UPPER GASTROINTESTINAL ENDOSCOPY  05/28/2010    UPPER GASTROINTESTINAL ENDOSCOPY  02/13/2013    WRIST FRACTURE SURGERY Right 12/28/2009    Open treatment with open reduction, internal fixation rightdistal radius using     Social History     Tobacco Use    Smoking status: Never    Smokeless tobacco: Never    Tobacco comments:     encouraged to never smoke    Vaping Use    Vaping Use: Never used   Substance Use Topics    Alcohol use: Yes     Alcohol/week: 2.0 standard drinks     Types: 1 Glasses of wine, 1 Drinks containing 0.5 oz of alcohol per week     Comment: Only drink on special occasions    Drug use: No     Family History   Problem Relation Age of Onset    Cancer Mother         Breast    Depression Mother     Asthma Mother     Cancer Father         lung    Arthritis Father     Heart Attack Father     Cancer Maternal Grandmother         breast    Asthma Other     Cancer Other     Heart Disease Other     Depression Paternal Grandmother     Emphysema Paternal Grandmother     Elevated Lipids Paternal Grandmother     Cancer Paternal Grandfather         lung    Cancer Maternal Grandfather         colon     Colon Cancer Maternal Grandfather     Cancer Paternal Aunt         Breast       PATIENT HAS NAUSEA AND HAS ASPIRATED WITH PAST SURGERY   PHENERGAN ONLY MED THAT WORKS   Objective:   Physical Exam  Vitals and nursing note reviewed. Constitutional:       Appearance: She is well-developed. HENT:      Head: Normocephalic. Neck:      Thyroid: No thyromegaly. Cardiovascular:      Rate and Rhythm: Normal rate and regular rhythm. Heart sounds: Normal heart sounds. Pulmonary:      Effort: Pulmonary effort is normal.      Breath sounds: Normal breath sounds. Lymphadenopathy:      Cervical: No cervical adenopathy.    Neurological:      Mental Status: She is alert and oriented to person, place, and time. Psychiatric:         Behavior: Behavior normal.         Thought Content:  Thought content normal.         Judgment: Judgment normal.     Ekg  normal sinus  no ischemia  Assessment:         Plan:      Assessment/plan;  Sulma was seen today for pre-op exam.    Diagnoses and all orders for this visit:    Pre-op exam  -     EKG 12 Lead - Clinic Performed    Capsular contracture of breast implant, sequela      Pt medically clear for surgery          Agustin Rios,

## 2023-01-30 DIAGNOSIS — M79.7 FIBROMYALGIA: ICD-10-CM

## 2023-01-30 DIAGNOSIS — E66.09 CLASS 1 OBESITY DUE TO EXCESS CALORIES WITH SERIOUS COMORBIDITY AND BODY MASS INDEX (BMI) OF 30.0 TO 30.9 IN ADULT: ICD-10-CM

## 2023-01-30 RX ORDER — TIZANIDINE 4 MG/1
TABLET ORAL
Qty: 30 TABLET | Refills: 2 | Status: SHIPPED | OUTPATIENT
Start: 2023-01-30

## 2023-01-30 RX ORDER — TIRZEPATIDE 5 MG/.5ML
5 INJECTION, SOLUTION SUBCUTANEOUS WEEKLY
Qty: 4 ADJUSTABLE DOSE PRE-FILLED PEN SYRINGE | Refills: 1 | Status: SHIPPED | OUTPATIENT
Start: 2023-01-30

## 2023-02-16 ENCOUNTER — OFFICE VISIT (OUTPATIENT)
Dept: SURGERY | Age: 43
End: 2023-02-16

## 2023-02-16 DIAGNOSIS — L98.8 AGE-RELATED FACIAL WRINKLES: Primary | ICD-10-CM

## 2023-02-16 PROCEDURE — DM00120 PR DERM ONLY BOTOX INJ LEVEL 3

## 2023-02-16 RX ORDER — VALACYCLOVIR HYDROCHLORIDE 500 MG/1
500 TABLET, FILM COATED ORAL 2 TIMES DAILY
Qty: 10 TABLET | Refills: 0 | Status: SHIPPED | OUTPATIENT
Start: 2023-02-16 | End: 2023-02-21

## 2023-02-16 NOTE — PROGRESS NOTES
[facility]    CC: Loss of facial volume & rhytids    HPI: This is a 43 y. o.female with a PMHx as delineated below who presents with the above described complaints. She wishes to proceed with botox today.      PMHx:   Past Medical History:   Diagnosis Date    Arrythmia     Asthma     no problems recently    Back pain     Breast cancer ([address]     Breast lump     right    Cervical cancer (HCC)     Chronic hepatitis C (HCC)     Chronic pain     DDD (degenerative disc disease)     Diverticulitis     Fibromyalgia     GERD (gastroesophageal reflux disease)     Headache     migraines    Immune deficiency disorder (HCC)     Interstitial cystitis     Nausea & vomiting     Osteoarthritis     Other closed fractures of distal end of radius (alone) 12/19/2009    distal radius fx surgery 12/28/2009 Dr. Maggie Holt    Pneumonia     PONV (postoperative nausea and vomiting)     scop patch and Phenergan work best     Rheumatoid arthritis(714.0)     Tachycardia      PSHx:   Past Surgical History:   Procedure Laterality Date    APPENDECTOMY      BLADDER SURGERY      BREAST ENHANCEMENT SURGERY Bilateral     BREAST SURGERY Bilateral 02/06/2020    REVISION BILATERAL BREAST RECONSTRUCTION; 1.7 CM SCAR REVISION performed by Sree Sparks MD at [address] [facility] Right 07/02/2014    removal of hardware    CHOLECYSTECTOMY      COLONOSCOPY      with polyectomy    CYSTOSCOPY  08/17/2011    with bladder and urethral dilatation    FAT TRANSFER Bilateral 02/06/2020    WITH HIGH VOLUME FAT GRAFTING performed by Sree Sparks MD at [facility] Bilateral 11/05/2020    BILATERAL BREAST FAT GRAFTING performed by Sree Sparks MD at [place] [address]  03/24/2017    repair of umbilical hernia with primary closure, exploration of LLQ subcutaneous space without definitive findings of lipoma    HYSTERECTOMY (CERVIX STATUS UNKNOWN)      HYSTERECTOMY, TOTAL ABDOMINAL (CERVIX REMOVED)      LAPAROSCOPIC APPENDECTOMY  03/18/2013    LAPAROSCOPY  03/18/2013    MASTECTOMY, BILATERAL Bilateral     NOSE SURGERY Bilateral 09/22/2020    OPEN SEPTORHINOPLASTY, BILATERAL NASAL VALVE REPAIR, BILATERAL INFERIOR TURBINATE REDUCTION performed by Beata Ang MD at [address] / DELAYED W/ TISSUE EXPANDER Bilateral 09/26/2019    EXCHANGE OF BILATERAL BREAST IMPLANTS ; AND BILATERAL CAPSULECTOMY, REVISION BILATERAL BREAST RECONSTRUCTION performed by Temi Abdi MD at [address] [facility] ENDOSCOPY      1/09    UPPER GASTROINTESTINAL ENDOSCOPY  05/28/2010    UPPER GASTROINTESTINAL ENDOSCOPY  02/13/2013    WRIST FRACTURE SURGERY Right 12/28/2009    Open treatment with open reduction, internal fixation rightdistal radius using     Allergy:   Allergies   Allergen Reactions    Latex Hives and Itching     After surgery x2     Shellfish-Derived Products Anaphylaxis    Tape [Adhesive Tape] Rash     Paper tape OK     Iodides Other (See Comments)     pt states tachycardia    Tylenol [Acetaminophen]      History of hep c    Bactrim [Sulfamethoxazole-Trimethoprim] Nausea And Vomiting and Other (See Comments)    Codeine Nausea And Vomiting and Palpitations    Morphine Itching and Nausea And Vomiting     \"makes me crazy\"     Prednisone Hives, Palpitations and Rash       SHx:   Social History     Socioeconomic History    Marital status:      Spouse name: Not on file    Number of children: 3    Years of education: Not on file    Highest education level: Not on file   Occupational History    Not on file   Tobacco Use    Smoking status: Never    Smokeless tobacco: Never    Tobacco comments:     encouraged to never smoke    Vaping Use    Vaping Use: Never used   Substance and Sexual Activity    Alcohol use:  Yes     Alcohol/week: 2.0 standard drinks     Types: 1 Glasses of wine, 1 Drinks containing 0.5 oz of alcohol per week     Comment: Only drink on special occasions    Drug use: No    Sexual activity: Yes     Partners: Male   Other Topics Concern    Not on file   Social History Narrative    Not on file     Social Determinants of Health     Financial Resource Strain: Low Risk     Difficulty of Paying Living Expenses: Not hard at all   Food Insecurity: No Food Insecurity    Worried About Running Out of Food in the Last Year: Never true    Ran Out of Food in the Last Year: Never true   Transportation Needs: Not on file   Physical Activity: Not on file   Stress: Not on file   Social Connections: Not on file   Intimate Partner Violence: Not on file   Housing Stability: Not on file     FHx: Noncontributory    Use of anticoagulation: No  Prior injections: No; complications with injections: n/a  Herpetic outbreak: No; She has hx of herpetic outbreak    Meds:   Current Outpatient Medications   Medication Sig Dispense Refill    tiZANidine (ZANAFLEX) 4 MG tablet TAKE ONE TABLET BY MOUTH EVERY 6 HOURS AS NEEDED 30 tablet 2    Tirzepatide (MOUNJARO) 5 MG/0.5ML SOPN SC injection Inject 0.5 mLs into the skin once a week 4 Adjustable Dose Pre-filled Pen Syringe 1    propranolol (INDERAL) 20 MG tablet Take 1 tablet by mouth 3 times daily 90 tablet 2    albuterol sulfate HFA (PROVENTIL;VENTOLIN;PROAIR) 108 (90 Base) MCG/ACT inhaler inhale TWO PUFFS BY MOUTH FOUR TIMES DAILY AS NEEDED FOR WHEEZING 25.5 g 0    famotidine (PEPCID) 40 MG tablet Take 1 tablet by mouth 2 times daily 60 tablet 3    famciclovir (FAMVIR) 500 MG tablet TAKE ONE TABLET BY MOUTH THREE TIMES DAILY 30 tablet 1    buPROPion (WELLBUTRIN XL) 150 MG extended release tablet Take 1 tablet by mouth every morning 30 tablet 3    oxyCODONE HCl (OXY-IR) 10 MG immediate release tablet TAKE ONE TABLET BY MOUTH EVERY 6 HOURS      XTAMPZA ER 9 MG C12A TAKE ONE CAPSULE BY MOUTH WITH FOOD EVERY TWELVE HOURS      furosemide (LASIX) 20 MG tablet Take 1 tablet by mouth daily 30 tablet 3    lidocaine viscous hcl (XYLOCAINE) 2 % SOLN solution Take 15 mLs by mouth every 3 hours as needed for Pain 300 mL 1    fluticasone (FLONASE) 50 MCG/ACT nasal spray SPRAY ONE SPRAY IN EACH NOSTRIL EVERY DAY 16 g 1    NONFORMULARY EPI PEN PRN       No current facility-administered medications for this visit. EXAM    FACE: Transverse frontalis rhytids: Mild   Glabellar rhytids: Mild   Periorbital rhytids: Mild   Nasolabial folds: Mild   Perioral rhytids: Mild   Marionette lines: Mild    IMP: 43 y. o.female with facial aging who presents with desire for nonsurgical intervention  PLAN: 50 units of Botox administered to the gabellas and frontalis. Lot number of Botox: [id number]; [address] code: [id number]; date of expiration: 10/24      PROCEDURE NOTE    The patient signed informed consent discussing the risks (not limited to bleeding and or bruising, infection, allergy, unacceptable cosmetic appearance, ptosis, drooling, blindness, and even death) were discussed in detail with the patient. After all questions were answered in a satisfactory manner as deemed by the patient, she agreed to proceed. The patient was prepped with ice packs and alcohol pads. Injections were performed in the standard fashion according to the plan as delineated above. The patient tolerated the injections without difficulty. There were no immediate complications.     DONALD Orta-CNP  [facility]  02/16/23

## 2023-02-28 ENCOUNTER — OFFICE VISIT (OUTPATIENT)
Dept: SURGERY | Age: 43
End: 2023-02-28

## 2023-02-28 DIAGNOSIS — L98.8 AGE-RELATED FACIAL WRINKLES: Primary | ICD-10-CM

## 2023-02-28 PROCEDURE — MISCPNC PLASTICS VISIT NO CHARGE

## 2023-02-28 RX ORDER — VALACYCLOVIR HYDROCHLORIDE 500 MG/1
500 TABLET, FILM COATED ORAL 2 TIMES DAILY
Qty: 14 TABLET | Refills: 0 | Status: SHIPPED | OUTPATIENT
Start: 2023-02-28

## 2023-02-28 NOTE — PROGRESS NOTES
[facility]    CC: Loss of facial volume & rhytids    HPI: This is a 43 y. o.female with a PMHx as delineated below who presents with the above described complaints. She is here following up after procedure two weeks ago.      PMHx:   Past Medical History:   Diagnosis Date    Arrythmia     Asthma     no problems recently    Back pain     Breast cancer ([address]     Breast lump     right    Cervical cancer (HCC)     Chronic hepatitis C (HCC)     Chronic pain     DDD (degenerative disc disease)     Diverticulitis     Fibromyalgia     GERD (gastroesophageal reflux disease)     Headache     migraines    Immune deficiency disorder (HCC)     Interstitial cystitis     Nausea & vomiting     Osteoarthritis     Other closed fractures of distal end of radius (alone) 12/19/2009    distal radius fx surgery 12/28/2009 Dr. Bruce Kauffman    Pneumonia     PONV (postoperative nausea and vomiting)     scop patch and Phenergan work best     Rheumatoid arthritis(714.0)     Tachycardia      PSHx:   Past Surgical History:   Procedure Laterality Date    APPENDECTOMY      BLADDER SURGERY      BREAST ENHANCEMENT SURGERY Bilateral     BREAST SURGERY Bilateral 02/06/2020    REVISION BILATERAL BREAST RECONSTRUCTION; 1.7 CM SCAR REVISION performed by Chris Jesus MD at [address] [facility] Right 07/02/2014    removal of hardware    CHOLECYSTECTOMY      COLONOSCOPY      with polyectomy    CYSTOSCOPY  08/17/2011    with bladder and urethral dilatation    FAT TRANSFER Bilateral 02/06/2020    WITH HIGH VOLUME FAT GRAFTING performed by Chris Jesus MD at [place] [address] Bilateral 11/05/2020    BILATERAL BREAST FAT GRAFTING performed by Chris Jesus MD at [address] [place] [address]  03/24/2017    repair of umbilical hernia with primary closure, exploration of LLQ subcutaneous space without definitive findings of lipoma    HYSTERECTOMY (CERVIX STATUS UNKNOWN)      HYSTERECTOMY, TOTAL ABDOMINAL (CERVIX REMOVED)      LAPAROSCOPIC APPENDECTOMY  03/18/2013    LAPAROSCOPY  03/18/2013    MASTECTOMY, BILATERAL Bilateral     NOSE SURGERY Bilateral 09/22/2020    OPEN SEPTORHINOPLASTY, BILATERAL NASAL VALVE REPAIR, BILATERAL INFERIOR TURBINATE REDUCTION performed by Shivam Gaspar MD at [address] / DELAYED W/ TISSUE EXPANDER Bilateral 09/26/2019    EXCHANGE OF BILATERAL BREAST IMPLANTS ; AND BILATERAL CAPSULECTOMY, REVISION BILATERAL BREAST RECONSTRUCTION performed by Gail Camacho MD at [address] [facility] ENDOSCOPY      1/09    UPPER GASTROINTESTINAL ENDOSCOPY  05/28/2010    UPPER GASTROINTESTINAL ENDOSCOPY  02/13/2013    WRIST FRACTURE SURGERY Right 12/28/2009    Open treatment with open reduction, internal fixation rightdistal radius using     Allergy:   Allergies   Allergen Reactions    Latex Hives and Itching     After surgery x2     Shellfish-Derived Products Anaphylaxis    Tape [Adhesive Tape] Rash     Paper tape OK     Iodides Other (See Comments)     pt states tachycardia    Tylenol [Acetaminophen]      History of hep c    Bactrim [Sulfamethoxazole-Trimethoprim] Nausea And Vomiting and Other (See Comments)    Codeine Nausea And Vomiting and Palpitations    Morphine Itching and Nausea And Vomiting     \"makes me crazy\"     Prednisone Hives, Palpitations and Rash       SHx:   Social History     Socioeconomic History    Marital status:      Spouse name: Not on file    Number of children: 3    Years of education: Not on file    Highest education level: Not on file   Occupational History    Not on file   Tobacco Use    Smoking status: Never    Smokeless tobacco: Never    Tobacco comments:     encouraged to never smoke    Vaping Use    Vaping Use: Never used   Substance and Sexual Activity    Alcohol use:  Yes     Alcohol/week: 2.0 standard drinks     Types: 1 Glasses of wine, 1 Drinks containing 0.5 oz of alcohol per week     Comment: Only drink on special occasions    Drug use: No    Sexual activity: Yes     Partners: Male   Other Topics Concern    Not on file   Social History Narrative    Not on file     Social Determinants of Health     Financial Resource Strain: Low Risk     Difficulty of Paying Living Expenses: Not hard at all   Food Insecurity: No Food Insecurity    Worried About Running Out of Food in the Last Year: Never true    Ran Out of Food in the Last Year: Never true   Transportation Needs: Not on file   Physical Activity: Not on file   Stress: Not on file   Social Connections: Not on file   Intimate Partner Violence: Not on file   Housing Stability: Not on file     FHx: Noncontributory    Use of anticoagulation: No  Prior injections: Yes; complications with injections: no   Herpetic outbreak: No    Meds:   Current Outpatient Medications   Medication Sig Dispense Refill    tiZANidine (ZANAFLEX) 4 MG tablet TAKE ONE TABLET BY MOUTH EVERY 6 HOURS AS NEEDED 30 tablet 2    Tirzepatide (MOUNJARO) 5 MG/0.5ML SOPN SC injection Inject 0.5 mLs into the skin once a week 4 Adjustable Dose Pre-filled Pen Syringe 1    propranolol (INDERAL) 20 MG tablet Take 1 tablet by mouth 3 times daily 90 tablet 2    albuterol sulfate HFA (PROVENTIL;VENTOLIN;PROAIR) 108 (90 Base) MCG/ACT inhaler inhale TWO PUFFS BY MOUTH FOUR TIMES DAILY AS NEEDED FOR WHEEZING 25.5 g 0    famotidine (PEPCID) 40 MG tablet Take 1 tablet by mouth 2 times daily 60 tablet 3    famciclovir (FAMVIR) 500 MG tablet TAKE ONE TABLET BY MOUTH THREE TIMES DAILY 30 tablet 1    buPROPion (WELLBUTRIN XL) 150 MG extended release tablet Take 1 tablet by mouth every morning 30 tablet 3    oxyCODONE HCl (OXY-IR) 10 MG immediate release tablet TAKE ONE TABLET BY MOUTH EVERY 6 HOURS      XTAMPZA ER 9 MG C12A TAKE ONE CAPSULE BY MOUTH WITH FOOD EVERY TWELVE HOURS      furosemide (LASIX) 20 MG tablet Take 1 tablet by mouth daily 30 tablet 3 lidocaine viscous hcl (XYLOCAINE) 2 % SOLN solution Take 15 mLs by mouth every 3 hours as needed for Pain 300 mL 1    fluticasone (FLONASE) 50 MCG/ACT nasal spray SPRAY ONE SPRAY IN EACH NOSTRIL EVERY DAY 16 g 1    NONFORMULARY EPI PEN PRN       No current facility-administered medications for this visit. EXAM    FACE: Transverse frontalis rhytids: Mild   Glabellar rhytids: Mild   Periorbital rhytids: Mild   Nasolabial folds: Mild   Perioral rhytids: Mild   Marionette lines: Mild    IMP: 43 y. o.female with facial aging who presents with desire for nonsurgical intervention  PLAN: Patient is happy with outcome. All of her bruising has resolved. No charge today.      DONALD Bradford-CNP  [facility]  02/28/23

## 2023-03-01 DIAGNOSIS — F41.1 GENERALIZED ANXIETY DISORDER: ICD-10-CM

## 2023-03-01 RX ORDER — ALPRAZOLAM 0.5 MG/1
TABLET ORAL
Qty: 90 TABLET | Refills: 0 | Status: SHIPPED | OUTPATIENT
Start: 2023-03-01 | End: 2023-04-01

## 2023-03-01 NOTE — TELEPHONE ENCOUNTER
Last office visit 1/18/2023     Last written      Next office visit scheduled Visit date not found    Requested Prescriptions     Pending Prescriptions Disp Refills    ALPRAZolam (XANAX) 0.5 MG tablet [Pharmacy Med Name: alprazolam 0.5 mg tablet] 90 tablet 0     Sig: TAKE ONE TABLET BY MOUTH EVERY 8 HOURS AS NEEDED

## 2023-03-13 ENCOUNTER — OFFICE VISIT (OUTPATIENT)
Dept: SURGERY | Age: 43
End: 2023-03-13

## 2023-03-13 DIAGNOSIS — L98.8 AGE-RELATED FACIAL WRINKLES: Primary | ICD-10-CM

## 2023-03-13 PROCEDURE — MISCPNC PLASTICS VISIT NO CHARGE

## 2023-03-13 RX ORDER — VALACYCLOVIR HYDROCHLORIDE 500 MG/1
500 TABLET, FILM COATED ORAL 2 TIMES DAILY
Qty: 7 TABLET | Refills: 0 | Status: SHIPPED | OUTPATIENT
Start: 2023-03-13

## 2023-03-13 NOTE — PROGRESS NOTES
[facility]    CC: Loss of facial volume & rhytids    HPI: This is a 43 y. o.female with a PMHx as delineated below who presents with the above described complaints. She is here following up after procedure several weeks ago.      PMHx:   Past Medical History:   Diagnosis Date    Arrythmia     Asthma     no problems recently    Back pain     Breast cancer ([address]     Breast lump     right    Cervical cancer (HCC)     Chronic hepatitis C (HCC)     Chronic pain     DDD (degenerative disc disease)     Diverticulitis     Fibromyalgia     GERD (gastroesophageal reflux disease)     Headache     migraines    Immune deficiency disorder (HCC)     Interstitial cystitis     Nausea & vomiting     Osteoarthritis     Other closed fractures of distal end of radius (alone) 12/19/2009    distal radius fx surgery 12/28/2009 Dr. Catalan [place]    Pneumonia     PONV (postoperative nausea and vomiting)     scop patch and Phenergan work best     Rheumatoid arthritis(714.0)     Tachycardia      PSHx:   Past Surgical History:   Procedure Laterality Date    APPENDECTOMY      BLADDER SURGERY      BREAST ENHANCEMENT SURGERY Bilateral     BREAST SURGERY Bilateral 02/06/2020    REVISION BILATERAL BREAST RECONSTRUCTION; 1.7 CM SCAR REVISION performed by Mohsen Zacarias MD at [address] [facility] Right 07/02/2014    removal of hardware    CHOLECYSTECTOMY      COLONOSCOPY      with polyectomy    CYSTOSCOPY  08/17/2011    with bladder and urethral dilatation    FAT TRANSFER Bilateral 02/06/2020    WITH HIGH VOLUME FAT GRAFTING performed by Mohsen Zacarias MD at [facility] Bilateral 11/05/2020    BILATERAL BREAST FAT GRAFTING performed by Mohsen Zacarias MD at [address] [place] [address] [place]  03/24/2017    repair of umbilical hernia with primary closure, exploration of LLQ subcutaneous space without definitive findings of lipoma    HYSTERECTOMY (CERVIX STATUS UNKNOWN)      HYSTERECTOMY, TOTAL ABDOMINAL (CERVIX REMOVED)      LAPAROSCOPIC APPENDECTOMY  03/18/2013    LAPAROSCOPY  03/18/2013    MASTECTOMY, BILATERAL Bilateral     NOSE SURGERY Bilateral 09/22/2020    OPEN SEPTORHINOPLASTY, BILATERAL NASAL VALVE REPAIR, BILATERAL INFERIOR TURBINATE REDUCTION performed by Alvin Rubalcava MD at [facility] OR    RECONSTRUCTION BREAST IMMEDIATE / DELAYED W/ TISSUE EXPANDER Bilateral 09/26/2019    EXCHANGE OF BILATERAL BREAST IMPLANTS ; AND BILATERAL CAPSULECTOMY, REVISION BILATERAL BREAST RECONSTRUCTION performed by Mercedes Paula MD at [facility] OR    TONSILLECTOMY AND ADENOIDECTOMY      UMBILICAL HERNIA REPAIR      UPPER GASTROINTESTINAL ENDOSCOPY      1/09    UPPER GASTROINTESTINAL ENDOSCOPY  05/28/2010    UPPER GASTROINTESTINAL ENDOSCOPY  02/13/2013    WRIST FRACTURE SURGERY Right 12/28/2009    Open treatment with open reduction, internal fixation rightdistal radius using     Allergy:   Allergies   Allergen Reactions    Latex Hives and Itching     After surgery x2     Shellfish-Derived Products Anaphylaxis    Tape [Adhesive Tape] Rash     Paper tape OK     Iodides Other (See Comments)     pt states tachycardia    Tylenol [Acetaminophen]      History of hep c    Bactrim [Sulfamethoxazole-Trimethoprim] Nausea And Vomiting and Other (See Comments)    Codeine Nausea And Vomiting and Palpitations    Morphine Itching and Nausea And Vomiting     \"makes me crazy\"     Prednisone Hives, Palpitations and Rash       SHx:   Social History     Socioeconomic History    Marital status:      Spouse name: Not on file    Number of children: 3    Years of education: Not on file    Highest education level: Not on file   Occupational History    Not on file   Tobacco Use    Smoking status: Never    Smokeless tobacco: Never    Tobacco comments:     encouraged to never smoke    Vaping Use    Vaping Use: Never used   Substance and Sexual Activity    Alcohol use: Yes     Alcohol/week: 2.0 standard drinks     Types: 1 Glasses of wine, 1  Drinks containing 0.5 oz of alcohol per week     Comment: Only drink on special occasions    Drug use: No    Sexual activity: Yes     Partners: Male   Other Topics Concern    Not on file   Social History Narrative    Not on file     Social Determinants of Health     Financial Resource Strain: Low Risk     Difficulty of Paying Living Expenses: Not hard at all   Food Insecurity: No Food Insecurity    Worried About Running Out of Food in the Last Year: Never true    Ran Out of Food in the Last Year: Never true   Transportation Needs: Not on file   Physical Activity: Not on file   Stress: Not on file   Social Connections: Not on file   Intimate Partner Violence: Not on file   Housing Stability: Not on file     FHx: Noncontributory    Use of anticoagulation: No  Prior injections: Yes; complications with injections: no   Herpetic outbreak: No    Meds:   Current Outpatient Medications   Medication Sig Dispense Refill    ALPRAZolam (XANAX) 0.5 MG tablet TAKE ONE TABLET BY MOUTH EVERY 8 HOURS AS NEEDED 90 tablet 0    valACYclovir (VALTREX) 500 MG tablet Take 1 tablet by mouth 2 times daily 14 tablet 0    tiZANidine (ZANAFLEX) 4 MG tablet TAKE ONE TABLET BY MOUTH EVERY 6 HOURS AS NEEDED 30 tablet 2    Tirzepatide (MOUNJARO) 5 MG/0.5ML SOPN SC injection Inject 0.5 mLs into the skin once a week 4 Adjustable Dose Pre-filled Pen Syringe 1    propranolol (INDERAL) 20 MG tablet Take 1 tablet by mouth 3 times daily 90 tablet 2    albuterol sulfate HFA (PROVENTIL;VENTOLIN;PROAIR) 108 (90 Base) MCG/ACT inhaler inhale TWO PUFFS BY MOUTH FOUR TIMES DAILY AS NEEDED FOR WHEEZING 25.5 g 0    famotidine (PEPCID) 40 MG tablet Take 1 tablet by mouth 2 times daily 60 tablet 3    famciclovir (FAMVIR) 500 MG tablet TAKE ONE TABLET BY MOUTH THREE TIMES DAILY 30 tablet 1    buPROPion (WELLBUTRIN XL) 150 MG extended release tablet Take 1 tablet by mouth every morning 30 tablet 3    oxyCODONE HCl (OXY-IR) 10 MG immediate release tablet TAKE ONE TABLET BY MOUTH EVERY 6 HOURS      XTAMPZA ER 9 MG C12A TAKE ONE CAPSULE BY MOUTH WITH FOOD EVERY TWELVE HOURS      furosemide (LASIX) 20 MG tablet Take 1 tablet by mouth daily 30 tablet 3    lidocaine viscous hcl (XYLOCAINE) 2 % SOLN solution Take 15 mLs by mouth every 3 hours as needed for Pain 300 mL 1    fluticasone (FLONASE) 50 MCG/ACT nasal spray SPRAY ONE SPRAY IN EACH NOSTRIL EVERY DAY 16 g 1    NONFORMULARY EPI PEN PRN       No current facility-administered medications for this visit.       EXAM    FACE: Transverse frontalis rhytids: Mild   Glabellar rhytids: Mild   Periorbital rhytids: Mild   Nasolabial folds: Mild   Perioral rhytids: Mild   Marionette lines: Mild    IMP: 42 y.o.female with facial aging who presents with desire for nonsurgical intervention  PLAN: Patient is happy with outcome. All of her bruising has resolved.    No charge today.     DONALD Chavez-CNP  [facility]  03/13/23

## 2023-03-22 ENCOUNTER — OFFICE VISIT (OUTPATIENT)
Dept: SURGERY | Age: 43
End: 2023-03-22

## 2023-03-22 DIAGNOSIS — L98.8 AGE-RELATED FACIAL WRINKLES: Primary | ICD-10-CM

## 2023-03-22 PROCEDURE — MISCPNC PLASTICS VISIT NO CHARGE

## 2023-03-22 NOTE — PROGRESS NOTES
0.5 oz of alcohol per week     Comment: Only drink on special occasions    Drug use: No    Sexual activity: Yes     Partners: Male   Other Topics Concern    Not on file   Social History Narrative    Not on file     Social Determinants of Health     Financial Resource Strain: Low Risk     Difficulty of Paying Living Expenses: Not hard at all   Food Insecurity: No Food Insecurity    Worried About Running Out of Food in the Last Year: Never true    Ran Out of Food in the Last Year: Never true   Transportation Needs: Not on file   Physical Activity: Not on file   Stress: Not on file   Social Connections: Not on file   Intimate Partner Violence: Not on file   Housing Stability: Not on file     FHx: Noncontributory    Use of anticoagulation: No  Prior injections: Yes; complications with injections: No  Herpetic outbreak: No    Meds:   Current Outpatient Medications   Medication Sig Dispense Refill    valACYclovir (VALTREX) 500 MG tablet Take 1 tablet by mouth 2 times daily 7 tablet 0    ALPRAZolam (XANAX) 0.5 MG tablet TAKE ONE TABLET BY MOUTH EVERY 8 HOURS AS NEEDED 90 tablet 0    valACYclovir (VALTREX) 500 MG tablet Take 1 tablet by mouth 2 times daily 14 tablet 0    tiZANidine (ZANAFLEX) 4 MG tablet TAKE ONE TABLET BY MOUTH EVERY 6 HOURS AS NEEDED 30 tablet 2    Tirzepatide (MOUNJARO) 5 MG/0.5ML SOPN SC injection Inject 0.5 mLs into the skin once a week 4 Adjustable Dose Pre-filled Pen Syringe 1    propranolol (INDERAL) 20 MG tablet Take 1 tablet by mouth 3 times daily 90 tablet 2    albuterol sulfate HFA (PROVENTIL;VENTOLIN;PROAIR) 108 (90 Base) MCG/ACT inhaler inhale TWO PUFFS BY MOUTH FOUR TIMES DAILY AS NEEDED FOR WHEEZING 25.5 g 0    famotidine (PEPCID) 40 MG tablet Take 1 tablet by mouth 2 times daily 60 tablet 3    famciclovir (FAMVIR) 500 MG tablet TAKE ONE TABLET BY MOUTH THREE TIMES DAILY 30 tablet 1    buPROPion (WELLBUTRIN XL) 150 MG extended release tablet Take 1 tablet by mouth every morning 30 tablet 3

## 2023-04-04 DIAGNOSIS — F41.1 GENERALIZED ANXIETY DISORDER: ICD-10-CM

## 2023-04-04 RX ORDER — ALPRAZOLAM 0.5 MG/1
TABLET ORAL
Qty: 90 TABLET | Refills: 0 | Status: SHIPPED | OUTPATIENT
Start: 2023-04-04 | End: 2023-05-04

## 2023-04-18 ENCOUNTER — OFFICE VISIT (OUTPATIENT)
Dept: SURGERY | Age: 43
End: 2023-04-18
Payer: COMMERCIAL

## 2023-04-18 ENCOUNTER — TELEPHONE (OUTPATIENT)
Dept: SURGERY | Age: 43
End: 2023-04-18

## 2023-04-18 VITALS
RESPIRATION RATE: 16 BRPM | OXYGEN SATURATION: 99 % | BODY MASS INDEX: 24.55 KG/M2 | WEIGHT: 130 LBS | SYSTOLIC BLOOD PRESSURE: 108 MMHG | DIASTOLIC BLOOD PRESSURE: 75 MMHG | TEMPERATURE: 98.3 F | HEIGHT: 61 IN | HEART RATE: 80 BPM

## 2023-04-18 DIAGNOSIS — M62.838 LEVATOR SPASM: ICD-10-CM

## 2023-04-18 DIAGNOSIS — G89.29 CHRONIC COCCYGEAL PAIN: ICD-10-CM

## 2023-04-18 DIAGNOSIS — K58.0 IRRITABLE BOWEL SYNDROME WITH DIARRHEA: Primary | ICD-10-CM

## 2023-04-18 DIAGNOSIS — M53.3 CHRONIC COCCYGEAL PAIN: ICD-10-CM

## 2023-04-18 PROCEDURE — 99204 OFFICE O/P NEW MOD 45 MIN: CPT | Performed by: SURGERY

## 2023-04-18 NOTE — PROGRESS NOTES
cystitis     Nausea & vomiting     Osteoarthritis     Other closed fractures of distal end of radius (alone) 12/19/2009    distal radius fx surgery 12/28/2009 Dr. Lester Carter    Pneumonia     PONV (postoperative nausea and vomiting)     scop patch and Phenergan work best     Rheumatoid arthritis(714.0)     Tachycardia        Past Surgical History:   Procedure Laterality Date    APPENDECTOMY      BLADDER SURGERY      BREAST ENHANCEMENT SURGERY Bilateral     BREAST SURGERY Bilateral 02/06/2020    REVISION BILATERAL BREAST RECONSTRUCTION; 1.7 CM SCAR REVISION performed by Ivon Conner MD at [address] Right 07/02/2014    removal of hardware    CHOLECYSTECTOMY      COLONOSCOPY      with polyectomy    CYSTOSCOPY  08/17/2011    with bladder and urethral dilatation    FAT TRANSFER Bilateral 02/06/2020    WITH HIGH VOLUME FAT GRAFTING performed by Ivon Conner MD at [place] [address] Bilateral 11/05/2020    BILATERAL BREAST FAT GRAFTING performed by Ivon Conner MD at [place] [address]  03/24/2017    repair of umbilical hernia with primary closure, exploration of LLQ subcutaneous space without definitive findings of lipoma    HYSTERECTOMY (CERVIX STATUS UNKNOWN)      HYSTERECTOMY, TOTAL ABDOMINAL (CERVIX REMOVED)      LAPAROSCOPIC APPENDECTOMY  03/18/2013    LAPAROSCOPY  03/18/2013    MASTECTOMY, BILATERAL Bilateral     NOSE SURGERY Bilateral 09/22/2020    OPEN SEPTORHINOPLASTY, BILATERAL NASAL VALVE REPAIR, BILATERAL INFERIOR TURBINATE REDUCTION performed by [place] MD at [address] / DELAYED W/ TISSUE EXPANDER Bilateral 09/26/2019    EXCHANGE OF BILATERAL BREAST IMPLANTS ; AND BILATERAL CAPSULECTOMY, REVISION BILATERAL BREAST RECONSTRUCTION performed by Ivon Conner MD at [place] [address]      1/09    UPPER GASTROINTESTINAL

## 2023-04-18 NOTE — TELEPHONE ENCOUNTER
Spoke to [facility] to order compound ointment consisting of Nifedipine 0.3%/Lidocaine 5% 30 grams with instructions to apply a pea size amount BID and 2 additional refills.

## 2023-06-21 DIAGNOSIS — M79.7 FIBROMYALGIA: ICD-10-CM

## 2023-06-21 DIAGNOSIS — F41.1 GENERALIZED ANXIETY DISORDER: ICD-10-CM

## 2023-06-21 RX ORDER — TIZANIDINE 4 MG/1
TABLET ORAL
Qty: 30 TABLET | Refills: 2 | OUTPATIENT
Start: 2023-06-21

## 2023-06-21 RX ORDER — ALPRAZOLAM 0.5 MG/1
TABLET ORAL
Qty: 90 TABLET | Refills: 0 | OUTPATIENT
Start: 2023-06-21 | End: 2023-07-21

## 2023-06-21 NOTE — TELEPHONE ENCOUNTER
----- Message from La Nenaabhishek Lam sent at 6/21/2023 11:17 AM EDT -----  Subject: Refill Request    QUESTIONS  Name of Medication? ALPRAZolam (XANAX) 0.5 MG tablet  Patient-reported dosage and instructions? TAKE ONE TABLET BY MOUTH EVERY 8   HOURS AS NEEDED  How many days do you have left? 0  Preferred Pharmacy? [facility] phone number (if available)? [phone]  Additional Information for Provider? Last seen 01/18/23 next appt no   upcoming  ---------------------------------------------------------------------------  --------------,  Name of Medication? tiZANidine (ZANAFLEX) 4 MG tablet  Patient-reported dosage and instructions? TAKE ONE TABLET BY MOUTH EVERY 6   HOURS AS NEEDED  How many days do you have left? 0  Preferred Pharmacy? [facility] phone number (if available)? [phone]  Additional Information for Provider? Last seen 01/18/23 next appt no   upcoming  ---------------------------------------------------------------------------  --------------  CALL BACK INFO  What is the best way for the office to contact you? OK to leave message on   voicemail,Do not leave any message, patient will call back for answer,OK   to respond with electronic message via [employer] portal (only for patients   who have registered [employer] account)  Preferred Call Back Phone Number? [phone]  ---------------------------------------------------------------------------  --------------  SCRIPT ANSWERS  Relationship to Patient?  Self

## 2023-06-25 ENCOUNTER — HOSPITAL ENCOUNTER (EMERGENCY)
Age: 43
Discharge: HOME | End: 2023-06-25
Payer: COMMERCIAL

## 2023-06-25 ENCOUNTER — APPOINTMENT (OUTPATIENT)
Dept: GENERAL RADIOLOGY | Age: 43
End: 2023-06-25
Payer: COMMERCIAL

## 2023-06-25 VITALS
DIASTOLIC BLOOD PRESSURE: 71 MMHG | TEMPERATURE: 97.2 F | WEIGHT: 138.89 LBS | RESPIRATION RATE: 18 BRPM | OXYGEN SATURATION: 99 % | BODY MASS INDEX: 27.27 KG/M2 | HEIGHT: 60 IN | SYSTOLIC BLOOD PRESSURE: 107 MMHG | HEART RATE: 79 BPM

## 2023-06-25 DIAGNOSIS — S62.306A CLOSED DISPLACED FRACTURE OF FIFTH METACARPAL BONE OF RIGHT HAND, UNSPECIFIED PORTION OF METACARPAL, INITIAL ENCOUNTER: Primary | ICD-10-CM

## 2023-06-25 PROCEDURE — 99283 EMERGENCY DEPT VISIT LOW MDM: CPT

## 2023-06-25 PROCEDURE — 6370000000 HC RX 637 (ALT 250 FOR IP): Performed by: PHYSICIAN ASSISTANT

## 2023-06-25 PROCEDURE — 73110 X-RAY EXAM OF WRIST: CPT

## 2023-06-25 PROCEDURE — 29125 APPL SHORT ARM SPLINT STATIC: CPT

## 2023-06-25 PROCEDURE — 73130 X-RAY EXAM OF HAND: CPT

## 2023-06-25 RX ORDER — ONDANSETRON 4 MG/1
4 TABLET, ORALLY DISINTEGRATING ORAL ONCE
Status: COMPLETED | OUTPATIENT
Start: 2023-06-25 | End: 2023-06-25

## 2023-06-25 RX ORDER — IBUPROFEN 800 MG/1
800 TABLET ORAL EVERY 8 HOURS PRN
Qty: 30 TABLET | Refills: 0 | Status: SHIPPED | OUTPATIENT
Start: 2023-06-25

## 2023-06-25 RX ORDER — PROMETHAZINE HYDROCHLORIDE 25 MG/1
25 TABLET ORAL EVERY 6 HOURS PRN
Qty: 15 TABLET | Refills: 0 | Status: SHIPPED | OUTPATIENT
Start: 2023-06-25 | End: 2023-07-02

## 2023-06-25 RX ADMIN — ONDANSETRON 4 MG: 4 TABLET, ORALLY DISINTEGRATING ORAL at 11:22

## 2023-06-25 ASSESSMENT — PAIN - FUNCTIONAL ASSESSMENT
PAIN_FUNCTIONAL_ASSESSMENT: 0-10
PAIN_FUNCTIONAL_ASSESSMENT: 0-10

## 2023-06-25 ASSESSMENT — PAIN DESCRIPTION - PAIN TYPE: TYPE: ACUTE PAIN

## 2023-06-25 ASSESSMENT — PAIN DESCRIPTION - DESCRIPTORS: DESCRIPTORS: ACHING

## 2023-06-25 ASSESSMENT — PAIN SCALES - GENERAL
PAINLEVEL_OUTOF10: 9
PAINLEVEL_OUTOF10: 9

## 2023-06-25 ASSESSMENT — VISUAL ACUITY
OD: 20/30
OU: 20/40

## 2023-06-25 ASSESSMENT — PAIN DESCRIPTION - ORIENTATION: ORIENTATION: LEFT

## 2023-06-25 ASSESSMENT — PAIN DESCRIPTION - LOCATION
LOCATION: HAND
LOCATION: HAND

## 2023-06-28 ENCOUNTER — OFFICE VISIT (OUTPATIENT)
Dept: ORTHOPEDIC SURGERY | Age: 43
End: 2023-06-28
Payer: COMMERCIAL

## 2023-06-28 VITALS — WEIGHT: 138 LBS | RESPIRATION RATE: 16 BRPM | BODY MASS INDEX: 27.09 KG/M2 | HEIGHT: 60 IN

## 2023-06-28 DIAGNOSIS — M25.532 LEFT WRIST PAIN: Primary | ICD-10-CM

## 2023-06-28 DIAGNOSIS — S62.367A CLOSED NONDISPLACED FRACTURE OF NECK OF FIFTH METACARPAL BONE OF LEFT HAND, INITIAL ENCOUNTER: ICD-10-CM

## 2023-06-28 PROCEDURE — 29075 APPL CST ELBW FNGR SHORT ARM: CPT | Performed by: NURSE PRACTITIONER

## 2023-06-28 PROCEDURE — 99243 OFF/OP CNSLTJ NEW/EST LOW 30: CPT | Performed by: NURSE PRACTITIONER

## 2023-06-29 ENCOUNTER — TELEPHONE (OUTPATIENT)
Dept: ORTHOPEDIC SURGERY | Age: 43
End: 2023-06-29

## 2023-06-29 NOTE — TELEPHONE ENCOUNTER
Wrong Provider. Changed to [facility]. Patient wants to let Antoinette Burkitt know her  is coming to  the patches per discussion with Antoinette Burkitt on the phone.  He will arrive at 3:30-4:00pm [place].

## 2023-06-29 NOTE — TELEPHONE ENCOUNTER
General Question     Subject: LEFT WRIST   Patient and /or Facility Request: Carissa Claysteph  Contact Number: [phone]    PATIENT CALLING WANTING TO KNOW IF IT WOULD BE OK IF HER  PICKED UP HER SKIN PATCHES DOCTOR JORDY IS GOING TO PROVIDE HER     PATIENT STATES THAT HER  WAS GOING TO SWING BY AND PICK THEM UP AFTER WORK BETWEEN 3:30PM AND 4:00PM TODAY AT THE [place] LOCATION     PLEASE ADVISE

## 2023-06-30 ENCOUNTER — OFFICE VISIT (OUTPATIENT)
Dept: FAMILY MEDICINE CLINIC | Age: 43
End: 2023-06-30
Payer: COMMERCIAL

## 2023-06-30 VITALS — BODY MASS INDEX: 27.09 KG/M2 | HEIGHT: 60 IN | WEIGHT: 138 LBS

## 2023-06-30 DIAGNOSIS — M79.7 FIBROMYALGIA: ICD-10-CM

## 2023-06-30 DIAGNOSIS — F41.1 GENERALIZED ANXIETY DISORDER: Primary | ICD-10-CM

## 2023-06-30 PROCEDURE — 99213 OFFICE O/P EST LOW 20 MIN: CPT | Performed by: FAMILY MEDICINE

## 2023-06-30 RX ORDER — TIZANIDINE 4 MG/1
TABLET ORAL
Qty: 30 TABLET | Refills: 2 | Status: SHIPPED | OUTPATIENT
Start: 2023-06-30

## 2023-06-30 RX ORDER — ALPRAZOLAM 0.5 MG/1
TABLET ORAL
Qty: 90 TABLET | Refills: 2 | Status: SHIPPED | OUTPATIENT
Start: 2023-06-30 | End: 2023-07-30

## 2023-06-30 SDOH — ECONOMIC STABILITY: FOOD INSECURITY: WITHIN THE PAST 12 MONTHS, THE FOOD YOU BOUGHT JUST DIDN'T LAST AND YOU DIDN'T HAVE MONEY TO GET MORE.: NEVER TRUE

## 2023-06-30 SDOH — ECONOMIC STABILITY: HOUSING INSECURITY
IN THE LAST 12 MONTHS, WAS THERE A TIME WHEN YOU DID NOT HAVE A STEADY PLACE TO SLEEP OR SLEPT IN A SHELTER (INCLUDING NOW)?: NO

## 2023-06-30 SDOH — ECONOMIC STABILITY: FOOD INSECURITY: WITHIN THE PAST 12 MONTHS, YOU WORRIED THAT YOUR FOOD WOULD RUN OUT BEFORE YOU GOT MONEY TO BUY MORE.: NEVER TRUE

## 2023-06-30 SDOH — ECONOMIC STABILITY: INCOME INSECURITY: HOW HARD IS IT FOR YOU TO PAY FOR THE VERY BASICS LIKE FOOD, HOUSING, MEDICAL CARE, AND HEATING?: NOT HARD AT ALL

## 2023-06-30 ASSESSMENT — ENCOUNTER SYMPTOMS
RESPIRATORY NEGATIVE: 1
GASTROINTESTINAL NEGATIVE: 1

## 2023-07-13 ENCOUNTER — OFFICE VISIT (OUTPATIENT)
Dept: ORTHOPEDIC SURGERY | Age: 43
End: 2023-07-13

## 2023-07-13 VITALS — HEIGHT: 60 IN | BODY MASS INDEX: 27.09 KG/M2 | WEIGHT: 138 LBS

## 2023-07-13 DIAGNOSIS — S62.337A CLOSED DISPLACED FRACTURE OF NECK OF FIFTH METACARPAL BONE OF LEFT HAND, INITIAL ENCOUNTER: Primary | ICD-10-CM

## 2023-07-13 PROBLEM — S62.367A CLOSED NONDISPLACED FRACTURE OF NECK OF FIFTH METACARPAL BONE OF LEFT HAND: Status: ACTIVE | Noted: 2023-07-13

## 2023-08-07 ENCOUNTER — APPOINTMENT (OUTPATIENT)
Dept: CT IMAGING | Age: 43
End: 2023-08-07
Payer: COMMERCIAL

## 2023-08-07 ENCOUNTER — HOSPITAL ENCOUNTER (EMERGENCY)
Age: 43
Discharge: HOME | End: 2023-08-07
Attending: EMERGENCY MEDICINE
Payer: COMMERCIAL

## 2023-08-07 VITALS
TEMPERATURE: 98.4 F | HEART RATE: 71 BPM | WEIGHT: 138 LBS | DIASTOLIC BLOOD PRESSURE: 79 MMHG | RESPIRATION RATE: 16 BRPM | SYSTOLIC BLOOD PRESSURE: 111 MMHG | BODY MASS INDEX: 26.95 KG/M2 | OXYGEN SATURATION: 100 %

## 2023-08-07 DIAGNOSIS — R10.84 GENERALIZED ABDOMINAL PAIN: Primary | ICD-10-CM

## 2023-08-07 LAB
ANION GAP SERPL CALCULATED.3IONS-SCNC: 13 MMOL/L (ref 3–16)
BASOPHILS # BLD: 0 K/UL (ref 0–0.2)
BASOPHILS NFR BLD: 0.5 %
BILIRUB UR QL STRIP.AUTO: NEGATIVE
BUN SERPL-MCNC: 7 MG/DL (ref 7–20)
CALCIUM SERPL-MCNC: 10 MG/DL (ref 8.3–10.6)
CHLORIDE SERPL-SCNC: 106 MMOL/L (ref 99–110)
CLARITY UR: CLEAR
CO2 SERPL-SCNC: 24 MMOL/L (ref 21–32)
COLOR UR: YELLOW
CREAT SERPL-MCNC: 0.8 MG/DL (ref 0.6–1.1)
DEPRECATED RDW RBC AUTO: 13.4 % (ref 12.4–15.4)
EOSINOPHIL # BLD: 0.3 K/UL (ref 0–0.6)
EOSINOPHIL NFR BLD: 2.8 %
GFR SERPLBLD CREATININE-BSD FMLA CKD-EPI: >60 ML/MIN/{1.73_M2}
GLUCOSE SERPL-MCNC: 90 MG/DL (ref 70–99)
GLUCOSE UR STRIP.AUTO-MCNC: NEGATIVE MG/DL
HCT VFR BLD AUTO: 41.8 % (ref 36–48)
HGB BLD-MCNC: 14.1 G/DL (ref 12–16)
HGB UR QL STRIP.AUTO: NEGATIVE
KETONES UR STRIP.AUTO-MCNC: NEGATIVE MG/DL
LEUKOCYTE ESTERASE UR QL STRIP.AUTO: NEGATIVE
LIPASE SERPL-CCNC: 20 U/L (ref 13–60)
LYMPHOCYTES # BLD: 2.7 K/UL (ref 1–5.1)
LYMPHOCYTES NFR BLD: 30.1 %
MCH RBC QN AUTO: 30.5 PG (ref 26–34)
MCHC RBC AUTO-ENTMCNC: 33.8 G/DL (ref 31–36)
MCV RBC AUTO: 90.4 FL (ref 80–100)
MONOCYTES # BLD: 0.5 K/UL (ref 0–1.3)
MONOCYTES NFR BLD: 5.1 %
NEUTROPHILS # BLD: 5.6 K/UL (ref 1.7–7.7)
NEUTROPHILS NFR BLD: 61.5 %
NITRITE UR QL STRIP.AUTO: NEGATIVE
PH UR STRIP.AUTO: 6 [PH] (ref 5–8)
PLATELET # BLD AUTO: 250 K/UL (ref 135–450)
PMV BLD AUTO: 7.6 FL (ref 5–10.5)
POTASSIUM SERPL-SCNC: 4 MMOL/L (ref 3.5–5.1)
PROT UR STRIP.AUTO-MCNC: NEGATIVE MG/DL
RBC # BLD AUTO: 4.62 M/UL (ref 4–5.2)
SODIUM SERPL-SCNC: 143 MMOL/L (ref 136–145)
SP GR UR STRIP.AUTO: 1.01 (ref 1–1.03)
UA COMPLETE W REFLEX CULTURE PNL UR: NORMAL
UA DIPSTICK W REFLEX MICRO PNL UR: NORMAL
URN SPEC COLLECT METH UR: NORMAL
UROBILINOGEN UR STRIP-ACNC: 0.2 E.U./DL
WBC # BLD AUTO: 9.1 K/UL (ref 4–11)

## 2023-08-07 PROCEDURE — 80048 BASIC METABOLIC PNL TOTAL CA: CPT

## 2023-08-07 PROCEDURE — 6360000004 HC RX CONTRAST MEDICATION: Performed by: EMERGENCY MEDICINE

## 2023-08-07 PROCEDURE — 74177 CT ABD & PELVIS W/CONTRAST: CPT

## 2023-08-07 PROCEDURE — 83690 ASSAY OF LIPASE: CPT

## 2023-08-07 PROCEDURE — 6360000002 HC RX W HCPCS

## 2023-08-07 PROCEDURE — 81003 URINALYSIS AUTO W/O SCOPE: CPT

## 2023-08-07 PROCEDURE — 85025 COMPLETE CBC W/AUTO DIFF WBC: CPT

## 2023-08-07 PROCEDURE — 99285 EMERGENCY DEPT VISIT HI MDM: CPT

## 2023-08-07 PROCEDURE — 96375 TX/PRO/DX INJ NEW DRUG ADDON: CPT

## 2023-08-07 PROCEDURE — 96374 THER/PROPH/DIAG INJ IV PUSH: CPT

## 2023-08-07 RX ORDER — DIPHENHYDRAMINE HYDROCHLORIDE 50 MG/ML
25 INJECTION INTRAMUSCULAR; INTRAVENOUS ONCE
Status: COMPLETED | OUTPATIENT
Start: 2023-08-07 | End: 2023-08-07

## 2023-08-07 RX ORDER — DICYCLOMINE HCL 20 MG
20 TABLET ORAL EVERY 6 HOURS
Qty: 40 TABLET | Refills: 0 | Status: SHIPPED | OUTPATIENT
Start: 2023-08-07 | End: 2023-08-17

## 2023-08-07 RX ORDER — DIPHENHYDRAMINE HYDROCHLORIDE 50 MG/ML
25 INJECTION INTRAMUSCULAR; INTRAVENOUS ONCE
Status: DISCONTINUED | OUTPATIENT
Start: 2023-08-07 | End: 2023-08-07

## 2023-08-07 RX ORDER — ONDANSETRON 2 MG/ML
4 INJECTION INTRAMUSCULAR; INTRAVENOUS EVERY 6 HOURS PRN
Status: DISCONTINUED | OUTPATIENT
Start: 2023-08-07 | End: 2023-08-07 | Stop reason: HOSPADM

## 2023-08-07 RX ADMIN — ONDANSETRON 4 MG: 2 INJECTION INTRAMUSCULAR; INTRAVENOUS at 20:25

## 2023-08-07 RX ADMIN — DIPHENHYDRAMINE HYDROCHLORIDE 25 MG: 50 INJECTION INTRAMUSCULAR; INTRAVENOUS at 20:46

## 2023-08-07 RX ADMIN — HYDROMORPHONE HYDROCHLORIDE 0.5 MG: 1 INJECTION, SOLUTION INTRAMUSCULAR; INTRAVENOUS; SUBCUTANEOUS at 20:25

## 2023-08-07 RX ADMIN — IOPAMIDOL 75 ML: 755 INJECTION, SOLUTION INTRAVENOUS at 20:54

## 2023-08-07 ASSESSMENT — ENCOUNTER SYMPTOMS
ABDOMINAL DISTENTION: 0
COUGH: 0
NAUSEA: 0
ALLERGIC/IMMUNOLOGIC NEGATIVE: 1
VOMITING: 0
DIARRHEA: 0
ABDOMINAL PAIN: 0
WHEEZING: 0
CHEST TIGHTNESS: 0
SHORTNESS OF BREATH: 0
CONSTIPATION: 0
PHOTOPHOBIA: 0
BACK PAIN: 0
EYE PAIN: 0

## 2023-08-07 ASSESSMENT — PAIN DESCRIPTION - FREQUENCY: FREQUENCY: INTERMITTENT

## 2023-08-07 ASSESSMENT — PAIN SCALES - GENERAL
PAINLEVEL_OUTOF10: 2
PAINLEVEL_OUTOF10: 8

## 2023-08-07 ASSESSMENT — PAIN DESCRIPTION - PAIN TYPE: TYPE: ACUTE PAIN

## 2023-08-07 ASSESSMENT — PAIN - FUNCTIONAL ASSESSMENT: PAIN_FUNCTIONAL_ASSESSMENT: 0-10

## 2023-08-07 ASSESSMENT — PAIN DESCRIPTION - LOCATION: LOCATION: ABDOMEN

## 2023-08-07 NOTE — ED TRIAGE NOTES
Pt comes to the ED reporting that today she has had severe abd pain that is making it where she cannot eat. Pt states on 7/18 she got a colonoscopy and was told she has hemorrhagic gastritis. Her PCP told her to come here to get a CT scan and additional treatment.

## 2023-08-08 NOTE — ED PROVIDER NOTES
ED Attending Attestation Note     Date of evaluation: 8/7/2023    This patient was seen by the advance practice provider. I have seen and examined the patient, agree with the workup, evaluation, management and diagnosis. The care plan has been discussed. My assessment reveals a 72-year-old female who presents with a chief complaint of abdominal pain. Patient with 1 year of abdominal pain, diagnosed with IBS as well as hemorrhagic gastritis from recent colonoscopy. General: This is a wd/wn female  in no acute distress who appears their stated age. HEENT: NC/AT. PERRL. OP clear. MMM. Neck: Supple. Trachea midline. Pulmonary: Normal work of breathing, not using accessory muscles or pursed lip breathing    Cardiac: RRR    Abdomen: non peritoneal    Musculoskeletal: WWP with no clubbing, cyanosis, or deformities noted. Vascular: +2 radial and DP pulses. Skin: Warm and dry with no lesions noted    Neuro:  AAOx4. Face is grossly symmetric. Gait not assessed.  Moving all 4 extremities equally and spontaneously     Kira Abdi MD  08/07/23 7450
appendicitis, vascular catastrophe, bowel obstruction, viscus perforation or diverticulitis ovarian torsion. Presentation not consistent with other acute emergent causes of abdominal pain at this time. CBC without any acute elevated leukocytosis, or new anemias. Lipase within normal limits. BMP is without any acute electrolyte abnormalities bicarbonate disturbances or anion gap elevations. Patient is stable BUN and creatinine as well. Urinalysis is free and clear of any infection. CT of abdomen does not show any acute intra-abdominal ischemic, infectious process    I feel the patient can be managed as an outpatient with follow up. Instructions have been given for the patient to return to the ED for worsening of the pain, high fevers, intractable vomiting, or bleeding. Patient be discharged home in stable condition with family at the bedside. Is this patient to be included in the SEP-1 core measure? No Exclusion criteria - the patient is NOT to be included for SEP-1 Core Measure due to: Infection is not suspected    Medical Decision Making  Amount and/or Complexity of Data Reviewed  External Data Reviewed: labs and notes. Labs: ordered. Decision-making details documented in ED Course. Radiology: ordered. Decision-making details documented in ED Course. ECG/medicine tests: ordered. Decision-making details documented in ED Course. Risk  Prescription drug management. This patient was also evaluated by the attending physician. All care plans werediscussed and agreed upon. Clinical Impression     1. Generalized abdominal pain        Disposition     PATIENT REFERRED TO:  Walker Hopkins DO  [address] [facility]  [phone]            DISCHARGE MEDICATIONS:  New Prescriptions    DICYCLOMINE (BENTYL) 20 MG TABLET    Take 1 tablet by mouth in the morning and 1 tablet at noon and 1 tablet in the evening and 1 tablet before bedtime. Do all this for 10 days.

## 2023-09-01 DIAGNOSIS — F41.1 GENERALIZED ANXIETY DISORDER: ICD-10-CM

## 2023-09-01 RX ORDER — ALPRAZOLAM 0.5 MG/1
TABLET ORAL
Qty: 90 TABLET | Refills: 2 | Status: SHIPPED | OUTPATIENT
Start: 2023-09-01 | End: 2023-09-30

## 2023-09-01 NOTE — TELEPHONE ENCOUNTER
Last office visit 6/30/2023     Last written     Next office visit scheduled Visit date not found    Requested Prescriptions     Pending Prescriptions Disp Refills    ALPRAZolam (XANAX) 0.5 MG tablet [Pharmacy Med Name: alprazolam 0.5 mg tablet] 90 tablet 2     Sig: TAKE ONE TABLET BY MOUTH EVERY 8 HOURS AS NEEDED

## 2023-09-05 ENCOUNTER — TELEPHONE (OUTPATIENT)
Dept: FAMILY MEDICINE CLINIC | Age: 43
End: 2023-09-05

## 2023-09-05 RX ORDER — DEXTROMETHORPHAN HYDROBROMIDE AND PROMETHAZINE HYDROCHLORIDE 15; 6.25 MG/5ML; MG/5ML
5 SYRUP ORAL 4 TIMES DAILY PRN
Qty: 180 ML | Refills: 0 | Status: SHIPPED | OUTPATIENT
Start: 2023-09-05 | End: 2023-09-15

## 2023-09-05 NOTE — TELEPHONE ENCOUNTER
Pt called stating she just left a [place], and she tested positive for the new strand of Covid. She stated she needs cough syrup prescribed due to her coughing so much, she is throwing up. She'd like this sent to [facility] on [address] [place] [address]

## 2023-10-13 NOTE — PROGRESS NOTES
Dr Salazar's note    Patient's instructions / PLAN:                                                        Plan:  Increase Jardiance to 25 mg daily  2. Lisinopril -- if you are taking it continue to take it. I f you are not taking it do not start it  3. Continue the other meds, same doses for now.  4. Please make a lab appointment for non fasting labs  in March 5. Please make an appointment few days after the labs to discuss about the results.           ASSESSMENT & PLAN:                                                      (Z00.00) Routine general medical examination at a health care facility  (primary encounter diagnosis)  Comment:   Plan:     (I10) HTN, goal below 140/90  Comment: Controlled  Plan: lisinopril (ZESTRIL) 5 MG tablet            (E11.65) DM 2, no insulin (H)  Comment: Controlled A1c is higher  Plan: empagliflozin (JARDIANCE) 25 MG TABS tablet,         metFORMIN (GLUCOPHAGE) 1000 MG tablet,         sitagliptin (JANUVIA) 100 MG tablet, lisinopril        (ZESTRIL) 5 MG tablet, metFORMIN (GLUCOPHAGE         XR) 500 MG 24 hr tablet, Hemoglobin A1c            (E78.5) Hyperlipidemia LDL goal <100 #  Comment: Controlled  Plan: rosuvastatin (CRESTOR) 20 MG tablet            (C50.111,  Z17.0) Malignant neoplasm of central portion of right breast in female, estrogen receptor positive (H)  Comment: Stable, in remission  Plan: Follow-up with oncology       Chief Complaint:                                                        Annual exam  Follow up chronic medical problems      SUBJECTIVE:                                                    History of present illness     We reviewed the chronic medical problems as above.   I reviewed the recent tests results in Epic     Labs Sept 28 -- Discussed     DM  -- A1c little higher  -- she admits to dietary changes  -- she doesn't want any meds changes, she will work more on the diet      ROS:     See below        PMHx: - reviewed  Past Medical History:   Diagnosis Date  aSubjective:      Patient ID: Anish Avila is a 40 y.o. female. HPI  Leaking breast implant  Having breast implants replaced   3/20/18  [facility]  S/p bilat mastectomy  Review of Systems   Constitutional: Negative. Respiratory: Negative. Cardiovascular: Negative. Gastrointestinal: Negative. Skin: Negative. Neurological: Negative. YOB: 1980    Date of Visit:  2/22/2018    Allergies   Allergen Reactions    Shellfish-Derived Products Shortness Of Breath    Tape Melburn Coins Tape] Rash    Tylenol [Acetaminophen]      History of hep c    Codeine Nausea And Vomiting and Palpitations    Morphine Itching and Nausea And Vomiting     \"makes me crazy\"     Prednisone Hives, Palpitations and Rash       Outpatient Prescriptions Marked as Taking for the 2/22/18 encounter (Office Visit) with Zacarias Cheung DO   Medication Sig Dispense Refill    naproxen (NAPROSYN) 500 MG tablet Take 1 tablet by mouth 2 times daily as needed for Pain 30 tablet 0    ondansetron (ZOFRAN ODT) 4 MG disintegrating tablet Take 1 tablet by mouth every 8 hours as needed for Nausea Let dissolve in mouth. 10 tablet 0    tiZANidine (ZANAFLEX) 4 MG tablet Take 1 tablet by mouth every 8 hours as needed (muscle spasm) 30 tablet 1    phenazopyridine (PYRIDIUM) 200 MG tablet Take 1 tablet by mouth 3 times daily as needed for Pain 30 tablet 2    hydrocortisone (ANUSOL-HC) 2.5 % rectal cream Place rectally 2 times daily. 1 Tube 0    Phentermine-Topiramate (QSYMIA) 3.75-23 MG CP24 Take 1 capsule by mouth daily .  30 capsule 0    buPROPion (WELLBUTRIN SR) 150 MG extended release tablet Take 1 tablet by mouth 2 times daily 60 tablet 3    ALPRAZolam (XANAX) 0.5 MG tablet Take 1 tablet by mouth 2 times daily as needed for Sleep 60 tablet 2    promethazine (PHENERGAN) 25 MG suppository Place 0.5 suppositories rectally every 6 hours as needed for Nausea 12 suppository 0    famciclovir (FAMVIR) 500 MG tablet Take    Anxiety     Breast cancer (H)     Cancer (H)     Diabetes (H)     History of blood transfusion     Hyperlipidemia LDL goal <100     Hypertension     Papanicolaou smear of cervix with low grade squamous intraepithelial lesion (LGSIL) 2017 LSIL/Neg HPV. Plan: cotest in 1 year       PSHx: reviewed  Past Surgical History:   Procedure Laterality Date    BIOPSY      BREAST SURGERY      COLONOSCOPY N/A 2019    Procedure: COLONOSCOPY;  Surgeon: Nam Shah MD;  Location: [facility]    DILATION AND CURETTAGE, OPERATIVE HYSTEROSCOPY, COMBINED N/A 2018    Procedure: COMBINED DILATION AND CURETTAGE, OPERATIVE HYSTEROSCOPY;  Hysteroscopy, Dilation and Curettage;  Surgeon: Lashawn Hernandez MD;  Location: [facility] OR    GYN SURGERY      HYSTERECTOMY, PAP NO LONGER INDICATED      MASTECTOMY          Soc Hx: No daily alcohol, no smoking  Social History     Socioeconomic History    Marital status:      Spouse name: Not on file    Number of children: Not on file    Years of education: Not on file    Highest education level: Not on file   Occupational History    Not on file   Tobacco Use    Smoking status: Former     Types: Cigarettes     Quit date: 8/3/1997     Years since quittin.2     Passive exposure: Past    Smokeless tobacco: Never   Vaping Use    Vaping Use: Never used   Substance and Sexual Activity    Alcohol use: Yes     Comment: socially    Drug use: No    Sexual activity: Yes     Partners: Male   Other Topics Concern    Parent/sibling w/ CABG, MI or angioplasty before 65F 55M? Not Asked   Social History Narrative    Not on file     Social Determinants of Health     Financial Resource Strain: Low Risk  (10/13/2023)    Financial Resource Strain     Within the past 12 months, have you or your family members you live with been unable to get utilities (heat, electricity) when it was really needed?: No   Food Insecurity: Low Risk  (10/13/2023)    Food Insecurity     Within the past 12  months, did you worry that your food would run out before you got money to buy more?: No     Within the past 12 months, did the food you bought just not last and you didn t have money to get more?: No   Transportation Needs: Low Risk  (10/13/2023)    Transportation Needs     Within the past 12 months, has lack of transportation kept you from medical appointments, getting your medicines, non-medical meetings or appointments, work, or from getting things that you need?: No   Physical Activity: Not on file   Stress: Not on file   Social Connections: Not on file   Interpersonal Safety: Low Risk  (10/13/2023)    Interpersonal Safety     Do you feel physically and emotionally safe where you currently live?: Yes     Within the past 12 months, have you been hit, slapped, kicked or otherwise physically hurt by someone?: No     Within the past 12 months, have you been humiliated or emotionally abused in other ways by your partner or ex-partner?: No   Housing Stability: Low Risk  (10/13/2023)    Housing Stability     Do you have housing? : Yes     Are you worried about losing your housing?: No        Fam Hx: reviewed  Family History   Problem Relation Age of Onset    Other - See Comments Mother         hysterectomy for benign    Diabetes Father          during bilateral leg amputation     Other - See Comments Sister         hysterectomy for benign    Myocardial Infarction Maternal Grandmother 40    Kidney Disease Paternal Grandmother         On dialysis    Cardiac Sudden Death Paternal Grandfather 60         during angiogram    Diabetes Brother 40    Breast Cancer No family hx of     Colon Cancer No family hx of     Ovarian Cancer No family hx of          Screening: reviewed      All: reviewed    Meds: reviewed  Current Outpatient Medications   Medication Sig Dispense Refill    apremilast (OTEZLA) 30 MG tablet Take 30 mg by mouth 2 times daily      empagliflozin (JARDIANCE) 10 MG TABS tablet Take 1 tablet (10 mg) by  "mouth daily 90 tablet 1    letrozole (FEMARA) 2.5 MG tablet Take 1 tablet (2.5 mg) by mouth daily      lisinopril (ZESTRIL) 10 MG tablet Take 1/2 (one-half) tablet by mouth once daily Strength: 10 mg 45 tablet 1    metFORMIN (GLUCOPHAGE) 1000 MG tablet TAKE 1 TABLET TWICE A DAY WITH MEALS 180 tablet 1    rosuvastatin (CRESTOR) 20 MG tablet Take 1 tablet (20 mg) by mouth daily 90 tablet 1    sitagliptin (JANUVIA) 100 MG tablet TAKE 1 TABLET DAILY (NEED TO BE SEEN, DUE FOR ANNUAL EXAM WITH DR. SALAZAR) 90 tablet 1    Venlafaxine HCl (EFFEXOR PO) Take 50 mg by mouth daily         OBJECTIVE:                                                    Physical Exam :  Blood pressure (!) 143/82, pulse 111, temperature (!) 96.5  F (35.8  C), temperature source Tympanic, resp. rate 18, height 1.657 m (5' 5.25\"), weight 66.4 kg (146 lb 6.4 oz), last menstrual period 06/05/2017, SpO2 99%, not currently breastfeeding.     NAD, appears comfortable  Skin clear, no rashes  Neck: supple, no JVD,  no thyroidmegaly  Lymph nodes non palpable in the cervical, supraclavicular axillaries,   Chest: clear to auscultation with good respiratory effort  Cardiac: S1S2, RRR, no mgr appreciated  Abdomen: soft, not tender, not distended, audible bowel sound, no hepatosplenomegaly, no palpable masses, no abdominal bruits  Extremities: no cyanosis, clubbing or edema.   Neuro: A, Ox3, no focal signs.  Breast exam deferred    Pelvic exam: deferred, s/p hysterectomy         Archana Salazar MD  Internal Medicine          SUBJECTIVE:   CC: Cheryl is an 61 year old who presents for preventive health visit.       10/13/2023     8:41 AM   Additional Questions   Roomed by Sita Aquino       Healthy Habits:     Getting at least 3 servings of Calcium per day:  Yes    Bi-annual eye exam:  NO    Dental care twice a year:  NO    Sleep apnea or symptoms of sleep apnea:  None    Diet:  Diabetic    Duration of exercise:  Less than 15 minutes    Taking medications regularly:  " Yes    Medication side effects:  None    Additional concerns today:  No            Hyperlipidemia Follow-Up    Are you regularly taking any medication or supplement to lower your cholesterol?   Yes- rosuvastatin  Are you having muscle aches or other side effects that you think could be caused by your cholesterol lowering medication?  No    Hypertension Follow-up    Do you check your blood pressure regularly outside of the clinic? No   Are you following a low salt diet? No  Are your blood pressures ever more than 140 on the top number (systolic) OR more   than 90 on the bottom number (diastolic), for example 140/90? Yes systolic      Social History     Tobacco Use    Smoking status: Former     Types: Cigarettes     Quit date: 8/3/1997     Years since quittin.2     Passive exposure: Past    Smokeless tobacco: Never   Substance Use Topics    Alcohol use: Yes     Comment: socially             2/15/2022     3:12 PM   Alcohol Use   Prescreen: >3 drinks/day or >7 drinks/week? No     Reviewed orders with patient.  Reviewed health maintenance and updated orders accordingly - Yes  Labs reviewed in EPIC    Breast Cancer Screening:  Any new diagnosis of family breast, ovarian, or bowel cancer? No    FHS-7:       2/15/2022     3:13 PM 2022     2:16 PM   Breast CA Risk Assessment (FHS-7)   Did any of your first-degree relatives have breast or ovarian cancer? No No   Did any of your relatives have bilateral breast cancer? No No   Did any man in your family have breast cancer? No No   Did any woman in your family have breast and ovarian cancer? Yes No   Did any woman in your family have breast cancer before age 50 y? Yes No   Do you have 2 or more relatives with breast and/or ovarian cancer? No No   Do you have 2 or more relatives with breast and/or bowel cancer? No No         Pertinent mammograms are reviewed under the imaging tab.    History of abnormal Pap smear:       Latest Ref Rng & Units 2019     2:35 PM  2/25/2019     1:57 PM 8/18/2017    10:47 AM   PAP / HPV   PAP (Historical)  NIL   LSIL    HPV 16 DNA NEG^Negative  Negative     HPV 18 DNA NEG^Negative  Negative     Other HR HPV NEG^Negative  Negative       Reviewed and updated as needed this visit by clinical staff                  Reviewed and updated as needed this visit by Provider                     Review of Systems   Constitutional:  Negative for chills and fever.   HENT:  Negative for congestion, ear pain, hearing loss and sore throat.    Eyes:  Negative for pain and visual disturbance.   Respiratory:  Negative for cough and shortness of breath.    Cardiovascular:  Negative for chest pain, palpitations and peripheral edema.   Gastrointestinal:  Negative for abdominal pain, constipation, diarrhea, heartburn, hematochezia and nausea.   Breasts:  Negative for tenderness, breast mass and discharge.   Genitourinary:  Negative for dysuria, frequency, genital sores, hematuria, pelvic pain, urgency, vaginal bleeding and vaginal discharge.   Musculoskeletal:  Negative for arthralgias, joint swelling and myalgias.   Skin:  Negative for rash.   Neurological:  Negative for dizziness, weakness, headaches and paresthesias.   Psychiatric/Behavioral:  Negative for mood changes. The patient is not nervous/anxious.          Patient has been advised of split billing requirements and indicates understanding: Yes At the check in, at the        COUNSELING:  Reviewed preventive health counseling, as reflected in patient instructions       Regular exercise       Healthy diet/nutrition        She reports that she quit smoking about 26 years ago. Her smoking use included cigarettes. She has been exposed to tobacco smoke. She has never used smokeless tobacco.          Archana Chen MD  [facility]

## 2023-11-09 ENCOUNTER — TELEPHONE (OUTPATIENT)
Dept: FAMILY MEDICINE CLINIC | Age: 43
End: 2023-11-09

## 2023-11-09 RX ORDER — FLUCONAZOLE 150 MG/1
150 TABLET ORAL ONCE
Qty: 1 TABLET | Refills: 0 | Status: SHIPPED | OUTPATIENT
Start: 2023-11-09 | End: 2023-11-09

## 2023-11-09 NOTE — TELEPHONE ENCOUNTER
Patient called requesting medication for a vaginal yeast infection be called to [facility] 6/30/23  No upcoming appt scheduled

## 2023-12-15 DIAGNOSIS — R00.2 HEART PALPITATIONS: ICD-10-CM

## 2023-12-15 DIAGNOSIS — M79.7 FIBROMYALGIA: ICD-10-CM

## 2023-12-15 RX ORDER — PROMETHAZINE HYDROCHLORIDE 25 MG/1
TABLET ORAL
Qty: 60 TABLET | Refills: 2 | Status: SHIPPED | OUTPATIENT
Start: 2023-12-15

## 2023-12-15 RX ORDER — PROPRANOLOL HYDROCHLORIDE 20 MG/1
20 TABLET ORAL 3 TIMES DAILY
Qty: 90 TABLET | Refills: 2 | Status: SHIPPED | OUTPATIENT
Start: 2023-12-15

## 2023-12-15 RX ORDER — TIZANIDINE 4 MG/1
TABLET ORAL
Qty: 30 TABLET | Refills: 2 | Status: SHIPPED | OUTPATIENT
Start: 2023-12-15

## 2024-01-04 ENCOUNTER — TELEPHONE (OUTPATIENT)
Dept: ADMINISTRATIVE | Age: 44
End: 2024-01-04

## 2024-01-04 NOTE — TELEPHONE ENCOUNTER
Tried submitting PA for Zepbound 5MG/0.5ML pen-injectors and Eligibility could not be verified for this patient - patient not found. Please reach out to patient for NEW PRESCRIPTION coverage.     Thank you

## 2024-01-10 NOTE — TELEPHONE ENCOUNTER
The medication was DENIED;N/A    This request cannot be processed due to the medication is not covered by the plan.    If you want an APPEAL; please note in this encounter what new information you would like to APPEAL with.  Once complete route back to PA POOL.    If this requires a response please respond to the pool ( P MHCX PSC MEDICATION PRE-AUTH).      Thank you please advise patient.

## 2024-01-24 ENCOUNTER — TELEPHONE (OUTPATIENT)
Dept: FAMILY MEDICINE CLINIC | Age: 44
End: 2024-01-24

## 2024-01-24 RX ORDER — LIDOCAINE HYDROCHLORIDE 20 MG/ML
15 SOLUTION OROPHARYNGEAL PRN
Qty: 200 ML | Refills: 0 | Status: SHIPPED | OUTPATIENT
Start: 2024-01-24

## 2024-01-24 NOTE — TELEPHONE ENCOUNTER
Pt called to let Dr Wood know that she has blister in her throat. She was told to just call back and let Dr Wood know. She states it is hard to swallow and blister in the roof of her mouth and the back of her throat. Please give pt a call [phone] [facility] [phone]

## 2024-01-24 NOTE — TELEPHONE ENCOUNTER
I sent in mouth rinse to help numb up her mouth  these are usually viral and have to run their course

## 2024-02-25 NOTE — PROGRESS NOTES
Patient has discharge order in for today. Patient denies any concerns with discharge. Discharge paperwork,medications and follow up appointment  reviewed with patient. Patient verbalized understanding. PIV and telemetry removed. Required core measures completed. None

## 2024-02-29 ENCOUNTER — HOSPITAL ENCOUNTER (OUTPATIENT)
Dept: GENERAL RADIOLOGY | Age: 44
Discharge: HOME | End: 2024-02-29
Payer: COMMERCIAL

## 2024-02-29 ENCOUNTER — HOSPITAL ENCOUNTER (OUTPATIENT)
Age: 44
Discharge: HOME | End: 2024-02-29
Payer: COMMERCIAL

## 2024-02-29 DIAGNOSIS — M54.10 RADICULOPATHY, UNSPECIFIED SPINAL REGION: ICD-10-CM

## 2024-02-29 PROCEDURE — 72100 X-RAY EXAM L-S SPINE 2/3 VWS: CPT

## 2024-03-19 RX ORDER — VALACYCLOVIR HYDROCHLORIDE 500 MG/1
500 TABLET, FILM COATED ORAL 2 TIMES DAILY
Qty: 7 TABLET | Refills: 0 | OUTPATIENT
Start: 2024-03-19

## 2024-03-20 ENCOUNTER — OFFICE VISIT (OUTPATIENT)
Dept: FAMILY MEDICINE CLINIC | Age: 44
End: 2024-03-20
Payer: COMMERCIAL

## 2024-03-20 VITALS
DIASTOLIC BLOOD PRESSURE: 78 MMHG | BODY MASS INDEX: 29.45 KG/M2 | WEIGHT: 150 LBS | HEIGHT: 60 IN | SYSTOLIC BLOOD PRESSURE: 112 MMHG

## 2024-03-20 DIAGNOSIS — R53.83 FATIGUE, UNSPECIFIED TYPE: ICD-10-CM

## 2024-03-20 DIAGNOSIS — R07.9 CHEST PAIN, UNSPECIFIED TYPE: ICD-10-CM

## 2024-03-20 DIAGNOSIS — B34.9 VIRAL ILLNESS: Primary | ICD-10-CM

## 2024-03-20 LAB
ALBUMIN SERPL-MCNC: 4.7 G/DL (ref 3.4–5)
ALBUMIN/GLOB SERPL: 2.1 {RATIO} (ref 1.1–2.2)
ALP SERPL-CCNC: 96 U/L (ref 40–129)
ALT SERPL-CCNC: 16 U/L (ref 10–40)
ANION GAP SERPL CALCULATED.3IONS-SCNC: 12 MMOL/L (ref 3–16)
AST SERPL-CCNC: 17 U/L (ref 15–37)
BILIRUB SERPL-MCNC: 0.5 MG/DL (ref 0–1)
BILIRUBIN, POC: NEGATIVE
BLOOD URINE, POC: NORMAL
BUN SERPL-MCNC: 16 MG/DL (ref 7–20)
CALCIUM SERPL-MCNC: 10.4 MG/DL (ref 8.3–10.6)
CHLORIDE SERPL-SCNC: 107 MMOL/L (ref 99–110)
CLARITY, POC: NORMAL
CO2 SERPL-SCNC: 27 MMOL/L (ref 21–32)
COLOR, POC: NORMAL
CREAT SERPL-MCNC: 0.9 MG/DL (ref 0.6–1.1)
DEPRECATED RDW RBC AUTO: 13.6 % (ref 12.4–15.4)
GFR SERPLBLD CREATININE-BSD FMLA CKD-EPI: >60 ML/MIN/{1.73_M2}
GLUCOSE SERPL-MCNC: 92 MG/DL (ref 70–99)
GLUCOSE URINE, POC: NEGATIVE
HCT VFR BLD AUTO: 44.3 % (ref 36–48)
HGB BLD-MCNC: 15.1 G/DL (ref 12–16)
KETONES, POC: NEGATIVE
LEUKOCYTE EST, POC: NORMAL
MCH RBC QN AUTO: 30.7 PG (ref 26–34)
MCHC RBC AUTO-ENTMCNC: 34.2 G/DL (ref 31–36)
MCV RBC AUTO: 89.7 FL (ref 80–100)
NITRITE, POC: NEGATIVE
PH, POC: 5.5
PLATELET # BLD AUTO: 283 K/UL (ref 135–450)
PMV BLD AUTO: 8.1 FL (ref 5–10.5)
POTASSIUM SERPL-SCNC: 4.8 MMOL/L (ref 3.5–5.1)
PROT SERPL-MCNC: 6.9 G/DL (ref 6.4–8.2)
PROTEIN, POC: NEGATIVE
RBC # BLD AUTO: 4.94 M/UL (ref 4–5.2)
SODIUM SERPL-SCNC: 146 MMOL/L (ref 136–145)
SPECIFIC GRAVITY, POC: >=1.03
TSH SERPL DL<=0.005 MIU/L-ACNC: 4.91 UIU/ML (ref 0.27–4.2)
UROBILINOGEN, POC: 0.2
VIT B12 SERPL-MCNC: 382 PG/ML (ref 211–911)
WBC # BLD AUTO: 9.6 K/UL (ref 4–11)

## 2024-03-20 PROCEDURE — 81002 URINALYSIS NONAUTO W/O SCOPE: CPT | Performed by: FAMILY MEDICINE

## 2024-03-20 PROCEDURE — 99214 OFFICE O/P EST MOD 30 MIN: CPT | Performed by: FAMILY MEDICINE

## 2024-03-20 PROCEDURE — 93000 ELECTROCARDIOGRAM COMPLETE: CPT | Performed by: FAMILY MEDICINE

## 2024-03-20 ASSESSMENT — PATIENT HEALTH QUESTIONNAIRE - PHQ9
2. FEELING DOWN, DEPRESSED OR HOPELESS: NOT AT ALL
4. FEELING TIRED OR HAVING LITTLE ENERGY: SEVERAL DAYS
SUM OF ALL RESPONSES TO PHQ QUESTIONS 1-9: 2
6. FEELING BAD ABOUT YOURSELF - OR THAT YOU ARE A FAILURE OR HAVE LET YOURSELF OR YOUR FAMILY DOWN: NOT AT ALL
SUM OF ALL RESPONSES TO PHQ QUESTIONS 1-9: 2
SUM OF ALL RESPONSES TO PHQ QUESTIONS 1-9: 2
9. THOUGHTS THAT YOU WOULD BE BETTER OFF DEAD, OR OF HURTING YOURSELF: NOT AT ALL
SUM OF ALL RESPONSES TO PHQ9 QUESTIONS 1 & 2: 0
7. TROUBLE CONCENTRATING ON THINGS, SUCH AS READING THE NEWSPAPER OR WATCHING TELEVISION: NOT AT ALL
10. IF YOU CHECKED OFF ANY PROBLEMS, HOW DIFFICULT HAVE THESE PROBLEMS MADE IT FOR YOU TO DO YOUR WORK, TAKE CARE OF THINGS AT HOME, OR GET ALONG WITH OTHER PEOPLE: SOMEWHAT DIFFICULT
8. MOVING OR SPEAKING SO SLOWLY THAT OTHER PEOPLE COULD HAVE NOTICED. OR THE OPPOSITE, BEING SO FIGETY OR RESTLESS THAT YOU HAVE BEEN MOVING AROUND A LOT MORE THAN USUAL: NOT AT ALL
3. TROUBLE FALLING OR STAYING ASLEEP: SEVERAL DAYS
5. POOR APPETITE OR OVEREATING: NOT AT ALL
SUM OF ALL RESPONSES TO PHQ QUESTIONS 1-9: 2
1. LITTLE INTEREST OR PLEASURE IN DOING THINGS: NOT AT ALL

## 2024-03-20 ASSESSMENT — ENCOUNTER SYMPTOMS
CHEST TIGHTNESS: 1
SHORTNESS OF BREATH: 1
ABDOMINAL DISTENTION: 1

## 2024-03-20 NOTE — PROGRESS NOTES
OUTPATIENT PROGRESS NOTE  Date of Service:  3/20/2024  Address: [facility]  [facility]  [address]  [address]  [place] [address]  Dept: [phone]  Loc: [phone]    Subjective:      Patient ID:  [id number]  Sulma Cordova is a 43 y.o. female     HPI  Rash   Itchy when she lays down  Not sleeping well  Lymph nodes swollen and tender in neck  Bloating in abd  Legs aching and tired   Joint pressure   Spine hurts   Chest feels tight  starts under sternum and goes to her back  Left arm aches and hand feels tingling  comes and goes  Review of Systems   Constitutional:  Positive for activity change, appetite change and fatigue.   Respiratory:  Positive for chest tightness and shortness of breath.    Cardiovascular:  Positive for chest pain.   Gastrointestinal:  Positive for abdominal distention.   Musculoskeletal:  Positive for myalgias.   Skin:  Positive for rash.   Neurological:  Positive for weakness.       Objective:   YOB: 1980    Date of Visit:  3/20/2024       Allergies   Allergen Reactions    Latex Hives and Itching     After surgery x2     Shellfish-Derived Products Anaphylaxis    Tape [Adhesive Tape] Rash     Paper tape OK     Iodides Hives     pt states hives and tachycardia    Tylenol [Acetaminophen]      History of hep c    Bactrim [Sulfamethoxazole-Trimethoprim] Nausea And Vomiting and Other (See Comments)    Codeine Nausea And Vomiting and Palpitations    Morphine Itching and Nausea And Vomiting     \"makes me crazy\"     Prednisone Hives, Palpitations and Rash       Outpatient Medications Marked as Taking for the 3/20/24 encounter (Office Visit) with Romy Wood, DO   Medication Sig Dispense Refill    lidocaine viscous hcl (XYLOCAINE) 2 % SOLN solution Take 15 mLs by mouth as needed for Irritation 200 mL 0    Tirzepatide-Weight Management (ZEPBOUND) 5 MG/0.5ML SOAJ Inject 0.5 mg into the skin once a week 8 mL 0    famotidine

## 2024-03-21 LAB — BACTERIA UR CULT: NORMAL

## 2024-04-16 ENCOUNTER — OFFICE VISIT (OUTPATIENT)
Dept: FAMILY MEDICINE CLINIC | Age: 44
End: 2024-04-16
Payer: COMMERCIAL

## 2024-04-16 VITALS
HEIGHT: 60 IN | BODY MASS INDEX: 30 KG/M2 | WEIGHT: 152.8 LBS | SYSTOLIC BLOOD PRESSURE: 112 MMHG | DIASTOLIC BLOOD PRESSURE: 78 MMHG

## 2024-04-16 DIAGNOSIS — J40 BRONCHITIS: Primary | ICD-10-CM

## 2024-04-16 PROCEDURE — 99214 OFFICE O/P EST MOD 30 MIN: CPT | Performed by: FAMILY MEDICINE

## 2024-04-16 RX ORDER — DOXYCYCLINE HYCLATE 100 MG
100 TABLET ORAL 2 TIMES DAILY
Qty: 20 TABLET | Refills: 0 | Status: SHIPPED | OUTPATIENT
Start: 2024-04-16 | End: 2024-04-26

## 2024-04-16 RX ORDER — ALBUTEROL SULFATE 90 UG/1
2 AEROSOL, METERED RESPIRATORY (INHALATION) EVERY 4 HOURS PRN
Qty: 18 G | Refills: 3 | Status: SHIPPED | OUTPATIENT
Start: 2024-04-16

## 2024-04-16 RX ORDER — METHYLPREDNISOLONE 4 MG/1
TABLET ORAL
Qty: 1 KIT | Refills: 0 | Status: SHIPPED | OUTPATIENT
Start: 2024-04-16 | End: 2024-04-22

## 2024-04-16 RX ORDER — DEXTROMETHORPHAN HYDROBROMIDE AND PROMETHAZINE HYDROCHLORIDE 15; 6.25 MG/5ML; MG/5ML
5 SYRUP ORAL 4 TIMES DAILY PRN
Qty: 118 ML | Refills: 0 | Status: SHIPPED | OUTPATIENT
Start: 2024-04-16 | End: 2024-04-26

## 2024-04-16 ASSESSMENT — ENCOUNTER SYMPTOMS
COUGH: 1
WHEEZING: 1
SHORTNESS OF BREATH: 1

## 2024-04-16 NOTE — PROGRESS NOTES
famotidine (PEPCID) 40 MG tablet Take 1 tablet by mouth 2 times daily 60 tablet 3    Tirzepatide (MOUNJARO) 5 MG/0.5ML SOPN SC injection Fill with Zepbound   .5 mg sq once a week 8 mL 0    propranolol (INDERAL) 20 MG tablet TAKE ONE TABLET BY MOUTH THREE TIMES DAILY 90 tablet 2    promethazine (PHENERGAN) 25 MG tablet TAKE ONE TABLET BY MOUTH EVERY 8 HOURS AS NEEDED FOR NAUSEA 60 tablet 2    tiZANidine (ZANAFLEX) 4 MG tablet TAKE ONE TABLET BY MOUTH EVERY 6 HOURS AS NEEDED 30 tablet 2    ibuprofen (ADVIL;MOTRIN) 800 MG tablet Take 1 tablet by mouth every 8 hours as needed for Pain 30 tablet 0    valACYclovir (VALTREX) 500 MG tablet Take 1 tablet by mouth 2 times daily 7 tablet 0    albuterol sulfate HFA (PROVENTIL;VENTOLIN;PROAIR) 108 (90 Base) MCG/ACT inhaler inhale TWO PUFFS BY MOUTH FOUR TIMES DAILY AS NEEDED FOR WHEEZING 25.5 g 0    famotidine (PEPCID) 40 MG tablet Take 1 tablet by mouth 2 times daily 60 tablet 3    famciclovir (FAMVIR) 500 MG tablet TAKE ONE TABLET BY MOUTH THREE TIMES DAILY 30 tablet 1    buPROPion (WELLBUTRIN XL) 150 MG extended release tablet Take 1 tablet by mouth every morning 30 tablet 3    oxyCODONE HCl (OXY-IR) 10 MG immediate release tablet TAKE ONE TABLET BY MOUTH EVERY 6 HOURS      XTAMPZA ER 9 MG C12A TAKE ONE CAPSULE BY MOUTH WITH FOOD EVERY TWELVE HOURS      furosemide (LASIX) 20 MG tablet Take 1 tablet by mouth daily 30 tablet 3    lidocaine viscous hcl (XYLOCAINE) 2 % SOLN solution Take 15 mLs by mouth every 3 hours as needed for Pain 300 mL 1    fluticasone (FLONASE) 50 MCG/ACT nasal spray SPRAY ONE SPRAY IN EACH NOSTRIL EVERY DAY 16 g 1    NONFORMULARY EPI PEN PRN         Vitals:    04/16/24 0907   BP: 112/78   Weight: 69.3 kg (152 lb 12.8 oz)   Height: 1.524 m (5')     Body mass index is 29.84 kg/m².     Wt Readings from Last 3 Encounters:   04/16/24 69.3 kg (152 lb 12.8 oz)   03/20/24 68 kg (150 lb)   12/18/23 68 kg (150 lb)     BP Readings from Last 3 Encounters:

## 2024-04-30 ENCOUNTER — TELEPHONE (OUTPATIENT)
Dept: FAMILY MEDICINE CLINIC | Age: 44
End: 2024-04-30

## 2024-04-30 ENCOUNTER — TELEMEDICINE (OUTPATIENT)
Dept: FAMILY MEDICINE CLINIC | Age: 44
End: 2024-04-30
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
## 2024-04-30 RX ORDER — AMOXICILLIN AND CLAVULANATE POTASSIUM 875; 125 MG/1; MG/1
1 TABLET, FILM COATED ORAL 2 TIMES DAILY
Qty: 14 TABLET | Refills: 0 | Status: SHIPPED | OUTPATIENT
Start: 2024-04-30 | End: 2024-05-07

## 2024-04-30 RX ORDER — IBUPROFEN 200 MG
TABLET ORAL
Qty: 1 EACH | Refills: 1 | Status: SHIPPED | OUTPATIENT
Start: 2024-04-30

## 2024-04-30 RX ORDER — FLUCONAZOLE 150 MG/1
150 TABLET ORAL DAILY
Qty: 3 TABLET | Refills: 0 | Status: SHIPPED | OUTPATIENT
Start: 2024-04-30 | End: 2024-05-03

## 2024-04-30 ASSESSMENT — ENCOUNTER SYMPTOMS
RESPIRATORY NEGATIVE: 1
GASTROINTESTINAL NEGATIVE: 1

## 2024-04-30 NOTE — PROGRESS NOTES
Sulma Cordova, was evaluated through a synchronous (real-time) audio-video encounter. The patient (or guardian if applicable) is aware that this is a billable service, which includes applicable co-pays. This Virtual Visit was conducted with patient's (and/or legal guardian's) consent. Patient identification was verified, and a caregiver was present when appropriate.   The patient was located at Home: [address] [address]  Provider was located at Facility (Appt Dept): [address]  [address]  [place] [address]  Confirm you are appropriately licensed, registered, or certified to deliver care in the state where the patient is located as indicated above. If you are not or unsure, please re-schedule the visit: Yes, I confirm.     Sulma Cordova (:  1980) is a Established patient, presenting virtually for evaluation of the following:    Assessment & Plan   Below is the assessment and plan developed based on review of pertinent history, physical exam, labs, studies, and medications.  1. Bitten by other birds, initial encounter  -     Lidocaine 0.5 % GEL; Apply up to six times daily to affected area, Disp-170 g, R-1Normal  -     amoxicillin-clavulanate (AUGMENTIN) 875-125 MG per tablet; Take 1 tablet by mouth 2 times daily for 7 days, Disp-14 tablet, R-0Normal  -     fluconazole (DIFLUCAN) 150 MG tablet; Take 1 tablet by mouth daily for 3 days, Disp-3 tablet, R-0Normal  -     neomycin-bacitracin-polymyxin (NEOSPORIN) 5-400-5000 ointment; Apply topically 4 times daily., Disp-1 each, R-1, Normal    To Er if fever or worsening symptoms       Subjective   HPI  Bird bite  She was going in her cousin's house and a big black bird attacked her  Latched on her arm and would not let go  Also bit her and broke the skin  Very sore and red  Some seepage from the wounds last night  Feels warm to touch   No fever  Review of Systems   Constitutional: Negative.    HENT: Negative.     Respiratory:

## 2024-04-30 NOTE — TELEPHONE ENCOUNTER
Pt called stating she was attacked by a large black bird, what she believes is a maicol, yesterday. She has 2 puncture wounds, and many open scratches on her left arm. Pt has complaints today of her arm being swollen and hot to the touch. She cleaned it with galilea dish soap, and applied rubbing alcohol on it after the attack, but wanted to know if she should take antibiotics as a precaution. Pt stated she doesn't have a very strong immune system, and birds are very dirty. Offered her 2:45 today for an ov, but she wanted to know if Dr. Wood preferred to see her in office, or if she can do a vv. Pt recently moved, and is now an hour and a half away from our office, and does not have a vehicle today.

## 2024-06-04 ENCOUNTER — OFFICE VISIT (OUTPATIENT)
Dept: FAMILY MEDICINE CLINIC | Age: 44
End: 2024-06-04
Payer: COMMERCIAL

## 2024-06-04 VITALS
SYSTOLIC BLOOD PRESSURE: 110 MMHG | HEIGHT: 60 IN | DIASTOLIC BLOOD PRESSURE: 78 MMHG | WEIGHT: 154.6 LBS | BODY MASS INDEX: 30.35 KG/M2

## 2024-06-04 DIAGNOSIS — M79.7 FIBROMYALGIA: ICD-10-CM

## 2024-06-04 DIAGNOSIS — K21.9 GASTROESOPHAGEAL REFLUX DISEASE WITHOUT ESOPHAGITIS: ICD-10-CM

## 2024-06-04 DIAGNOSIS — R00.2 HEART PALPITATIONS: ICD-10-CM

## 2024-06-04 DIAGNOSIS — E74.39 GLUCOSE INTOLERANCE: ICD-10-CM

## 2024-06-04 DIAGNOSIS — B00.1 FEVER BLISTER: ICD-10-CM

## 2024-06-04 DIAGNOSIS — E78.5 DYSLIPIDEMIA: ICD-10-CM

## 2024-06-04 DIAGNOSIS — R73.9 BLOOD GLUCOSE ELEVATED: ICD-10-CM

## 2024-06-04 DIAGNOSIS — E66.09 CLASS 1 OBESITY DUE TO EXCESS CALORIES WITH SERIOUS COMORBIDITY AND BODY MASS INDEX (BMI) OF 30.0 TO 30.9 IN ADULT: ICD-10-CM

## 2024-06-04 DIAGNOSIS — E88.819 INSULIN RESISTANCE: Primary | ICD-10-CM

## 2024-06-04 DIAGNOSIS — E66.3 OVERWEIGHT: ICD-10-CM

## 2024-06-04 PROCEDURE — 99214 OFFICE O/P EST MOD 30 MIN: CPT | Performed by: FAMILY MEDICINE

## 2024-06-04 RX ORDER — FAMOTIDINE 40 MG/1
40 TABLET, FILM COATED ORAL 2 TIMES DAILY
Qty: 60 TABLET | Refills: 3 | Status: SHIPPED | OUTPATIENT
Start: 2024-06-04

## 2024-06-04 RX ORDER — TIRZEPATIDE 2.5 MG/.5ML
2 INJECTION, SOLUTION SUBCUTANEOUS WEEKLY
Qty: 2 ML | Refills: 1 | Status: SHIPPED | OUTPATIENT
Start: 2024-06-04

## 2024-06-04 RX ORDER — VALACYCLOVIR HYDROCHLORIDE 500 MG/1
500 TABLET, FILM COATED ORAL 2 TIMES DAILY
Qty: 20 TABLET | Refills: 1 | Status: SHIPPED | OUTPATIENT
Start: 2024-06-04

## 2024-06-04 RX ORDER — PROMETHAZINE HYDROCHLORIDE 25 MG/1
TABLET ORAL
Qty: 60 TABLET | Refills: 2 | Status: SHIPPED | OUTPATIENT
Start: 2024-06-04

## 2024-06-04 RX ORDER — TIZANIDINE 4 MG/1
TABLET ORAL
Qty: 30 TABLET | Refills: 2 | Status: SHIPPED | OUTPATIENT
Start: 2024-06-04 | End: 2024-06-05 | Stop reason: SDUPTHER

## 2024-06-04 RX ORDER — OXYCODONE HYDROCHLORIDE 10 MG/1
TABLET ORAL
Status: CANCELLED | OUTPATIENT
Start: 2024-06-04

## 2024-06-04 ASSESSMENT — ENCOUNTER SYMPTOMS
TROUBLE SWALLOWING: 1
BACK PAIN: 1
GASTROINTESTINAL NEGATIVE: 1
RESPIRATORY NEGATIVE: 1

## 2024-06-04 NOTE — PROGRESS NOTES
Palpitations and Rash       Outpatient Medications Marked as Taking for the 6/4/24 encounter (Office Visit) with Romy Wood,    Medication Sig Dispense Refill    Lidocaine 0.5 % GEL Apply up to six times daily to affected area 170 g 1    neomycin-bacitracin-polymyxin (NEOSPORIN) 5-400-5000 ointment Apply topically 4 times daily. 1 each 1    albuterol sulfate HFA (PROVENTIL HFA) 108 (90 Base) MCG/ACT inhaler Inhale 2 puffs into the lungs every 4 hours as needed for Wheezing 18 g 3    lidocaine viscous hcl (XYLOCAINE) 2 % SOLN solution Take 15 mLs by mouth as needed for Irritation 200 mL 0    Tirzepatide-Weight Management (ZEPBOUND) 5 MG/0.5ML SOAJ Inject 0.5 mg into the skin once a week 8 mL 0    famotidine (PEPCID) 40 MG tablet Take 1 tablet by mouth 2 times daily 60 tablet 3    Tirzepatide (MOUNJARO) 5 MG/0.5ML SOPN SC injection Fill with Zepbound   .5 mg sq once a week 8 mL 0    propranolol (INDERAL) 20 MG tablet TAKE ONE TABLET BY MOUTH THREE TIMES DAILY 90 tablet 2    promethazine (PHENERGAN) 25 MG tablet TAKE ONE TABLET BY MOUTH EVERY 8 HOURS AS NEEDED FOR NAUSEA 60 tablet 2    tiZANidine (ZANAFLEX) 4 MG tablet TAKE ONE TABLET BY MOUTH EVERY 6 HOURS AS NEEDED 30 tablet 2    ibuprofen (ADVIL;MOTRIN) 800 MG tablet Take 1 tablet by mouth every 8 hours as needed for Pain 30 tablet 0    valACYclovir (VALTREX) 500 MG tablet Take 1 tablet by mouth 2 times daily 7 tablet 0    albuterol sulfate HFA (PROVENTIL;VENTOLIN;PROAIR) 108 (90 Base) MCG/ACT inhaler inhale TWO PUFFS BY MOUTH FOUR TIMES DAILY AS NEEDED FOR WHEEZING 25.5 g 0    famotidine (PEPCID) 40 MG tablet Take 1 tablet by mouth 2 times daily 60 tablet 3    famciclovir (FAMVIR) 500 MG tablet TAKE ONE TABLET BY MOUTH THREE TIMES DAILY 30 tablet 1    buPROPion (WELLBUTRIN XL) 150 MG extended release tablet Take 1 tablet by mouth every morning 30 tablet 3    oxyCODONE HCl (OXY-IR) 10 MG immediate release tablet TAKE ONE TABLET BY MOUTH EVERY 6 HOURS

## 2024-06-05 ENCOUNTER — PATIENT MESSAGE (OUTPATIENT)
Dept: FAMILY MEDICINE CLINIC | Age: 44
End: 2024-06-05

## 2024-06-05 DIAGNOSIS — M79.7 FIBROMYALGIA: ICD-10-CM

## 2024-06-05 DIAGNOSIS — F41.1 GAD (GENERALIZED ANXIETY DISORDER): Primary | ICD-10-CM

## 2024-06-05 LAB
EST. AVERAGE GLUCOSE BLD GHB EST-MCNC: 99.7 MG/DL
HBA1C MFR BLD: 5.1 %
TSH SERPL DL<=0.005 MIU/L-ACNC: 2.54 UIU/ML (ref 0.27–4.2)

## 2024-06-05 RX ORDER — TIZANIDINE 4 MG/1
TABLET ORAL
Qty: 30 TABLET | Refills: 2 | Status: SHIPPED | OUTPATIENT
Start: 2024-06-05

## 2024-06-05 RX ORDER — ALPRAZOLAM 0.5 MG/1
0.5 TABLET ORAL 2 TIMES DAILY PRN
Qty: 60 TABLET | Refills: 0 | Status: SHIPPED | OUTPATIENT
Start: 2024-06-05 | End: 2024-07-05

## 2024-06-05 RX ORDER — TIZANIDINE 4 MG/1
TABLET ORAL
Qty: 30 TABLET | Refills: 2 | Status: SHIPPED | OUTPATIENT
Start: 2024-06-05 | End: 2024-06-05 | Stop reason: SDUPTHER

## 2024-06-05 NOTE — TELEPHONE ENCOUNTER
Last office visit 6/4/2024       Next office visit scheduled Visit date not found    Requested Prescriptions     Pending Prescriptions Disp Refills    tiZANidine (ZANAFLEX) 4 MG tablet 30 tablet 2     Sig: One q 6 hours

## 2024-06-05 NOTE — TELEPHONE ENCOUNTER
From: Sulma Cordova  To: Dr. Romy Wood  Sent: 6/5/2024 10:36 AM EDT  Subject: Medication     Yesterday Silvio went to  my prescriptions the Tizandine and Alprazolam was not there. Can you please send them over to the pharmacy please?   Also with the Zepbound since you had to do a prior authorization do we have to wait till they come back and let you know about that? Not sure how that works. Thank you so very much. Have a great day.       Thank you Again,   Sincerely Sulma Cordova

## 2024-06-06 ENCOUNTER — PATIENT MESSAGE (OUTPATIENT)
Dept: FAMILY MEDICINE CLINIC | Age: 44
End: 2024-06-06

## 2024-06-06 NOTE — TELEPHONE ENCOUNTER
From: Sulma Cordova  To: Dr. Romy Wood  Sent: 6/6/2024 2:55 PM EDT  Subject: Medication/ Zepbound question     Yesterday Silvio went to  my prescriptions the Alprazolam was not there. Can you please send that over to the pharmacy please?   Also with the Zepbound since you had to do a prior authorization do we have to wait till they come back and let you know about that? Not sure how that works. Thank you so very much. Have a great day.         Thank you Again,   Sincerely Sulma Cordova

## 2024-07-02 ENCOUNTER — TELEPHONE (OUTPATIENT)
Dept: FAMILY MEDICINE CLINIC | Age: 44
End: 2024-07-02

## 2024-07-02 ENCOUNTER — PATIENT MESSAGE (OUTPATIENT)
Dept: FAMILY MEDICINE CLINIC | Age: 44
End: 2024-07-02

## 2024-07-02 RX ORDER — TIRZEPATIDE 5 MG/.5ML
5 INJECTION, SOLUTION SUBCUTANEOUS WEEKLY
Qty: 4 ADJUSTABLE DOSE PRE-FILLED PEN SYRINGE | Refills: 0 | Status: SHIPPED | OUTPATIENT
Start: 2024-07-02

## 2024-07-02 NOTE — TELEPHONE ENCOUNTER
From: Sulma Cordova  To: Dr. Romy Wood  Sent: 7/2/2024 10:53 AM EDT  Subject: Zepbound     Hello hope you are doing well.   My question is I was on 5mg of Mounjaro when insurance kicked me off. Insurance will not cover Zepbound, but because of how it helped me with my pain, my weight and all my blood test were perfect on it. My  and I have decided to use the HSA account to pay out of pocket. But instead of doing the shot once a week I’m going to try to do it once every other week. So that it’s easier for us to afford with the coupon.   My question is you prescribed the 2.5mg. Can I just do the 5mg? Since I was on 5MG before I was cut off. Also do to me spacing them out every other week so we can afford it. Can you please send over the 5mg to the pharmacy and let me know if that’s possible please.     Thank You so very much,   Sincerely,  Sulma Cordova

## 2024-07-02 NOTE — TELEPHONE ENCOUNTER
SUBMITTED PA FOR Mounjaro 5MG/0.5ML pen-injectors  VIA CMM Key: [id number] STATUS PENDING.      FOLLOW UP DONE DAILY: IF NO RESPONSE IN 3 DAYS WE WILL REFAX FOR STATUS CHECK. IF ANOTHER 3 DAYS GOES BY WITH NO RESPONSE WILL CALL INSURANCE FOR STATUS.

## 2024-07-12 NOTE — TELEPHONE ENCOUNTER
Pt called to let Dr Wood know the wrong rx was sent to [facility]. The rx for Mounjaro was sent to the pharmacy instead of  Zepbound 5 mg. Please send correct rx to [facility] [phone]

## 2024-07-14 RX ORDER — TIRZEPATIDE 5 MG/.5ML
0.5 INJECTION, SOLUTION SUBCUTANEOUS WEEKLY
Qty: 8 ML | Refills: 0 | Status: SHIPPED | OUTPATIENT
Start: 2024-07-14

## 2024-08-26 ENCOUNTER — TELEMEDICINE (OUTPATIENT)
Dept: FAMILY MEDICINE CLINIC | Age: 44
End: 2024-08-26
Payer: COMMERCIAL

## 2024-08-26 ENCOUNTER — TELEPHONE (OUTPATIENT)
Dept: FAMILY MEDICINE CLINIC | Age: 44
End: 2024-08-26

## 2024-08-26 DIAGNOSIS — J02.0 STREP THROAT: Primary | ICD-10-CM

## 2024-08-26 DIAGNOSIS — M79.7 FIBROMYALGIA: ICD-10-CM

## 2024-08-26 DIAGNOSIS — F41.1 GAD (GENERALIZED ANXIETY DISORDER): ICD-10-CM

## 2024-08-26 DIAGNOSIS — E66.09 CLASS 1 OBESITY DUE TO EXCESS CALORIES WITH SERIOUS COMORBIDITY AND BODY MASS INDEX (BMI) OF 30.0 TO 30.9 IN ADULT: ICD-10-CM

## 2024-08-26 PROCEDURE — 99214 OFFICE O/P EST MOD 30 MIN: CPT | Performed by: FAMILY MEDICINE

## 2024-08-26 RX ORDER — ALPRAZOLAM 0.5 MG
0.5 TABLET ORAL 2 TIMES DAILY PRN
Qty: 60 TABLET | Refills: 0 | Status: SHIPPED | OUTPATIENT
Start: 2024-08-26 | End: 2024-09-25

## 2024-08-26 RX ORDER — AMOXICILLIN AND CLAVULANATE POTASSIUM 250; 62.5 MG/5ML; MG/5ML
500 POWDER, FOR SUSPENSION ORAL 2 TIMES DAILY
Qty: 200 ML | Refills: 0 | Status: SHIPPED | OUTPATIENT
Start: 2024-08-26 | End: 2024-09-05

## 2024-08-26 ASSESSMENT — ENCOUNTER SYMPTOMS
COUGH: 1
SORE THROAT: 1

## 2024-08-26 NOTE — PROGRESS NOTES
Sulma Cordova, was evaluated through a synchronous (real-time) audio-video encounter. The patient (or guardian if applicable) is aware that this is a billable service, which includes applicable co-pays. This Virtual Visit was conducted with patient's (and/or legal guardian's) consent. Patient identification was verified, and a caregiver was present when appropriate.   The patient was located at Home: [address]  [facility] [address]  Provider was located at Facility (Appt Dept): [address] [facility]  [address]  [place] [address]  Confirm you are appropriately licensed, registered, or certified to deliver care in the state where the patient is located as indicated above. If you are not or unsure, please re-schedule the visit: Yes, I confirm.     Sulma Cordova (:  1980) is a Established patient, presenting virtually for evaluation of the following:      Below is the assessment and plan developed based on review of pertinent history, physical exam, labs, studies, and medications.     Assessment & Plan  Fibromyalgia    Refill muscle relaxant    Orders:    tiZANidine (ZANAFLEX) 4 MG tablet; One q 6 hours    JACKSON (generalized anxiety disorder)       Orders:    ALPRAZolam (XANAX) 0.5 MG tablet; Take 1 tablet by mouth 2 times daily as needed for Sleep or Anxiety for up to 30 days. Max Daily Amount: 1 mg    Strep throat       Orders:    amoxicillin-clavulanate (AUGMENTIN) 250-62.5 MG/5ML suspension; Take 10 mLs by mouth 2 times daily for 10 days    Class 1 obesity due to excess calories with serious comorbidity and body mass index (BMI) of 30.0 to 30.9 in adult       Orders:    Tirzepatide-Weight Management 7.5 MG/0.5ML SOAJ; Inject 7.5 mg into the skin once a week      No follow-ups on file.       Subjective   Pharyngitis  This is a new problem. The current episode started in the past 7 days. The problem occurs constantly. The problem has been gradually worsening. Associated symptoms include chills,  coughing, fatigue, headaches, myalgias and a sore throat. The symptoms are aggravated by drinking, eating and swallowing. She has tried acetaminophen for the symptoms. The treatment provided no relief.     Obesity  She is doing well on the zepbound  Taking every other week to save money  Weight is down      Review of Systems   Constitutional:  Positive for chills and fatigue.   HENT:  Positive for sore throat.    Respiratory:  Positive for cough.    Musculoskeletal:  Positive for myalgias.   Neurological:  Positive for headaches.        Needs refills on her medicine   Objective   Patient-Reported Vitals  No data recorded     Physical Exam  Constitutional:       General: She is not in acute distress.     Appearance: She is well-developed.   HENT:      Head: Normocephalic.   Neurological:      Mental Status: She is alert and oriented to person, place, and time.   Psychiatric:         Behavior: Behavior normal.         Thought Content: Thought content normal.         Judgment: Judgment normal.                  --JOSE J LYNN, DO

## 2024-08-26 NOTE — TELEPHONE ENCOUNTER
Patient stated she tried to submit Evisit but it is not working. She started with a sore throat on 8/21/24 and then developed blisters in her mouth and throat the next day. Patient is wanting a VV due to moving and living over an hour away. Please advise.

## 2024-10-06 ENCOUNTER — PATIENT MESSAGE (OUTPATIENT)
Dept: FAMILY MEDICINE CLINIC | Age: 44
End: 2024-10-06

## 2024-10-06 DIAGNOSIS — B00.1 FEVER BLISTER: ICD-10-CM

## 2024-10-06 DIAGNOSIS — F41.1 GAD (GENERALIZED ANXIETY DISORDER): ICD-10-CM

## 2024-10-06 DIAGNOSIS — M79.7 FIBROMYALGIA: ICD-10-CM

## 2024-10-06 DIAGNOSIS — K21.9 GASTROESOPHAGEAL REFLUX DISEASE WITHOUT ESOPHAGITIS: ICD-10-CM

## 2024-10-06 RX ORDER — VALACYCLOVIR HYDROCHLORIDE 500 MG/1
500 TABLET, FILM COATED ORAL 2 TIMES DAILY
Qty: 20 TABLET | Refills: 1 | Status: SHIPPED | OUTPATIENT
Start: 2024-10-06

## 2024-10-06 RX ORDER — FAMOTIDINE 40 MG/1
40 TABLET, FILM COATED ORAL 2 TIMES DAILY
Qty: 60 TABLET | Refills: 3 | Status: SHIPPED | OUTPATIENT
Start: 2024-10-06

## 2024-10-06 RX ORDER — FAMCICLOVIR 500 MG/1
TABLET ORAL
Qty: 30 TABLET | Refills: 1 | Status: SHIPPED | OUTPATIENT
Start: 2024-10-06

## 2024-10-07 RX ORDER — ALPRAZOLAM 0.5 MG
0.5 TABLET ORAL 2 TIMES DAILY PRN
Qty: 60 TABLET | Refills: 0 | Status: SHIPPED | OUTPATIENT
Start: 2024-10-07 | End: 2024-11-06

## 2024-10-07 NOTE — TELEPHONE ENCOUNTER
Last office visit 8/26/2024      Next office visit scheduled Visit date not found    Requested Prescriptions      No prescriptions requested or ordered in this encounter

## 2024-10-31 DIAGNOSIS — E66.811 CLASS 1 OBESITY DUE TO EXCESS CALORIES WITH SERIOUS COMORBIDITY AND BODY MASS INDEX (BMI) OF 30.0 TO 30.9 IN ADULT: ICD-10-CM

## 2024-10-31 DIAGNOSIS — E66.09 CLASS 1 OBESITY DUE TO EXCESS CALORIES WITH SERIOUS COMORBIDITY AND BODY MASS INDEX (BMI) OF 30.0 TO 30.9 IN ADULT: ICD-10-CM

## 2024-10-31 NOTE — TELEPHONE ENCOUNTER
Last office visit 8/26/2024     Last written      Next office visit scheduled Visit date not found    Requested Prescriptions     Pending Prescriptions Disp Refills    tirzepatide-weight management (ZEPBOUND) 7.5 MG/0.5ML SOAJ subCUTAneous auto-injector pen 2 mL 1     Sig: Inject 7.5 mg into the skin once a week

## 2024-12-10 DIAGNOSIS — E66.09 CLASS 1 OBESITY DUE TO EXCESS CALORIES WITH SERIOUS COMORBIDITY AND BODY MASS INDEX (BMI) OF 30.0 TO 30.9 IN ADULT: ICD-10-CM

## 2024-12-10 DIAGNOSIS — E66.811 CLASS 1 OBESITY DUE TO EXCESS CALORIES WITH SERIOUS COMORBIDITY AND BODY MASS INDEX (BMI) OF 30.0 TO 30.9 IN ADULT: ICD-10-CM

## 2024-12-10 RX ORDER — PROMETHAZINE HYDROCHLORIDE 25 MG/1
TABLET ORAL
Qty: 60 TABLET | Refills: 2 | Status: SHIPPED | OUTPATIENT
Start: 2024-12-10

## 2025-01-20 ENCOUNTER — TELEPHONE (OUTPATIENT)
Dept: FAMILY MEDICINE CLINIC | Age: 45
End: 2025-01-20

## 2025-01-20 ENCOUNTER — TELEMEDICINE (OUTPATIENT)
Dept: FAMILY MEDICINE CLINIC | Age: 45
End: 2025-01-20

## 2025-01-20 DIAGNOSIS — M06.09 RHEUMATOID ARTHRITIS OF MULTIPLE SITES WITH NEGATIVE RHEUMATOID FACTOR (HCC): ICD-10-CM

## 2025-01-20 DIAGNOSIS — C53.9 MALIGNANT NEOPLASM OF CERVIX, UNSPECIFIED SITE (HCC): ICD-10-CM

## 2025-01-20 DIAGNOSIS — R59.0 CERVICAL ADENOPATHY: Primary | ICD-10-CM

## 2025-01-20 DIAGNOSIS — B18.1 CHRONIC VIRAL HEPATITIS B WITHOUT DELTA AGENT AND WITHOUT COMA (HCC): ICD-10-CM

## 2025-01-20 RX ORDER — CLINDAMYCIN HYDROCHLORIDE 150 MG/1
150 CAPSULE ORAL 3 TIMES DAILY
Qty: 30 CAPSULE | Refills: 0 | Status: SHIPPED | OUTPATIENT
Start: 2025-01-20 | End: 2025-01-30

## 2025-01-20 ASSESSMENT — ENCOUNTER SYMPTOMS
GASTROINTESTINAL NEGATIVE: 1
RESPIRATORY NEGATIVE: 1
TROUBLE SWALLOWING: 1

## 2025-01-20 NOTE — TELEPHONE ENCOUNTER
Patient called and stated she was seen in [facility] on 1/17/25 due to the mass on the right side of her head, swollen lymphnodes in neck and armpit, and a red stripe that goes from her neck down her chest. She stated the [facility] told her it was infected and gave her antibiotics. She feels the antibiotics are not working. I spoke with Ophelia MARTIN and she offered 145pm this afternoon. I asked the patient if she could come in the office today and she stated \" I cannot do that I do not have a car. I was wanting a VV\" spoke with Ophelia MARTIN again and she stated Dr Wood will need to see pt in person due to red stripe, if she is unable to come in the office advise ER. I informed patient of Jone message and patient started crying and said there was no way for her to get here. I informed her I would transfer her to medical assistant so she could further speak with her. I informed Ophelia, Transferred call to Ophelia

## 2025-01-20 NOTE — TELEPHONE ENCOUNTER
Bridget from [facility] called and stated patient has allergy to Iodine. She stated Dr Wood should be aware of the pre medication protocol to follow and prescribe the patient RX before having the scans done. Please advise     Ct Dept # [phone]

## 2025-01-20 NOTE — PROGRESS NOTES
Sulma Cordova, was evaluated through a synchronous (real-time) audio-video encounter. The patient (or guardian if applicable) is aware that this is a billable service, which includes applicable co-pays. This Virtual Visit was conducted with patient's (and/or legal guardian's) consent. Patient identification was verified, and a caregiver was present when appropriate.   The patient was located at Home: [address]  [facility] [address]  Provider was located at Facility (Appt Dept): [address] [facility]  [address]  [place] [address]  Confirm you are appropriately licensed, registered, or certified to deliver care in the state where the patient is located as indicated above. If you are not or unsure, please re-schedule the visit: Yes, I confirm.     Sulma Cordova (:  1980) is a Established patient, presenting virtually for evaluation of the following:      Below is the assessment and plan developed based on review of pertinent history, physical exam, labs, studies, and medications.     Assessment & Plan  Cervical adenopathy   Change antibiotic and await CT   To ER if fever or increasing pain    Orders:    clindamycin (CLEOCIN) 150 MG capsule; Take 1 capsule by mouth 3 times daily for 10 days    CT SOFT TISSUE NECK W CONTRAST; Future    Rheumatoid arthritis of multiple sites with negative rheumatoid factor (HCC)            Malignant neoplasm of cervix, unspecified site (HCC)            Chronic viral hepatitis B without delta agent and without coma (HCC)              No follow-ups on file.       Subjective   HPI  Review of Systems   Constitutional:  Positive for activity change.   HENT:  Positive for trouble swallowing.    Respiratory: Negative.     Cardiovascular: Negative.    Gastrointestinal: Negative.    Musculoskeletal:  Positive for neck pain.   Neurological: Negative.    Psychiatric/Behavioral:  Positive for decreased concentration.           Objective   Patient-Reported Vitals  No data

## 2025-01-25 ENCOUNTER — HOSPITAL ENCOUNTER (OUTPATIENT)
Dept: CT IMAGING | Age: 45
Discharge: HOME | End: 2025-01-25
Payer: COMMERCIAL

## 2025-01-25 DIAGNOSIS — R59.0 CERVICAL ADENOPATHY: ICD-10-CM

## 2025-01-25 PROCEDURE — 70490 CT SOFT TISSUE NECK W/O DYE: CPT

## 2025-02-12 DIAGNOSIS — F41.1 GAD (GENERALIZED ANXIETY DISORDER): ICD-10-CM

## 2025-02-12 RX ORDER — ALPRAZOLAM 0.5 MG
TABLET ORAL
Qty: 60 TABLET | Refills: 0 | Status: SHIPPED | OUTPATIENT
Start: 2025-02-12 | End: 2025-03-14

## 2025-02-12 NOTE — TELEPHONE ENCOUNTER
Last office visit 1/20/2025       Next office visit scheduled Visit date not found    Requested Prescriptions     Pending Prescriptions Disp Refills    ALPRAZolam (XANAX) 0.5 MG tablet [Pharmacy Med Name: ALPRAZolam 0.5 MG TABLET] 60 tablet      Sig: TAKE 1 TABLET BY MOUTH 2 TIMES A DAY AS NEEDED FOR SLEEP OR ANXIETY

## 2025-03-27 DIAGNOSIS — F41.1 GAD (GENERALIZED ANXIETY DISORDER): ICD-10-CM

## 2025-03-27 RX ORDER — ALPRAZOLAM 0.5 MG
TABLET ORAL
Qty: 60 TABLET | Refills: 1 | Status: SHIPPED | OUTPATIENT
Start: 2025-03-27 | End: 2025-04-26

## 2025-03-27 NOTE — TELEPHONE ENCOUNTER
Last office visit 1/20/2025      Next office visit scheduled Visit date not found    Requested Prescriptions     Pending Prescriptions Disp Refills    ALPRAZolam (XANAX) 0.5 MG tablet [Pharmacy Med Name: ALPRAZolam 0.5 MG TABLET] 60 tablet      Sig: TAKE 1 TABLET BY MOUTH 2 TIMES A DAY AS NEEDED FOR SLEEP OR ANIXETY

## 2025-06-08 DIAGNOSIS — M79.7 FIBROMYALGIA: ICD-10-CM

## 2025-08-18 DIAGNOSIS — M79.7 FIBROMYALGIA: ICD-10-CM

## 2025-08-18 DIAGNOSIS — K21.9 GERD (GASTROESOPHAGEAL REFLUX DISEASE): ICD-10-CM

## 2025-08-18 RX ORDER — PROMETHAZINE HYDROCHLORIDE 25 MG/1
TABLET ORAL
Qty: 60 TABLET | Refills: 2 | Status: SHIPPED | OUTPATIENT
Start: 2025-08-18

## 2025-08-18 RX ORDER — OMEPRAZOLE 20 MG/1
20 CAPSULE, DELAYED RELEASE ORAL DAILY
Qty: 90 CAPSULE | Refills: 1 | Status: SHIPPED | OUTPATIENT
Start: 2025-08-18

## (undated) DEVICE — SOLUTION IV 1000ML 0.9% SOD CHL

## (undated) DEVICE — 3M™ STERI-STRIP™ BLEND TONE SKIN CLOSURES, B1557, TAN, 1/2 IN X 4 IN (12MM X 100MM), 6 STRIPS/ENVELOPE: Brand: 3M™ STERI-STRIP™

## (undated) DEVICE — ELECTROSURGICAL PENCIL ROCKER SWITCH NON COATED BLADE ELECTRODE 10 FT (3 M) CORD HOLSTER: Brand: MEGADYNE

## (undated) DEVICE — BINDER ABD UNIV H9IN WAIST 30-45IN E SFT COT PREM 3 PNL

## (undated) DEVICE — GLOVE ORANGE PI 8 1/2   MSG9085

## (undated) DEVICE — TIP HELIX DIA7MM DISP FOR ULTRASONIC REV SYS UDRV 3

## (undated) DEVICE — STAPLER SKIN H3.9MM WIRE DIA0.58MM CRWN 6.9MM 35 STPL ROT

## (undated) DEVICE — BRA SURG SUPP MED 34-36 IN VELCRO STRP

## (undated) DEVICE — SOLUTION IV 1000ML LAC RINGERS PH 6.5 INJ USP VIAFLX PLAS

## (undated) DEVICE — SINGLE ACTION PUMPING SYSTEM

## (undated) DEVICE — 3M™ WARMING BLANKET, LOWER BODY, 10 PER CASE, 42568: Brand: BAIR HUGGER™

## (undated) DEVICE — MEDICINE CUPS: Brand: DEROYAL

## (undated) DEVICE — YANKAUER,OPEN TIP,W/O VENT,STERILE: Brand: MEDLINE INDUSTRIES, INC.

## (undated) DEVICE — SURE SET-DOUBLE BASIN-LF: Brand: MEDLINE INDUSTRIES, INC.

## (undated) DEVICE — SUTURE PLN GUT SZ 5-0 L18IN ABSRB YELLOWISH TAN L13MM PC-1 1915G

## (undated) DEVICE — INTENDED FOR TISSUE SEPARATION, AND OTHER PROCEDURES THAT REQUIRE A SHARP SURGICAL BLADE TO PUNCTURE OR CUT.: Brand: BARD-PARKER ® CARBON RIB-BACK BLADES

## (undated) DEVICE — DECANTER BAG 9": Brand: MEDLINE INDUSTRIES, INC.

## (undated) DEVICE — GOWN,SIRUS,POLYRNF,BRTHSLV,LG,30/CS: Brand: MEDLINE

## (undated) DEVICE — SPONGE,LAP,18"X18",DLX,XR,ST,5/PK,40/PK: Brand: MEDLINE

## (undated) DEVICE — SUTURE MCRYL SZ 4-0 L27IN ABSRB UD L19MM PS-2 1/2 CIR PRIM Y426H

## (undated) DEVICE — SPONGE GZ W4XL8IN COT WVN 12 PLY

## (undated) DEVICE — GLOVE ORANGE PI 7 1/2   MSG9075

## (undated) DEVICE — DRAPE IRRIG FLD WRM W44XL44IN W/ AORN STD PRTBL INTRATEMP

## (undated) DEVICE — SYSTEM IMPL DEL FOR BRST IMPL FUN (SEE COMMENT)

## (undated) DEVICE — TUBING BRST PMP L9FT DISP LAMIS

## (undated) DEVICE — DRESSING PETRO W3XL8IN N ADH KNIT CELOS ACETT ADPTC

## (undated) DEVICE — 6 ML SYRINGE LUER-LOCK TIP: Brand: MONOJECT

## (undated) DEVICE — ELECTRODE NDL 2.8IN COAT VALLEYLAB

## (undated) DEVICE — SPONGE,NEURO,1"X3",XR,STRL,LF,10/PK: Brand: MEDLINE

## (undated) DEVICE — E-Z CLEAN, NON-STICK, PTFE COATED, ELECTROSURGICAL BLADE ELECTRODE, 2.5 INCH (6.35 CM): Brand: EZ CLEAN

## (undated) DEVICE — TUBING ASPIR L12FT FOR LIPO SYS PSI-TEC

## (undated) DEVICE — TOWEL,OR,DSP,ST,BLUE,STD,4/PK,20PK/CS: Brand: MEDLINE

## (undated) DEVICE — TRAY CATHETER 16FR F INCLUDE BARDX IC COMPLT CARE DRNGE BG

## (undated) DEVICE — PLATE ES AD W 9FT CRD 2

## (undated) DEVICE — SURGICAL SET UP - SURE SET: Brand: MEDLINE INDUSTRIES, INC.

## (undated) DEVICE — SYRINGE, LUER LOCK, 10ML: Brand: MEDLINE

## (undated) DEVICE — SYRINGE 60ML IRRIG PISTON TOMEY

## (undated) DEVICE — GARMENT,MEDLINE,DVT,INT,CALF,MED, GEN2: Brand: MEDLINE

## (undated) DEVICE — SHEET,DRAPE,53X77,STERILE: Brand: MEDLINE

## (undated) DEVICE — SUTURE PDS II SZ 5-0 L18IN ABSRB UD P-3 L13MM 3/8 CIR PRIM Z493G

## (undated) DEVICE — COVER LT HNDL BLU PLAS

## (undated) DEVICE — SOLUTION PREP POVIDONE IOD FOR SKIN MUCOUS MEM PRIOR TO

## (undated) DEVICE — STANDARD HYPODERMIC NEEDLE,POLYPROPYLENE HUB: Brand: MONOJECT

## (undated) DEVICE — PACK,UNIVERSAL,NO GOWNS: Brand: MEDLINE

## (undated) DEVICE — INTENDED FOR TISSUE SEPARATION, AND OTHER PROCEDURES THAT REQUIRE A SHARP SURGICAL BLADE TO PUNCTURE OR CUT.: Brand: BARD-PARKER ® STAINLESS STEEL BLADES

## (undated) DEVICE — PADDING CAST W20XL29.8IN FOAM SELF ADH M SUPP LTWT RESTON

## (undated) DEVICE — DRAPE,UTILTY,TAPE,15X26, 4EA/PK: Brand: MEDLINE

## (undated) DEVICE — SYRINGE, LUER SLIP, STERILE, 60ML: Brand: MEDLINE

## (undated) DEVICE — ST FLUFF LG 1 PLY: Brand: DEROYAL

## (undated) DEVICE — JEWISH HOSPITAL TURNOVER KIT: Brand: MEDLINE INDUSTRIES, INC.

## (undated) DEVICE — CHLORAPREP 26ML ORANGE

## (undated) DEVICE — PENCIL ES ULT VAC W TELSCP NOSE EZ CLN BLDE 10FT

## (undated) DEVICE — SHEET 1532520 10PK SILICONE 5X5CM 1.02MM

## (undated) DEVICE — SPLINT NSL TRAPEZOIDAL IVRY

## (undated) DEVICE — Z DISCONTINUED BY MEDLINE USE 2711682 TRAY SKIN PREP DRY W/ PREM GLV

## (undated) DEVICE — ELECTRODE BLDE L6.5IN CAUT EXT DISP

## (undated) DEVICE — E-Z CLEAN, NON-STICK, PTFE COATED, ELECTROSURGICAL BLADE ELECTRODE, 4 INCH (10.2 CM): Brand: MEGADYNE

## (undated) DEVICE — BLANKET WRM W40.2XL55.9IN IORT LO BODY + MISTRAL AIR

## (undated) DEVICE — AGENT HEMSTAT W2XL3IN OXIDIZED REGENERATED CELOS ABSRB

## (undated) DEVICE — SYSTEM LIPO FAT PROC REVOLVE RV001] ALLERGAN USA INC]

## (undated) DEVICE — TURNOVER KIT RM INF CTRL TECH

## (undated) DEVICE — SHEET,DRAPE,40X58,STERILE: Brand: MEDLINE

## (undated) DEVICE — FLUID TRAP FOR MINIVAC ES EQUIP FLD TRAP

## (undated) DEVICE — SYRINGE MED 10ML LUERLOCK TIP W/O SFTY DISP

## (undated) DEVICE — Device

## (undated) DEVICE — GOWN,SIRUS,POLYRNF,BRTHSLV,XL,30/CS: Brand: MEDLINE

## (undated) DEVICE — ENT I-LF: Brand: MEDLINE INDUSTRIES, INC.

## (undated) DEVICE — STRIP,CLOSURE,WOUND,MEDI-STRIP,1/2X4: Brand: MEDLINE

## (undated) DEVICE — 3 ML SYRINGE LUER-LOCK TIP: Brand: MONOJECT

## (undated) DEVICE — SUTURE CHROMIC GUT SZ 5-0 L18IN ABSRB BRN P-3 L13MM 3/8 CIR 687G

## (undated) DEVICE — SUTURE PROL SZ 6-0 L18IN NONABSORBABLE BLU L36MM P-1 3/8 8697G

## (undated) DEVICE — MERCY HEALTH WEST TURNOVER: Brand: MEDLINE INDUSTRIES, INC.

## (undated) DEVICE — SYRINGE MED 50ML LUERLOCK TIP

## (undated) DEVICE — SUTURE ABSORBABLE MONOFILAMENT 4-0 SC-1 18 IN PLN GUT 1824H

## (undated) DEVICE — 3M™ IOBAN™ 2 ANTIMICROBIAL INCISE DRAPE 6640EZ: Brand: IOBAN™ 2

## (undated) DEVICE — SUTURE PROL SZ 3-0 L18IN NONABSORBABLE BLU L30MM FS-1 3/8 8663G

## (undated) DEVICE — DRESSING PAD FOAM SLF-ADH  7 7/8X11.75IN

## (undated) DEVICE — SOLUTION SCRB 4OZ 10% POVIDONE IOD ANTIMIC BTL

## (undated) DEVICE — APPLICATOR MEDICATED 26 CC SOLUTION HI LT ORNG CHLORAPREP

## (undated) DEVICE — GLOVE SURG SZ 75 L12IN FNGR THK79MIL GRN LTX FREE

## (undated) DEVICE — TURBINATOR WAND: Brand: COBLATION

## (undated) DEVICE — MASTISOL ADHESIVE LIQ 2/3ML